# Patient Record
Sex: FEMALE | Race: WHITE | NOT HISPANIC OR LATINO | Employment: OTHER | URBAN - NONMETROPOLITAN AREA
[De-identification: names, ages, dates, MRNs, and addresses within clinical notes are randomized per-mention and may not be internally consistent; named-entity substitution may affect disease eponyms.]

---

## 2017-02-01 ENCOUNTER — COMMUNICATION - GICH (OUTPATIENT)
Dept: INTERNAL MEDICINE | Facility: OTHER | Age: 82
End: 2017-02-01

## 2017-02-01 DIAGNOSIS — G47.00 INSOMNIA: ICD-10-CM

## 2017-02-06 ENCOUNTER — COMMUNICATION - GICH (OUTPATIENT)
Dept: INTERNAL MEDICINE | Facility: OTHER | Age: 82
End: 2017-02-06

## 2017-02-06 DIAGNOSIS — K21.9 GASTRO-ESOPHAGEAL REFLUX DISEASE WITHOUT ESOPHAGITIS: ICD-10-CM

## 2017-02-20 ENCOUNTER — COMMUNICATION - GICH (OUTPATIENT)
Dept: INTERNAL MEDICINE | Facility: OTHER | Age: 82
End: 2017-02-20

## 2017-02-20 DIAGNOSIS — E03.9 HYPOTHYROIDISM: ICD-10-CM

## 2017-02-20 DIAGNOSIS — I10 ESSENTIAL (PRIMARY) HYPERTENSION: ICD-10-CM

## 2017-03-19 ENCOUNTER — HISTORY (OUTPATIENT)
Dept: EMERGENCY MEDICINE | Facility: OTHER | Age: 82
End: 2017-03-19

## 2017-03-22 ENCOUNTER — COMMUNICATION - GICH (OUTPATIENT)
Dept: INTERNAL MEDICINE | Facility: OTHER | Age: 82
End: 2017-03-22

## 2017-03-27 ENCOUNTER — COMMUNICATION - GICH (OUTPATIENT)
Dept: INTERNAL MEDICINE | Facility: OTHER | Age: 82
End: 2017-03-27

## 2017-03-27 DIAGNOSIS — I10 ESSENTIAL (PRIMARY) HYPERTENSION: ICD-10-CM

## 2017-03-31 ENCOUNTER — OFFICE VISIT - GICH (OUTPATIENT)
Dept: INTERNAL MEDICINE | Facility: OTHER | Age: 82
End: 2017-03-31

## 2017-03-31 ENCOUNTER — HISTORY (OUTPATIENT)
Dept: INTERNAL MEDICINE | Facility: OTHER | Age: 82
End: 2017-03-31

## 2017-03-31 DIAGNOSIS — E03.9 HYPOTHYROIDISM: ICD-10-CM

## 2017-03-31 DIAGNOSIS — N28.9 DISORDER OF KIDNEY AND URETER: ICD-10-CM

## 2017-03-31 DIAGNOSIS — E86.0 DEHYDRATION: ICD-10-CM

## 2017-03-31 DIAGNOSIS — D64.9 ANEMIA: ICD-10-CM

## 2017-03-31 DIAGNOSIS — J11.1 INFLUENZA DUE TO UNIDENTIFIED INFLUENZA VIRUS WITH OTHER RESPIRATORY MANIFESTATIONS: ICD-10-CM

## 2017-03-31 DIAGNOSIS — I10 ESSENTIAL (PRIMARY) HYPERTENSION: ICD-10-CM

## 2017-03-31 LAB
ANION GAP - HISTORICAL: 9 (ref 5–18)
BUN SERPL-MCNC: 23 MG/DL (ref 7–25)
BUN/CREAT RATIO - HISTORICAL: 16
CALCIUM SERPL-MCNC: 9 MG/DL (ref 8.6–10.3)
CHLORIDE SERPLBLD-SCNC: 107 MMOL/L (ref 98–107)
CO2 SERPL-SCNC: 24 MMOL/L (ref 21–31)
CREAT SERPL-MCNC: 1.43 MG/DL (ref 0.7–1.3)
ERYTHROCYTE [DISTWIDTH] IN BLOOD BY AUTOMATED COUNT: 11.8 % (ref 11.5–15.5)
FOLATE: 6.8 NG/ML
GFR IF NOT AFRICAN AMERICAN - HISTORICAL: 35 ML/MIN/1.73M2
GLUCOSE SERPL-MCNC: 89 MG/DL (ref 70–105)
HCT VFR BLD AUTO: 35.9 % (ref 33–51)
HEMOGLOBIN: 11.9 G/DL (ref 12–16)
MCH RBC QN AUTO: 33.6 PG (ref 26–34)
MCHC RBC AUTO-ENTMCNC: 33.1 G/DL (ref 32–36)
MCV RBC AUTO: 101 FL (ref 80–100)
PLATELET # BLD AUTO: 327 THOU/CU MM (ref 140–440)
PMV BLD: 6.1 FL (ref 6.5–11)
POTASSIUM SERPL-SCNC: 4.2 MMOL/L (ref 3.5–5.1)
RED BLOOD COUNT - HISTORICAL: 3.54 MIL/CU MM (ref 4–5.2)
SODIUM SERPL-SCNC: 140 MMOL/L (ref 133–143)
T4 FREE SERPL-MCNC: 1.51 NG/DL (ref 0.58–1.64)
TSH - HISTORICAL: 1.63 UIU/ML (ref 0.34–5.6)
VIT B12 SERPL-MCNC: 598 PG/ML (ref 180–914)
WHITE BLOOD COUNT - HISTORICAL: 8.5 THOU/CU MM (ref 4.5–11)

## 2017-04-18 ENCOUNTER — COMMUNICATION - GICH (OUTPATIENT)
Dept: INTERNAL MEDICINE | Facility: OTHER | Age: 82
End: 2017-04-18

## 2017-04-18 DIAGNOSIS — I10 ESSENTIAL (PRIMARY) HYPERTENSION: ICD-10-CM

## 2017-04-20 ENCOUNTER — AMBULATORY - GICH (OUTPATIENT)
Dept: LAB | Facility: OTHER | Age: 82
End: 2017-04-20

## 2017-04-20 DIAGNOSIS — I10 ESSENTIAL (PRIMARY) HYPERTENSION: ICD-10-CM

## 2017-04-20 LAB
ANION GAP - HISTORICAL: 11 (ref 5–18)
BUN SERPL-MCNC: 21 MG/DL (ref 7–25)
BUN/CREAT RATIO - HISTORICAL: 14
CALCIUM SERPL-MCNC: 9.2 MG/DL (ref 8.6–10.3)
CHLORIDE SERPLBLD-SCNC: 107 MMOL/L (ref 98–107)
CO2 SERPL-SCNC: 24 MMOL/L (ref 21–31)
CREAT SERPL-MCNC: 1.53 MG/DL (ref 0.7–1.3)
GFR IF NOT AFRICAN AMERICAN - HISTORICAL: 32 ML/MIN/1.73M2
GLUCOSE SERPL-MCNC: 87 MG/DL (ref 70–105)
POTASSIUM SERPL-SCNC: 4.6 MMOL/L (ref 3.5–5.1)
SODIUM SERPL-SCNC: 142 MMOL/L (ref 133–143)

## 2017-04-28 ENCOUNTER — OFFICE VISIT - GICH (OUTPATIENT)
Dept: INTERNAL MEDICINE | Facility: OTHER | Age: 82
End: 2017-04-28

## 2017-04-28 ENCOUNTER — HISTORY (OUTPATIENT)
Dept: INTERNAL MEDICINE | Facility: OTHER | Age: 82
End: 2017-04-28

## 2017-04-28 DIAGNOSIS — I10 ESSENTIAL (PRIMARY) HYPERTENSION: ICD-10-CM

## 2017-04-28 DIAGNOSIS — N18.30 CHRONIC KIDNEY DISEASE, STAGE III (MODERATE) (H): ICD-10-CM

## 2017-04-28 DIAGNOSIS — R60.9 EDEMA: ICD-10-CM

## 2017-04-28 DIAGNOSIS — K21.9 GASTRO-ESOPHAGEAL REFLUX DISEASE WITHOUT ESOPHAGITIS: ICD-10-CM

## 2017-05-01 ENCOUNTER — COMMUNICATION - GICH (OUTPATIENT)
Dept: INTERNAL MEDICINE | Facility: OTHER | Age: 82
End: 2017-05-01

## 2017-05-01 DIAGNOSIS — I10 ESSENTIAL (PRIMARY) HYPERTENSION: ICD-10-CM

## 2017-05-01 DIAGNOSIS — E03.9 HYPOTHYROIDISM: ICD-10-CM

## 2017-05-04 ENCOUNTER — OFFICE VISIT - GICH (OUTPATIENT)
Dept: FAMILY MEDICINE | Facility: OTHER | Age: 82
End: 2017-05-04

## 2017-05-04 ENCOUNTER — HISTORY (OUTPATIENT)
Dept: FAMILY MEDICINE | Facility: OTHER | Age: 82
End: 2017-05-04

## 2017-05-04 DIAGNOSIS — M70.62 TROCHANTERIC BURSITIS OF LEFT HIP: ICD-10-CM

## 2017-05-04 DIAGNOSIS — M70.61 TROCHANTERIC BURSITIS OF RIGHT HIP: ICD-10-CM

## 2017-05-26 ENCOUNTER — OFFICE VISIT - GICH (OUTPATIENT)
Dept: INTERNAL MEDICINE | Facility: OTHER | Age: 82
End: 2017-05-26

## 2017-05-26 ENCOUNTER — HISTORY (OUTPATIENT)
Dept: INTERNAL MEDICINE | Facility: OTHER | Age: 82
End: 2017-05-26

## 2017-05-26 ENCOUNTER — HOSPITAL ENCOUNTER (OUTPATIENT)
Dept: RADIOLOGY | Facility: OTHER | Age: 82
End: 2017-05-26
Attending: NURSE PRACTITIONER

## 2017-05-26 DIAGNOSIS — I10 ESSENTIAL (PRIMARY) HYPERTENSION: ICD-10-CM

## 2017-05-26 DIAGNOSIS — R93.89 ABNORMAL FINDINGS ON DIAGNOSTIC IMAGING OF OTHER SPECIFIED BODY STRUCTURES: ICD-10-CM

## 2017-05-26 DIAGNOSIS — F17.210 CIGARETTE NICOTINE DEPENDENCE, UNCOMPLICATED: ICD-10-CM

## 2017-05-26 DIAGNOSIS — E53.8 DEFICIENCY OF OTHER SPECIFIED B GROUP VITAMINS (CODE): ICD-10-CM

## 2017-05-26 DIAGNOSIS — K21.9 GASTRO-ESOPHAGEAL REFLUX DISEASE WITHOUT ESOPHAGITIS: ICD-10-CM

## 2017-05-26 DIAGNOSIS — I95.9 HYPOTENSION: ICD-10-CM

## 2017-05-26 DIAGNOSIS — J11.1 INFLUENZA DUE TO UNIDENTIFIED INFLUENZA VIRUS WITH OTHER RESPIRATORY MANIFESTATIONS: ICD-10-CM

## 2017-05-26 DIAGNOSIS — N18.30 CHRONIC KIDNEY DISEASE, STAGE III (MODERATE) (H): ICD-10-CM

## 2017-05-26 DIAGNOSIS — R60.9 EDEMA: ICD-10-CM

## 2017-05-26 LAB
ANION GAP - HISTORICAL: 8 (ref 5–18)
BILIRUB UR QL: NEGATIVE
BUN SERPL-MCNC: 27 MG/DL (ref 7–25)
BUN/CREAT RATIO - HISTORICAL: 18
CALCIUM SERPL-MCNC: 9.2 MG/DL (ref 8.6–10.3)
CHLORIDE SERPLBLD-SCNC: 108 MMOL/L (ref 98–107)
CLARITY, URINE: CLEAR CLARITY
CO2 SERPL-SCNC: 24 MMOL/L (ref 21–31)
COLOR UR: YELLOW COLOR
CREAT SERPL-MCNC: 1.51 MG/DL (ref 0.7–1.3)
GFR IF NOT AFRICAN AMERICAN - HISTORICAL: 33 ML/MIN/1.73M2
GLUCOSE SERPL-MCNC: 93 MG/DL (ref 70–105)
GLUCOSE URINE: NEGATIVE MG/DL
KETONES UR QL: NEGATIVE MG/DL
LEUKOCYTE ESTERASE URINE: NEGATIVE
NITRITE UR QL STRIP: NEGATIVE
OCCULT BLOOD,URINE - HISTORICAL: NEGATIVE
PH UR: 5.5 [PH]
POTASSIUM SERPL-SCNC: 4.6 MMOL/L (ref 3.5–5.1)
PROTEIN QUALITATIVE,URINE - HISTORICAL: NEGATIVE MG/DL
SODIUM SERPL-SCNC: 140 MMOL/L (ref 133–143)
SP GR UR STRIP: 1.02
UROBILINOGEN,QUALITATIVE - HISTORICAL: NORMAL EU/DL

## 2017-06-09 ENCOUNTER — OFFICE VISIT - GICH (OUTPATIENT)
Dept: FAMILY MEDICINE | Facility: OTHER | Age: 82
End: 2017-06-09

## 2017-06-09 ENCOUNTER — HOSPITAL ENCOUNTER (OUTPATIENT)
Dept: RADIOLOGY | Facility: OTHER | Age: 82
End: 2017-06-09
Attending: FAMILY MEDICINE

## 2017-06-09 ENCOUNTER — HISTORY (OUTPATIENT)
Dept: FAMILY MEDICINE | Facility: OTHER | Age: 82
End: 2017-06-09

## 2017-06-09 DIAGNOSIS — R50.9 FEVER: ICD-10-CM

## 2017-06-09 DIAGNOSIS — R82.71 BACTERIURIA: ICD-10-CM

## 2017-06-09 DIAGNOSIS — R53.1 WEAKNESS: ICD-10-CM

## 2017-06-09 DIAGNOSIS — I49.9 CARDIAC ARRHYTHMIA: ICD-10-CM

## 2017-06-09 LAB
A/G RATIO - HISTORICAL: 1.2 (ref 1–2)
ALBUMIN SERPL-MCNC: 3.7 G/DL (ref 3.5–5.7)
ALP SERPL-CCNC: 46 IU/L (ref 34–104)
ALT (SGPT) - HISTORICAL: 17 IU/L (ref 7–52)
ANION GAP - HISTORICAL: 13 (ref 5–18)
AST SERPL-CCNC: 20 IU/L (ref 13–39)
BACTERIA URINE: ABNORMAL BACTERIA/HPF
BILIRUB SERPL-MCNC: 0.5 MG/DL (ref 0.3–1)
BILIRUB UR QL: ABNORMAL
BUN SERPL-MCNC: 27 MG/DL (ref 7–25)
BUN/CREAT RATIO - HISTORICAL: 18
CALCIUM SERPL-MCNC: 9 MG/DL (ref 8.6–10.3)
CHLORIDE SERPLBLD-SCNC: 105 MMOL/L (ref 98–107)
CLARITY, URINE: CLEAR CLARITY
CO2 SERPL-SCNC: 22 MMOL/L (ref 21–31)
COLOR UR: YELLOW COLOR
CREAT SERPL-MCNC: 1.46 MG/DL (ref 0.7–1.3)
EPITHELIAL CELLS: ABNORMAL EPI/HPF
ERYTHROCYTE [DISTWIDTH] IN BLOOD BY AUTOMATED COUNT: 13.3 % (ref 11.5–15.5)
GFR IF NOT AFRICAN AMERICAN - HISTORICAL: 34 ML/MIN/1.73M2
GLOBULIN - HISTORICAL: 3.2 G/DL (ref 2–3.7)
GLUCOSE SERPL-MCNC: 107 MG/DL (ref 70–105)
GLUCOSE URINE: NEGATIVE MG/DL
HCT VFR BLD AUTO: 34.8 % (ref 33–51)
HEMOGLOBIN: 11.5 G/DL (ref 12–16)
KETONES UR QL: ABNORMAL MG/DL
LEUKOCYTE ESTERASE URINE: NEGATIVE
MCH RBC QN AUTO: 33.5 PG (ref 26–34)
MCHC RBC AUTO-ENTMCNC: 33 G/DL (ref 32–36)
MCV RBC AUTO: 102 FL (ref 80–100)
NITRITE UR QL STRIP: NEGATIVE
OCCULT BLOOD,URINE - HISTORICAL: NEGATIVE
PH UR: 5 [PH]
PLATELET # BLD AUTO: 213 THOU/CU MM (ref 140–440)
PMV BLD: 9 FL (ref 6.5–11)
POTASSIUM SERPL-SCNC: 4.3 MMOL/L (ref 3.5–5.1)
PROT SERPL-MCNC: 6.9 G/DL (ref 6.4–8.9)
PROTEIN QUALITATIVE,URINE - HISTORICAL: ABNORMAL MG/DL
RBC - HISTORICAL: ABNORMAL /HPF
RED BLOOD COUNT - HISTORICAL: 3.43 MIL/CU MM (ref 4–5.2)
SODIUM SERPL-SCNC: 140 MMOL/L (ref 133–143)
SP GR UR STRIP: >=1.03
UROBILINOGEN,QUALITATIVE - HISTORICAL: NORMAL EU/DL
WBC - HISTORICAL: ABNORMAL /HPF
WHITE BLOOD COUNT - HISTORICAL: 13.8 THOU/CU MM (ref 4.5–11)

## 2017-06-11 LAB — CULTURE - HISTORICAL: NORMAL

## 2017-06-12 ENCOUNTER — COMMUNICATION - GICH (OUTPATIENT)
Dept: FAMILY MEDICINE | Facility: OTHER | Age: 82
End: 2017-06-12

## 2017-06-14 ENCOUNTER — OFFICE VISIT - GICH (OUTPATIENT)
Dept: INTERNAL MEDICINE | Facility: OTHER | Age: 82
End: 2017-06-14

## 2017-06-14 ENCOUNTER — HISTORY (OUTPATIENT)
Dept: INTERNAL MEDICINE | Facility: OTHER | Age: 82
End: 2017-06-14
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
## 2017-06-14 DIAGNOSIS — F17.210 CIGARETTE NICOTINE DEPENDENCE, UNCOMPLICATED: ICD-10-CM

## 2017-06-14 DIAGNOSIS — Z87.01 HISTORY OF RECURRENT PNEUMONIA: ICD-10-CM

## 2017-06-14 DIAGNOSIS — D72.829 ELEVATED WHITE BLOOD CELL COUNT: ICD-10-CM

## 2017-06-14 DIAGNOSIS — M25.561 PAIN IN RIGHT KNEE: ICD-10-CM

## 2017-06-14 DIAGNOSIS — N18.30 CHRONIC KIDNEY DISEASE, STAGE III (MODERATE) (H): ICD-10-CM

## 2017-06-14 DIAGNOSIS — R50.9 FEVER: ICD-10-CM

## 2017-06-14 LAB
ABSOLUTE BASOPHILS - HISTORICAL: 0 THOU/CU MM
ABSOLUTE EOSINOPHILS - HISTORICAL: 0.1 THOU/CU MM
ABSOLUTE LYMPHOCYTES - HISTORICAL: 2.2 THOU/CU MM (ref 0.9–2.9)
ABSOLUTE MONOCYTES - HISTORICAL: 0.9 THOU/CU MM
ABSOLUTE NEUTROPHILS - HISTORICAL: 10.3 THOU/CU MM (ref 1.7–7)
BASOPHILS # BLD AUTO: 0.2 %
EOSINOPHIL NFR BLD AUTO: 0.5 %
ERYTHROCYTE [DISTWIDTH] IN BLOOD BY AUTOMATED COUNT: 12.9 % (ref 11.5–15.5)
ERYTHROCYTE [SEDIMENTATION RATE] IN BLOOD: 100 MM/HR
HCT VFR BLD AUTO: 30.4 % (ref 33–51)
HEMOGLOBIN: 10 G/DL (ref 12–16)
LYMPHOCYTES NFR BLD AUTO: 16.1 % (ref 20–44)
MCH RBC QN AUTO: 33.1 PG (ref 26–34)
MCHC RBC AUTO-ENTMCNC: 32.9 G/DL (ref 32–36)
MCV RBC AUTO: 101 FL (ref 80–100)
MONOCYTES NFR BLD AUTO: 6.9 %
NEUTROPHILS NFR BLD AUTO: 75.6 % (ref 42–72)
PLATELET # BLD AUTO: 248 THOU/CU MM (ref 140–440)
PMV BLD: 9.1 FL (ref 6.5–11)
RED BLOOD COUNT - HISTORICAL: 3.02 MIL/CU MM (ref 4–5.2)
WHITE BLOOD COUNT - HISTORICAL: 13.6 THOU/CU MM (ref 4.5–11)

## 2017-06-16 ENCOUNTER — COMMUNICATION - GICH (OUTPATIENT)
Dept: INTERNAL MEDICINE | Facility: OTHER | Age: 82
End: 2017-06-16

## 2017-06-16 LAB — LYME SCREEN W/REFLEX WEST BLOT - HISTORICAL: NEGATIVE

## 2017-06-17 LAB
ANAPLASMA PHAGOCYTOPHILUM - HISTORICAL: NEGATIVE
EHRLICHIA CHAFFEENSIS - HISTORICAL: NEGATIVE
EHRLICHIA EWINGII/CANIS - HISTORICAL: NEGATIVE
EHRLICHIA MURIS-LIKE - HISTORICAL: NEGATIVE

## 2017-06-19 ENCOUNTER — COMMUNICATION - GICH (OUTPATIENT)
Dept: INTERNAL MEDICINE | Facility: OTHER | Age: 82
End: 2017-06-19

## 2017-06-19 DIAGNOSIS — I10 ESSENTIAL (PRIMARY) HYPERTENSION: ICD-10-CM

## 2017-06-20 LAB
CULTURE - HISTORICAL: NORMAL
CULTURE - HISTORICAL: NORMAL

## 2017-06-21 ENCOUNTER — HISTORY (OUTPATIENT)
Dept: INTERNAL MEDICINE | Facility: OTHER | Age: 82
End: 2017-06-21

## 2017-06-21 ENCOUNTER — OFFICE VISIT - GICH (OUTPATIENT)
Dept: INTERNAL MEDICINE | Facility: OTHER | Age: 82
End: 2017-06-21

## 2017-06-21 DIAGNOSIS — F17.210 CIGARETTE NICOTINE DEPENDENCE, UNCOMPLICATED: ICD-10-CM

## 2017-06-21 DIAGNOSIS — R26.81 UNSTEADINESS ON FEET: ICD-10-CM

## 2017-06-21 DIAGNOSIS — Z12.11 ENCOUNTER FOR SCREENING FOR MALIGNANT NEOPLASM OF COLON: ICD-10-CM

## 2017-06-21 DIAGNOSIS — D50.9 IRON DEFICIENCY ANEMIA: ICD-10-CM

## 2017-06-21 DIAGNOSIS — R50.9 FEVER: ICD-10-CM

## 2017-06-21 DIAGNOSIS — D72.829 ELEVATED WHITE BLOOD CELL COUNT: ICD-10-CM

## 2017-06-21 LAB
ABSOLUTE BASOPHILS - HISTORICAL: 0 THOU/CU MM
ABSOLUTE EOSINOPHILS - HISTORICAL: 0 THOU/CU MM
ABSOLUTE IMMATURE GRANULOCYTES(METAS,MYELOS,PROS) - HISTORICAL: 0.3 THOU/CU MM
ABSOLUTE LYMPHOCYTES - HISTORICAL: 3 THOU/CU MM (ref 0.9–2.9)
ABSOLUTE MONOCYTES - HISTORICAL: 0.5 THOU/CU MM
ABSOLUTE NEUTROPHILS - HISTORICAL: 9.3 THOU/CU MM (ref 1.7–7)
BASOPHILS # BLD AUTO: 0.2 %
EOSINOPHIL NFR BLD AUTO: 0.3 %
ERYTHROCYTE [DISTWIDTH] IN BLOOD BY AUTOMATED COUNT: 13.1 % (ref 11.5–15.5)
ERYTHROCYTE [SEDIMENTATION RATE] IN BLOOD: 106 MM/HR
FERRITIN SERPL-MCNC: 145.7 NG/ML (ref 23.9–336.2)
FOLATE: >24.8 NG/ML
HCT VFR BLD AUTO: 31.5 % (ref 33–51)
HEMOGLOBIN: 10.2 G/DL (ref 12–16)
IMMATURE GRANULOCYTES(METAS,MYELOS,PROS) - HISTORICAL: 2.2 %
IRON BINDING CAP: 255.08 UG/DL (ref 245–400)
IRON SATURATION: 10 % (ref 20–55)
IRON: 26 UG/DL (ref 50–212)
LYMPHOCYTES NFR BLD AUTO: 22.8 % (ref 20–44)
MCH RBC QN AUTO: 32.4 PG (ref 26–34)
MCHC RBC AUTO-ENTMCNC: 32.4 G/DL (ref 32–36)
MCV RBC AUTO: 100 FL (ref 80–100)
MONOCYTES NFR BLD AUTO: 3.9 %
NEUTROPHILS NFR BLD AUTO: 70.6 % (ref 42–72)
PLATELET # BLD AUTO: 453 THOU/CU MM (ref 140–440)
PMV BLD: 8.5 FL (ref 6.5–11)
RED BLOOD COUNT - HISTORICAL: 3.15 MIL/CU MM (ref 4–5.2)
UIBC (UNSATURATED) - HISTORICAL: 229.08 MG/DL
VIT B12 SERPL-MCNC: 953 PG/ML (ref 180–914)
WHITE BLOOD COUNT - HISTORICAL: 13.1 THOU/CU MM (ref 4.5–11)

## 2017-06-23 ENCOUNTER — HOSPITAL ENCOUNTER (OUTPATIENT)
Dept: RADIOLOGY | Facility: OTHER | Age: 82
End: 2017-06-23
Attending: NURSE PRACTITIONER

## 2017-06-23 DIAGNOSIS — R50.9 FEVER: ICD-10-CM

## 2017-06-23 DIAGNOSIS — F17.210 CIGARETTE NICOTINE DEPENDENCE, UNCOMPLICATED: ICD-10-CM

## 2017-06-23 DIAGNOSIS — D50.9 IRON DEFICIENCY ANEMIA: ICD-10-CM

## 2017-06-23 DIAGNOSIS — D72.829 ELEVATED WHITE BLOOD CELL COUNT: ICD-10-CM

## 2017-06-26 ENCOUNTER — AMBULATORY - GICH (OUTPATIENT)
Dept: INTERNAL MEDICINE | Facility: OTHER | Age: 82
End: 2017-06-26

## 2017-06-26 ENCOUNTER — COMMUNICATION - GICH (OUTPATIENT)
Dept: INTERNAL MEDICINE | Facility: OTHER | Age: 82
End: 2017-06-26

## 2017-06-26 DIAGNOSIS — K76.89 OTHER SPECIFIED DISEASES OF LIVER (CODE): ICD-10-CM

## 2017-06-29 ENCOUNTER — AMBULATORY - GICH (OUTPATIENT)
Dept: LAB | Facility: OTHER | Age: 82
End: 2017-06-29

## 2017-06-29 ENCOUNTER — HOSPITAL ENCOUNTER (OUTPATIENT)
Dept: RADIOLOGY | Facility: OTHER | Age: 82
End: 2017-06-29
Attending: NURSE PRACTITIONER

## 2017-06-29 DIAGNOSIS — K76.89 OTHER SPECIFIED DISEASES OF LIVER (CODE): ICD-10-CM

## 2017-06-29 DIAGNOSIS — D50.9 IRON DEFICIENCY ANEMIA: ICD-10-CM

## 2017-06-29 DIAGNOSIS — Z12.11 ENCOUNTER FOR SCREENING FOR MALIGNANT NEOPLASM OF COLON: ICD-10-CM

## 2017-06-29 LAB
A/G RATIO - HISTORICAL: 0.9 (ref 1–2)
ALBUMIN SERPL-MCNC: 3.7 G/DL (ref 3.5–5.7)
ALP SERPL-CCNC: 55 IU/L (ref 34–104)
ALT (SGPT) - HISTORICAL: 9 IU/L (ref 7–52)
ANION GAP - HISTORICAL: 7 (ref 5–18)
AST SERPL-CCNC: 15 IU/L (ref 13–39)
BILIRUB SERPL-MCNC: 0.3 MG/DL (ref 0.3–1)
BUN SERPL-MCNC: 22 MG/DL (ref 7–25)
BUN/CREAT RATIO - HISTORICAL: 17
CALCIUM SERPL-MCNC: 9.4 MG/DL (ref 8.6–10.3)
CHLORIDE SERPLBLD-SCNC: 107 MMOL/L (ref 98–107)
CO2 SERPL-SCNC: 24 MMOL/L (ref 21–31)
CREAT SERPL-MCNC: 1.32 MG/DL (ref 0.7–1.3)
GFR IF NOT AFRICAN AMERICAN - HISTORICAL: 38 ML/MIN/1.73M2
GLOBULIN - HISTORICAL: 4.1 G/DL (ref 2–3.7)
GLUCOSE SERPL-MCNC: 102 MG/DL (ref 70–105)
HEMOCCULT SP1 STL QL: NEGATIVE
HEMOCCULT SP2 STL QL: NEGATIVE
HEMOCCULT SP3 STL QL: NEGATIVE
POTASSIUM SERPL-SCNC: 4.3 MMOL/L (ref 3.5–5.1)
PROT SERPL-MCNC: 7.8 G/DL (ref 6.4–8.9)
SODIUM SERPL-SCNC: 138 MMOL/L (ref 133–143)
SPECIMEN DATE DAY 1 - HISTORICAL: NORMAL
SPECIMEN DATE DAY 2 - HISTORICAL: NORMAL
SPECIMEN DATE DAY 3 - HISTORICAL: NORMAL

## 2017-06-30 ENCOUNTER — AMBULATORY - GICH (OUTPATIENT)
Dept: INTERNAL MEDICINE | Facility: OTHER | Age: 82
End: 2017-06-30

## 2017-06-30 ENCOUNTER — COMMUNICATION - GICH (OUTPATIENT)
Dept: INTERNAL MEDICINE | Facility: OTHER | Age: 82
End: 2017-06-30

## 2017-06-30 ENCOUNTER — AMBULATORY - GICH (OUTPATIENT)
Dept: SCHEDULING | Facility: OTHER | Age: 82
End: 2017-06-30

## 2017-06-30 DIAGNOSIS — D50.9 IRON DEFICIENCY ANEMIA: ICD-10-CM

## 2017-06-30 DIAGNOSIS — K75.0 ABSCESS OF LIVER: ICD-10-CM

## 2017-07-02 ENCOUNTER — AMBULATORY - GICH (OUTPATIENT)
Dept: SCHEDULING | Facility: OTHER | Age: 82
End: 2017-07-02

## 2017-07-03 ENCOUNTER — COMMUNICATION - GICH (OUTPATIENT)
Dept: INTERNAL MEDICINE | Facility: OTHER | Age: 82
End: 2017-07-03

## 2017-07-03 DIAGNOSIS — G47.00 INSOMNIA: ICD-10-CM

## 2017-07-12 ENCOUNTER — HISTORY (OUTPATIENT)
Dept: INTERNAL MEDICINE | Facility: OTHER | Age: 82
End: 2017-07-12

## 2017-07-12 ENCOUNTER — OFFICE VISIT - GICH (OUTPATIENT)
Dept: INTERNAL MEDICINE | Facility: OTHER | Age: 82
End: 2017-07-12

## 2017-07-12 DIAGNOSIS — D50.8 OTHER IRON DEFICIENCY ANEMIAS (CODE): ICD-10-CM

## 2017-07-12 DIAGNOSIS — K75.0 ABSCESS OF LIVER: ICD-10-CM

## 2017-07-12 LAB
ABSOLUTE BASOPHILS - HISTORICAL: 0.1 THOU/CU MM
ABSOLUTE EOSINOPHILS - HISTORICAL: 0.6 THOU/CU MM
ABSOLUTE IMMATURE GRANULOCYTES(METAS,MYELOS,PROS) - HISTORICAL: 0.1 THOU/CU MM
ABSOLUTE LYMPHOCYTES - HISTORICAL: 3.7 THOU/CU MM (ref 0.9–2.9)
ABSOLUTE MONOCYTES - HISTORICAL: 0.6 THOU/CU MM
ABSOLUTE NEUTROPHILS - HISTORICAL: 5.9 THOU/CU MM (ref 1.7–7)
BASOPHILS # BLD AUTO: 0.8 %
EOSINOPHIL NFR BLD AUTO: 5.3 %
ERYTHROCYTE [DISTWIDTH] IN BLOOD BY AUTOMATED COUNT: 13.8 % (ref 11.5–15.5)
HCT VFR BLD AUTO: 34.7 % (ref 33–51)
HEMOGLOBIN: 11.7 G/DL (ref 12–16)
IMMATURE GRANULOCYTES(METAS,MYELOS,PROS) - HISTORICAL: 1.1 %
LYMPHOCYTES NFR BLD AUTO: 33.9 % (ref 20–44)
MCH RBC QN AUTO: 33.1 PG (ref 26–34)
MCHC RBC AUTO-ENTMCNC: 33.7 G/DL (ref 32–36)
MCV RBC AUTO: 98 FL (ref 80–100)
MONOCYTES NFR BLD AUTO: 5 %
NEUTROPHILS NFR BLD AUTO: 53.9 % (ref 42–72)
PLATELET # BLD AUTO: 256 THOU/CU MM (ref 140–440)
PMV BLD: 9.3 FL (ref 6.5–11)
RED BLOOD COUNT - HISTORICAL: 3.54 MIL/CU MM (ref 4–5.2)
WHITE BLOOD COUNT - HISTORICAL: 11 THOU/CU MM (ref 4.5–11)

## 2017-07-17 ENCOUNTER — AMBULATORY - GICH (OUTPATIENT)
Dept: SCHEDULING | Facility: OTHER | Age: 82
End: 2017-07-17

## 2017-07-24 ENCOUNTER — COMMUNICATION - GICH (OUTPATIENT)
Dept: INTERNAL MEDICINE | Facility: OTHER | Age: 82
End: 2017-07-24

## 2017-07-24 DIAGNOSIS — G60.9 HEREDITARY AND IDIOPATHIC NEUROPATHY: ICD-10-CM

## 2017-07-24 DIAGNOSIS — I10 ESSENTIAL (PRIMARY) HYPERTENSION: ICD-10-CM

## 2017-07-24 DIAGNOSIS — E03.9 HYPOTHYROIDISM: ICD-10-CM

## 2017-08-04 ENCOUNTER — HISTORY (OUTPATIENT)
Dept: INTERNAL MEDICINE | Facility: OTHER | Age: 82
End: 2017-08-04

## 2017-08-04 ENCOUNTER — OFFICE VISIT - GICH (OUTPATIENT)
Dept: INTERNAL MEDICINE | Facility: OTHER | Age: 82
End: 2017-08-04

## 2017-08-04 DIAGNOSIS — D50.9 IRON DEFICIENCY ANEMIA: ICD-10-CM

## 2017-08-04 DIAGNOSIS — G60.9 HEREDITARY AND IDIOPATHIC NEUROPATHY: ICD-10-CM

## 2017-08-04 DIAGNOSIS — K75.0 ABSCESS OF LIVER: ICD-10-CM

## 2017-08-04 DIAGNOSIS — K58.0 IRRITABLE BOWEL SYNDROME WITH DIARRHEA: ICD-10-CM

## 2017-08-09 ENCOUNTER — HOSPITAL ENCOUNTER (OUTPATIENT)
Dept: PHYSICAL THERAPY | Facility: OTHER | Age: 82
Setting detail: THERAPIES SERIES
End: 2017-08-09
Attending: NURSE PRACTITIONER

## 2017-08-09 DIAGNOSIS — R26.81 UNSTEADINESS ON FEET: ICD-10-CM

## 2017-08-25 ENCOUNTER — HOSPITAL ENCOUNTER (OUTPATIENT)
Dept: PHYSICAL THERAPY | Facility: OTHER | Age: 82
Setting detail: THERAPIES SERIES
End: 2017-08-25
Attending: NURSE PRACTITIONER

## 2017-08-26 ENCOUNTER — COMMUNICATION - GICH (OUTPATIENT)
Dept: INTERNAL MEDICINE | Facility: OTHER | Age: 82
End: 2017-08-26

## 2017-08-26 DIAGNOSIS — E03.9 HYPOTHYROIDISM: ICD-10-CM

## 2017-09-01 ENCOUNTER — AMBULATORY - GICH (OUTPATIENT)
Dept: CHIROPRACTIC MEDICINE | Facility: OTHER | Age: 82
End: 2017-09-01

## 2017-09-01 ENCOUNTER — OFFICE VISIT - GICH (OUTPATIENT)
Dept: CHIROPRACTIC MEDICINE | Facility: OTHER | Age: 82
End: 2017-09-01

## 2017-09-01 DIAGNOSIS — M70.61 TROCHANTERIC BURSITIS OF RIGHT HIP: ICD-10-CM

## 2017-09-01 DIAGNOSIS — M99.04 SEGMENTAL AND SOMATIC DYSFUNCTION OF SACRAL REGION: ICD-10-CM

## 2017-09-01 DIAGNOSIS — M99.05 SEGMENTAL AND SOMATIC DYSFUNCTION OF PELVIC REGION: ICD-10-CM

## 2017-09-05 ENCOUNTER — COMMUNICATION - GICH (OUTPATIENT)
Dept: INTERNAL MEDICINE | Facility: OTHER | Age: 82
End: 2017-09-05

## 2017-09-05 DIAGNOSIS — Z78.0 ASYMPTOMATIC MENOPAUSAL STATE: ICD-10-CM

## 2017-09-07 ENCOUNTER — OFFICE VISIT - GICH (OUTPATIENT)
Dept: CHIROPRACTIC MEDICINE | Facility: OTHER | Age: 82
End: 2017-09-07

## 2017-09-07 ENCOUNTER — HOSPITAL ENCOUNTER (OUTPATIENT)
Dept: PHYSICAL THERAPY | Facility: OTHER | Age: 82
Setting detail: THERAPIES SERIES
End: 2017-09-07
Attending: NURSE PRACTITIONER

## 2017-09-07 DIAGNOSIS — M70.61 TROCHANTERIC BURSITIS OF RIGHT HIP: ICD-10-CM

## 2017-09-07 DIAGNOSIS — M99.05 SEGMENTAL AND SOMATIC DYSFUNCTION OF PELVIC REGION: ICD-10-CM

## 2017-09-07 DIAGNOSIS — M99.04 SEGMENTAL AND SOMATIC DYSFUNCTION OF SACRAL REGION: ICD-10-CM

## 2017-09-11 ENCOUNTER — OFFICE VISIT - GICH (OUTPATIENT)
Dept: CHIROPRACTIC MEDICINE | Facility: OTHER | Age: 82
End: 2017-09-11

## 2017-09-11 DIAGNOSIS — M99.04 SEGMENTAL AND SOMATIC DYSFUNCTION OF SACRAL REGION: ICD-10-CM

## 2017-09-11 DIAGNOSIS — M70.61 TROCHANTERIC BURSITIS OF RIGHT HIP: ICD-10-CM

## 2017-09-11 DIAGNOSIS — M54.50 LOW BACK PAIN: ICD-10-CM

## 2017-09-11 DIAGNOSIS — M99.05 SEGMENTAL AND SOMATIC DYSFUNCTION OF PELVIC REGION: ICD-10-CM

## 2017-09-15 ENCOUNTER — HISTORY (OUTPATIENT)
Dept: RADIOLOGY | Facility: OTHER | Age: 82
End: 2017-09-15

## 2017-09-15 ENCOUNTER — OFFICE VISIT - GICH (OUTPATIENT)
Dept: CHIROPRACTIC MEDICINE | Facility: OTHER | Age: 82
End: 2017-09-15

## 2017-09-15 ENCOUNTER — HOSPITAL ENCOUNTER (OUTPATIENT)
Dept: RADIOLOGY | Facility: OTHER | Age: 82
End: 2017-09-15
Attending: NURSE PRACTITIONER

## 2017-09-15 DIAGNOSIS — Z78.0 ASYMPTOMATIC MENOPAUSAL STATE: ICD-10-CM

## 2017-09-15 DIAGNOSIS — Z12.31 ENCOUNTER FOR SCREENING MAMMOGRAM FOR MALIGNANT NEOPLASM OF BREAST: ICD-10-CM

## 2017-09-15 DIAGNOSIS — M99.02 SEGMENTAL AND SOMATIC DYSFUNCTION OF THORACIC REGION: ICD-10-CM

## 2017-09-15 DIAGNOSIS — M70.61 TROCHANTERIC BURSITIS OF RIGHT HIP: ICD-10-CM

## 2017-09-15 DIAGNOSIS — M54.6 PAIN IN THORACIC SPINE: ICD-10-CM

## 2017-09-15 DIAGNOSIS — M99.04 SEGMENTAL AND SOMATIC DYSFUNCTION OF SACRAL REGION: ICD-10-CM

## 2017-09-18 ENCOUNTER — OFFICE VISIT - GICH (OUTPATIENT)
Dept: CHIROPRACTIC MEDICINE | Facility: OTHER | Age: 82
End: 2017-09-18

## 2017-09-18 DIAGNOSIS — M99.05 SEGMENTAL AND SOMATIC DYSFUNCTION OF PELVIC REGION: ICD-10-CM

## 2017-09-18 DIAGNOSIS — M70.61 TROCHANTERIC BURSITIS OF RIGHT HIP: ICD-10-CM

## 2017-09-20 ENCOUNTER — AMBULATORY - GICH (OUTPATIENT)
Dept: INTERNAL MEDICINE | Facility: OTHER | Age: 82
End: 2017-09-20

## 2017-09-20 ENCOUNTER — COMMUNICATION - GICH (OUTPATIENT)
Dept: INTERNAL MEDICINE | Facility: OTHER | Age: 82
End: 2017-09-20

## 2017-09-20 DIAGNOSIS — G47.00 INSOMNIA: ICD-10-CM

## 2017-09-21 ENCOUNTER — HOSPITAL ENCOUNTER (OUTPATIENT)
Dept: PHYSICAL THERAPY | Facility: OTHER | Age: 82
Setting detail: THERAPIES SERIES
End: 2017-09-21
Attending: NURSE PRACTITIONER

## 2017-09-21 ENCOUNTER — OFFICE VISIT - GICH (OUTPATIENT)
Dept: CHIROPRACTIC MEDICINE | Facility: OTHER | Age: 82
End: 2017-09-21

## 2017-09-21 DIAGNOSIS — M54.6 PAIN IN THORACIC SPINE: ICD-10-CM

## 2017-09-21 DIAGNOSIS — M54.50 LOW BACK PAIN: ICD-10-CM

## 2017-09-21 DIAGNOSIS — M99.02 SEGMENTAL AND SOMATIC DYSFUNCTION OF THORACIC REGION: ICD-10-CM

## 2017-09-21 DIAGNOSIS — M99.04 SEGMENTAL AND SOMATIC DYSFUNCTION OF SACRAL REGION: ICD-10-CM

## 2017-09-22 ENCOUNTER — COMMUNICATION - GICH (OUTPATIENT)
Dept: INTERNAL MEDICINE | Facility: OTHER | Age: 82
End: 2017-09-22

## 2017-09-22 DIAGNOSIS — N95.2 POSTMENOPAUSAL ATROPHIC VAGINITIS: ICD-10-CM

## 2017-09-26 ENCOUNTER — COMMUNICATION - GICH (OUTPATIENT)
Dept: INTERNAL MEDICINE | Facility: OTHER | Age: 82
End: 2017-09-26

## 2017-09-26 DIAGNOSIS — E03.9 HYPOTHYROIDISM: ICD-10-CM

## 2017-10-06 ENCOUNTER — OFFICE VISIT - GICH (OUTPATIENT)
Dept: INTERNAL MEDICINE | Facility: OTHER | Age: 82
End: 2017-10-06

## 2017-10-06 ENCOUNTER — HISTORY (OUTPATIENT)
Dept: INTERNAL MEDICINE | Facility: OTHER | Age: 82
End: 2017-10-06

## 2017-10-06 DIAGNOSIS — M85.80 OTHER SPECIFIED DISORDERS OF BONE DENSITY AND STRUCTURE, UNSPECIFIED SITE (CODE): ICD-10-CM

## 2017-10-06 DIAGNOSIS — K58.0 IRRITABLE BOWEL SYNDROME WITH DIARRHEA: ICD-10-CM

## 2017-10-06 DIAGNOSIS — Z23 ENCOUNTER FOR IMMUNIZATION: ICD-10-CM

## 2017-10-06 DIAGNOSIS — E03.9 HYPOTHYROIDISM: ICD-10-CM

## 2017-10-06 LAB — VITAMIN D TOTAL - HISTORICAL: 42.7 NG/ML

## 2017-10-10 ENCOUNTER — HOSPITAL ENCOUNTER (OUTPATIENT)
Dept: PHYSICAL THERAPY | Facility: OTHER | Age: 82
Setting detail: THERAPIES SERIES
End: 2017-10-10
Attending: NURSE PRACTITIONER

## 2017-10-23 ENCOUNTER — COMMUNICATION - GICH (OUTPATIENT)
Dept: INTERNAL MEDICINE | Facility: OTHER | Age: 82
End: 2017-10-23

## 2017-10-23 DIAGNOSIS — E03.9 HYPOTHYROIDISM: ICD-10-CM

## 2017-10-23 DIAGNOSIS — G60.9 HEREDITARY AND IDIOPATHIC NEUROPATHY: ICD-10-CM

## 2017-10-23 DIAGNOSIS — I10 ESSENTIAL (PRIMARY) HYPERTENSION: ICD-10-CM

## 2017-10-30 ENCOUNTER — COMMUNICATION - GICH (OUTPATIENT)
Dept: INTERNAL MEDICINE | Facility: OTHER | Age: 82
End: 2017-10-30

## 2017-10-30 DIAGNOSIS — G47.00 INSOMNIA: ICD-10-CM

## 2017-11-02 ENCOUNTER — COMMUNICATION - GICH (OUTPATIENT)
Dept: INTERNAL MEDICINE | Facility: OTHER | Age: 82
End: 2017-11-02

## 2017-11-07 ENCOUNTER — COMMUNICATION - GICH (OUTPATIENT)
Dept: INTERNAL MEDICINE | Facility: OTHER | Age: 82
End: 2017-11-07

## 2017-12-04 ENCOUNTER — AMBULATORY - GICH (OUTPATIENT)
Dept: FAMILY MEDICINE | Facility: OTHER | Age: 82
End: 2017-12-04

## 2017-12-11 ENCOUNTER — COMMUNICATION - GICH (OUTPATIENT)
Dept: INTERNAL MEDICINE | Facility: OTHER | Age: 82
End: 2017-12-11

## 2017-12-11 DIAGNOSIS — G47.00 INSOMNIA: ICD-10-CM

## 2017-12-20 ENCOUNTER — COMMUNICATION - GICH (OUTPATIENT)
Dept: INTERNAL MEDICINE | Facility: OTHER | Age: 82
End: 2017-12-20

## 2017-12-20 DIAGNOSIS — E03.9 HYPOTHYROIDISM: ICD-10-CM

## 2017-12-27 NOTE — PROGRESS NOTES
Patient Information     Patient Name MRN Sex Sylvie Raza [id number] Female 1934      Progress Notes by Mirella Aly NP at 2017 10:20 AM     Author:  Mirella Aly NP Service:  (none) Author Type:  PHYS- Nurse Practitioner     Filed:  2017 10:48 AM Encounter Date:  2017 Status:  Signed     :  Mirella Aly NP (PHYS- Nurse Practitioner)            SUBJECTIVE:    Sylvie Bautista is a 83 y.o. female who presents for follow-up    HPI  last month pacer was hospitalized for liver abscess. She had incision and drainage. She was placed on Augmentin. She followed up with GI a couple weeks ago and all was well. She has no abdominal pain. Denies jaundice and yellow stools. Feeling well. Appetite is good. She also had anemia which improved after liver abscess was drained. She was found to have some mild iron deficiency anemia. This was felt to be diet related as she has a diet poor and iron. She has made some changes to her diet to improve that. She is not on iron pills. Her last hemoglobin was 11.7 g/dL. She also has history of irritable bowel syndrome which fluctuate between diarrhea and constipation. She is wondering about Vibrezi. She had started Metamucil) before the liver abscess and discontinue that after a couple of days but is planning to restart it once again to see if that improves her symptoms. She has been taking Pepto-Bismol occasionally with diarrhea stools because the Imodium causes too much constipation.  She was referred to physical therapy prior to liver abscess secondary to gait instability. She states her balance is off. If she has to walk any long distances she will use a cane. She never went to physical therapy because then she was in the hospital but she is planning to start it this week.  No Known Allergies,   Current Outpatient Prescriptions on File Prior to Visit       Medication  Sig Dispense Refill     acetaminophen SR (TYLENOL  ARTHRITIS) 650 mg Extended-Release tablet Take 1,300 mg by mouth every 24 hours. Max acetaminophen dose: 4000mg in 24 hrs.   Indications: PAIN       aspirin 81 mg tablet Take 81 mg by mouth once daily with a meal.       Blood Pressure Test Kit-Large kit As directed. OMRON 1 Each 0     CALCIUM CARBONATE/VITAMIN D3 (CALCIUM 600 + D,3, ORAL) Take  by mouth 2 times daily.       carboxymethylcellulose 0.5% (REFRESH TEARS) 0.5 % drop ophthalmic drops Place 1 Drop into both eyes once daily.  0     chlorthalidone (HYGROTON) 25 mg tablet TAKE ONE TABLET three days per week 45 tablet 0     docusate (DULCOLAX STOOL SOFTENER, DSS,) 100 mg capsule Take 1 capsule by mouth for constipation. Can take 2 in 24 hours.  0     estradiol (ESTRACE) 0.1 mg/g vaginal cream Insert 2 g into the vagina every Monday and Friday. 42.5 g 6     folic acid 1 mg tablet Take 1 tablet by mouth once daily.  0     gabapentin (NEURONTIN) 300 mg capsule TAKE 1 CAPSULE BY MOUTH THREE TIMES DAILY 270 capsule 0     levothyroxine (SYNTHROID) 112 mcg tablet TAKE 1 TABLET BY MOUTH ONCE DAILY 90 tablet 2     loratadine (CLARITIN) 10 mg tablet Take 1 tablet by mouth once daily.  0     losartan (COZAAR) 25 mg tablet TAKE 1 TABLET BY MOUTH EVERY DAY 90 tablet 3     pantoprazole (PROTONIX) 20 mg tablet Take 1 tablet by mouth once daily. 90 tablet 3     SF 5000 PLUS 1.1 % crea   98     Vit C-Vit E-Lutein-Min-OM-3 (OCUVITE) 287-11-9-150 mg-unit-mg-mg cap 1 tablet by mouth once daily.  0     VITAMIN C 1,000 MG TAB 1 tablet oral daily       zolpidem (AMBIEN) 5 mg tablet TAKE ONE TABLET BY MOUTH NIGHTLY AT BEDTIME AS NEEDED FOR SLEEP 40 tablet 2     No current facility-administered medications on file prior to visit.     and   Past Medical History:     Diagnosis  Date     CHRONIC KIDNEY DISEASE STAGE III (MODERATE) 7/27/2011     Colon polyp 2006    sessile adenoma       Depression with anxiety     1972 with divorce - Imipramine;  Sertraline 5/08      Diarrhea 11/6/2014  "    Dysphagia 2007    EGD 3/14/07 nl; sx from goiter.      Grave's disease 2004    on Tapazole      HTN (hypertension)      Hx of pregnancy     vaginal deliveries       Kidney stones 1990    s/p lithotripsy      Menopause     DXA 10/27/06 normal      Raynaud phenomenon 6/5/2015     Tobacco use disorder        REVIEW OF SYSTEMS:  ROS  see history of present illness  OBJECTIVE:  /64  Temp 96.1  F (35.6  C) (Tympanic)  Ht 1.632 m (5' 4.25\")  Wt 68.9 kg (152 lb)  Breastfeeding? No  BMI 25.89 kg/m2    EXAM:   Physical Exam  pleasant female without acute distress. Affect normal. Alert and oriented ×4. No ataxia. Skin color pink. Sclera nonicteric. Lung fields clear to auscultation throughout. Cardiovascular regular rate and rhythm. Abdomen soft without masses, tenderness and organomegaly.  Most recent CBC reviewed and discussed with patient. She has had Hemoccult-negative stools.  ASSESSMENT/PLAN:    ICD-10-CM    1. Liver abscess K75.0    2. Iron deficiency anemia, unspecified iron deficiency anemia type D50.9    3. Irritable bowel syndrome with diarrhea K58.0 Psyllium Seed-Sucrose (METAMUCIL, SUGAR,) powder   4. NEUROPATHY-PERIPHERAL G60.9         Plan:  1. Liver abscess has resolved. If for some reason she develops fever, lethargy, abdominal pain, jaundice she needs to be seen. She does not have any follow-up appointments with GI. 2. Iron deficiency anemia secondary to poor iron diet. She has incorporated iron rich foods and to her diet, does not want any iron supplements. 3. Chronic irritable bowel syndrome with alternating constipation and diarrhea. Will restart the Metamucil 1 teaspoon daily in 8 ounces of water. May need to back off on the Pepto-Bismol if she develops constipation. Discussed with patient that the Vibrezi is for patients with irritable bowel syndrome with predominant diarrhea. Also discussed with patient potential complications of this medication to include bowel obstruction and patient " declines. She states her symptoms are not bad enough to really do much more about it. 4. She will restart physical therapy. Gait instability secondary to peripheral neuropathy. She's not having any falls with the exception of when she was ill with a liver abscess.

## 2017-12-27 NOTE — PROGRESS NOTES
Patient Information     Patient Name MRN Sex Sylvie Raza [id number] Female 1934      Progress Notes by Nita Polanco at 2017 10:19 AM     Author:  Nita Polanco Service:  (none) Author Type:  Other Clinical Staff     Filed:  2017 10:19 AM Date of Service:  2017 10:19 AM Status:  Signed     :  Nita Polanco (Other Clinical Staff)            Falls Risk Criteria:    Age 65 and older or under age 4        Sensory deficits    Poor vision    Use of ambulatory aides    Impaired judgment    Unable to walk independently    Meets High Risk criteria for falls: yes             1.  Do you have dizziness or vertigo?    no                    2.  Do you need help standing or walking?   no                 3.  Have you fallen within the last 6 months?    no           4.  Has the patient been fasting?      no       If any risks are marked Yes, the following interventions are utilized:    Do not leave patient unattended     Assist patient in the dressing room and bathroom    Have ambulatory aides available throughout procedure    Involve patient s family if available            2

## 2017-12-27 NOTE — PROGRESS NOTES
Patient Information     Patient Name MRN Sex Sylvie Raza [id number] Female 1934      Progress Notes by Mirella Aly NP at 2017  2:40 PM     Author:  Mirella Aly NP Service:  (none) Author Type:  PHYS- Nurse Practitioner     Filed:  2017  4:21 PM Encounter Date:  2017 Status:  Signed     :  Mirella Aly NP (PHYS- Nurse Practitioner)            SUBJECTIVE:    Sylvie Bautista is a 83 y.o. female who presents for febrile illness    Fever    This is a new problem. The current episode started in the past 7 days. The problem has been unchanged. The maximum temperature noted was 102 to 102.9 F. The temperature was taken using an oral thermometer. Pertinent negatives include no abdominal pain, chest pain, congestion, coughing, diarrhea, ear pain, headaches, muscle aches, nausea, rash, sleepiness, sore throat, urinary pain, vomiting or wheezing. She has tried NSAIDs for the symptoms. The treatment provided moderate relief.   Risk factors: recent sickness    Risk factors: no contaminated food, no contaminated water, no hx of cancer and no immunosuppression    above answers were by patient on her my chart.    She was seen in clinic last Friday by Dr. Casas. Had chest x-ray, EKG and lab work completed. Urinalysis slightly abnormal. She was started on amoxicillin. She took this until Monday and was called and told that she did not need to continue the antibiotic because urine culture was normal. Her white count was slightly elevated last week at 13.8. The fever continues to be off and on and was 102  last night. She did have a bite on her left wrist last week. She lives in an apartment complex and mostly has hardscape surfaces outdoors. Does not recall being bit by any ticks or mosquitoes recently. Occasionally will work with potted plants. She was hospitalized one year ago with pneumonia. She was very weak at that time. Pneumonia resolved. She does feel  that her weakness is getting better. She does report that this morning she had acute sharp right knee pain. She took one ibuprofen around noon and the pain in her knee has moderately improved. The highest her temperature is been today is 100.2. She was sweaty one night but otherwise no night sweats. Appetite improving. Weight stable.  Has not started any new medications.    No Known Allergies,   Family History       Problem   Relation Age of Onset     Other  Mother       87yo, emphysema, dementia       Heart Disease  Father      MI in 50s,  62yo       Diabetes  Father      Alcohol/Drug  Father      Etoh       Diabetes  Daughter      Psychiatric illness  Daughter      depr/anxiety       Thyroid Disease  Sister      Cancer-breast  No Family History    ,   Current Outpatient Prescriptions on File Prior to Visit       Medication  Sig Dispense Refill     acetaminophen SR (TYLENOL ARTHRITIS) 650 mg Extended-Release tablet Take 1,300 mg by mouth every 24 hours. Max acetaminophen dose: 4000mg in 24 hrs.   Indications: PAIN       aspirin 81 mg tablet Take 81 mg by mouth once daily with a meal.       Blood Pressure Test Kit-Large kit As directed. OMRON 1 Each 0     CALCIUM CARBONATE/VITAMIN D3 (CALCIUM 600 + D,3, ORAL) Take  by mouth 2 times daily.       carboxymethylcellulose 0.5% (REFRESH TEARS) 0.5 % drop ophthalmic drops Place 1 Drop into both eyes once daily.  0     chlorthalidone (HYGROTON) 25 mg tablet TAKE ONE TABLET three days per week 45 tablet 0     docusate (DULCOLAX STOOL SOFTENER, DSS,) 100 mg capsule Take 1 capsule by mouth for constipation. Can take 2 in 24 hours.  0     estradiol (ESTRACE) 0.1 mg/g vaginal cream Insert 2 g into the vagina every Monday and Friday. 42.5 g 6     folic acid 1 mg tablet Take 1 tablet by mouth once daily.  0     gabapentin (NEURONTIN) 300 mg capsule Take 1 capsule by mouth 3 times daily. 270 capsule 3     levothyroxine (SYNTHROID) 112 mcg tablet TAKE 1 TABLET BY MOUTH ONCE  DAILY 90 tablet 2     loratadine (CLARITIN) 10 mg tablet Take 1 tablet by mouth once daily.  0     losartan (COZAAR) 25 mg tablet TAKE ONE TABLET BY MOUTH EVERY DAY 90 tablet 0     pantoprazole (PROTONIX) 20 mg tablet Take 1 tablet by mouth once daily. 90 tablet 3     SF 5000 PLUS 1.1 % crea   98     Vit C-Vit E-Lutein-Min-OM-3 (OCUVITE) [id number] mg-unit-mg-mg cap 1 tablet by mouth once daily.  0     VITAMIN C 1,000 MG TAB 1 tablet oral daily       zolpidem (AMBIEN) 5 mg tablet TAKE ONE TABLET BY MOUTH NIGHTLY AT BEDTIME AS NEEDED FOR SLEEP 40 tablet 3     No current facility-administered medications on file prior to visit.    ,   Past Medical History:     Diagnosis  Date     CHRONIC KIDNEY DISEASE STAGE III (MODERATE) 7/27/2011     Colon polyp 2006    sessile adenoma       Depression with anxiety     1972 with divorce - Imipramine;  Sertraline 5/08      Diarrhea 11/6/2014     Dysphagia 2007    EGD 3/14/07 nl; sx from goiter.      Grave's disease 2004    on Tapazole      HTN (hypertension)      Hx of pregnancy     vaginal deliveries       Kidney stones 1990    s/p lithotripsy      Menopause     DXA 10/27/06 normal      Raynaud phenomenon 6/5/2015     Tobacco use disorder    ,   Past Surgical History:      Procedure  Laterality Date     APPENDECTOMY  1979,     & Meckel's diverticulum removed       COLONOSCOPY DIAGNOSTIC  11/17/06    polyps; repeat in 5 YEARS       COLONOSCOPY DIAGNOSTIC  11/8/11     ESOPHAGOGASTRODUODENOSCOPY  3/14/07    done for dysphagia; normal       FOOT SURGERY  10/26/2015    [facility] with Dr. Grimaldo       IR BILIARY STRICTURE DILATATION WO STENT      x3- per h/p /Choledochojejunostomy with Vane-En-Y due to transection of common bile duct       LAP CHOLECYSTECTOMY  1991    common bile duct transected       OR COLONOSCOPY REMOVE PAMELA POLYP LESN HOT BPSY FORCEP  11/6/2014            PREMALIG/BENIGN SKIN LESION EXCISION  4/05    epidermal inclusion cyst        "ARMEN AND BSO  1979     THYROIDECTOMY  3/26/08    Total thyroidectomy; multinodular goiter      and   Social History       Substance Use Topics         Smoking status:   Current Every Day Smoker     Packs/day:  1.00     Years:  30.00     Types:  Cigarettes     Smokeless tobacco:   Never Used      Comment: 1/2 - 3/4 ppd; not interested in quitting at this time.        Alcohol use   No       REVIEW OF SYSTEMS:  Review of Systems   Constitutional: Positive for fever.   HENT: Negative for congestion, ear pain and sore throat.    Respiratory: Negative for cough and wheezing.    Cardiovascular: Negative for chest pain.   Gastrointestinal: Negative for abdominal pain, diarrhea, nausea and vomiting.   Genitourinary: Negative for dysuria.   Musculoskeletal: Positive for joint pain. Negative for neck pain.   Skin: Negative for rash.   Neurological: Negative for headaches.       OBJECTIVE:  /60  Temp 98.8  F (37.1  C) (Tympanic)  Ht 1.632 m (5' 4.25\")  Wt 72.1 kg (159 lb)  Breastfeeding? No  BMI 27.08 kg/m2  weight down 3 pounds since April 2017  EXAM:   Physical Exam   Constitutional: She is oriented to person, place, and time and well-developed, well-nourished, and in no distress. No distress.   HENT:   Mouth/Throat: Oropharynx is clear and moist. No oropharyngeal exudate.   TMs clear bilaterally. Throat without erythema and exudate.   Eyes: Conjunctivae are normal. No scleral icterus.   Neck: Neck supple. No thyromegaly present.   Cardiovascular: Normal rate, regular rhythm and normal heart sounds.  Exam reveals no gallop and no friction rub.    No murmur heard.  Pulmonary/Chest: Effort normal and breath sounds normal. No respiratory distress. She has no wheezes. She has no rales.   Abdominal: Soft. Bowel sounds are normal. She exhibits no distension. There is no tenderness.   No axillary or inguinal adenopathy. No suprapubic tenderness. No CVA tenderness.   Musculoskeletal: She exhibits no edema. "   Lymphadenopathy:     She has no cervical adenopathy.   Neurological: She is alert and oriented to person, place, and time.   Using a cane for ambulation. Slight left. Right knee with mild swelling when compared to left. No erythema or warmth. Normal range of motion. No pain with palpation or with range of motion.   Skin: Skin is warm. No rash noted. She is not diaphoretic. No pallor.   Left wrist is without a bite javier   Psychiatric: Mood, memory, affect and judgment normal.   Nursing note and vitals reviewed.  Visit note, laboratory and diagnostic studies from appointment last week all reviewed and discussed with patient.    ASSESSMENT/PLAN:    ICD-10-CM    1. Febrile illness R50.9 CBC WITH DIFFERENTIAL      BLOOD CULTURE      ANAPLASMA      LYME SCREEN W/REFLEX      BLOOD CULTURE      CBC WITH DIFFERENTIAL      BLOOD CULTURE      ANAPLASMA      LYME SCREEN W/REFLEX      BLOOD CULTURE      CBC WITH AUTO DIFFERENTIAL      levoFLOXacin (LEVAQUIN) 250 mg tablet      SEDIMENTATION RATE   2. Fever of unknown origin R50.9 levoFLOXacin (LEVAQUIN) 250 mg tablet      SEDIMENTATION RATE   3. Leukocytosis, unspecified type D72.829 SEDIMENTATION RATE   4. Insect bite, initial encounter W57.XXXA    5. Acute pain of right knee M25.561    6. Cigarette smoker F17.210    7. History of pneumonia Z87.01         Plan:  Patient is a tobacco user and has history of pneumonia 1 year which clinically began similar to this illness. She is not having any cough or shortness of breath. Will empirically treat with Levaquin 250 mg 2 pills today than one pill daily for the next 6 days. Dose adjusted for renal disease. If she finds his symptoms are not improving or she develops worsening she needs to be seen otherwise will see her back in 1 week. If continues to be mildly elevated at 13.6. Blood cultures ×2 are pending. Also obtained tick titers to include Anaplasma and Lymes however diagnosis unlikely in this patient but she did have a bite on  her wrist and right knee pain that was new and acute and also has been feverish. If symptoms do not resolve with antibiotic treatment may need to proceed with further laboratory diagnostic studies to workup for fever of unknown origin.

## 2017-12-27 NOTE — PROGRESS NOTES
Patient Information     Patient Name MRN Sex Sylvie Raza [id number] Female 1934      Progress Notes by Mirella Aly NP at 10/6/2017 10:20 AM     Author:  Mirella Aly NP Service:  (none) Author Type:  PHYS- Nurse Practitioner     Filed:  10/6/2017 11:09 AM Encounter Date:  10/6/2017 Status:  Signed     :  Mirella Aly NP (PHYS- Nurse Practitioner)            SUBJECTIVE:    Sylvie Bautista is a 83 y.o. female who presents for follow-up on osteopenia    HPI  patient had bone density scan recently. This did show osteopenia. Her fracture score was increased at the femur. She has never been on medication to treat osteoporosis. She does take calcium 600 mg once or twice daily. She is taking additional vitamin D but she is not sure the strength. There is also Vitamin D in her calcium supplement. She has never had compression fracture in the past. She is quite vigorous and active. She continues to smoke tobacco and is not interested in tobacco cessation. She has never had vitamin D level completed.  Hypothyroidism: Patient had normal TSH in March of this year. She was sick with a liver abscess in  and had a poor appetite and weight loss. She lost approximately 10 pounds. Her weight has stabilized but she has not started to gain weight and yet with the exception of 1 pound. Her appetite is good and she is not losing weight any longer. She denies other symptoms of thyroid disease.  Irritable bowel syndrome with diarrhea: Patient has not had diarrhea for several weeks and then yesterday had a large amount of diarrhea. She states she has not been taking the Metamucil faithfully. She is maybe only doing this every 3 days. She is finding that most days she is more constipated than having diarrhea. She does not have any abdominal pain. No melena or rectal bleeding. Last colonoscopy . This is a typical pattern for her irritable bowel.  She is also in need of  influenza vaccine.  No Known Allergies,   Current Outpatient Prescriptions on File Prior to Visit       Medication  Sig Dispense Refill     acetaminophen SR (TYLENOL ARTHRITIS) 650 mg Extended-Release tablet Take 1,300 mg by mouth every 24 hours. Max acetaminophen dose: 4000mg in 24 hrs.   Indications: PAIN       aspirin 81 mg tablet Take 81 mg by mouth once daily with a meal.       Blood Pressure Test Kit-Large kit As directed. OMRON 1 Each 0     CALCIUM CARBONATE/VITAMIN D3 (CALCIUM 600 + D,3, ORAL) Take  by mouth 2 times daily.       carboxymethylcellulose 0.5% (REFRESH TEARS) 0.5 % drop ophthalmic drops Place 1 Drop into both eyes once daily.  0     chlorthalidone (HYGROTON) 25 mg tablet TAKE ONE TABLET three days per week 45 tablet 0     docusate (DULCOLAX STOOL SOFTENER, DSS,) 100 mg capsule Take 1 capsule by mouth for constipation. Can take 2 in 24 hours.  0     ESTRACE 0.01 % (0.1 mg/gram) vaginal cream INSERT 2GM INTO THE VAGINA EVERY MONDAY AND FRIDAY 42.5 g 2     folic acid 1 mg tablet Take 1 tablet by mouth once daily.  0     gabapentin (NEURONTIN) 300 mg capsule TAKE 1 CAPSULE BY MOUTH THREE TIMES DAILY 270 capsule 0     levothyroxine (SYNTHROID) 112 mcg tablet TAKE 1 TABLET BY MOUTH ONCE DAILY 90 tablet 2     loratadine (CLARITIN) 10 mg tablet Take 1 tablet by mouth once daily.  0     losartan (COZAAR) 25 mg tablet TAKE 1 TABLET BY MOUTH EVERY DAY 90 tablet 3     pantoprazole (PROTONIX) 20 mg tablet Take 1 tablet by mouth once daily. 90 tablet 3     Psyllium Seed-Sucrose (METAMUCIL, SUGAR,) powder Take 1 tsp by mouth at bedtime.  0     SF 5000 PLUS 1.1 % crea   98     Vit C-Vit E-Lutein-Min-OM-3 (OCUVITE) 088-02-3-150 mg-unit-mg-mg cap 1 tablet by mouth once daily.  0     VITAMIN C 1,000 MG TAB 1 tablet oral daily       zolpidem (AMBIEN) 5 mg tablet TAKE ONE TABLET BY MOUTH NIGHTLY AT BEDTIME AS NEEDED FOR SLEEP 40 tablet 2     No current facility-administered medications on file prior to visit.    ,  "  Past Medical History:     Diagnosis  Date     CHRONIC KIDNEY DISEASE STAGE III (MODERATE) 7/27/2011     Colon polyp 2006    sessile adenoma       Depression with anxiety     1972 with divorce - Imipramine;  Sertraline 5/08      Diarrhea 11/6/2014     Dysphagia 2007    EGD 3/14/07 nl; sx from goiter.      Grave's disease 2004    on Tapazole      HTN (hypertension)      Hx of pregnancy     vaginal deliveries       Kidney stones 1990    s/p lithotripsy      Menopause     DXA 10/27/06 normal      Raynaud phenomenon 6/5/2015     Tobacco use disorder     and   Past Surgical History:      Procedure  Laterality Date     APPENDECTOMY  1979,     & Meckel's diverticulum removed       COLONOSCOPY DIAGNOSTIC  11/17/06    polyps; repeat in 5 YEARS       COLONOSCOPY DIAGNOSTIC  11/8/11     ESOPHAGOGASTRODUODENOSCOPY  3/14/07    done for dysphagia; normal       FOOT SURGERY  10/26/2015    [facility] with Dr. Grimaldo       IR BILIARY STRICTURE DILATATION WO STENT      x3- per h/p /Choledochojejunostomy with Vane-En-Y due to transection of common bile duct       LAP CHOLECYSTECTOMY  1991    common bile duct transected       percutaneous drainage liver abscess      6/30/17 [facility]       VA COLONOSCOPY REMOVE PAMELA POLYP LESN HOT BPSY FORCEP  11/6/2014            PREMALIG/BENIGN SKIN LESION EXCISION  4/05    epidermal inclusion cyst       ARMEN AND BSO  1979     THYROIDECTOMY  3/26/08    Total thyroidectomy; multinodular goiter         REVIEW OF SYSTEMS:  ROS  see history of present illness  OBJECTIVE:  /60  Temp 96  F (35.6  C) (Tympanic)  Ht 1.651 m (5' 5\")  Wt 69.4 kg (153 lb)  Breastfeeding? No  BMI 25.46 kg/m2    EXAM:   Physical Exam  pleasant female without acute distress. Affect normal. Alert and oriented ×4.  Bone density scan results reviewed and discussed with patient.  ASSESSMENT/PLAN:    ICD-10-CM    1. Osteopenia, unspecified location M85.80 VITAMIN D 25 (DEFICIENCY)      VITAMIN D 25 " (DEFICIENCY)   2. Irritable bowel syndrome with diarrhea K58.0    3. Need for influenza vaccination Z23 FLU VACCINE => 65 YRS HIGH DOSE TRIVALENT IIV3 IM      KS ADMIN FLU VACC SEASONAL (MEDICARE)   4. Hypothyroidism, unspecified type E03.9         Plan:  1. Vitamin D hydroxy level will be drawn today. Discussed use of Fosamax. At this point patient really is not interested and she is fearful of the potential side effects. Once her vitamin D level returns she may reconsider. Discussed bone builders and other strength building activities. Patient not interested. She may begin doing yoga as there is a  who will come to the home. Recommend repeat bone density scan in 3 years. She will continue with calcium 600 mg twice daily. For maintenance therapy she needs vitamin D 2000 international units daily. If vitamin D level low then we will prescribe high-dose vitamin D short-term. 2. Encouraged patient to do the Metamucil 1 teaspoon daily as this will help to keep the stool soft and hopefully will prevent both constipation and diarrhea. If she is not finding improvement after doing this for 2 weeks then recommend increasing to 1 tablespoon daily. 3. Patient had normal TSH in March. Her weight loss has stabilized. She did lose weight after her liver abscess. Recommend that if she continues to lose weight or develops other symptoms of thyroid disease that repeat TSH is obtained. She will follow-up as needed. 4. Influenza vaccine is provided today.

## 2017-12-27 NOTE — PROGRESS NOTES
Patient Information     Patient Name MRN Sex Sylvie Raza [id number] Female 1934      Progress Notes by Johanna Kate at 2017 10:54 AM     Author:  Johanna Kate  Service:  (none) Author Type:  Other Clinical Staff     Filed:  2017 10:54 AM  Date of Service:  2017 10:54 AM Status:  Addendum     :  Johanna Kate (Other Clinical Staff)        Related Notes: Original Note by Johanna Kate (Other Clinical Staff) filed at 2017 10:54 AM            Falls Risk Criteria:    Age 65 and older or under age 4        Sensory deficits    Poor vision    Use of ambulatory aides    Impaired judgment    Unable to walk independently    Meets High Risk criteria for falls:  Yes             1.  Do you have dizziness or vertigo?    no                    2.  Do you need help standing or walking?   no                 3.  Have you fallen within the last 6 months?    no           4.  Has the patient been fasting?      yes       If any risks are marked Yes, the following interventions are utilized:    Do not leave patient unattended     Assist patient in the dressing room and bathroom    Have ambulatory aides available throughout procedure    Involve patient s family if available

## 2017-12-28 NOTE — TELEPHONE ENCOUNTER
Patient Information     Patient Name MRN Sylvie Jacobson [id number] Female 1934      Telephone Encounter by Vivi Onofre RN at 2017  9:18 AM     Author:  Vivi Onofre RN Service:  (none) Author Type:  NURS- Registered Nurse     Filed:  2017  9:24 AM Encounter Date:  2017 Status:  Signed     :  Vivi Onofre RN (NURS- Registered Nurse)            Request for refill of Levothyroxine received from [employer]. Per medication list was refilled on 17 for #90 X 2 refills - a nine month supply. Refused, too early to refill.   Unable to complete prescription refill per RN Medication Refill Policy.................... Vivi Onofre RN ....................  2017   9:23 AM

## 2017-12-28 NOTE — TELEPHONE ENCOUNTER
Patient Information     Patient Name MRN Sex Sylvie Raza [id number] Female 1934      Telephone Encounter by Mirella Aly NP at 2017  8:57 AM     Author:  Mirella Aly NP Service:  (none) Author Type:  PHYS- Nurse Practitioner     Filed:  2017  8:57 AM Encounter Date:  2017 Status:  Signed     :  Mirella Aly NP (PHYS- Nurse Practitioner)            The sedimentation rate is elevated because of the infection. The preliminary blood culture results are negative but those are not yet fully completed. Lyme's titer is negative.

## 2017-12-28 NOTE — TELEPHONE ENCOUNTER
Patient Information     Patient Name MRN Sex Mayda Raza [id number] Female 1934      Telephone Encounter by Tereza Delvalle RN at 2017  8:06 AM     Author:  Tereza Delvalle RN Service:  (none) Author Type:  NURS- Registered Nurse     Filed:  2017  8:08 AM Encounter Date:  2017 Status:  Signed     :  Tereza Delvalle RN (NURS- Registered Nurse)            losartan (COZAAR) 25 mg tablet  TAKE 1 TABLET BY MOUTH EVERY DAY       Disp: 90 tablet Refills:     Class: eRx Start: 2017    For: Essential hypertension  Documented:4 years ago  Last refill:2017  To pharmacy: MAYDA IS A MED SYN PATIENT. MAY WE PLEASE HAVE #90? THANK YOU!  To be filled at: [facility] [place] [facility] [address]: [phone]    Last visit with MIRELLA DURANT was on: 2017 in [facility]  Next visit with MIRELLA DURANT is on: 2017 in [facility]  Next visit with Internal Medicine is on: 2017 in [facility]    Lab test requirements:  Creatinine and Potassium annually, if ordering lab, order BMP.  CREATININE (mg/dL)    Date Value   2017 1.46 (H)     POTASSIUM (mmol/L)    Date Value   2017 4.3     Will route to Mirella Durant NP for review and consideration of refill, patient has appointment tomorrow 17    Unable to complete prescription refill per RN Medication Refill Policy.................... TEREZA DELVALLE RN ....................  2017   8:07 AM

## 2017-12-28 NOTE — TELEPHONE ENCOUNTER
Patient Information     Patient Name MRN Sex Mayda Raza [id number] Female 1934      Telephone Encounter by Misty Quick RN at 2017  3:43 PM     Author:  Misty Quick RN Service:  (none) Author Type:  NURS- Registered Nurse     Filed:  2017  3:45 PM Encounter Date:  2017 Status:  Signed     :  Misty Quick RN (NURS- Registered Nurse)            Filled 17 #90 x 2. Due 2018. Pharmacy alerted. Unable to complete prescription refill per RN Medication Refill Policy.................... Misty Quick RN ....................  2017   3:44 PM    Prescribing Provider: Mirella Aly NP           Order Date: 2017  Ordered by: RANI BUSTILLOS  Medication:levothyroxine (SYNTHROID) 112 mcg tablet  Prescription #:[id number]    Qty:90 tablet   Ref:2  Start:2017    End:              Route:Oral                  DAMION:No   Class:eRx    Sig:TAKE 1 TABLET BY MOUTH ONCE DAILY    Note to Pharmacy:MAYDA IS NOW A MED [facility] PATIENT. MAY WE PLEASE HAVE 90                      DAYS FOR THIS RX? THANK YOU!    Pharmacy:[facility] - [place], [place] - [facility] [address]

## 2017-12-28 NOTE — TELEPHONE ENCOUNTER
Patient Information     Patient Name MRN Sylvie Jacobson [id number] Female 1934      Telephone Encounter by Terese Traylor LPN at 2017  8:28 AM     Author:  Terese Traylor LPN Service:  (none) Author Type:  NURS- Licensed Practical Nurse     Filed:  2017  8:28 AM Encounter Date:  7/3/2017 Status:  Signed     :  Terese Traylor LPN (NURS- Licensed Practical Nurse)            Rx faxed to pharmacy below.  Terese Traylor LPN.........2017   8:28 AM

## 2017-12-28 NOTE — TELEPHONE ENCOUNTER
Patient Information     Patient Name MRN Sylvie Jacobson [id number] Female 1934      Telephone Encounter by Misty Quick RN at 2017  8:08 AM     Author:  Misty Quick RN Service:  (none) Author Type:  NURS- Registered Nurse     Filed:  2017  8:13 AM Encounter Date:  2017 Status:  Signed     :  Misty Quick RN (NURS- Registered Nurse)            Hormones  Office visit in the past 12 months or per provider note.  Last visit with JOE DURANT was on: 2017 in [facility]  Next visit with JOE DURANT is on: No future appointment listed with this provider  Next visit with Internal Medicine is on: No future appointment listed in this department  Max refill for 12 months from last office visit or per provider note.    Prescription refilled per RN Medication Refill Policy.................... Misty Quick RN ....................  2017   8:12 AM

## 2017-12-28 NOTE — TELEPHONE ENCOUNTER
Patient Information     Patient Name MRN Sex Sylvie Raza [id number] Female 1934      Telephone Encounter by Terese Traylor LPN at 2017 11:56 AM     Author:  Terese Traylor LPN Service:  (none) Author Type:  NURS- Licensed Practical Nurse     Filed:  2017 11:57 AM Encounter Date:  2017 Status:  Signed     :  Terese Traylor LPN (NURS- Licensed Practical Nurse)            Wondering about CT scan results.  Terese Traylor LPN.........2017   11:56 AM

## 2017-12-28 NOTE — PATIENT INSTRUCTIONS
Patient Information     Patient Name MRN Sylvie Jacobson [id number] Female 1934      Patient Instructions by Ewa Koroma DC at 2017 11:40 AM     Author:  Ewa Koroma DC  Service:  (none) Author Type:  INT THER- Other provider     Filed:  2017 11:41 AM  Encounter Date:  2017 Status:  Addendum     :  Ewa Koroma DC (INT THER- Other provider)        Related Notes: Original Note by Ewa Koroma DC (INT THER- Other provider) filed at 2017 11:40 AM            Stretch side of your neck 3xdaily for 3 slow deep breaths as shown.  PT exercises.  Schedule 2xweek for 2 weeks.      Index Exercises   Hip Bursitis   ________________________________________________________________________  KEY POINTS    Bursitis is irritation and swelling a fluid-filled sac that acts as a cushion between bone and other tissues, such as skin, muscle, tendons, or ligaments. There are several bursae in your hip.    You need to change or stop doing the activities that cause pain until the bursa has healed. Treatment may include removing fluid from the swollen area with a needle and syringe, medicine, or surgery.    To help prevent bursitis, wear protective pads in sports that may cause hits or falls on your hip. If your legs are different lengths, ask your provider if you should get lifts for your shoes.  ________________________________________________________________________  What is hip bursitis?  Bursitis is irritation and swelling of a bursa. A bursa is a fluid-filled sac that acts as a cushion between bone and other tissues, such as skin, muscle, tendons, or ligaments. Tendons are strong bands of tissue that attach muscle to bone. Ligaments are strong bands of tissue that connect one bone to another to form the joints.  There are several bursae in your hip. A bursa that is only mildly irritated may improve within a few weeks with treatment. A bursa that is very swollen and  irritated, or has been painful for a long time, may take months to improve.  What is the cause?   Common causes of hip bursitis include:    Injury to your hip, for example, from a fall or being tackled    Overuse injuries of your hip during sports, work, or hobbies. For example, a strong band of tissue called the iliotibial band goes down the outside of your thigh and connects your hip to your knee. Tight muscles in your hips or outer thigh can tighten this band and cause it to rub against the bursa. This is more likely to happen if you run, walk, or ride a bicycle a lot, especially if you increase your mileage too fast.    Infection from a cut or a scrape on the skin over a bursa  Other possible causes or risk factors for developing hip bursitis include:    Differences in the length of your legs or frequently running on uneven surfaces    Arthritis, which is pain and swelling of a joint, such as the joints in your lower back    Gout or pseudogout, which is pain and swelling caused by a buildup of crystals in your joints and under your skin    An autoimmune disease, such as rheumatoid arthritis or lupus, which causes your body to mistakenly attack your own tissue  What are the symptoms?   Symptoms may include:    Pain on the upper outer area of your thigh or on the side of your hip    Pain that gets worse when you walk, bicycle, go up or down stairs, or put pressure on the side of your hip (this pain can make sleeping on your side difficult)    Stiffness or trouble moving your hip  How is it diagnosed?   Your healthcare provider will ask about your symptoms and medical history and examine you. Tests may include:    An ultrasound, which uses sound waves to show pictures of the joint    Joint aspiration, which uses a needle and syringe to remove a sample of fluid from the bursa. The fluid can be checked for infection and other causes of the bursitis. Removing some of the fluid can also help the pain.  You may have  tests or scans to check for other possible causes of your symptoms, such as blood tests, X-rays, or an MRI, which uses a strong magnetic field and radio waves to show detailed pictures of the joint  How is it treated?   To relieve symptoms of bursitis:    You may need to change or stop doing the activities that cause pain until the bursa has healed. Do not put any pressure on the sore area while it is swollen. For example, you may need to swim instead of running or bicycling. If you are a cyclist, you may need to lower your bicycle seat.    Put an ice pack, gel pack, or package of frozen vegetables wrapped in a cloth on the area every 3 to 4 hours for up to 20 minutes at a time to help relieve pain.    Take nonprescription pain medicine, such as acetaminophen, ibuprofen, or naproxen. Read the label and take as directed. Unless recommended by your healthcare provider, you should not take these medicines for more than 10 days.    Nonsteroidal anti-inflammatory medicines (NSAIDs), such as ibuprofen, naproxen, and aspirin, may cause stomach bleeding and other problems. These risks increase with age.    Acetaminophen may cause liver damage or other problems. Unless recommended by your provider, don't take more than 3000 milligrams (mg) in 24 hours. To make sure you don t take too much, check other medicines you take to see if they also contain acetaminophen. Ask your provider if you need to avoid drinking alcohol while taking this medicine.    Follow your provider s instructions for doing exercises to keep your range of motion, keep the joint from getting stiff, and help the joint get stronger.    Bursitis caused by an infection may be treated with antibiotics.  If you keep having symptoms:    Your healthcare provider may remove fluid from the swollen area with a needle and syringe.    Your provider may inject the inflamed area with a steroid medicine and a local anesthetic so you will have less swelling, redness, and  pain.    Your provider may recommend surgery to take out the bursa if the joint keeps bothering you even after treatment.  Follow the full course of treatment prescribed by your healthcare provider. Ask your provider:    How and when you will get your test results    How long it will take to recover    If there are activities you should avoid and when you can return to your normal activities    How to take care of yourself at home    What symptoms or problems you should watch for and what to do if you have them  Make sure you know when you should come back for a checkup. Keep all appointments for provider visits or tests.  How can I help prevent hip bursitis?   It may help prevent hip bursitis if you warm up properly and do stretching exercises before sports or other physical activities. You may need to cut back on or avoid doing whatever seems to have caused the bursitis. Wear protective pads in sports that may cause hits or falls on your hip. If your legs are different lengths, ask your provider if you should get lifts for your shoes.  Developed by [employer].  Adult Advisor 2016.3 published by [employer].  Last modified: 2016-05-17  Last reviewed: 2014-10-06  This content is reviewed periodically and is subject to change as new health information becomes available. The information is intended to inform and educate and is not a replacement for medical evaluation, advice, diagnosis or treatment by a healthcare professional.  References   Adult Advisor 2016.3 Index    Copyright   2016 [employer], a division of [employer] All rights reserved.

## 2017-12-28 NOTE — TELEPHONE ENCOUNTER
Patient Information     Patient Name MRN Sex Sylvie Raza [id number] Female 1934      Telephone Encounter by Mirella Aly NP at 2017  7:10 AM     Author:  Mirella Aly NP Service:  (none) Author Type:  PHYS- Nurse Practitioner     Filed:  2017  7:15 AM Encounter Date:  2017 Status:  Signed     :  Mirella Aly NP (PHYS- Nurse Practitioner)            ordered

## 2017-12-28 NOTE — TELEPHONE ENCOUNTER
Patient Information     Patient Name MRN Sex Sylvie Raza [id number] Female 1934      Telephone Encounter by Terese Traylor LPN at 7/3/2017  8:54 AM     Author:  Terese Traylor LPN Service:  (none) Author Type:  NURS- Licensed Practical Nurse     Filed:  7/3/2017  8:56 AM Encounter Date:  7/3/2017 Status:  Signed     :  Terese Traylor LPN (NURS- Licensed Practical Nurse)            Verified patient's last name and date of birth. Notified of the information below. Transferred to the appointment line to schedule.  Terese Traylor LPN.........7/3/2017   8:56 AM

## 2017-12-28 NOTE — TELEPHONE ENCOUNTER
Patient Information     Patient Name MRN Sex Sylvie Raza [id number] Female 1934      Telephone Encounter by Terese Traylor LPN at 2017  8:38 AM     Author:  Terese Traylor LPN Service:  (none) Author Type:  NURS- Licensed Practical Nurse     Filed:  2017  8:40 AM Encounter Date:  2017 Status:  Signed     :  Terese Traylor LPN (NURS- Licensed Practical Nurse)            Patient was able to see the sed rate and the CBC but not the rest of the labs, please advise.  Terese Traylor LPN.........2017   8:40 AM

## 2017-12-28 NOTE — PROGRESS NOTES
"Patient Information     Patient Name MRN Sex Sylvie Raza [id number] Female 1934      Progress Notes by Ewa Koroma DC at 2017 10:30 AM     Author:  Ewa Koroma DC Service:  (none) Author Type:  INT [facility]ER- Other provider     Filed:  2017 11:31 AM Encounter Date:  2017 Status:  Signed     :  Ewa Koroma DC (INT [facility]ER- Other provider)            PATIENT:  Sylvie Bautista is a 83 y.o. female    PROBLEM:   Date of Initial Visit for this Episode:  2017  Chief Complaint     Patient presents with       Chiropractic Care      f/u      2017 Visit #5    SUBJECTIVE:  L low back and R lateral hip pain rated 3/10 average, now occasional from frequent, a little better since care began.  Occasional tenderness left low back upon waking.  Sleeps on right with left leg bent over right.  Balance has improved.  Sore left midthoracic.    2017  Back index 22%  2017     Back index 28%    Functional limitations: improvement with no pain walking or standing (fatigue limits).     PT measured leg length difference at 2\" compared to 1 \".   Wearing lift in R shoe.  Progressing HEP with Physical Therapy.      OBJECTIVE:    DIAGNOSTICS:  No lumbopelvic or hip xrays views taken.  2017 Defer on improvement with trial of care.     2017   Musculoskeletal Exam:   Posture: Anterior head carriage, rounded shoulders.  Low R shoulder, Low R occiput- head leans to R.      Low L iliac crest and L PSIS, low R greater trochanter.    Cervical  ROM: No further evaluation.     Thoracic and Lumbar ROM:  Restricted.  -Kemps:   +Gillets:  NINO   - Seated knee extension: no radicular or focal R low back pain  - XIANG: no ipsilateral low back/sacroiliac jt pain, restricted ROM bilateral  +Prone Leg length inequality: -Derefield L; Restricted R heel to buttock -R hip does not lift off table with passive heel to buttock.    +Tenderness: L T4-5, left lumbar  +Muscle spasm:  MILD " lumbar paraspinals  +Joint asymmetry and restriction:  T5 L, NINO    ASSESSMENT: Improved functional pelvic alignment with improvement on structural measurement of leg length and hip flexor length, standing and walking improvement. Mild left thoracic dysfunction with postural weakness.  She has met care goals as can be expected and discharged to [facility], able to return if worsens.  Chronicity, peripheral neuropathy of feet, arthritis are complicating factors.     ICD-10-CM    1. Segmental and somatic dysfunction of sacral region M99.04 IN CHIRO SPINAL MANIP 1-2 [facility] ONLY   2. Acute left-sided low back pain without sciatica M54.5 IN CHIRO SPINAL MANIP 1-2 [facility] ONLY   3. Segmental and somatic dysfunction of thoracic region M99.02 IN CHIRO SPINAL MANIP 1-2 [facility] ONLY   4. Midline thoracic back pain, unspecified chronicity M54.6 IN CHIRO SPINAL MANIP 1-2 [facility] ONLY       PLAN    Care:    2 min. glute Myofascial release and stretching.  SMT using light Drop x3 sacrum and Activator instrument thoracic adjustments.       INSTRUCTIONS   She has met care goals and discharged to [facility], able to return if worsens.      Stretch side of your neck 3xdaily for 3 slow deep breaths as shown.    Stretch anterior hips  PT exercises.  Discharged after 5/8 visits of Chiropractic Care including Spinal Adjustments, brief soft tissue physiotherapy and active rehabilitation. Most non-spinal care deferred to Physical Therapy to meet care plan goals.

## 2017-12-28 NOTE — PROGRESS NOTES
Patient Information     Patient Name MRN Sex Sylvie Raza [id number] Female 1934      Progress Notes by Darling Akbar PT at 2017  1:16 PM     Author:  Darling Akbar PT Service:  (none) Author Type:  PT- Physical Therapist     Filed:  2017  3:05 PM Date of Service:  2017  1:16 PM Status:  Signed     :  Darling Akbar PT (PT- Physical Therapist)            [facility]  Outpatient PT - Daily note      Date of Service: 2017   Visit #: 2 (of 10 for PSFS)     Patient Name: Sylvie Bautista (Mireya)  YOB: 1934   Referring MD/Provider: Referring MD/Provider: Haydee Aly  Diagnosis: Medical and Treatment Diagnosis: Gait instability  Treatment Diagnosis: Gait instability   Insurance:  Medicare: G Codes/C Modifiers at Initial Assessment:     Start of Care Date: Start of Service: 17  Certification Dates: From: Start of Service: 17  Re-Cert Due: Medicare/MA Re-Cert Due: 10/04/17      Subjective      Current Status: She has done some of HEP but not every day.  Most difficult with eyes closed.  She hasn't gotten out for walks, encouraged her to set a time and she is thinking at 1:00 when she is at home to walk to park and back.  Issued heel lift for R leg length discrepancy and she is wondering about chiropractic care, she has had some L LBP off and on she had a couple falls over the winter, one time on L knee early in winter and once when trying to get up from chair slid to ground then when trying to get up fell and hit head.  She will check on insurance for chiropractic and keep up with HEP.  Plan to f/u in 2 weeks again to advance HEP.      Complaints/Reason for Referral: Sylvie Bautista  is a 83 y.o. female  who comes to physical therapy today with a chief complaint of unsteady gait.  She has noticed she walks favoring R foot and when she had pant shortened she was told one was shorter, she had R foot surgery for  hammer toe but not successful and sometimes painful but usually tight.  Uses cane when she walks outside but sick since from liver abscess that started June 11th, started with antibiotic for UTI, MRI found, [facility] in [place] on 6/30 for procedure, and it's been fine ever since.  She lost weight, about 6-7 pounds.  No energy.  Has had decreased balance before this, winter doesn't walk much and uses cart when shops.  Neuropathy in feet, elevates feet in afternoon and can notices it then, not notices when she is walking.  Gabapentin helps some, interested in trying different med to see if helps more.  Can tell when in shower she has to hold on because she feels unstable when she closes her eyes to rinse hair.  Doesn't feel safe with walking outside, walk with holding daughter's arm or cane.  No pain.    Falls: Pt reports no falls in past 6 months.  Unsteady but grabs onto something.     Patient Specific Functional and Pain Scales (PSFS): 8/9/2017 based on pt reports during subjective history    Clinician Instructions: Complete after the history and before the exam.    Initial Assessment: We want to know what 3 activities in your life you are unable to perform, or are having the most difficulty performing, as a result of your chief problem. Please list and score at least 3 activities that you are unable to perform, or having the most difficulty performing, because of your chief problem.   Patient Specific Activity Scoring Scheme (score one number for each activity):   Activity Score (0-10)  0= Unable to perform activity  10= Able to perform activity at same level as before injury or problem   1. Walking outside - needs cane 5/10   2. Steps - needs rail 5/10   3. Shopping -needs cart 7/10   Totals:  17/30 = 57% ability which relates to 43% impairment    Patient verbally states that they understand that the information they have provided above is current and complete to the best of their knowledge.     G codes and  Modifier taken from patient completing the PSFS:   Initial Primary G Code and Modifier:    Per the Patient's intake and/or assessment the Primary G Code is: Mobility .   The Patient's Impairment, Limitation or Restriction Modifier would be best described as: CK - 40% - 60% Impairment.   Goal Primary G Code and Modifier:    The Patient's G Code Goal would be: Mobility    The Patient's Impairment, Limitation or Restriction Modifier goal would be best described as: CJ - 20% - 40% Impairment.       Objective    Posture: with heel lift: improved, pelvic crest is level still shoulders shifted to R but less than without lift    Today's Intervention:    Standing March with Counter Support - 20 reps - 2x daily  Seated Hamstring Stretch - 2 reps - 30 second hold - 2x daily  Standing Romberg to 3/4 Tandem Stance - 30 second hold - 2x daily  Romberg Stance with Eyes Closed - 2 reps - 30 second hold - 2x daily  NEW: Sit to stand x8 in 30 sec -instructed to do 10 reps at home    SLS 4-5 sec max    Home Exercise Program:      Access Code: [email or web address] URL: [email or web address] Date: 08/09/2017 Prepared by: Darling Akbar   Exercises  Standing March with Counter Support - 20 reps - 2x daily  Seated Hamstring Stretch - 2 reps - 30 second hold - 2x daily  Standing Romberg to 3/4 Tandem Stance - 30 second hold - 2x daily  Romberg Stance with Eyes Closed - 2 reps - 30 second hold - 2x daily    Access Code: [email or web address] URL: [email or web address] Date: 08/25/2017 Prepared by: Darling Akbar   Exercises   Sit to Stand without Arm Support - 10 reps - 2x daily   Walking - 10-20 Minutes - 1x daily     Assessment      Clinical Impression:  Patient presents with signs and symptoms consistent with decreased balance with mild weakness and peripheral neuropathy.      Functional Limitations/Problems List:   1. Decreased safety walking outdoors on uneven surfaces  2. Decreased safety walking on stairs    3. Decreased safety walking long distances      Functional Impairment(s): Decreased Activity Tolerance:  walking, dynamic balance     Prior Function:   Limited progressing    Physical Impairment(s):       NEURO/SENSORY:  peripheral neuropathy    Goals: Following physical therapy intervention, the patient hopes to be able to: Feel more confident with walking outdoors.      Goals:   Short Term Goals: To be met in 4 weeks:   Following physical therapy intervention, the patient will be able to:   1. Patient following through with HEP.  2. Patient to show increasing balance with being able to preform SLS x10 sec.     Long Term Goals: To be met in 8 weeks:   Following physical therapy intervention, the patient will be able to:   1. Patient to feel more confident with ambulating outdoors to be able to walk to park at least 3 times per week.   2. Patient will be independent with HEP to continue to maintain ROM and strength achieved during physical therapy.      Rehab Potential: Patient Potential for Achieving Desired Outcome:  Fair due to question patient follow through    Barriers to Learning: There are barriers to learning present with this patient with wanting to do on her own and not sure how much she is willing to put into HEP.     Patient participated in goal selection and understand(s) the plan of care: Yes     Therapist Assessment of Today's Intervention:  She did well with exercises given and understood parts of balance to work on, may need encouragement to continue.     Response to Intervention: Felt comfortable with HEP.     Plan    Treatment Plan / Targeted Outcomes:     Frequency:   6 visits     Duration of Treatment: 8 weeks     Plan of Care Due Date: Medicare/MA Re-Cert Due: 10/04/17     Planned Interventions:  Possible physical therapy interventions include but are not limited to:   Home Exercise Program development  Therapeutic Exercise (ROM & Strengthening)  Manual Therapy  Neuromuscular  Re-education  Ultrasound  Electrical Stimulation  Phonophoresis with Ketoprofen  Iontophoresis with Dexamethasone  Therapeutic Activities  Gait Training    Patient will follow up with physician as needed during course of physical therapy intervention.  At time of discharge, patient will be given a home exercise program to continue to maintain strength and ROM achieved during therapy.      Plan for next visit:  Advance HEP and see if additional appointments needed for follow through.  Discussed some adjustment of R shoe to accommodate decreased leg length on R side.     Student or PTA has been instructed in and demonstrates skills necessary to carry out above stated treatment plan: Yes    Thank you for your referral to [facility].  Please call with any questions, concerns or comments.  [phone]

## 2017-12-28 NOTE — TELEPHONE ENCOUNTER
Patient Information     Patient Name MRN Sylvie Jacobson [id number] Female 1934      Telephone Encounter by Agnes Benjamin LPN at 2017 10:17 AM     Author:  Agnes Benjamin LPN Service:  (none) Author Type:  NURS- Licensed Practical Nurse     Filed:  2017 10:17 AM Encounter Date:  2017 Status:  Signed     :  Agnes Benjamin LPN (NURS- Licensed Practical Nurse)            The patient was calling looking for her refill on her zolpidem. I let the patient know we did receive it on 10-30 and it will be looked at today.  Agnes Benjamin LPN......2017  10:17 AM

## 2017-12-28 NOTE — PROGRESS NOTES
"Patient Information     Patient Name MRN Sex Sylvie Raza [id number] Female 1934      Progress Notes by Sujata Casas MD at 2017 10:00 AM     Author:  Sujata Casas MD Service:  (none) Author Type:  Physician     Filed:  2017  2:07 PM Encounter Date:  2017 Status:  Signed     :  Sujata Casas MD (Physician)            SUBJECTIVE:    Sylvie Bautista is a 83 y.o. female who presents for weakness.    HPI Comments: Mrs. Bautista presents today with her daughter for concerns of possible pneumonia. Last night she just didn't feel quite well, had chills. She woke up in the middle of the night feeling mildly short of breath, feels that her breathing was \"odd.\"  This morning she feels more weak than normal. She reports a temp of 102 F. She has taken Tylenol since then. Temp upon presentation is normal.  Patient's daughter is one hour away from leaving on a road trip to [facility]. They are trying to determine if they should make arrangements for other family members to be present to care for the patient.  Mrs. Bautista does not note any cold symptoms, no sore throat, no headache. Patient is not had any chest pain or otherwise noted shortness of breath or coughing. She denies any nausea vomiting diarrhea or constipation. No other GI symptoms. Typically does not have PND or orthopnea. No recent increase in lower extremity swelling. In the past she has had some issues with pneumonia, sometimes this presents with weakness.      Past Medical History:     Diagnosis  Date     CHRONIC KIDNEY DISEASE STAGE III (MODERATE) 2011     Colon polyp 2006    sessile adenoma       Depression with anxiety     1972 with divorce - Imipramine;  Sertraline       Diarrhea 2014     Dysphagia 2007    EGD 3/14/07 nl; sx from goiter.      Grave's disease     on Tapazole      HTN (hypertension)      Hx of pregnancy     vaginal deliveries       Kidney stones     s/p lithotripsy      Menopause     " DXA 10/27/06 normal      Raynaud phenomenon 6/5/2015     Tobacco use disorder    ,   Patient Active Problem List       Diagnosis  Date Noted     Low folic acid  05/26/2017     CAP (community acquired pneumonia)  06/15/2016     Raynaud phenomenon  06/05/2015     Cigarette smoker  06/05/2015     Age 16 to current; 1/2 to 1 ppd        Irritable bowel syndrome  11/19/2014     Diarrhea  11/06/2014     ACP (advance care planning)  12/05/2013     CHRONIC KIDNEY DISEASE STAGE III (MODERATE)  07/27/2011     NEUROPATHY-PERIPHERAL  03/09/2011     TINEA PEDIS       NEUROPATHY  02/28/2011     Essential hypertension  06/23/2010     HYPOTHYROIDISM  06/23/2010     ACID REFLUX DISEASE  06/23/2010     INSOMNIA  06/23/2010     NONTOXIC MULTINODULAR GOITER s/p total thyroidectomy  03/26/2008    and   Past Surgical History:      Procedure  Laterality Date     APPENDECTOMY  1979,     & Meckel's diverticulum removed       COLONOSCOPY DIAGNOSTIC  11/17/06    polyps; repeat in 5 YEARS       COLONOSCOPY DIAGNOSTIC  11/8/11     ESOPHAGOGASTRODUODENOSCOPY  3/14/07    done for dysphagia; normal       FOOT SURGERY  10/26/2015    [facility] with Dr. Grimaldo       IR BILIARY STRICTURE DILATATION WO STENT      x3- per h/p /Choledochojejunostomy with Vane-En-Y due to transection of common bile duct       LAP CHOLECYSTECTOMY  1991    common bile duct transected       CO COLONOSCOPY REMOVE PAMELA POLYP LESN HOT BPSY FORCEP  11/6/2014            PREMALIG/BENIGN SKIN LESION EXCISION  4/05    epidermal inclusion cyst       ARMEN AND BSO  1979     THYROIDECTOMY  3/26/08    Total thyroidectomy; multinodular goiter         REVIEW OF SYSTEMS:  ROS see HPI, ROS otherwise negative.     OBJECTIVE:  /62  Pulse 58  Temp 99  F (37.2  C) (Tympanic)  Resp 20  Breastfeeding? No    EXAM:   Physical Exam   Constitutional: She is well-developed, well-nourished, and in no distress.   HENT:   Mouth/Throat: Oropharynx is clear and  moist. No oropharyngeal exudate.   TMs normal   Eyes: Conjunctivae are normal. No scleral icterus.   Neck: Neck supple. No thyromegaly present.   Cardiovascular: Normal rate and regular rhythm.    Pulmonary/Chest: Effort normal and breath sounds normal.   Abdominal: Soft. She exhibits no distension. There is no tenderness. There is no rebound.   Musculoskeletal: She exhibits no edema.   Lymphadenopathy:     She has no cervical adenopathy.   Neurological: She is alert.   Skin: No rash noted.   Psychiatric: Mood and affect normal.     Results for orders placed or performed in visit on 06/09/17      COMPLETE METABOLIC PANEL      Result  Value Ref Range    SODIUM 140 133 - 143 mmol/L    POTASSIUM 4.3 3.5 - 5.1 mmol/L    CHLORIDE 105 98 - 107 mmol/L    CO2,TOTAL 22 21 - 31 mmol/L    ANION GAP 13 5 - 18                    GLUCOSE 107 (H) 70 - 105 mg/dL    CALCIUM 9.0 8.6 - 10.3 mg/dL    BUN 27 (H) 7 - 25 mg/dL    CREATININE 1.46 (H) 0.70 - 1.30 mg/dL    BUN/CREAT RATIO           18                    GFR if African American 41 (L) >60 ml/min/1.73m2    GFR if not  34 (L) >60 ml/min/1.73m2    ALBUMIN 3.7 3.5 - 5.7 g/dL    PROTEIN,TOTAL 6.9 6.4 - 8.9 g/dL    GLOBULIN                  3.2 2.0 - 3.7 g/dL    A/G RATIO 1.2 1.0 - 2.0                    BILIRUBIN,TOTAL 0.5 0.3 - 1.0 mg/dL    ALK PHOSPHATASE 46 34 - 104 IU/L    ALT (SGPT) 17 7 - 52 IU/L    AST (SGOT) 20 13 - 39 IU/L   CBC W PLT NO DIFF      Result  Value Ref Range    WHITE BLOOD COUNT         13.8 (H) 4.5 - 11.0 thou/cu mm    RED BLOOD COUNT           3.43 (L) 4.00 - 5.20 mil/cu mm    HEMOGLOBIN                11.5 (L) 12.0 - 16.0 g/dL    HEMATOCRIT                34.8 33.0 - 51.0 %    MCV                       102 (H) 80 - 100 fL    MCH                       33.5 26.0 - 34.0 pg    MCHC                      33.0 32.0 - 36.0 g/dL    RDW                       13.3 11.5 - 15.5 %    PLATELET COUNT            213 140 - 440 thou/cu mm    MPV                        9.0 6.5 - 11.0 fL   URINALYSIS W REFLEX MICROSCOPIC IF POSITIVE      Result  Value Ref Range    COLOR                     Yellow Yellow Color    CLARITY                   Clear Clear Clarity    SPECIFIC GRAVITY,URINE    >=1.030 (A) 1.010, 1.015, 1.020, 1.025                    PH,URINE                  5.0 (A) 6.0, 7.0, 8.0, 5.5, 6.5, 7.5, 8.5                    UROBILINOGEN,QUALITATIVE  Normal Normal EU/dl    PROTEIN, URINE Trace (A) Negative mg/dL    GLUCOSE, URINE Negative Negative mg/dL    KETONES,URINE             Trace (A) Negative mg/dL    BILIRUBIN,URINE           Abnormal (A) Negative                    OCCULT BLOOD,URINE        Negative Negative                    NITRITE                   Negative Negative                    LEUKOCYTE ESTERASE        Negative Negative                   URINALYSIS MICROSCOPIC      Result  Value Ref Range    RBC None Seen 0-2, None Seen /HPF    WBC 0-2 0-2, 3-5, None Seen /HPF    BACTERIA                  Moderate (A) None Seen, Rare, Occasional, Few Bacteria/HPF    EPITHELIAL CELLS          Few None Seen, Few Epi/HPF   URINE CULTURE      Result  Value Ref Range    CULTURE <10,000 CFU/mL multiple organisms      Chest XR: without acute findings.     ASSESSMENT/PLAN:    ICD-10-CM    1. Fever, unspecified fever cause R50.9 XR CHEST 2 VIEWS PA AND LATERAL      COMPLETE METABOLIC PANEL      CBC W PLT NO DIFF      URINALYSIS W REFLEX MICROSCOPIC IF POSITIVE      COMPLETE METABOLIC PANEL      CBC W PLT NO DIFF      URINALYSIS W REFLEX MICROSCOPIC IF POSITIVE      URINALYSIS MICROSCOPIC      URINALYSIS MICROSCOPIC   2. Weakness R53.1    3. Irregular heart beat I49.9 EKG 12 LEAD UNIT PERFORMED      MN ELECTROCARDIOGRAM TRACING   4. Bacteria in urine R82.71 amoxicillin (AMOXIL) 500 mg capsule      URINE CULTURE      URINE CULTURE        Plan:  Patient with report of fever and weakness. Exam unremarkable. Labs generally unremarkable with the exception of bacteriuria. Will  cover for possible bladder infection while awaiting urine culture. Due to patient's age and possible developing infection, would recommend that another family member be present to care for her should her condition change. Recommend presentation to the emergency department over the weekend if symptoms progress.      Sujata Casas MD

## 2017-12-28 NOTE — TELEPHONE ENCOUNTER
Patient Information     Patient Name MRN Sex Sylvie Raza [id number] Female 1934      Telephone Encounter by Vivi Flores RN at 10/26/2017 11:06 AM     Author:  Vivi Flores RN Service:  (none) Author Type:  NURS- Registered Nurse     Filed:  10/26/2017 11:12 AM Encounter Date:  10/23/2017 Status:  Signed     :  Vivi Flores RN (NURS- Registered Nurse)            Refill request for levothyroxine refused.  eRx on 17 for #90 and 2 refills to [facility].    Nsaids    Office visit in the past 12 months or per provider note.    Last visit with JOE DURANT was on: 10/06/2017 in [facility]  Next visit with JOE DURANT is on: No future appointment listed with this provider  Next visit with Internal Medicine is on: No future appointment listed in this department    Max refill for 12 months from last office visit or per provider note.    Diuretics (may be prescribed for edema)    Office visit in the past 12 months or per provider note.    Last visit with JOE DURANT was on: 10/06/2017 in [facility]  Next visit with JOE DURANT is on: No future appointment listed with this provider  Next visit with Internal Medicine is on: No future appointment listed in this department    Lab testing requirements:  Creatinine and Potassium annually, if ordering lab, order BMP.  CREATININE (mg/dL)    Date Value   2017 1.32 (H)     POTASSIUM (mmol/L)    Date Value   2017 4.3     BP Readings from Last 4 Encounters:    10/06/17 100/60   17 122/66   17 108/64   17 120/54         Review last provider visit note.  If BP reviewed and plan is noted, can refill.  Max refill for 12 months from last office visit or per provider note.    Prescription refilled per RN Medication Refill Policy.................... Vivi Flores RN ....................  10/26/2017   11:10 AM

## 2017-12-28 NOTE — TELEPHONE ENCOUNTER
Patient Information     Patient Name MRN Sylvie Jacobson [id number] Female 1934      Telephone Encounter by Terese Traylor LPN at 2017 11:04 AM     Author:  Terese Traylor LPN Service:  (none) Author Type:  NURS- Licensed Practical Nurse     Filed:  2017 11:05 AM Encounter Date:  2017 Status:  Signed     :  Terese Traylor LPN (NURS- Licensed Practical Nurse)            Patient notified of the information below. Stated that she is not sure if the blood cultures are all back or if they are still the same but she would like a [employer] message with interpretation of them. Please advise.  Terese Traylor LPN.........2017   11:05 AM

## 2017-12-28 NOTE — TELEPHONE ENCOUNTER
Patient Information     Patient Name MRN Sex Sylvie Raza [id number] Female 1934      Telephone Encounter by Terese Traylor LPN at 2017  8:29 AM     Author:  Terese Traylor LPN Service:  (none) Author Type:  NURS- Licensed Practical Nurse     Filed:  2017  8:30 AM Encounter Date:  2017 Status:  Signed     :  Terese Traylor LPN (NURS- Licensed Practical Nurse)            Patient is speaking with Mirella SIMPSON.  Terese Traylor LPN.........2017   8:30 AM

## 2017-12-28 NOTE — TELEPHONE ENCOUNTER
Patient Information     Patient Name MRN Sylvie Jacobson [id number] Female 1934      Telephone Encounter by Jeanette Mendez at 2017  9:42 AM     Author:  Jeanette Mendez Service:  (none) Author Type:  (none)     Filed:  2017  9:43 AM Encounter Date:  2017 Status:  Signed     :  Jeanette Mendez            Please call patient regarding RX refill. They state [facility] faxed request to [facility] on Monday.

## 2017-12-28 NOTE — PROGRESS NOTES
"Patient Information     Patient Name MRN Sex Sylvie Raza [id number] Female 1934      Progress Notes by Ewa Koroma DC at 2017  1:30 PM     Author:  Ewa Koroma DC Service:  (none) Author Type:  [facility]- Other provider     Filed:  2017  3:37 PM Encounter Date:  2017 Status:  Signed     :  Ewa Koroma DC ([facility]- Other provider)            PATIENT:  Sylvie Bautista is a 83 y.o. female    PROBLEM:   Date of Initial Visit for this Episode:  2017  Chief Complaint     Patient presents with       Back Pain      Both hips and leg length difference.      2017 Visit #1    SUBJECTIVE / HPI:   Description, Duration and Location of Primary Problem: R low back and hip aching pain x9 months.  Balance difficulty, lists to the right.  Seamstress noted and recent Physical Therapist measured 1.5 in R leg length discrepancy.    Working with Physical Therapy.  She had R foot surgery for hammer toe but not successful and sometimes painful but usually tight.   Standing with eyes closed feels unbalanced.   Same history from 17 PT note:  \"Neuropathy in feet, elevates feet in afternoon and can notices it then, not notices when she is walking.  Gabapentin helps some, interested in trying different med to see if helps more.  Can tell when in shower she has to hold on because she feels unstable when she closes her eyes to rinse hair.  Doesn't feel safe with walking outside, walk with holding daughter's arm or cane.\"    Mechanism of Injury: recurrent, no injury  Duration and Frequency of Pain: frequent  Radiation of pain: yes, R lateral thigh   Pain rated 4/10 at it's worst, 2/10 current, and 0/10 at it's best.  Pain course: unchanged  Worse with: walking, standing, sitting and lifting  Improved by: holding onto cart  Previous injury:  History of recurrent hip bursitis, has had several injections.    2017  Neck index n/a    Back index 28%    Functional limitations: " walking    Other Health Care Providers seen for this: PT Darling Akbar  Work Habits:   Exercise habits:  Sleeping habits:  Hobbies/Interests:  Past D.C. Care: past for low back         Patient Active Problem List     Diagnosis  Code     NONTOXIC MULTINODULAR GOITER s/p total thyroidectomy E04.2     NEUROPATHY-PERIPHERAL G60.9     TINEA PEDIS B35.3     Essential hypertension I10     HYPOTHYROIDISM E03.9     ACID REFLUX DISEASE K21.9     INSOMNIA G47.00     NEUROPATHY G58.9     CHRONIC KIDNEY DISEASE STAGE III (MODERATE) N18.3     ACP (advance care planning) Z71.89     Diarrhea R19.7     Irritable bowel syndrome K58.9     Raynaud phenomenon I73.00     Cigarette smoker F17.210     CAP (community acquired pneumonia) J18.9     Low folic acid E53.8     Liver abscess K75.0     Iron deficiency anemia D50.9       Past Medical History:     Diagnosis  Date     CHRONIC KIDNEY DISEASE STAGE III (MODERATE) 7/27/2011     Colon polyp 2006    sessile adenoma       Depression with anxiety     1972 with divorce - Imipramine;  Sertraline 5/08      Diarrhea 11/6/2014     Dysphagia 2007    EGD 3/14/07 nl; sx from goiter.      Grave's disease 2004    on Tapazole      HTN (hypertension)      Hx of pregnancy     vaginal deliveries       Kidney stones 1990    s/p lithotripsy      Menopause     DXA 10/27/06 normal      Raynaud phenomenon 6/5/2015     Tobacco use disorder        Past Surgical History:      Procedure  Laterality Date     APPENDECTOMY  1979,     & Meckel's diverticulum removed       COLONOSCOPY DIAGNOSTIC  11/17/06    polyps; repeat in 5 YEARS       COLONOSCOPY DIAGNOSTIC  11/8/11     ESOPHAGOGASTRODUODENOSCOPY  3/14/07    done for dysphagia; normal       FOOT SURGERY  10/26/2015    [facility] [place] with Dr. Grimaldo       IR BILIARY STRICTURE DILATATION WO STENT      x3- per h/p /Choledochojejunostomy with Vane-En-Y due to transection of common bile duct       LAP CHOLECYSTECTOMY  1991    common bile  duct transected       percutaneous drainage liver abscess      6/30/17 [facility]       WA COLONOSCOPY REMOVE PAMELA POLYP LESN HOT BPSY FORCEP  11/6/2014            PREMALIG/BENIGN SKIN LESION EXCISION  4/05    epidermal inclusion cyst       ARMEN AND BSO  1979     THYROIDECTOMY  3/26/08    Total thyroidectomy; multinodular goiter           Current Outpatient Prescriptions:      acetaminophen SR (TYLENOL ARTHRITIS) 650 mg Extended-Release tablet, Take 1,300 mg by mouth every 24 hours. Max acetaminophen dose: 4000mg in 24 hrs.   Indications: PAIN, Disp: , Rfl:      aspirin 81 mg tablet, Take 81 mg by mouth once daily with a meal., Disp: , Rfl:      Blood Pressure Test Kit-Large kit, As directed. OMRON, Disp: 1 Each, Rfl: 0     CALCIUM CARBONATE/VITAMIN D3 (CALCIUM 600 + D,3, ORAL), Take  by mouth 2 times daily., Disp: , Rfl:      carboxymethylcellulose 0.5% (REFRESH TEARS) 0.5 % drop ophthalmic drops, Place 1 Drop into both eyes once daily., Disp: , Rfl: 0     chlorthalidone (HYGROTON) 25 mg tablet, TAKE ONE TABLET three days per week, Disp: 45 tablet, Rfl: 0     docusate (DULCOLAX STOOL SOFTENER, DSS,) 100 mg capsule, Take 1 capsule by mouth for constipation. Can take 2 in 24 hours., Disp: , Rfl: 0     estradiol (ESTRACE) 0.1 mg/g vaginal cream, Insert 2 g into the vagina every Monday and Friday., Disp: 42.5 g, Rfl: 6     folic acid 1 mg tablet, Take 1 tablet by mouth once daily., Disp: , Rfl: 0     gabapentin (NEURONTIN) 300 mg capsule, TAKE 1 CAPSULE BY MOUTH THREE TIMES DAILY, Disp: 270 capsule, Rfl: 0     levothyroxine (SYNTHROID) 112 mcg tablet, TAKE 1 TABLET BY MOUTH ONCE DAILY, Disp: 90 tablet, Rfl: 2     loratadine (CLARITIN) 10 mg tablet, Take 1 tablet by mouth once daily., Disp: , Rfl: 0     losartan (COZAAR) 25 mg tablet, TAKE 1 TABLET BY MOUTH EVERY DAY, Disp: 90 tablet, Rfl: 3     pantoprazole (PROTONIX) 20 mg tablet, Take 1 tablet by mouth once daily., Disp: 90 tablet, Rfl: 3     Psyllium Seed-Sucrose  "(METAMUCIL, SUGAR,) powder, Take 1 tsp by mouth at bedtime., Disp: , Rfl: 0     SF 5000 PLUS 1.1 % crea, , Disp: , Rfl: 98     Vit C-Vit E-Lutein-Min-OM-3 (OCUVITE) 970-29-4-150 mg-unit-mg-mg cap, 1 tablet by mouth once daily., Disp: , Rfl: 0     VITAMIN C 1,000 MG TAB, 1 tablet oral daily, Disp: , Rfl:      zolpidem (AMBIEN) 5 mg tablet, TAKE ONE TABLET BY MOUTH NIGHTLY AT BEDTIME AS NEEDED FOR SLEEP, Disp: 40 tablet, Rfl: 2  Medications have been reviewed by me and are current to the best of my knowledge and ability.      Social History     Social History        Marital status:       Spouse name: N/A     Number of children:  N/A     Years of education:  N/A     Occupational History      Not on file.     Social History Main Topics         Smoking status:   Current Every Day Smoker     Packs/day:  0.50     Years:  30.00     Types:  Cigarettes     Smokeless tobacco:   Never Used      Comment: 1/2 - 3/4 ppd; not interested in quitting at this time.        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    ROS:  A comprehensive review of systems was negative except for items noted in HPI/Subjective.      OBJECTIVE:    DIAGNOSTICS:  No lumbopelvic or hip xrays views taken.  Order if not improving with trial of care.     PHYSICAL EXAM:   /66  Pulse 60  Resp 16  Ht 1.651 m (5' 5\")  Wt 69.3 kg (152 lb 12.8 oz)  BMI 25.43 kg/m2    Neurologic Exam: Nonfocal; symmetric DTRs, normal gross motor movement, tone, and coordination. No tremor.  Sensory: " decreased fine touch bilateral feet.    Musculoskeletal Exam:   Posture: Anterior head carriage, rounded shoulders.  Low R shoulder, Low R occiput- head leans to R.      Low L iliac crest and L PSIS, low R greater trochanter.  Gait: shortened    Cervical    ROM:   halting arc of motion, marked lateral bending restriction <15 degrees bilateral.   No further evaluation.     Thoracic and Lumbar ROM:   Restricted.  +Kemps: bilateral sacroiliac.   +Gillets: +FERNANDA, NINO   - Seated knee extension: no radicular pain, focal R low back pain  + XIANG: ipsilateral low back/sacroiliac jt pain, restricted ROM.   +Prone Leg length inequality: -[place]ield R; Restricted R heel to buttock +anterior thigh muscle pulling, R hip lifts off table with passive heel to buttock.    +Tenderness: lumbosacral and R inferior and lateral glute  +Muscle spasm:  lumbar paraspinals, glutes, R>L psoas.   +Joint asymmetry and restriction:  FERNANDA, NINO    ASSESSMENT: Sylvie Bautista is a 83 y.o. female with good to fair prognosis with age, chronicity, recurrence, adherence/motivation toward home exercise program as limiting factors to recovery.   No diagnosis found.    PLAN    Care:  Risks and benefits were explained and all questions answered. Exam only today.     INSTRUCTIONS   Gentle Range Of Motion stretching: anterior hips.  Return to start care in 2-7 days.    9/1/2017  Plan of Care:  Short term 5-8 visits Chiropractic Care including Spinal Adjustments.    Deferring physiotherapy and active rehabilitation to Physical Therapy to meet care plan goals.     Frequency: 2xweek for up to 2-4 weeks trial of care.  POC discussed and patient agreeable to plan of care.      9/1/2017 Goals:  To be met by Re-eval in 1 month approx 9/21-10/1/2017 .     Patient:     Patient will report improved pain.   Patient will report 25-50% improved walking.   Patient will demonstrate an improved ability to complete Activities of Daily Living as shown by a reported reduced  score on back index.   Objective:     Patient will demonstrate improved ROM and motion on palpation testing.

## 2017-12-28 NOTE — TELEPHONE ENCOUNTER
Patient Information     Patient Name MRN Sex Sylvie Raza [id number] Female 1934      Telephone Encounter by Terese Traylor LPN at 2017  8:06 AM     Author:  Terese Traylor LPN Service:  (none) Author Type:  NURS- Licensed Practical Nurse     Filed:  2017  8:07 AM Encounter Date:  2017 Status:  Signed     :  Terese Traylor LPN (NURS- Licensed Practical Nurse)            Verified patient's last name and date of birth. Notified of the order placed.  Terese Traylro LPN.........2017   8:07 AM

## 2017-12-28 NOTE — TELEPHONE ENCOUNTER
Patient Information     Patient Name MRN Sex Sylvie Raza [id number] Female 1934      Telephone Encounter by Terese Traylor LPN at 2017  9:12 AM     Author:  Terese Traylor LPN Service:  (none) Author Type:  NURS- Licensed Practical Nurse     Filed:  2017  9:15 AM Encounter Date:  2017 Status:  Signed     :  Terese Traylor LPN (NURS- Licensed Practical Nurse)            Verified patient's last name and date of birth. Notified of the results, she is concerned about her hemoglobin has dropped from 11.5 to 10 in five days. She is concerned about this and is wondering if this has anything to do with her not being able to drink a lot of water. Please advise.  Terese Traylor LPN.........2017   9:15 AM

## 2017-12-28 NOTE — TELEPHONE ENCOUNTER
Patient Information     Patient Name MRN Sex Sylvie Raza [id number] Female 1934      Telephone Encounter by Mirella Aly NP at 2017  7:20 AM     Author:  Mirella Aly NP Service:  (none) Author Type:  PHYS- Nurse Practitioner     Filed:  2017  7:22 AM Encounter Date:  2017 Status:  Signed     :  Mirella Aly NP (PHYS- Nurse Practitioner)            This drop could have occurred because of your persistent. This lab will be rechecked when you are seen in clinic this week and also additional studies will be obtained to rule out causes of anemia if it is still low.

## 2017-12-28 NOTE — PROGRESS NOTES
Patient Information     Patient Name MRN Sex Sylvie Raza [id number] Female 1934      Progress Notes by Mirella Aly NP at 2017 12:40 PM     Author:  Mirella Aly NP  Service:  (none) Author Type:  PHYS- Nurse Practitioner     Filed:  2017  3:39 PM  Encounter Date:  2017 Status:  Addendum     :  Mirella Aly NP (PHYS- Nurse Practitioner)        Related Notes: Original Note by Mirella Aly NP (PHYS- Nurse Practitioner) filed at 2017  2:58 PM            SUBJECTIVE:    Sylvie Bautista is a 83 y.o. female who presents for a follow-up on febrile illness and fatigue    HPI  she was seen in clinic one week ago. She had been seen by Dr. Casas the week prior for fever and weakness. She had questionable urinalysis and was started on amoxicillin. That antibiotic was stopped because urine culture was negative. She was seen back in follow-up by this provider last week. Patient continued to run a temperature and felt quite weak. She really had no other symptoms. Patient did have pneumonia about a year ago. She does use tobacco daily. She has been smoking since age of 17 between 1 and 2 packs of cigarettes per day most of her life but now is down to just 1 pack a day. She denies cough and shortness of breath. Denies weight loss and night sweats. No hemoptysis. No dysuria. No abdominal pain. She states she feels significantly better. Highest fever over the past 2 days is 99.3  Blood cultures and titers negative last week.  When she was seen in clinic last week her sedimentation rate was increased to 100 and white count was above 13,000. Her hemoglobin was also low at 10 g/dL which was a new finding. She is to do today for follow-up of labs. Patient had colonoscopy  with hyperplastic polyp and no further recommended screening per surgical note. Denies GI or  blood loss. She has been drinking plenty of water and appetite is good. She has  chronic intermittent episodes of diarrhea which has been related to irritable bowel syndrome with negative colonoscopy screening. The symptoms are unchanged.  She does have some chronic gait instability which is worsened since she has been ill. She is wondering if she can get set up with physical therapy. She currently is using a cane to assist with ambulation. No recent falls.  Other than the Levaquin, she has not been on any new medications that could be causing the fever  No Known Allergies,   Current Outpatient Prescriptions on File Prior to Visit       Medication  Sig Dispense Refill     acetaminophen SR (TYLENOL ARTHRITIS) 650 mg Extended-Release tablet Take 1,300 mg by mouth every 24 hours. Max acetaminophen dose: 4000mg in 24 hrs.   Indications: PAIN       aspirin 81 mg tablet Take 81 mg by mouth once daily with a meal.       Blood Pressure Test Kit-Large kit As directed. OMRON 1 Each 0     CALCIUM CARBONATE/VITAMIN D3 (CALCIUM 600 + D,3, ORAL) Take  by mouth 2 times daily.       carboxymethylcellulose 0.5% (REFRESH TEARS) 0.5 % drop ophthalmic drops Place 1 Drop into both eyes once daily.  0     chlorthalidone (HYGROTON) 25 mg tablet TAKE ONE TABLET three days per week 45 tablet 0     docusate (DULCOLAX STOOL SOFTENER, DSS,) 100 mg capsule Take 1 capsule by mouth for constipation. Can take 2 in 24 hours.  0     estradiol (ESTRACE) 0.1 mg/g vaginal cream Insert 2 g into the vagina every Monday and Friday. 42.5 g 6     folic acid 1 mg tablet Take 1 tablet by mouth once daily.  0     gabapentin (NEURONTIN) 300 mg capsule Take 1 capsule by mouth 3 times daily. 270 capsule 3     levothyroxine (SYNTHROID) 112 mcg tablet TAKE 1 TABLET BY MOUTH ONCE DAILY 90 tablet 2     loratadine (CLARITIN) 10 mg tablet Take 1 tablet by mouth once daily.  0     losartan (COZAAR) 25 mg tablet TAKE 1 TABLET BY MOUTH EVERY DAY 90 tablet 3     pantoprazole (PROTONIX) 20 mg tablet Take 1 tablet by mouth once daily. 90 tablet 3     SF  "5000 PLUS 1.1 % crea   98     Vit C-Vit E-Lutein-Min-OM-3 (OCUVITE) 091-30-6-150 mg-unit-mg-mg cap 1 tablet by mouth once daily.  0     VITAMIN C 1,000 MG TAB 1 tablet oral daily       zolpidem (AMBIEN) 5 mg tablet TAKE ONE TABLET BY MOUTH NIGHTLY AT BEDTIME AS NEEDED FOR SLEEP 40 tablet 3     No current facility-administered medications on file prior to visit.     and   Past Medical History:     Diagnosis  Date     CHRONIC KIDNEY DISEASE STAGE III (MODERATE) 7/27/2011     Colon polyp 2006    sessile adenoma       Depression with anxiety     1972 with divorce - Imipramine;  Sertraline 5/08      Diarrhea 11/6/2014     Dysphagia 2007    EGD 3/14/07 nl; sx from goiter.      Grave's disease 2004    on Tapazole      HTN (hypertension)      Hx of pregnancy     vaginal deliveries       Kidney stones 1990    s/p lithotripsy      Menopause     DXA 10/27/06 normal      Raynaud phenomenon 6/5/2015     Tobacco use disorder        REVIEW OF SYSTEMS:  Review of Systems   Constitutional: Negative for chills, fever, malaise/fatigue and weight loss.   HENT: Negative for congestion, ear pain and sore throat.    Respiratory: Negative for cough, hemoptysis, sputum production, shortness of breath and wheezing.    Cardiovascular: Negative for chest pain.   Gastrointestinal: Negative for abdominal pain, constipation and diarrhea.   Genitourinary: Negative for dysuria.   Musculoskeletal: Positive for joint pain.        Hip pain secondary arthritis, improved with steroid injection last month   Neurological: Negative for dizziness, weakness and headaches.        Has chronic gait instability, using cane   Psychiatric/Behavioral: Positive for substance abuse.       OBJECTIVE:  /60  Temp 97.4  F (36.3  C) (Tympanic)  Ht 1.632 m (5' 4.25\")  Wt 72.1 kg (159 lb)  Breastfeeding? No  BMI 27.08 kg/m2    EXAM:   Physical Exam   Constitutional: She is oriented to person, place, and time and well-developed, well-nourished, and in no distress. " No distress.   HENT:   Right Ear: Tympanic membrane normal.   Left Ear: Tympanic membrane normal.   Mouth/Throat: Oropharynx is clear and moist. No oropharyngeal exudate.   No temporal artery tenderness, erythema, swelling of temporal arteries nor bounding pulsations   Eyes: Conjunctivae are normal. No scleral icterus.   Neck: Neck supple.   No supraclavicular adenopathy. No thyromegaly or thyroid tenderness   Cardiovascular: Normal rate, regular rhythm and normal heart sounds.    Pulmonary/Chest: Effort normal and breath sounds normal.   Faint mid inspiratory rales bilaterally at bases   Abdominal: Soft. She exhibits no distension. There is no tenderness.   Musculoskeletal: She exhibits no edema.   No synovitis   Lymphadenopathy:     She has no cervical adenopathy.   Neurological: She is alert and oriented to person, place, and time.   Slight limp, uses cane   Skin: Skin is warm. No rash noted. She is not diaphoretic. No pallor.   Psychiatric: Mood, memory, affect and judgment normal.     Results for orders placed or performed in visit on 06/21/17      SEDIMENTATION RATE      Result  Value Ref Range    SEDIMENTATION RATE        106 (H) <31 mm/hr   IRON PLUS IRON BINDING CAP      Result  Value Ref Range    IRON 26 (L) 50 - 212 ug/dL    UIBC (UNSATURATED)  229.08 mg/dL    IRON BINDING CAPACITY, CALCULATED  255.08 245.00 - 400.00 ug/dL    IRON, % SATURATION  10 (L) 20 - 55 %   FERRITIN      Result  Value Ref Range    FERRITIN 145.7 23.9 - 336.2 ng/mL   FOLIC ACID      Result  Value Ref Range    FOLIC ACID >24.8 >=5.2 ng/mL   VITAMIN B12      Result  Value Ref Range    VITAMIN B12 953 (H) 180 - 914 pg/mL   CBC WITH AUTO DIFFERENTIAL      Result  Value Ref Range    WHITE BLOOD COUNT         13.1 (H) 4.5 - 11.0 thou/cu mm    RED BLOOD COUNT           3.15 (L) 4.00 - 5.20 mil/cu mm    HEMOGLOBIN                10.2 (L) 12.0 - 16.0 g/dL    HEMATOCRIT                31.5 (L) 33.0 - 51.0 %    MCV                       100 80  - 100 fL    MCH                       32.4 26.0 - 34.0 pg    MCHC                      32.4 32.0 - 36.0 g/dL    RDW                       13.1 11.5 - 15.5 %    PLATELET COUNT            453 (H) 140 - 440 thou/cu mm    MPV                       8.5 6.5 - 11.0 fL    NEUTROPHILS               70.6 42.0 - 72.0 %    LYMPHOCYTES               22.8 20.0 - 44.0 %    MONOCYTES                 3.9 <12.0 %    EOSINOPHILS               0.3 <8.0 %    BASOPHILS                 0.2 <3.0 %    IMMATURE GRANULOCYTES(METAS,MYELOS,PROS) 2.2 %    ABSOLUTE NEUTROPHILS      9.3 (H) 1.7 - 7.0 thou/cu mm    ABSOLUTE LYMPHOCYTES      3.0 (H) 0.9 - 2.9 thou/cu mm    ABSOLUTE MONOCYTES        0.5 <0.9 thou/cu mm    ABSOLUTE EOSINOPHILS      0.0 <0.5 thou/cu mm    ABSOLUTE BASOPHILS        0.0 <0.3 thou/cu mm    ABSOLUTE IMMATURE GRANULOCYTES(METAS,MYELOS,PROS) 0.3 <=0.3 thou/cu mm     ASSESSMENT/PLAN:    ICD-10-CM    1. Fever of unknown origin R50.9 CT ABDOMEN PELVIS W      CT CHEST W   2. Febrile illness R50.9 SEDIMENTATION RATE      SEDIMENTATION RATE      CT ABDOMEN PELVIS W      CT CHEST W      CANCELED: XR CHEST 2 VIEWS PA AND LATERAL      CANCELED: CT CHEST WO   3. Leukocytosis, unspecified type D72.829 CT ABDOMEN PELVIS W      CT CHEST W      CANCELED: XR CHEST 2 VIEWS PA AND LATERAL   4. Iron deficiency anemia, unspecified iron deficiency anemia type D50.9 CBC WITH DIFFERENTIAL      IRON PLUS IRON BINDING CAP      FERRITIN      FOLIC ACID      VITAMIN B12      CBC WITH DIFFERENTIAL      IRON PLUS IRON BINDING CAP      FERRITIN      FOLIC ACID      VITAMIN B12      CBC WITH AUTO DIFFERENTIAL      CT ABDOMEN PELVIS W      CT CHEST W      OCCULT BLOOD STOOL 3 SPECIMENS GUAIAC   5. Cigarette smoker F17.210 CT CHEST W      CANCELED: CT CHEST WO   6. Gait instability R26.81 AMB CONSULT TO PHYSICAL THERAPY   7. Encounter for screening for malignant neoplasm of colon  Z12.11 OCCULT BLOOD STOOL 3 SPECIMENS GUAIAC        Plan:  Patient continues  to have elevated white count, significantly high sedimentation rate and evidence of iron deficiency anemia. Stools will be checked for occult bleeding with Hemoccult ×3. If positive then patient will need panendoscopy. No further antibiotic therapy at this point. She will be scheduled for CT of abdomen and pelvis and chest with contrast. She does have stage III chronic kidney disease, last evaluation 10 days ago. Patient will follow-up in clinic 2 days after test to discuss results. If she develops worsening of her symptoms to include high fever, weakness or any new concerning findings she needs to be evaluated. She will be evaluated in the emergency department if significantly ill. She also will be set up for physical therapy for strengthening. Patient is at high risk of malignancy with lab abnormality, fever of unknown origin. May also need to consider rheumatologic disorders.  Greater than 40 minutes of face-to-face time spent with patient and her daughter with greater than 50% in counseling and care coordination.

## 2017-12-28 NOTE — PROGRESS NOTES
"Patient Information     Patient Name MRN Sex Sylvie Raza [id number] Female 1934      Progress Notes by Ewa Koroma DC at 2017 10:30 AM     Author:  Ewa Koroma DC Service:  (none) Author Type:  [facility]- Other provider     Filed:  2017 11:29 AM Encounter Date:  2017 Status:  Signed     :  Ewa Koroma DC ([facility]- Other provider)            PATIENT:  Sylvie Bautista is a 83 y.o. female    PROBLEM:   Date of Initial Visit for this Episode:  2017  Chief Complaint     Patient presents with       Chiropractic Care      f/u      2017 Visit #5    SUBJECTIVE:  L low back and R lateral hip pain \"ok\".   No discomfort after adding thoracic adjusting with activator.    Wearing lift in R shoe today with athletic shoes and doing ok.  Doing HEP, but not able to do all at one time because tires or overwhelmed by number.    Not raising R knee as high marching because aggravating R trochanter bursitis.   Thinking about attending yoga and fitness classes held at [facility] after completes PT/chiropractic.    Wants to review anterior hip stretching.       HPI:   Description, Duration and Location of Primary Problem: R low back and hip aching pain x9 months.  Balance difficulty, lists to the right.  Seamstress noted and recent Physical Therapist measured 1.5 in R leg length discrepancy.    Working with Physical Therapy.  She had R foot surgery for hammer toe but not successful and sometimes painful but usually tight.   Standing with eyes closed feels unbalanced.   Same history from 17 PT note:  \"Neuropathy in feet, elevates feet in afternoon and can notices it then, not notices when she is walking.  Gabapentin helps some, interested in trying different med to see if helps more.  Can tell when in shower she has to hold on because she feels unstable when she closes her eyes to rinse hair.  Doesn't feel safe with walking outside, walk with holding " "daughter's arm or cane.\"    Mechanism of Injury: recurrent, no injury  Duration and Frequency of Pain: frequent  Radiation of pain: yes, R lateral thigh   Pain rated 4/10 at it's worst, 2/10 current, and 0/10 at it's best.  Pain course: unchanged  Worse with: walking, standing, sitting and lifting  Improved by: holding onto cart  Previous injury:  History of recurrent hip bursitis, has had several injections.    9/1/2017  Neck index n/a    Back index 28%    Functional limitations: walking    Other Health Care Providers seen for this: PT Darling Akabr  Work Habits:   Exercise habits:  Sleeping habits:  Hobbies/Interests:  Past D.C. Care: past for low back      OBJECTIVE:    DIAGNOSTICS:  No lumbopelvic or hip xrays views taken.  Order if not improving with trial of care.     9/7/17  PHYSICAL EXAM:   Neurologic Exam: Nonfocal; symmetric DTRs, normal gross motor movement, tone, and coordination. No tremor.  Sensory: decreased fine touch bilateral feet.    Musculoskeletal Exam:   Posture: Anterior head carriage, rounded shoulders.  Low R shoulder, Low R occiput- head leans to R.      Low L iliac crest and L PSIS, low R greater trochanter.  Gait: shortened    Cervical    ROM:   halting arc of motion, marked lateral bending restriction <15 degrees bilateral.   No further evaluation.     Thoracic and Lumbar ROM:   Restricted.  +Kemps: bilateral sacroiliac.   +Gillets: +FERNANDA, NINO   - Seated knee extension: no radicular pain, focal R low back pain  + XIANG: ipsilateral low back/sacroiliac jt pain, restricted ROM.   +Prone Leg length inequality: -Derefield R; Restricted R heel to buttock +anterior thigh muscle pulling, R hip lifts off table with passive heel to buttock.    +Tenderness: lumbosacral and R inferior and lateral glute  +Muscle spasm:  lumbar paraspinals, glutes, R>L psoas.   +Joint asymmetry and restriction:  FERNANDA, NINO    9/18/2017   Upright posture: Anterior head carriage, rounded shoulders.  Low R shoulder, Low R " occiput- head leans to R.   Mild Low L iliac crest and L PSIS, mildly low R greater trochanter.    +Prone Leg length inequality: MILD +Derefield L; MILD Restricted L heel to buttock   +Tenderness: mild lumbosacral   +Muscle spasm:  MILD lumbar paraspinals  +Joint asymmetry and restriction:  BERONICA    ASSESSMENT: Improved functional pelvic leg length and hip flexor length, resolved thoracic dysfunction.     No diagnosis found.    PLAN    Care:      3 min. Anterior hip stretching reviewed. Instructions given on AVS handout.  SMT using light Drop x3 pelvic adjustments.       INSTRUCTIONS   Stretch side of your neck 3xdaily for 3 slow deep breaths as shown.    Active neck rotation: Sit in a chair, keeping your neck, shoulders, and trunk straight. First, turn your head slowly to the right. Turn it gently until it starts hurting. Turn it back to the forward position. Relax. Then turn it to the left. Repeat in each direction for 3-5 slow deep breaths.       Active neck side bend: Sit in a chair, keeping your neck, shoulders, and trunk straight. Tilt your head so that your right ear moves toward your right shoulder. Keep tilting until it starts hurting. Then tilt your head in the other direction so your left ear moves toward your left shoulder. Make sure you do not rotate your head while tilting or raise your shoulder toward your head. Repeat this exercise for 3-5 slow deep breaths in each direction.    Stretch anterior hips      PT exercises.  Return 9/21 for progress evaluation, likely discharge with maintained improvements.          9/1/2017  Plan of Care:  Short term 5-8 visits Chiropractic Care including Spinal Adjustments.    Deferring physiotherapy and active rehabilitation to Physical Therapy to meet care plan goals.     Frequency: 2xweek for up to 2-4 weeks trial of care.       9/1/2017 Goals:  To be met by Re-eval in 1 month approx 9/21/2017 .     Patient:     Patient will report improved pain.   Patient will report  25-50% improved walking.   Patient will demonstrate an improved ability to complete Activities of Daily Living as shown by a reported reduced score on back index.   Objective:     Patient will demonstrate improved ROM and motion on palpation testing.

## 2017-12-28 NOTE — TELEPHONE ENCOUNTER
Patient Information     Patient Name MRN Sex Sylvie Raza [id number] Female 1934      Telephone Encounter by Lyndsey Deshpande at 2017 11:33 AM     Author:  Lyndsey Deshpande Service:  (none) Author Type:  (none)     Filed:  2017 11:37 AM Encounter Date:  2017 Status:  Signed     :  Lyndsey Deshpande            Patient notified of results. She states she is feeling better but she is still continuing to run a temperature of 100.4. She states this temperature is not normal for her.  Lyndsey Deshpande LPN.......................... 2017  11:36 AM

## 2017-12-28 NOTE — TELEPHONE ENCOUNTER
Patient Information     Patient Name MRN Sex Sylvie Raza [id number] Female 1934      Telephone Encounter by Terese Traylor LPN at 7/3/2017  8:14 AM     Author:  Terese Traylor LPN Service:  (none) Author Type:  NURS- Licensed Practical Nurse     Filed:  7/3/2017  8:20 AM Encounter Date:  7/3/2017 Status:  Signed     :  Terese Traylor LPN (NURS- Licensed Practical Nurse)            Verified patient's last name and date of birth. Stated that she is feeling much better. Stated that they pulled the drain out before she was discharged yesterday as they weren't getting any drainage out of it. Stated that she has a dressing over this area that was advised to leave on for 24 hours as long as it was dry. Stated that this has been dry. She is on Augmentin for 14 days and then is to follow up down in [place] after this. She is wondering if she needs to follow up with Mirella SIMPSON in the mean time. Stated that she has not had a fever at all. Please advise.  Terese Traylor LPN.........7/3/2017   8:20 AM

## 2017-12-28 NOTE — PATIENT INSTRUCTIONS
Patient Information     Patient Name MRN Sex Sylvie Raza [id number] Female 1934      Patient Instructions by Ewa Koroma DC at 2017 10:30 AM     Author:  Ewa Koroma DC Service:  (none) Author Type:  [facility]- Other provider     Filed:  2017 11:27 AM Encounter Date:  2017 Status:  Signed     :  Ewa Koroma DC (INT [facility]- Other provider)              INSTRUCTIONS   Stretch side of your neck 3xdaily for 3 slow deep breaths as shown.    Active neck rotation: Sit in a chair, keeping your neck, shoulders, and trunk straight. First, turn your head slowly to the right. Turn it gently until it starts hurting. Turn it back to the forward position. Relax. Then turn it to the left. Repeat in each direction for 3-5 slow deep breaths.       Active neck side bend: Sit in a chair, keeping your neck, shoulders, and trunk straight. Tilt your head so that your right ear moves toward your right shoulder. Keep tilting until it starts hurting. Then tilt your head in the other direction so your left ear moves toward your left shoulder. Make sure you do not rotate your head while tilting or raise your shoulder toward your head. Repeat this exercise for 3-5 slow deep breaths in each direction.    Stretch anterior hips      PT exercises.  Return  for progress evaluation, likely discharge with maintained improvements.          2017  Plan of Care:  Short term 5-8 visits Chiropractic Care including Spinal Adjustments.    Deferring physiotherapy and active rehabilitation to Physical Therapy to meet care plan goals.     Frequency: 2xweek for up to 2-4 weeks trial of care.       2017 Goals:  To be met by Re-eval in 1 month approx 2017 .     Patient:     Patient will report improved pain.   Patient will report 25-50% improved walking.   Patient will demonstrate an improved ability to complete Activities of Daily Living as shown by a reported reduced score on back index.    Objective:     Patient will demonstrate improved ROM and motion on palpation testing.

## 2017-12-28 NOTE — TELEPHONE ENCOUNTER
Patient Information     Patient Name MRN Sex Sylvie Raza [id number] Female 1934      Telephone Encounter by Susie Yu at 2017  1:50 PM     Author:  Susie Yu Service:  (none) Author Type:  (none)     Filed:  2017  1:51 PM Encounter Date:  2017 Status:  Signed     :  Susie Yu            Patient notified and appointment made.

## 2017-12-28 NOTE — PROGRESS NOTES
"Patient Information     Patient Name MRN Sex Sylvie Raza [id number] Female 1934      Progress Notes by Ewa Koroma DC at 2017 11:00 AM     Author:  Ewa Koroma DC Service:  (none) Author Type:  INT [facility]- Other provider     Filed:  2017  3:50 PM Encounter Date:  2017 Status:  Signed     :  Ewa Koroma DC (INT [facility]ER- Other provider)            PATIENT:  Sylvie Bautista is a 83 y.o. female    PROBLEM:   Date of Initial Visit for this Episode:  2017  Chief Complaint    Patient presents with      Chiropractic Care      2017 Visit #2    SUBJECTIVE:  Here to start treatment after initial evaluation.    Woke this morning with right bilateral hip pain consistent with trochanteric bursitis.  Saw physical therapy prior to this and given IT band stretching.  Discussed this with Darling Akbar, PT.       HPI:   Description, Duration and Location of Primary Problem: R low back and hip aching pain x9 months.  Balance difficulty, lists to the right.  Seamstress noted and recent Physical Therapist measured 1.5 in R leg length discrepancy.    Working with Physical Therapy.  She had R foot surgery for hammer toe but not successful and sometimes painful but usually tight.   Standing with eyes closed feels unbalanced.   Same history from 17 PT note:  \"Neuropathy in feet, elevates feet in afternoon and can notices it then, not notices when she is walking.  Gabapentin helps some, interested in trying different med to see if helps more.  Can tell when in shower she has to hold on because she feels unstable when she closes her eyes to rinse hair.  Doesn't feel safe with walking outside, walk with holding daughter's arm or cane.\"    Mechanism of Injury: recurrent, no injury  Duration and Frequency of Pain: frequent  Radiation of pain: yes, R lateral thigh   Pain rated 4/10 at it's worst, 2/10 current, and 0/10 at it's best.  Pain course: unchanged  Worse with: walking, " standing, sitting and lifting  Improved by: holding onto cart  Previous injury:  History of recurrent hip bursitis, has had several injections.    9/1/2017  Neck index n/a    Back index 28%    Functional limitations: walking    Other Health Care Providers seen for this: PT Darling Akbar  Work Habits:   Exercise habits:  Sleeping habits:  Hobbies/Interests:  Past D.C. Care: past for low back      OBJECTIVE:    DIAGNOSTICS:  No lumbopelvic or hip xrays views taken.  Order if not improving with trial of care.     9/7/17  PHYSICAL EXAM:   Neurologic Exam: Nonfocal; symmetric DTRs, normal gross motor movement, tone, and coordination. No tremor.  Sensory: decreased fine touch bilateral feet.    Musculoskeletal Exam:   Posture: Anterior head carriage, rounded shoulders.  Low R shoulder, Low R occiput- head leans to R.      Low L iliac crest and L PSIS, low R greater trochanter.  Gait: shortened    Cervical    ROM:   halting arc of motion, marked lateral bending restriction <15 degrees bilateral.   No further evaluation.     Thoracic and Lumbar ROM:   Restricted.  +Kemps: bilateral sacroiliac.   +Gillets: +FERNANDA, NINO   - Seated knee extension: no radicular pain, focal R low back pain  + XIANG: ipsilateral low back/sacroiliac jt pain, restricted ROM.   +Prone Leg length inequality: -Derefield R; Restricted R heel to buttock +anterior thigh muscle pulling, R hip lifts off table with passive heel to buttock.    +Tenderness: lumbosacral and R inferior and lateral glute  +Muscle spasm:  lumbar paraspinals, glutes, R>L psoas.   +Joint asymmetry and restriction:  FERNANDA, NINO    9/7/2017     +Prone Leg length inequality: -Derefield R; Restricted R heel to buttock +anterior thigh muscle pulling, R hip lifts off table with passive heel to buttock.  +Laguerres:  R restricted  +Tenderness: lumbosacral and R inferior and lateral glute  +Muscle spasm:  lumbar paraspinals, glutes, R>L psoas.   +Joint asymmetry and restriction:  FERNANDA,  NINO    ASSESSMENT: Sylvie Bautista is a 83 y.o. female with good to fair prognosis with age, chronicity, recurrence, adherence/motivation toward home exercise program as limiting factors to recovery.       ICD-10-CM    1. Segmental and somatic dysfunction of pelvic region M99.05 SD CHIRO SPINAL MANIP 1-2 [facility] ONLY   2. Greater trochanteric bursitis, right M70.61 SD CHIRO SPINAL MANIP 1-2 [facility] ONLY   3. Segmental and somatic dysfunction of sacral region M99.04 SD CHIRO SPINAL MANIP 1-2 [facility] ONLY       PLAN    Care:  Risks and benefits were explained and all questions answered.  5 min. Hip capsule elongation, glute Myofascial release and stretching.  Side neck stretching.  SMT using light Drop x3 and R LE LAD pelvic adjustments.       INSTRUCTIONS   Stretch side of your neck 3xdaily for 3 slow deep breaths as shown.  PT exercises.  Schedule 2xweek for 2 weeks.          9/1/2017  Plan of Care:  Short term 5-8 visits Chiropractic Care including Spinal Adjustments.    Deferring physiotherapy and active rehabilitation to Physical Therapy to meet care plan goals.     Frequency: 2xweek for up to 2-4 weeks trial of care.  POC discussed and patient agreeable to plan of care.      9/1/2017 Goals:  To be met by Re-eval in 1 month approx 9/21-10/1/2017 .     Patient:     Patient will report improved pain.   Patient will report 25-50% improved walking.   Patient will demonstrate an improved ability to complete Activities of Daily Living as shown by a reported reduced score on back index.   Objective:     Patient will demonstrate improved ROM and motion on palpation testing.

## 2017-12-28 NOTE — TELEPHONE ENCOUNTER
Patient Information     Patient Name MRN Sex Sylvie Raza [id number] Female 1934      Telephone Encounter by Terese Traylor LPN at 2017 10:35 AM     Author:  Terese Traylor LPN Service:  (none) Author Type:  NURS- Licensed Practical Nurse     Filed:  2017 10:36 AM Encounter Date:  2017 Status:  Signed     :  Terese Traylor LPN (NURS- Licensed Practical Nurse)            Left message for patient to return call.  Terese Traylor LPN.........2017   10:35 AM

## 2017-12-28 NOTE — PROGRESS NOTES
Patient Information     Patient Name MRN Sex Sylvie Raza [id number] Female 1934      Progress Notes by Darling Akbar PT at 2017  9:46 AM     Author:  Darling Akbar PT Service:  (none) Author Type:  PT- Physical Therapist     Filed:  2017 11:11 AM Date of Service:  2017  9:46 AM Status:  Signed     :  Darling Akbar PT (PT- Physical Therapist)            [facility]  Outpatient PT - Daily note      Date of Service: 2017   Visit #: 3 (of 10 for PSFS)     Patient Name: Sylvie Bautista (Mireya)  YOB: 1934   Referring MD/Provider: Referring MD/Provider: Haydee Aly  Diagnosis: Medical and Treatment Diagnosis: Gait instability  Treatment Diagnosis: Gait instability   Insurance:  Medicare: G Codes/C Modifiers at Initial Assessment:     Start of Care Date: Start of Service: 17  Certification Dates: From: Start of Service: 17  Re-Cert Due: Medicare/MA Re-Cert Due: 10/04/17      Subjective      Current Status: She has done HEP 1 time per day, hasn't walked every day, but couple trips to [facility] and couple outside.  Walk outside carried cane instead of needing it.  She does feel heel lift has helped.  She has had some soreness in R greater trochanter area this morning, thinks she slept wrong, felt quite a stretch with IT band and pirifomis stretch.  She has had eval by chiropractor and sees her for an adjustment after PT today.  Plan to f/u in 2 weeks again to advance HEP.      Complaints/Reason for Referral: Sylvie Bautista  is a 83 y.o. female  who comes to physical therapy today with a chief complaint of unsteady gait.  She has noticed she walks favoring R foot and when she had pant shortened she was told one was shorter, she had R foot surgery for hammer toe but not successful and sometimes painful but usually tight.  Uses cane when she walks outside but sick since from liver abscess that started ,  started with antibiotic for UTI, MRI found, [facility] in [place] on 6/30 for procedure, and it's been fine ever since.  She lost weight, about 6-7 pounds.  No energy.  Has had decreased balance before this, winter doesn't walk much and uses cart when shops.  Neuropathy in feet, elevates feet in afternoon and can notices it then, not notices when she is walking.  Gabapentin helps some, interested in trying different med to see if helps more.  Can tell when in shower she has to hold on because she feels unstable when she closes her eyes to rinse hair.  Doesn't feel safe with walking outside, walk with holding daughter's arm or cane.  No pain.    Falls: Pt reports no falls in past 6 months.  Unsteady but grabs onto something.     Patient Specific Functional and Pain Scales (PSFS): 8/9/2017 based on pt reports during subjective history    Clinician Instructions: Complete after the history and before the exam.    Initial Assessment: We want to know what 3 activities in your life you are unable to perform, or are having the most difficulty performing, as a result of your chief problem. Please list and score at least 3 activities that you are unable to perform, or having the most difficulty performing, because of your chief problem.   Patient Specific Activity Scoring Scheme (score one number for each activity):   Activity Score (0-10)  0= Unable to perform activity  10= Able to perform activity at same level as before injury or problem   1. Walking outside - needs cane 5/10   2. Steps - needs rail 5/10   3. Shopping -needs cart 7/10   Totals:  17/30 = 57% ability which relates to 43% impairment    Patient verbally states that they understand that the information they have provided above is current and complete to the best of their knowledge.     G codes and Modifier taken from patient completing the PSFS:   Initial Primary G Code and Modifier:    Per the Patient's intake and/or assessment the Primary G Code is: Mobility  .   The Patient's Impairment, Limitation or Restriction Modifier would be best described as: CK - 40% - 60% Impairment.   Goal Primary G Code and Modifier:    The Patient's G Code Goal would be: Mobility    The Patient's Impairment, Limitation or Restriction Modifier goal would be best described as: CJ - 20% - 40% Impairment.       Objective    Posture: with heel lift: improved, pelvic crest is level still shoulders shifted to R but less than without lift    Today's Intervention:    Standing March with Counter Support - 20 reps - 2x daily - advanced to 1 finger on each hand - pulls in R buttock/hip  Sitting piriformis stretch B 2x30 sec, sore on R, stretch B  Seated Hamstring Stretch - 2 reps - 30 second hold - 2x daily  Standing Romberg to 3/4 Tandem Stance - 30 second hold - 2x daily - minimal sway, added head turns   Romberg Stance with Eyes Closed - 2 reps - 30 second hold - 2x daily - minimal sway  Sit to stand x8 in 30 sec -instructed to do 10 reps    SLS 4-5 sec max    Home Exercise Program:      Access Code: [email or web address] URL: [email or web address]/ Date: 08/09/2017 Prepared by: Darling Akbar   Exercises  Standing March with Counter Support - 20 reps - 2x daily  Seated Hamstring Stretch - 2 reps - 30 second hold - 2x daily  Standing Romberg to 3/4 Tandem Stance - 30 second hold - 2x daily  Romberg Stance with Eyes Closed - 2 reps - 30 second hold - 2x daily    Access Code: [email or web address] URL: [email or web address]/ Date: 08/25/2017 Prepared by: Darling Akbar   Exercises   Sit to Stand without Arm Support - 10 reps - 2x daily   Walking - 10-20 Minutes - 1x daily     Assessment      Clinical Impression:  Patient presents with signs and symptoms consistent with decreased balance with mild weakness and peripheral neuropathy.      Functional Limitations/Problems List:   1. Decreased safety walking outdoors on uneven surfaces  2. Decreased safety walking on stairs   3. Decreased safety  walking long distance      Functional Impairment(s): Decreased Activity Tolerance:  walking, dynamic balance     Prior Function:   Limited progressing    Physical Impairment(s):       NEURO/SENSORY:  peripheral neuropathy    Goals: Following physical therapy intervention, the patient hopes to be able to: Feel more confident with walking outdoors.      Goals:   Short Term Goals: To be met in 4 weeks:   Following physical therapy intervention, the patient will be able to:   1. Patient following through with HEP.  2. Patient to show increasing balance with being able to preform SLS x10 sec.     Long Term Goals: To be met in 8 weeks:   Following physical therapy intervention, the patient will be able to:   1. Patient to feel more confident with ambulating outdoors to be able to walk to park at least 3 times per week.   2. Patient will be independent with HEP to continue to maintain ROM and strength achieved during physical therapy.      Rehab Potential: Patient Potential for Achieving Desired Outcome:  Fair due to question patient follow through    Barriers to Learning: There are barriers to learning present with this patient with wanting to do on her own and not sure how much she is willing to put into HEP.     Patient participated in goal selection and understand(s) the plan of care: Yes     Therapist Assessment of Today's Intervention:  She did well with exercises given and understood parts of balance to work on, may need encouragement to continue.     Response to Intervention: Felt comfortable with HEP.     Plan    Treatment Plan / Targeted Outcomes:     Frequency:   6 visits     Duration of Treatment: 8 weeks     Plan of Care Due Date: Medicare/MA Re-Cert Due: 10/04/17     Planned Interventions:  Possible physical therapy interventions include but are not limited to:   Home Exercise Program development  Therapeutic Exercise (ROM & Strengthening)  Manual Therapy  Neuromuscular Re-education  Ultrasound  Electrical  Stimulation  Phonophoresis with Ketoprofen  Iontophoresis with Dexamethasone  Therapeutic Activities  Gait Training    Patient will follow up with physician as needed during course of physical therapy intervention.  At time of discharge, patient will be given a home exercise program to continue to maintain strength and ROM achieved during therapy.      Plan for next visit:  Advance HEP and see if additional appointments needed for follow through.  Discussed some adjustment of R shoe to accommodate decreased leg length on R side.     Student or PTA has been instructed in and demonstrates skills necessary to carry out above stated treatment plan: Yes    Thank you for your referral to [facility].  Please call with any questions, concerns or comments.  [phone]

## 2017-12-28 NOTE — TELEPHONE ENCOUNTER
Patient Information     Patient Name MRN Sex Sylvie Raza [id number] Female 1934      Telephone Encounter by Rani Tripp RN at 2017 10:07 AM     Author:  Rani Tripp RN Service:  (none) Author Type:  NURS- Registered Nurse     Filed:  2017 10:09 AM Encounter Date:  7/3/2017 Status:  Signed     :  Rani Tripp RN (NURS- Registered Nurse)            Medication is not on refill protocol. Unable to complete prescription refill per RN Medication Refill Policy.................... RANI TRIPP RN ....................  2017   10:07 AM    Will send to [facility] as Mirella Durant NP is out.    This is a Refill request from: [facility]  Name of Medication: Ambien 5 mg  Quantity requested: 40  Last fill date: 17  Last visit with MIRELLA DURANT was on: 2017 in [facility]  PCP:  Mirella Durant NP  Controlled Substance Agreement:  na   Diagnosis r/t this medication request: insomnia

## 2017-12-28 NOTE — TELEPHONE ENCOUNTER
Patient Information     Patient Name MRN Sylvie Jacobson [id number] Female 1934      Telephone Encounter by Tereza Delvalle RN at 2017  9:00 AM     Author:  Tereza Delvalle RN Service:  (none) Author Type:  NURS- Registered Nurse     Filed:  2017  9:02 AM Encounter Date:  10/30/2017 Status:  Signed     :  Tereza Delvalle RN (NURS- Registered Nurse)            zolpidem (AMBIEN) 5 mg tablet  TAKE 1 TABLET BY MOUTH NIGHTLY AT BEDTIME AS NEEDED FOR SLEEP  Disp: 40 tablet Refills:     Class: eRx Start: 10/30/2017    For: Insomnia  Originally ordered: 3 years ago by Mirella Durant NP  Last refill:2017  To be filled at: [facility] - [place], [place] [facility] [address]: [phone]    Last visit with MIRELLA DURANT was on: 10/06/2017 in [facility]  PCP:  Mirella Durant NP  Controlled Substance Agreement:  None noted      Mirella Durant NP out of office the rest of week will route to covering team let for review and consideration of refill    Unable to complete prescription refill per RN Medication Refill Policy.................... TEREZA DELVALLE RN ....................  2017   9:00 AM

## 2017-12-28 NOTE — TELEPHONE ENCOUNTER
Patient Information     Patient Name MRN Sylvie Jacobson [id number] Female 1934      Telephone Encounter by Seth Dalton MD at 2017 12:02 PM     Author:  Seth Dalton MD Service:  (none) Author Type:  Physician     Filed:  2017 12:02 PM Encounter Date:  7/3/2017 Status:  Signed     :  Seth Dalton MD (Physician)            PCP is back tomorrow.   Seth Dalton MD

## 2017-12-28 NOTE — PROGRESS NOTES
Patient Information     Patient Name MRN Sex Sylvie Raza [id number] Female 1934      Progress Notes by Mirella Aly NP at 2017  1:20 PM     Author:  Mirella Aly NP Service:  (none) Author Type:  PHYS- Nurse Practitioner     Filed:  2017  2:00 PM Encounter Date:  2017 Status:  Signed     :  Mirella Aly NP (PHYS- Nurse Practitioner)            SUBJECTIVE:    Sylvie Bautista is a 83 y.o. female who presents for follow-up after liver abscess drainage    HPI  patient was having fever of unknown origin and fatigue. She was also found to have a mildly elevated white count and new anemia. Found to have iron deficiency anemia. Hemoccult negative stools. Her diet is poor and iron. She eats a lot of TV dinners. On 2017 she was sent to [facility] after MRI of liver results showed abscess. She did have this drained. She had a 3 day hospital stay that was uneventful. She was discharged on Augmentin twice daily for 14 days. Since hospital discharge she states that the fatigue has improved. She is no longer febrile. She's feeling very well. Her appetite has always been good. She denies abdominal pain and pain in the right upper quadrant. No nausea or vomiting.    No Known Allergies,   Current Outpatient Prescriptions on File Prior to Visit       Medication  Sig Dispense Refill     acetaminophen SR (TYLENOL ARTHRITIS) 650 mg Extended-Release tablet Take 1,300 mg by mouth every 24 hours. Max acetaminophen dose: 4000mg in 24 hrs.   Indications: PAIN       aspirin 81 mg tablet Take 81 mg by mouth once daily with a meal.       Blood Pressure Test Kit-Large kit As directed. OMRON 1 Each 0     CALCIUM CARBONATE/VITAMIN D3 (CALCIUM 600 + D,3, ORAL) Take  by mouth 2 times daily.       carboxymethylcellulose 0.5% (REFRESH TEARS) 0.5 % drop ophthalmic drops Place 1 Drop into both eyes once daily.  0     chlorthalidone (HYGROTON) 25 mg tablet TAKE  ONE TABLET three days per week 45 tablet 0     docusate (DULCOLAX STOOL SOFTENER, DSS,) 100 mg capsule Take 1 capsule by mouth for constipation. Can take 2 in 24 hours.  0     estradiol (ESTRACE) 0.1 mg/g vaginal cream Insert 2 g into the vagina every Monday and Friday. 42.5 g 6     folic acid 1 mg tablet Take 1 tablet by mouth once daily.  0     gabapentin (NEURONTIN) 300 mg capsule Take 1 capsule by mouth 3 times daily. 270 capsule 3     levothyroxine (SYNTHROID) 112 mcg tablet TAKE 1 TABLET BY MOUTH ONCE DAILY 90 tablet 2     loratadine (CLARITIN) 10 mg tablet Take 1 tablet by mouth once daily.  0     losartan (COZAAR) 25 mg tablet TAKE 1 TABLET BY MOUTH EVERY DAY 90 tablet 3     pantoprazole (PROTONIX) 20 mg tablet Take 1 tablet by mouth once daily. 90 tablet 3     SF 5000 PLUS 1.1 % crea   98     Vit C-Vit E-Lutein-Min-OM-3 (OCUVITE) 037-59-9-150 mg-unit-mg-mg cap 1 tablet by mouth once daily.  0     VITAMIN C 1,000 MG TAB 1 tablet oral daily       zolpidem (AMBIEN) 5 mg tablet TAKE ONE TABLET BY MOUTH NIGHTLY AT BEDTIME AS NEEDED FOR SLEEP 40 tablet 2     No current facility-administered medications on file prior to visit.    ,   Past Medical History:     Diagnosis  Date     CHRONIC KIDNEY DISEASE STAGE III (MODERATE) 7/27/2011     Colon polyp 2006    sessile adenoma       Depression with anxiety     1972 with divorce - Imipramine;  Sertraline 5/08      Diarrhea 11/6/2014     Dysphagia 2007    EGD 3/14/07 nl; sx from goiter.      Grave's disease 2004    on Tapazole      HTN (hypertension)      Hx of pregnancy     vaginal deliveries       Kidney stones 1990    s/p lithotripsy      Menopause     DXA 10/27/06 normal      Raynaud phenomenon 6/5/2015     Tobacco use disorder    ,   Past Surgical History:      Procedure  Laterality Date     APPENDECTOMY  1979,     & Meckel's diverticulum removed       COLONOSCOPY DIAGNOSTIC  11/17/06    polyps; repeat in 5 YEARS       COLONOSCOPY DIAGNOSTIC  11/8/11      "ESOPHAGOGASTRODUODENOSCOPY  3/14/07    done for dysphagia; normal       FOOT SURGERY  10/26/2015    [facility] with Dr. Dillan DECKER BILIARY STRICTURE DILATATION WO STENT      x3- per h/p /Choledochojejunostomy with Vane-En-Y due to transection of common bile duct       LAP CHOLECYSTECTOMY  1991    common bile duct transected       percutaneous drainage liver abscess      6/30/17 [facility]       FL COLONOSCOPY REMOVE PAMELA POLYP LESLAY HOT BPSY FORCEP  11/6/2014            PREMALIG/BENIGN SKIN LESION EXCISION  4/05    epidermal inclusion cyst       ARMEN AND BSO  1979     THYROIDECTOMY  3/26/08    Total thyroidectomy; multinodular goiter      and   Social History       Substance Use Topics         Smoking status:   Current Every Day Smoker     Packs/day:  0.50     Years:  30.00     Types:  Cigarettes     Smokeless tobacco:   Never Used      Comment: 1/2 - 3/4 ppd; not interested in quitting at this time.        Alcohol use   No       REVIEW OF SYSTEMS:  ROS  see history of present illness  OBJECTIVE:  /54  Pulse 60  Temp 96.2  F (35.7  C) (Tympanic)  Ht 1.632 m (5' 4.25\")  Wt 71.7 kg (158 lb)  Breastfeeding? No  BMI 26.91 kg/m2    EXAM:   Physical Exam   Constitutional: She is oriented to person, place, and time and well-developed, well-nourished, and in no distress. No distress.   HENT:   Mouth/Throat: Oropharynx is clear and moist. No oropharyngeal exudate.   Cardiovascular: Normal rate and regular rhythm.    Pulmonary/Chest: Effort normal and breath sounds normal.   Abdominal: Soft. She exhibits no distension. There is no tenderness. There is no guarding.   2 drainage sites at the epigastric region have healed.   Neurological: She is alert and oriented to person, place, and time.   Skin: Skin is warm. She is not diaphoretic. No pallor.   Psychiatric: Mood, memory, affect and judgment normal.   Nursing note and vitals reviewed.  [facility] discharge notes reviewed and " discussed with patient.    ASSESSMENT/PLAN:    ICD-10-CM    1. Liver abscess K75.0    2. Iron deficiency anemia secondary to inadequate dietary iron intake D50.8 CBC WITH DIFFERENTIAL      CBC WITH DIFFERENTIAL      CBC WITH AUTO DIFFERENTIAL        Plan:  1. She has follow-up on Monday with surgeon. If she develops fever, fatigue or any other new concerning symptoms that she will need to be evaluated. She will finish out Augmentin. 2. We'll repeat CBC to make sure her white count has returned back to normal and that the hemoglobin is improving. If hemoglobin is still low then will replace with iron. She has heme-negative stools. Nutritional intake poor as iron sources as admitted by patient. If she is started on iron supplements then she will need to have labs checked again in 6 weeks.

## 2017-12-28 NOTE — PROGRESS NOTES
"Patient Information     Patient Name MRN Sex Sylvie Raza [id number] Female 1934      Progress Notes by Ewa Koroma DC at 2017 10:45 AM     Author:  Ewa Koroma DC Service:  (none) Author Type:  INT [facility]ER- Other provider     Filed:  9/15/2017  8:08 AM Encounter Date:  2017 Status:  Signed     :  Ewa Koroma DC (INT THER- Other provider)            PATIENT:  Sylvie Bautista is a 83 y.o. female    PROBLEM:   Date of Initial Visit for this Episode:  2017  Chief Complaint     Patient presents with       Chiropractic Care      f/u      9/15/2017 Visit #3    SUBJECTIVE:  L low back pain current 0/10, 6-7/10 at worst.    Improved right bilateral hip pain and no left hip pain today.     Wants to review neck stretching to improve upright posture.  Marching exercise helpful to feel more balanced.      HPI:   Description, Duration and Location of Primary Problem: R low back and hip aching pain x9 months.  Balance difficulty, lists to the right.  Seamstress noted and recent Physical Therapist measured 1.5 in R leg length discrepancy.    Working with Physical Therapy.  She had R foot surgery for hammer toe but not successful and sometimes painful but usually tight.   Standing with eyes closed feels unbalanced.   Same history from 17 PT note:  \"Neuropathy in feet, elevates feet in afternoon and can notices it then, not notices when she is walking.  Gabapentin helps some, interested in trying different med to see if helps more.  Can tell when in shower she has to hold on because she feels unstable when she closes her eyes to rinse hair.  Doesn't feel safe with walking outside, walk with holding daughter's arm or cane.\"    Mechanism of Injury: recurrent, no injury  Duration and Frequency of Pain: frequent  Radiation of pain: yes, R lateral thigh   Pain rated 4/10 at it's worst, 2/10 current, and 0/10 at it's best.  Pain course: unchanged  Worse with: walking, standing, " sitting and lifting  Improved by: holding onto cart  Previous injury:  History of recurrent hip bursitis, has had several injections.    9/1/2017  Neck index n/a    Back index 28%    Functional limitations: walking    Other Health Care Providers seen for this: PT Darling Akbar  Work Habits:   Exercise habits:  Sleeping habits:  Hobbies/Interests:  Past D.C. Care: past for low back      OBJECTIVE:    DIAGNOSTICS:  No lumbopelvic or hip xrays views taken.  Order if not improving with trial of care.     9/7/17  PHYSICAL EXAM:   Neurologic Exam: Nonfocal; symmetric DTRs, normal gross motor movement, tone, and coordination. No tremor.  Sensory: decreased fine touch bilateral feet.    Musculoskeletal Exam:   Posture: Anterior head carriage, rounded shoulders.  Low R shoulder, Low R occiput- head leans to R.      Low L iliac crest and L PSIS, low R greater trochanter.  Gait: shortened    Cervical    ROM:   halting arc of motion, marked lateral bending restriction <15 degrees bilateral.   No further evaluation.     Thoracic and Lumbar ROM:   Restricted.  +Kemps: bilateral sacroiliac.   +Gillets: +FERNANDA, NINO   - Seated knee extension: no radicular pain, focal R low back pain  + XIANG: ipsilateral low back/sacroiliac jt pain, restricted ROM.   +Prone Leg length inequality: -Derefield R; Restricted R heel to buttock +anterior thigh muscle pulling, R hip lifts off table with passive heel to buttock.    +Tenderness: lumbosacral and R inferior and lateral glute  +Muscle spasm:  lumbar paraspinals, glutes, R>L psoas.   +Joint asymmetry and restriction:  FERNANDA, NINO    9/15/2017     +Prone Leg length inequality: -Derefield R; Restricted R heel to buttock +anterior thigh muscle pulling, R hip lifts off table with passive heel to buttock, improved  +Tenderness: lumbosacral and R inferior and lateral glute  +Muscle spasm:  lumbar paraspinals, glutes, R>L psoas.   +Joint asymmetry and restriction:  FERNANDA, NINO    ASSESSMENT: Sylvie GASTELUM  Lily is a 83 y.o. female with good to fair prognosis with age, chronicity, recurrence, adherence/motivation toward home exercise program as limiting factors to recovery.       ICD-10-CM    1. Segmental and somatic dysfunction of pelvic region M99.05 MS CHIRO SPINAL MANIP 1-2 REGIONS GICH ONLY   2. Greater trochanteric bursitis, right M70.61 MS CHIRO SPINAL MANIP 1-2 REGIONS GICH ONLY   3. Segmental and somatic dysfunction of sacral region M99.04 MS CHIRO SPINAL MANIP 1-2 REGIONS GICH ONLY   4. Acute left-sided low back pain without sciatica M54.5 MS CHIRO SPINAL MANIP 1-2 REGIONS GICH ONLY       PLAN    Care:    5 min. Hip capsule elongation, glute Myofascial release and stretching.  3 min. Side neck stretching reviewed. Instructions given on AVS handout.  SMT using light Drop x3 and bilateral LE LAD pelvic adjustments.       INSTRUCTIONS   Stretch side of your neck 3xdaily for 3 slow deep breaths as shown.    Active neck rotation: Sit in a chair, keeping your neck, shoulders, and trunk straight. First, turn your head slowly to the right. Turn it gently until it starts hurting. Turn it back to the forward position. Relax. Then turn it to the left. Repeat in each direction for 3-5 slow deep breaths.       Active neck side bend: Sit in a chair, keeping your neck, shoulders, and trunk straight. Tilt your head so that your right ear moves toward your right shoulder. Keep tilting until it starts hurting. Then tilt your head in the other direction so your left ear moves toward your left shoulder. Make sure you do not rotate your head while tilting or raise your shoulder toward your head. Repeat this exercise for 3-5 slow deep breaths in each direction.    PT exercises.  Schedule 2xweek for 2 weeks.          9/1/2017  Plan of Care:  Short term 5-8 visits Chiropractic Care including Spinal Adjustments.    Deferring physiotherapy and active rehabilitation to Physical Therapy to meet care plan goals.     Frequency: 2xweek  for up to 2-4 weeks trial of care.       9/1/2017 Goals:  To be met by Re-eval in 1 month approx 9/21-10/1/2017 .     Patient:     Patient will report improved pain.   Patient will report 25-50% improved walking.   Patient will demonstrate an improved ability to complete Activities of Daily Living as shown by a reported reduced score on back index.   Objective:     Patient will demonstrate improved ROM and motion on palpation testing.

## 2017-12-28 NOTE — TELEPHONE ENCOUNTER
Patient Information     Patient Name MRN Sylvie Jacobson [id number] Female 1934      Telephone Encounter by Tereza Latham RN at 2017 11:19 AM     Author:  Tereza Latham RN Service:  (none) Author Type:  NURS- Registered Nurse     Filed:  2017 11:31 AM Encounter Date:  2017 Status:  Signed     :  Tereza Latham RN (NURS- Registered Nurse)            Levothyroxine refilled on 17 #90 x 2 refills to [facility]  Chlorthalidone refilled on 17 #45  Nsaids    Office visit in the past 12 months or per provider note.    Last visit with JOE DURANT was on: 2017 in [facility]  Next visit with JOE DURANT is on: 2017 in [facility]  Next visit with Internal Medicine is on: 2017 in [facility]    Max refill for 12 months from last office visit or per provider note.  Prescription refilled per RN Medication Refill Policy.................... TEREZA LATHAM RN ....................  2017   11:30 AM

## 2017-12-28 NOTE — PROGRESS NOTES
Patient Information     Patient Name MRN Sex Sylvie Raza [id number] Female 1934      Progress Notes by Libertad Shaw at 2017 12:29 PM     Author:  Libertad Shaw Service:  (none) Author Type:  (none)     Filed:  2017 12:31 PM Encounter Date:  2017 Status:  Signed     :  Libertad Shaw            Fax from [facility] was sent to [facility]go[facility] for previous Dexa/Mammogram images. No radiology exams were done at our clinics.    Libertad Shaw ....................  2017   12:31 PM

## 2017-12-28 NOTE — PROGRESS NOTES
"Patient Information     Patient Name MRN Sex Sylvie Raza [id number] Female 1934      Progress Notes by Ewa Koroma DC at 9/15/2017 11:30 AM     Author:  Ewa Koroma DC Service:  (none) Author Type:  INT THER- Other provider     Filed:  9/15/2017  4:26 PM Encounter Date:  9/15/2017 Status:  Signed     :  Ewa Koroma DC (INT THER- Other provider)            PATIENT:  Sylvie Bautista is a 83 y.o. female    PROBLEM:   Date of Initial Visit for this Episode:  2017  Chief Complaint     Patient presents with       Chiropractic Care      f/u      9/15/2017 Visit #4    SUBJECTIVE:  L low back pain \"ok\".   Mid back pain sitting on computer too long.   Not wearing lift in R shoe today with athletic shoes and doing ok.  Doing HEP, but not able to do all at one time because tires.   Wants to review neck stretching to improve upright posture.      HPI:   Description, Duration and Location of Primary Problem: R low back and hip aching pain x9 months.  Balance difficulty, lists to the right.  Seamstress noted and recent Physical Therapist measured 1.5 in R leg length discrepancy.    Working with Physical Therapy.  She had R foot surgery for hammer toe but not successful and sometimes painful but usually tight.   Standing with eyes closed feels unbalanced.   Same history from 17 PT note:  \"Neuropathy in feet, elevates feet in afternoon and can notices it then, not notices when she is walking.  Gabapentin helps some, interested in trying different med to see if helps more.  Can tell when in shower she has to hold on because she feels unstable when she closes her eyes to rinse hair.  Doesn't feel safe with walking outside, walk with holding daughter's arm or cane.\"    Mechanism of Injury: recurrent, no injury  Duration and Frequency of Pain: frequent  Radiation of pain: yes, R lateral thigh   Pain rated 4/10 at it's worst, 2/10 current, and 0/10 at it's best.  Pain course: " unchanged  Worse with: walking, standing, sitting and lifting  Improved by: holding onto cart  Previous injury:  History of recurrent hip bursitis, has had several injections.    9/1/2017  Neck index n/a    Back index 28%    Functional limitations: walking    Other Health Care Providers seen for this: PT Darling Akbar  Work Habits:   Exercise habits:  Sleeping habits:  Hobbies/Interests:  Past D.C. Care: past for low back      OBJECTIVE:    DIAGNOSTICS:  No lumbopelvic or hip xrays views taken.  Order if not improving with trial of care.     9/7/17  PHYSICAL EXAM:   Neurologic Exam: Nonfocal; symmetric DTRs, normal gross motor movement, tone, and coordination. No tremor.  Sensory: decreased fine touch bilateral feet.    Musculoskeletal Exam:   Posture: Anterior head carriage, rounded shoulders.  Low R shoulder, Low R occiput- head leans to R.      Low L iliac crest and L PSIS, low R greater trochanter.  Gait: shortened    Cervical    ROM:   halting arc of motion, marked lateral bending restriction <15 degrees bilateral.   No further evaluation.     Thoracic and Lumbar ROM:   Restricted.  +Kemps: bilateral sacroiliac.   +Gillets: +FERNANDA, NINO   - Seated knee extension: no radicular pain, focal R low back pain  + XIANG: ipsilateral low back/sacroiliac jt pain, restricted ROM.   +Prone Leg length inequality: -Derefield R; Restricted R heel to buttock +anterior thigh muscle pulling, R hip lifts off table with passive heel to buttock.    +Tenderness: lumbosacral and R inferior and lateral glute  +Muscle spasm:  lumbar paraspinals, glutes, R>L psoas.   +Joint asymmetry and restriction:  FERNANDA, NINO    9/15/2017   Upright posture: Anterior head carriage, rounded shoulders.  Low R shoulder, Low R occiput- head leans to R.   Mild Low L iliac crest and L PSIS, mildly low R greater trochanter.  +Prone Leg length inequality: Mild -Derefield R; Marked Restricted R heel to buttock   +Tenderness: mild lumbosacral and R inferior and  lateral glute  +Muscle spasm:  lumbar paraspinals, glutes, R>L psoas.   +Joint asymmetry and restriction:  FERNANDA, NINO    ASSESSMENT: Improved hip flexor length       ICD-10-CM    1. Segmental and somatic dysfunction of sacral region M99.04 [place] CHIRO SPINAL MANIP 1-2 REG[facility]ICH ONLY   2. Greater trochanteric bursitis, right M70.61 [place] CHIRO SPINAL MANIP 1-2 REG[facility] ONLY   3. Segmental and somatic dysfunction of thoracic region M99.02 NJ CHIRO SPINAL MANIP 1-2 REGIONS [facility]ICH ONLY   4. Midline thoracic back pain, unspecified chronicity M54.6 NJ CHIRO SPINAL MANIP 1-2 REG[facility] GICH ONLY       PLAN    Care:    5 min. Hip capsule elongation, glute Myofascial release and stretching.  3 min. Side neck stretching reviewed. Instructions given on AVS handout.  SMT using light Drop x3 and bilateral LE LAD pelvic adjustments.       INSTRUCTIONS   Stretch side of your neck 3xdaily for 3 slow deep breaths as shown.    Active neck rotation: Sit in a chair, keeping your neck, shoulders, and trunk straight. First, turn your head slowly to the right. Turn it gently until it starts hurting. Turn it back to the forward position. Relax. Then turn it to the left. Repeat in each direction for 3-5 slow deep breaths.       Active neck side bend: Sit in a chair, keeping your neck, shoulders, and trunk straight. Tilt your head so that your right ear moves toward your right shoulder. Keep tilting until it starts hurting. Then tilt your head in the other direction so your left ear moves toward your left shoulder. Make sure you do not rotate your head while tilting or raise your shoulder toward your head. Repeat this exercise for 3-5 slow deep breaths in each direction.    PT exercises.  Schedule 2xweek for 2-3 weeks.          9/1/2017  Plan of Care:  Short term 5-8 visits Chiropractic Care including Spinal Adjustments.    Deferring physiotherapy and active rehabilitation to Physical Therapy to meet care plan goals.     Frequency: 2xweek for up  to 2-4 weeks trial of care.       9/1/2017 Goals:  To be met by Re-eval in 1 month approx 9/21/2017 .     Patient:     Patient will report improved pain.   Patient will report 25-50% improved walking.   Patient will demonstrate an improved ability to complete Activities of Daily Living as shown by a reported reduced score on back index.   Objective:     Patient will demonstrate improved ROM and motion on palpation testing.

## 2017-12-28 NOTE — PROGRESS NOTES
Patient Information     Patient Name MRN Sex Sylvie Raza [id number] Female 1934      Progress Notes by Darling Akbar PT at 2017  9:37 AM     Author:  Darling Akbar PT Service:  (none) Author Type:  PT- Physical Therapist     Filed:  2017 11:05 AM Date of Service:  2017  9:37 AM Status:  Signed     :  Darling Akbar PT (PT- Physical Therapist)            [facility]  Outpatient PT - Daily note      Date of Service: 2017   Visit #: 4 (of 10 for PSFS)     Patient Name: Sylvie Bautista (Mireya)  YOB: 1934   Referring MD/Provider: Referring MD/Provider: Haydee Aly  Diagnosis: Medical and Treatment Diagnosis: Gait instability  Treatment Diagnosis: Gait instability   Insurance:  Medicare: G Codes/C Modifiers at Initial Assessment:     Start of Care Date: Start of Service: 17  Certification Dates: From: Start of Service: 17  Re-Cert Due: Medicare/MA Re-Cert Due: 10/04/17      Subjective      Current Status: She has done HEP a little throughout the day, some walking, feels a little more confident with gait.  She still tends to lean to the right but doesn't feel like she's listing as much.      Complaints/Reason for Referral: Sylvie Bautista  is a 83 y.o. female  who comes to physical therapy today with a chief complaint of unsteady gait.  She has noticed she walks favoring R foot and when she had pant shortened she was told one was shorter, she had R foot surgery for hammer toe but not successful and sometimes painful but usually tight.  Uses cane when she walks outside but sick since from liver abscess that started , started with antibiotic for UTI, MRI found, [facility] in [place] on  for procedure, and it's been fine ever since.  She lost weight, about 6-7 pounds.  No energy.  Has had decreased balance before this, winter doesn't walk much and uses cart when shops.  Neuropathy in feet, elevates  feet in afternoon and can notices it then, not notices when she is walking.  Gabapentin helps some, interested in trying different med to see if helps more.  Can tell when in shower she has to hold on because she feels unstable when she closes her eyes to rinse hair.  Doesn't feel safe with walking outside, walk with holding daughter's arm or cane.  No pain.    Falls: Pt reports no falls in past 6 months.  Unsteady but grabs onto something.     Patient Specific Functional and Pain Scales (PSFS): 8/9/2017 based on pt reports during subjective history    Clinician Instructions: Complete after the history and before the exam.    Initial Assessment: We want to know what 3 activities in your life you are unable to perform, or are having the most difficulty performing, as a result of your chief problem. Please list and score at least 3 activities that you are unable to perform, or having the most difficulty performing, because of your chief problem.   Patient Specific Activity Scoring Scheme (score one number for each activity):   Activity Score (0-10)  0= Unable to perform activity  10= Able to perform activity at same level as before injury or problem   1. Walking outside - needs cane 5/10   2. Steps - needs rail 5/10   3. Shopping -needs cart 7/10   Totals:  17/30 = 57% ability which relates to 43% impairment    Patient verbally states that they understand that the information they have provided above is current and complete to the best of their knowledge.     G codes and Modifier taken from patient completing the PSFS:   Initial Primary G Code and Modifier:    Per the Patient's intake and/or assessment the Primary G Code is: Mobility .   The Patient's Impairment, Limitation or Restriction Modifier would be best described as: CK - 40% - 60% Impairment.   Goal Primary G Code and Modifier:    The Patient's G Code Goal would be: Mobility    The Patient's Impairment, Limitation or Restriction Modifier goal would  "be best described as: CJ - 20% - 40% Impairment.       Objective    Posture: with heel lift: improved, pelvic crest is level still shoulders shifted to R but less than without lift  Measured leg length in supine from greater trochanter to medial malleolus:   R 29 3/4\"  L 30 1/4      Today's Intervention:    Standing March with Counter Support - 20 reps - even with smaller marches feels sore in knees, willing to switch it to side steps  Sitting piriformis stretch B 2x30 sec, sore on R, stretch B  Seated Hamstring Stretch - 2 reps - 30 second hold - 2x daily  Standing Romberg to 3/4 Tandem Stance - 30 second hold - 2x daily - minimal sway, added head turns   Romberg Stance with Eyes Closed - 2 reps - 30 second hold - 2x daily - minimal sway  Sit to stand x8 in 30 sec x12   Side steps x20 feet B  IT band stretch 2x30 sec          Home Exercise Program:      Access Code: [email or web address] URL: [email or web address] Date: 08/09/2017 Prepared by: Darling Akbar   Exercises  Standing March with Counter Support - 20 reps - 2x daily  Seated Hamstring Stretch - 2 reps - 30 second hold - 2x daily  Standing Romberg to 3/4 Tandem Stance - 30 second hold - 2x daily  Romberg Stance with Eyes Closed - 2 reps - 30 second hold - 2x daily    Access Code: [email or web address] URL: [email or web address] Date: 08/25/2017 Prepared by: Darling Akbar   Exercises   Sit to Stand without Arm Support - 10 reps - 2x daily   Walking - 10-20 Minutes - 1x daily     Access Code: [email or web address] URL: [email or web address] Date: 09/21/2017 Prepared by: Darling Akbar     Exercises   Seated Hamstring Stretch - 2 reps - 30 second hold - 2x daily - hand on knee  Standing Romberg to 3/4 Tandem Stance - 30 second hold - 2x daily - add head turn  Romberg Stance with Eyes Closed - 2 reps - 30 second hold - 2x daily   Seated Figure 4 Piriformis Stretch - 2 reps - 30 hold - 2x daily   ITB Stretch at Wall - 2 reps - 30 second hold - 2x daily - " hand on hip  Sideways Walking - 20 reps - 2x daily (instead of marching)  Sit to Stand with Arms Crossed - 12-13 reps - 1x daily     Assessment      Clinical Impression:  Patient presents with signs and symptoms consistent with decreased balance with mild weakness and peripheral neuropathy.      Functional Limitations/Problems List:   1. Decreased safety walking outdoors on uneven surfaces  2. Decreased safety walking on stairs   3. Decreased safety walking long distance      Functional Impairment(s): Decreased Activity Tolerance:  walking, dynamic balance     Prior Function:   Limited progressing    Physical Impairment(s):       NEURO/SENSORY:  peripheral neuropathy    Goals: Following physical therapy intervention, the patient hopes to be able to: Feel more confident with walking outdoors.      Goals:   Short Term Goals: To be met in 4 weeks:   Following physical therapy intervention, the patient will be able to:   1. Patient following through with HEP.  2. Patient to show increasing balance with being able to preform SLS x10 sec.     Long Term Goals: To be met in 8 weeks:   Following physical therapy intervention, the patient will be able to:   1. Patient to feel more confident with ambulating outdoors to be able to walk to park at least 3 times per week.   2. Patient will be independent with HEP to continue to maintain ROM and strength achieved during physical therapy.      Rehab Potential: Patient Potential for Achieving Desired Outcome:  Fair due to question patient follow through    Barriers to Learning: There are barriers to learning present with this patient with wanting to do on her own and not sure how much she is willing to put into HEP.     Patient participated in goal selection and understand(s) the plan of care: Yes     Therapist Assessment of Today's Intervention:  She did well with exercises given and understood parts of balance to work on, may need encouragement to continue.     Response to  Intervention: Felt comfortable with HEP.     Plan    Treatment Plan / Targeted Outcomes:     Frequency:   6 visits     Duration of Treatment: 8 weeks     Plan of Care Due Date: Medicare/MA Re-Cert Due: 10/04/17     Planned Interventions:  Possible physical therapy interventions include but are not limited to:   Home Exercise Program development  Therapeutic Exercise (ROM & Strengthening)  Manual Therapy  Neuromuscular Re-education  Ultrasound  Electrical Stimulation  Phonophoresis with Ketoprofen  Iontophoresis with Dexamethasone  Therapeutic Activities  Gait Training    Patient will follow up with physician as needed during course of physical therapy intervention.  At time of discharge, patient will be given a home exercise program to continue to maintain strength and ROM achieved during therapy.      Plan for next visit:  Advance HEP and see if additional appointments needed for follow through.  Discussed some adjustment of R shoe to accommodate decreased leg length on R side.     Student or PTA has been instructed in and demonstrates skills necessary to carry out above stated treatment plan: Yes    Thank you for your referral to [facility].  Please call with any questions, concerns or comments.  [phone]

## 2017-12-28 NOTE — TELEPHONE ENCOUNTER
Patient Information     Patient Name MRN Sex Sylvie Raza [id number] Female 1934      Telephone Encounter by Terese Traylor LPN at 2017  8:24 AM     Author:  Terese Traylor LPN Service:  (none) Author Type:  NURS- Licensed Practical Nurse     Filed:  2017  8:25 AM Encounter Date:  2017 Status:  Signed     :  Terese Traylor LPN (NURS- Licensed Practical Nurse)            Left message for patient to return call.  Terese Traylor LPN.........2017   8:24 AM

## 2017-12-28 NOTE — TELEPHONE ENCOUNTER
Patient Information     Patient Name MRN Sylvie Jacobson [id number] Female 1934      Telephone Encounter by Terese Traylor LPN at 2017 11:34 AM     Author:  Terese Traylor LPN Service:  (none) Author Type:  NURS- Licensed Practical Nurse     Filed:  2017 11:38 AM Encounter Date:  2017 Status:  Signed     :  Terese Traylor LPN (NURS- Licensed Practical Nurse)            See result note from 9/15/17.  Terese Traylor LPN.........2017   11:38 AM

## 2017-12-28 NOTE — TELEPHONE ENCOUNTER
Patient Information     Patient Name MRN Sex Sylvie Raza [id number] Female 1934      Telephone Encounter by Terese Traylor LPN at 2017 11:09 AM     Author:  Terese Traylor LPN Service:  (none) Author Type:  NURS- Licensed Practical Nurse     Filed:  2017 11:10 AM Encounter Date:  2017 Status:  Signed     :  Terese Traylor LPN (NURS- Licensed Practical Nurse)            Notified of the information below.  Terese Traylor LPN.........2017   11:10 AM

## 2017-12-28 NOTE — TELEPHONE ENCOUNTER
Patient Information     Patient Name MRN Sylvie Jacobson [id number] Female 1934      Telephone Encounter by Terese Traylor LPN at 2017 12:57 PM     Author:  Terese Traylor LPN Service:  (none) Author Type:  NURS- Licensed Practical Nurse     Filed:  2017  1:06 PM Encounter Date:  2017 Status:  Signed     :  Terese Traylor LPN (NURS- Licensed Practical Nurse)            Patient verified last name and date of birth. Stated that last week she had called in a refill for Ambien on 10/30/17 and medication was not refilled until 2017 and she was not able to  from the pharmacy until the next day, 11/3/17. Stated that luckily she had enough medication to get through but was very unhappy about the time frame. Advised that refills take 48-72 hours to address. Patient will call in refill when she is about a week from needing a refill again next month to allow for time for refill process.  Terese Traylor LPN.........2017   1:06 PM

## 2017-12-28 NOTE — TELEPHONE ENCOUNTER
Patient Information     Patient Name MRN Sex Sylvie Raza [id number] Female 1934      Telephone Encounter by Sujata Casas MD at 2017  1:38 PM     Author:  Sujata Casas MD Service:  (none) Author Type:  Physician     Filed:  2017  1:40 PM Encounter Date:  2017 Status:  Signed     :  Sujata Casas MD (Physician)            I wonder if her thermometer is accurate? I would not expect her to feel okay if she continued to have a fever. May be worth getting an appointment scheduled with [facility] (she has openings on Wed), can always cancel if she feels all symptoms have resolved.

## 2017-12-28 NOTE — PROGRESS NOTES
Patient Information     Patient Name MRN Sex Sylvie Raza [id number] Female 1934      Progress Notes by Gisel Guzman at 9/15/2017  1:34 PM     Author:  Gisel Guzman Service:  (none) Author Type:  (none)     Filed:  9/15/2017  1:34 PM Date of Service:  9/15/2017  1:34 PM Status:  Signed     :  Gisel Guzman            Falls Risk Criteria:    Age 65 and older or under age 4        Sensory deficits    Poor vision    Use of ambulatory aides    Impaired judgment    Unable to walk independently    Meets High Risk criteria for falls:  Yes             1.  Do you have dizziness or vertigo?    no                    2.  Do you need help standing or walking?   no                 3.  Have you fallen within the last 6 months?    No           4.  Has the patient been fasting?      no       If any risks are marked Yes, the following interventions are utilized:    Do not leave patient unattended     Assist patient in the dressing room and bathroom    Have ambulatory aides available throughout procedure    Involve patient s family if available

## 2017-12-29 NOTE — PATIENT INSTRUCTIONS
Patient Information     Patient Name MRN Sex Sylvie Raza [id number] Female 1934      Patient Instructions by Ewa Koroma DC at 2017 10:45 AM     Author:  Ewa Koroma DC Service:  (none) Author Type:  INT THER- Other provider     Filed:  2017 11:30 AM Encounter Date:  2017 Status:  Signed     :  Ewa Koroma DC (INT THER- Other provider)              Active neck rotation: Sit in a chair, keeping your neck, shoulders, and trunk straight. First, turn your head slowly to the right. Turn it gently until it starts hurting. Turn it back to the forward position. Relax. Then turn it to the left. Repeat in each direction for 3-5 slow deep breaths.       Active neck side bend: Sit in a chair, keeping your neck, shoulders, and trunk straight. Tilt your head so that your right ear moves toward your right shoulder. Keep tilting until it starts hurting. Then tilt your head in the other direction so your left ear moves toward your left shoulder. Make sure you do not rotate your head while tilting or raise your shoulder toward your head. Repeat this exercise for 3-5 slow deep breaths in each direction.

## 2017-12-29 NOTE — PATIENT INSTRUCTIONS
Patient Information     Patient Name MRN Sex Sylvie Raza [id number] Female 1934      Patient Instructions by Ewa Koroma DC at 2017  1:30 PM     Author:  Ewa Koroma DC Service:  (none) Author Type:  INT THER- Other provider     Filed:  2017  1:08 PM Encounter Date:  2017 Status:  Signed     :  Ewa Koroma DC (INT THER- Other provider)              INSTRUCTIONS   Gentle Range Of Motion stretching: anterior hips.  Return to start care in 2-7 days.    2017  Plan of Care:  Short term 5-8 visits Chiropractic Care including Spinal Adjustments.    Deferring physiotherapy and active rehabilitation to Physical Therapy to meet care plan goals.     Frequency: 2xweek for up to 2-4 weeks trial of care.  POC discussed and patient agreeable to plan of care.      2017 Goals:  To be met by Re-eval in 1 month approx -10/1/2017 .     Patient:     Patient will report improved pain.   Patient will report 25-50% improved walking.   Patient will demonstrate an improved ability to complete Activities of Daily Living as shown by a reported reduced score on back index.   Objective:     Patient will demonstrate improved ROM and motion on palpation testing.

## 2017-12-29 NOTE — DISCHARGE SUMMARY
Patient Information     Patient Name MRN Sex Sylvie Raza [id number] Female 1934      Discharge Summaries by Darling Akbar PT at 10/10/2017 12:31 PM     Author:  Darling Akbar PT Service:  (none) Author Type:  PT- Physical Therapist     Filed:  10/10/2017  3:32 PM Date of Service:  10/10/2017 12:31 PM Status:  Signed     :  Darling Akbar PT (PT- Physical Therapist)            [facility]  Outpatient PT -   Discharge/ Daily note      Date of Service: 10/10/2017   Visit #: [id number]     Patient Name: Sylvie Bautista (Mireya)  YOB: 1934   Referring MD/Provider: Referring MD/Provider: Haydee Aly  Diagnosis: Medical and Treatment Diagnosis: Gait instability  Treatment Diagnosis: Gait instability   Insurance:  Medicare: G Codes/C Modifiers at Initial Assessment:     Start of Care Date: Start of Service: 17  Certification Dates: From: Start of Service: 17  Re-Cert Due: Medicare/MA Re-Cert Due: 10/04/17  Re-cert for last visit on 10/10/2017       Subjective      Current Status: She does feel that she has continued to gain confidence with her walking.  She has walked to and from the park a block away from her house a couple times a week and carries her cane most of the walk, using it minimally.  She also shops holding the cart a couple times per week, so in combination getting in about 4 walks per week.  She is thinking she will join a [employer] exercise class twice per week now that she is going to be done with PT.  She did get some new shoes and they fit well. She has been doing HEP most days, leaves the paper with HEP on her table so she remembers to do them.  Bone density did show decreased density in hip, started on vitamin D and encouraged to have increased calcium intake.  Overall good gains with gait, balance and posture and plan to d/c from PT today.     Complaints/Reason for Referral: Sylvie Bautista  is a 83 y.o. female  who  comes to physical therapy today with a chief complaint of unsteady gait.  She has noticed she walks favoring R foot and when she had pant shortened she was told one was shorter, she had R foot surgery for hammer toe but not successful and sometimes painful but usually tight.  Uses cane when she walks outside but sick since from liver abscess that started June 11th, started with antibiotic for UTI, MRI found, [facility] in [place] on 6/30 for procedure, and it's been fine ever since.  She lost weight, about 6-7 pounds.  No energy.  Has had decreased balance before this, winter doesn't walk much and uses cart when shops.  Neuropathy in feet, elevates feet in afternoon and can notices it then, not notices when she is walking.  Gabapentin helps some, interested in trying different med to see if helps more.  Can tell when in shower she has to hold on because she feels unstable when she closes her eyes to rinse hair.  Doesn't feel safe with walking outside, walk with holding daughter's arm or cane.  No pain.    Falls: Pt reports no falls in past 6 months.  Unsteady but grabs onto something.     Patient Specific Functional and Pain Scales (PSFS): based on pt reports during subjective history    Clinician Instructions: Complete after the history and before the exam.    Initial Assessment: We want to know what 3 activities in your life you are unable to perform, or are having the most difficulty performing, as a result of your chief problem. Please list and score at least 3 activities that you are unable to perform, or having the most difficulty performing, because of your chief problem.   Patient Specific Activity Scoring Scheme (score one number for each activity):   Activity Score (0-10) 8/9/2017   0= Unable to perform activity  10= Able to perform activity at same level as before injury or problem Score (0-10) 10/10/2017   0= Unable to perform activity  10= Able to perform activity at same level as before injury or  problem   1. Walking outside - needs cane 5/10 9/10   2. Steps - needs rail 5/10 8/10   3. Shopping -needs cart 7/10 8/10   Totals:  17/30 = 57% ability which relates to 43% impairment 25/30 = 83% ability which relates to 17% impairment    Patient verbally states that they understand that the information they have provided above is current and complete to the best of their knowledge.     G codes and Modifier taken from patient completing the PSFS and clinical judgement:    Goal Primary G Code and Modifier:    The Patient's G Code Goal would be: Mobility    The Patient's Impairment, Limitation or Restriction Modifier goal would be best described as: CJ - 20% - 40% Impairment.     Discharge Primary G Code and Modifier:      The Patient's status upon Discharge is Mobility    The Patient's Impairment, Limitation or Restriction Modifier would be best described as CI - 1% - 20% Impairment.       Objective    Posture: with heel lift: pelvic crest is level, shoulders minimal shift to R with gait, not in standing, significantly improved       Today's Intervention:    Side steps B x40 ft  Sitting piriformis stretch B 2x30 sec  Seated Hamstring Stretch - 2 reps - 30 second   Standing Romberg to 3/4 Tandem Stance - 2x 30 second hold - with head turns   Romberg Stance with Eyes Closed - 2 reps - 30 second hold - 2x daily - minimal sway  Sit to stand x10 then started to get sore in knees   IT band stretch 2x30 sec        Home Exercise Program:      Access Code: [email or web address] URL: [email or web address] Date: 08/09/2017 Prepared by: Darling Akbar   Exercises  Standing March with Counter Support - 20 reps - 2x daily  Seated Hamstring Stretch - 2 reps - 30 second hold - 2x daily  Standing Romberg to 3/4 Tandem Stance - 30 second hold - 2x daily  Romberg Stance with Eyes Closed - 2 reps - 30 second hold - 2x daily    Access Code: [email or web address] URL: [email or web address]/ Date: 08/25/2017 Prepared by:  Darling Akbar   Exercises   Sit to Stand without Arm Support - 10 reps - 2x daily   Walking - 10-20 Minutes - 1x daily     Access Code: [email or web address] URL: [email or web address] Date: 09/21/2017 Prepared by: Darling Akbar     Exercises   Seated Hamstring Stretch - 2 reps - 30 second hold - 2x daily - hand on knee  Standing Romberg to 3/4 Tandem Stance - 30 second hold - 2x daily - add head turn  Romberg Stance with Eyes Closed - 2 reps - 30 second hold - 2x daily   Seated Figure 4 Piriformis Stretch - 2 reps - 30 hold - 2x daily   ITB Stretch at Wall - 2 reps - 30 second hold - 2x daily - hand on hip  Sideways Walking - 20 reps - 2x daily (instead of marching)  Sit to Stand with Arms Crossed - 12-13 reps - 1x daily     Assessment      Clinical Impression:  Patient presents with signs and symptoms consistent with decreased balance with mild weakness and peripheral neuropathy.      Functional Limitations/Problems List:   1. Decreased safety walking outdoors on uneven surfaces  2. Decreased safety walking on stairs   3. Decreased safety walking long distance      Functional Impairment(s): Decreased Activity Tolerance:  walking, dynamic balance     Prior Function:   Limited progressing    Physical Impairment(s):       NEURO/SENSORY:  peripheral neuropathy    Goals: Following physical therapy intervention, the patient hopes to be able to: Feel more confident with walking outdoors.   Goal met 10/10/2017     Goals:   Short Term Goals: To be met in 4 weeks:   Following physical therapy intervention, the patient will be able to:   1. Patient following through with HEP. Goal met 10/10/2017   2. Patient to show increasing balance with being able to preform SLS x10 sec.  Goal partially met 10/10/2017 (can do Romberg eyes closed)    Long Term Goals: To be met in 8 weeks:   Following physical therapy intervention, the patient will be able to:   1. Patient to feel more confident with ambulating outdoors to be able to  walk to park at least 3 times per week.  Goal met 10/10/2017   2. Patient will be independent with HEP to continue to maintain ROM and strength achieved during physical therapy.   Goal met 10/10/2017     Rehab Potential: Patient Potential for Achieving Desired Outcome:  Fair due to question patient follow through    Barriers to Learning: There are barriers to learning present with this patient with wanting to do on her own and not sure how much she is willing to put into HEP.     Patient participated in goal selection and understand(s) the plan of care: Yes     Therapist Assessment of Today's Intervention:  She did well with exercises given and understood parts of balance to work on, may need encouragement to continue.     Response to Intervention: Felt comfortable with HEP.     Plan    Treatment Plan / Targeted Outcomes:     Frequency:   6 visits     Duration of Treatment: 8 weeks     Plan of Care Due Date: Medicare/MA Re-Cert Due: 10/04/17     Planned Interventions:  Possible physical therapy interventions include but are not limited to:   Home Exercise Program development  Therapeutic Exercise (ROM & Strengthening)  Manual Therapy  Neuromuscular Re-education  Ultrasound  Electrical Stimulation  Phonophoresis with Ketoprofen  Iontophoresis with Dexamethasone  Therapeutic Activities  Gait Training    Patient will follow up with physician as needed during course of physical therapy intervention.  At time of discharge, patient will be given a home exercise program to continue to maintain strength and ROM achieved during therapy.      Plan for next visit:  D/C.    Student or PTA has been instructed in and demonstrates skills necessary to carry out above stated treatment plan: Yes    Thank you for your referral to [facility].  Please call with any questions, concerns or comments.  [phone]

## 2017-12-29 NOTE — PATIENT INSTRUCTIONS
Patient Information     Patient Name MRN Sex Sylvie Raza [id number] Female 1934      Patient Instructions by Ewa Koroma DC at 2017 10:30 AM     Author:  Ewa Koroma DC Service:  (none) Author Type:  INT THER- Other provider     Filed:  2017 11:28 AM Encounter Date:  2017 Status:  Signed     :  Ewa Koroma DC (INT THER- Other provider)              She has met care goals and discharged to [facility], able to return if worsens.      Stretch side of your neck 3xdaily for 3 slow deep breaths as shown.    Stretch anterior hips  PT exercises.  Discharged after 5/8 visits of Chiropractic Care including Spinal Adjustments, brief soft tissue physiotherapy and active rehabilitation. Most non-spinal care deferred to Physical Therapy to meet care plan goals.

## 2017-12-29 NOTE — PATIENT INSTRUCTIONS
Patient Information     Patient Name MRN Sex Sylvie Raza [id number] Female 1934      Patient Instructions by Ewa Koroma DC at 9/15/2017 11:30 AM     Author:  Ewa Koroma DC Service:  (none) Author Type:  INT THER- Other provider     Filed:  9/15/2017  4:26 PM Encounter Date:  9/15/2017 Status:  Signed     :  Ewa Koroma DC (INT THER- Other provider)            ONS   Stretch side of your neck 3xdaily for 3 slow deep breaths as shown.    Active neck rotation: Sit in a chair, keeping your neck, shoulders, and trunk straight. First, turn your head slowly to the right. Turn it gently until it starts hurting. Turn it back to the forward position. Relax. Then turn it to the left. Repeat in each direction for 3-5 slow deep breaths.       Active neck side bend: Sit in a chair, keeping your neck, shoulders, and trunk straight. Tilt your head so that your right ear moves toward your right shoulder. Keep tilting until it starts hurting. Then tilt your head in the other direction so your left ear moves toward your left shoulder. Make sure you do not rotate your head while tilting or raise your shoulder toward your head. Repeat this exercise for 3-5 slow deep breaths in each direction.    PT exercises.  Schedule 2xweek for 2-3 weeks.          2017  Plan of Care:  Short term 5-8 visits Chiropractic Care including Spinal Adjustments.    Deferring physiotherapy and active rehabilitation to Physical Therapy to meet care plan goals.     Frequency: 2xweek for up to 2-4 weeks trial of care.       2017 Goals:  To be met by Re-eval in 1 month approx 2017 .     Patient:     Patient will report improved pain.   Patient will report 25-50% improved walking.   Patient will demonstrate an improved ability to complete Activities of Daily Living as shown by a reported reduced score on back index.   Objective:     Patient will demonstrate improved ROM and motion on palpation testing.

## 2017-12-30 ENCOUNTER — HISTORY (OUTPATIENT)
Dept: EMERGENCY MEDICINE | Facility: OTHER | Age: 82
End: 2017-12-30

## 2017-12-30 NOTE — NURSING NOTE
Patient Information     Patient Name MRN Sex Sylvie Raza [id number] Female 1934      Nursing Note by Terese Traylor LPN at 10/6/2017 10:20 AM     Author:  Terese Traylor LPN Service:  (none) Author Type:  NURS- Licensed Practical Nurse     Filed:  10/6/2017 10:28 AM Encounter Date:  10/6/2017 Status:  Signed     :  Terese Traylor LPN (NURS- Licensed Practical Nurse)            Sylvie Bautista is a 83 y.o. female here today today for follow up bone density.  Declines KIANA and PHQ.  Terese LPN.........10/6/2017   10:15 am

## 2017-12-30 NOTE — NURSING NOTE
Patient Information     Patient Name MRN Sex Sylvie Raza [id number] Female 1934      Nursing Note by Terese Traylor LPN at 2017 12:40 PM     Author:  Terese Traylor LPN Service:  (none) Author Type:  NURS- Licensed Practical Nurse     Filed:  2017 12:48 PM Encounter Date:  2017 Status:  Signed     :  Terese Traylor LPN (NURS- Licensed Practical Nurse)            Sylvie Bautista is a 83 y.o. female here today for follow up weakness and fever. Reports that the weakness is better but worse then her normal.   Terese Traylor LPN.........2017   12:43 PM

## 2017-12-30 NOTE — NURSING NOTE
Patient Information     Patient Name MRN Sylvie Jacobson [id number] Female 1934      Nursing Note by Julisa Garcia at 2017 10:00 AM     Author:  Julisa aGrcia Service:  (none) Author Type:  NURS- Student Practical Nurse     Filed:  2017 10:20 AM Encounter Date:  2017 Status:  Signed     :  Julisa Garcia (NURS- Student Practical Nurse)            Patient states she felt chilled when she went to bed last night. Woke up with SOB and wheezing. Woke up and was real weak and couldn't even get up to use the bathroom. Temp this morning was 101.4 and took two tylenol. States she had pneumonia last year at this time.   Julisa Garcia, KARLENE............................ 2017 10:01 AM

## 2017-12-30 NOTE — NURSING NOTE
Patient Information     Patient Name MRN Sex Sylvie Raza [id number] Female 1934      Nursing Note by Terese Traylor LPN at 2017 10:20 AM     Author:  Terese Traylor LPN Service:  (none) Author Type:  NURS- Licensed Practical Nurse     Filed:  2017 10:26 AM Encounter Date:  2017 Status:  Signed     :  Terese Traylor LPN (NURS- Licensed Practical Nurse)            Sylvie Bautista is a 83 y.o. female here today for follow up liver abscess.   Declines PHQ-9 and KIANA.  Terese Traylor LPN.........2017   10:19 AM

## 2017-12-30 NOTE — NURSING NOTE
Patient Information     Patient Name MRN Sex Sylvie Raza [id number] Female 1934      Nursing Note by Terese Traylor LPN at 2017  2:40 PM     Author:  Terese Traylor LPN Service:  (none) Author Type:  NURS- Licensed Practical Nurse     Filed:  2017  2:52 PM Encounter Date:  2017 Status:  Signed     :  Terese Traylor LPN (NURS- Licensed Practical Nurse)            Sylvie Bautista is a 83 y.o. female here today for follow up weakness, fatigue and a fever from 17. Stated that the weakness is still there but better and she had a fever again yesterday afternoon. She also had a sharp pain in her right knee this afternoon when she got up that is new that lasted about an hour.  Terese Traylor LPN.........2017   2:39 PM

## 2017-12-30 NOTE — NURSING NOTE
Patient Information     Patient Name MRN Sylvie Jacobson [id number] Female 1934      Nursing Note by Terese Traylor LPN at 2017  1:20 PM     Author:  Terese Traylor LPN Service:  (none) Author Type:  NURS- Licensed Practical Nurse     Filed:  2017  1:37 PM Encounter Date:  2017 Status:  Signed     :  Terese Traylor LPN (NURS- Licensed Practical Nurse)            Sylvie Bautista is a 83 y.o. female here today for hospital follow up from [place] for a liver abscess that was drained. She reports that her tempature has been normal and is feeling better.  Terese Traylor LPN.........2017   1:13 PM

## 2018-01-03 NOTE — TELEPHONE ENCOUNTER
Patient Information     Patient Name MRN Sylvie Jacobson [id number] Female 1934      Telephone Encounter by Tereza Delvalle RN at 2017  2:52 PM     Author:  Tereza Delvalle RN Service:  (none) Author Type:  NURS- Registered Nurse     Filed:  2017  2:53 PM Encounter Date:  2017 Status:  Signed     :  Tereza Delvalle RN (NURS- Registered Nurse)            zolpidem (AMBIEN) 5 mg tablet  TAKE ONE TABLET BY MOUTH NIGHTLY AT BEDTIME AS NEEDED FOR SLEEP  Disp: 40 tablet Refills:     Class: eRx Start: 2017    For: Insomnia, unspecified  Originally ordered: 2 years ago by Mirella Durant NP  Last refill: 2016  To be filled at: [facility] [place] [facility] [address]: [phone]  Last visit with MIRELLA DURANT was on: 2016 in [facility]  PCP:  Mirella Durant NP       Unable to complete prescription refill per RN Medication Refill Policy.................... TEREZA DELVALLE RN ....................  2017   2:52 PM

## 2018-01-03 NOTE — TELEPHONE ENCOUNTER
Patient Information     Patient Name MRN Sylvie Jacobson [id number] Female 1934      Telephone Encounter by Aarti Alicea at 3/22/2017  9:24 AM     Author:  Aarti Alicea Service:  (none) Author Type:  (none)     Filed:  3/22/2017  9:25 AM Encounter Date:  3/22/2017 Status:  Signed     :  Aarti Alicea            RKV- Patient wanting injection for hip pain, would like to talk to the nurse about her options. Thanks    Aarti Alicea ....................  3/22/2017   9:25 AM

## 2018-01-03 NOTE — TELEPHONE ENCOUNTER
Patient Information     Patient Name MRN Sex Sylvie Raza [id number] Female 1934      Telephone Encounter by Rani Bustillos RN at 2017 10:50 AM     Author:  Rani Bustillos RN Service:  (none) Author Type:  NURS- Registered Nurse     Filed:  2017 10:55 AM Encounter Date:  2017 Status:  Signed     :  Rani Bustillos RN (NURS- Registered Nurse)            Hypothyroidism    Office visit in the past 12 months or per provider note.    Last visit with MIRELLA DURANT was on: 2016 in [facility]  Next visit with MIRELLA DURANT is on: No future appointment listed with this provider  Next visit with Internal Medicine is on: No future appointment listed in this department    Lab testing requirements:  TSH annually  TSH (uIU/mL)    Date Value   10/14/2015 0.91   Patient is due for labs.  This is a Refill request from: [facility]  Name of Medication: Synthroid 112mcg  Quantity requested: 90  Last fill date:   Last visit with MIRELLA DURANT was on: 2016 in [facility]  PCP:  Mirella Durant NP  Controlled Substance Agreement:  na   Diagnosis r/t this medication request: hypothyroidism     Unable to complete prescription refill per RN Medication Refill Policy.................... RANI BUSTILLOS RN ....................  2017   10:52 AM          Losartan was refilled 16, 90 x 1.  Unable to complete prescription refill per RN Medication Refill Policy.................... RANI BUSTILLOS RN ....................  2017   10:51 AM

## 2018-01-03 NOTE — TELEPHONE ENCOUNTER
Patient Information     Patient Name MRN Sylvie Jacobson [id number] Female 1934      Telephone Encounter by Rani Bustillos RN at 2017 11:05 AM     Author:  Rani Bustillos RN Service:  (none) Author Type:  NURS- Registered Nurse     Filed:  2017 11:06 AM Encounter Date:  2017 Status:  Signed     :  aRni Bustillos RN (NURS- Registered Nurse)            Refilled 16, 90 x 2.  Too early.  Unable to complete prescription refill per RN Medication Refill Policy.................... RANI BUSTILLOS RN ....................  2017   11:05 AM

## 2018-01-03 NOTE — TELEPHONE ENCOUNTER
Patient Information     Patient Name MRN Sylvie Jacobson [id number] Female 1934      Telephone Encounter by Terese Traylor LPN at 2017  4:06 PM     Author:  Terese Traylor LPN Service:  (none) Author Type:  NURS- Licensed Practical Nurse     Filed:  2017  4:06 PM Encounter Date:  2017 Status:  Signed     :  Terese Traylor LPN (NURS- Licensed Practical Nurse)            Rx faxed to pharmacy.  Terese Traylor LPN.........2017   4:06 PM

## 2018-01-04 NOTE — TELEPHONE ENCOUNTER
Patient Information     Patient Name MRN Sex Sylvie Raza [id number] Female 1934      Telephone Encounter by Melanie Barone at 2017 11:10 AM     Author:  Melanie Barone Service:  (none) Author Type:  (none)     Filed:  2017 11:16 AM Encounter Date:  2017 Status:  Signed     :  Melanie Barone            Patient states seen CALVIN Vasques end of march, and had labs. Patient states was recommended to have repeat labs in a few weeks.  mychart message was to stop chlorthalidone, increase fluids and repeat labs.   Patient states blood pressure has been higher since stopping.   Patient wondering what labs are needed, and what to do about blood pressure.   Notified may need appointment, or have new Medication. Please advise, and place lab orders.  Melanie Barone LPN .............2017  11:16 AM

## 2018-01-04 NOTE — NURSING NOTE
Patient Information     Patient Name MRN Sex Sylvie Raza [id number] Female 1934      Nursing Note by Terese Traylor LPN at 3/31/2017  1:50 PM     Author:  Terese Traylor LPN Service:  (none) Author Type:  NURS- Licensed Practical Nurse     Filed:  3/31/2017  2:12 PM Encounter Date:  3/31/2017 Status:  Signed     :  Terese Traylor LPN (NURS- Licensed Practical Nurse)            Sylvie Bautista is a 83 y.o. female here today for follow up ED for a contusion of her scalp. She had become weak at home and was not able to get out of her chair, crawled across the floor to pull up to stand and fell backwards hitting her head. She feels better today.  Terese Traylor LPN.........3/31/2017   1:54 PM

## 2018-01-04 NOTE — TELEPHONE ENCOUNTER
Patient Information     Patient Name MRN Sylvie Jacobson [id number] Female 1934      Telephone Encounter by Melanie Barone at 2017  2:20 PM     Author:  Melanie Barone Service:  (none) Author Type:  (none)     Filed:  2017  2:20 PM Encounter Date:  2017 Status:  Signed     :  Melanie Barone            Patient notified.   Melanie Barone LPN........2017  2:20 PM

## 2018-01-04 NOTE — TELEPHONE ENCOUNTER
Patient Information     Patient Name MRN Sex Sylvie Raza [id number] Female 1934      Telephone Encounter by Mara Tripp RN at 2017  3:47 PM     Author:  Mara Tripp RN Service:  (none) Author Type:  NURS- Registered Nurse     Filed:  2017  3:49 PM Encounter Date:  2017 Status:  Signed     :  Mara Tripp RN (NURS- Registered Nurse)            Diuretics (may be prescribed for edema)    Office visit in the past 12 months or per provider note.    Last visit with JOE DURANT was on: 2017 in [facility]  Next visit with JOE DURANT is on: 2017 in [facility]  Next visit with Internal Medicine is on: 2017 in [facility]    Lab testing requirements:  Creatinine and Potassium annually, if ordering lab, order BMP.  CREATININE (mg/dL)    Date Value   2017 1.53 (H)     POTASSIUM (mmol/L)    Date Value   2017 4.6     BP Readings from Last 4 Encounters:    17 122/78   17 102/54   17 118/55   16 116/60         Review last provider visit note.  If BP reviewed and plan is noted, can refill.  Max refill for 12 months from last office visit or per provider note.  Angiotensin Receptor Blockers (ARB)    Office visit in the past 12 months or per provider note.    Last visit with JOE DURANT was on: 2017 in [facility]  Next visit with JOE DURANT is on: 2017 in [facility]  Next visit with Internal Medicine is on: 2017 in [facility]    Lab test requirements:  Creatinine and Potassium annually, if ordering lab, order BMP.  CREATININE (mg/dL)    Date Value   2017 1.53 (H)     POTASSIUM (mmol/L)    Date Value   2017 4.6       Max refill for 12 months from last office visit or per provider note  Hypothyroidism    Office visit in the past 12 months or per provider note.    Lab testing requirements:  TSH  annually  TSH (uIU/mL)    Date Value   03/31/2017 1.63       Max refill for 12 months from last office visit or per provider note.  Prescription refilled per RN Medication Refill Policy.................... RANI BUSTILLOS RN ....................  5/1/2017   3:48 PM

## 2018-01-04 NOTE — PROGRESS NOTES
Patient Information     Patient Name MRN Sex Sylvie Raza [id number] Female 1934      Progress Notes by Shahid Koroma MD at 2017 11:00 AM     Author:  Shahid Koroma MD Service:  (none) Author Type:  Physician     Filed:  2017 11:15 AM Encounter Date:  2017 Status:  Signed     :  Shahid Koroma MD (Physician)            SUBJECTIVE:  Sylvie Bautista is a 83 y.o. female here for bilateral hip pain. Patient has a history of trochanteric bursitis bilaterally. She has had a history of right trochanteric injections, last one was 2015. She states that over the last month her right side has flared up significantly in her left side has also flared up but not as severe. She does not recall any particular activity or injury that brought this on. She is here today requesting injections.    ROS:    As above otherwise ROS is unremarkable.    OBJECTIVE:  /56  Pulse 68  Wt 73 kg (161 lb)  BMI 27.42 kg/m2    EXAM:  General Appearance: Pleasant, alert, appropriate appearance for age. No acute distress  Musculoskeletal: Right hip shows flexion of 120 , internal rotation 15  and external rotation of 45 . She has no pain with log rolling. She has tenderness to palpation over her greater trochanter.    Left hip shows flexion to 120 , internal rotation 15  and rotation 45 . She also has no pain with log rolling on her left side. She again has tenderness over her greater trochanter on the left side.  Skin: No bruising is noted.    ASSESSEMENT AND PLAN:    Sylvie was seen today for musculoskeletal problem.    Diagnoses and all orders for this visit:    Trochanteric bursitis of both hips  -     AR DRAIN/INJ MAJOR JOINT/BURSA HIP KNEE WO US GUIDE  -     triamcinolone acetonide 40 mg injection (KENALOG); Inject 1 mL intra-articular one time.  -     AR DRAIN/INJ MAJOR JOINT/BURSA HIP KNEE WO US GUIDE  -     triamcinolone acetonide 40 mg injection (KENALOG); Inject 1 mL  intra-articular one time.    We discussed her various options and she once again wanted to try steroid injections she wanted to try both sides today. Informed consent was obtained. Her right greater trochanter was cleansed normal fashion. A 5 mL mixture of 40 mg of Kenalog and lidocaine were used to infiltrate the point of maximal tenderness in a fanlike fashion. She tolerated this well, no immediate complications.    The procedure was then repeated on her left side exactly as above. She again tolerated this well, no immediate complications. She will ice both areas to prevent postinjection flare and follow-up as needed.      Adam Koroma MD    This document was prepared using voice generated softwear.  While every attempt was made for accuracy, grammatical errors may exist.

## 2018-01-04 NOTE — NURSING NOTE
Patient Information     Patient Name MRN Sex Sylvie Raza [id number] Female 1934      Nursing Note by Berenice Hong at 2017 11:00 AM     Author:  Berenice Hong Service:  (none) Author Type:  (none)     Filed:  2017 11:26 AM Encounter Date:  2017 Status:  Signed     :  Berenice Hong            [facility] Protocol    A. Pre-procedure verification complete yes  1-relevant information / documentation available, reviewed and properly matched to the patient; 2-consent accurate and complete, 3-equipment and supplies available    B. Site marking complete N/A  Site marked if not in continuous attendance with patient    C. TIME OUT completed yes  Time Out was conducted just prior to starting procedure to verify the eight required elements: 1-patient identity, 2-consent accurate and complete, 3-position, 4-correct side/site marked (if applicable), 5-procedure, 6-relevant images / results properly labeled and displayed (if applicable), 7-antibiotics / irrigation fluids (if applicable), 8-safety precautions.

## 2018-01-04 NOTE — TELEPHONE ENCOUNTER
Patient Information     Patient Name MRN Sylvie Jacobson [id number] Female 1934      Telephone Encounter by Tereza Latham RN at 3/28/2017  1:43 PM     Author:  Tereza Latham RN Service:  (none) Author Type:  NURS- Registered Nurse     Filed:  3/28/2017  1:45 PM Encounter Date:  3/27/2017 Status:  Signed     :  Tereza Latham RN (NURS- Registered Nurse)            Refilled on 16 #90 x 2 refills and noted as refilled by [facility] 3/27  Unable to complete prescription refill per RN Medication Refill Policy.................... TEREZA LATHAM RN ....................  3/28/2017   1:44 PM

## 2018-01-04 NOTE — NURSING NOTE
Patient Information     Patient Name MRN Sylvie Jacobson [id number] Female 1934      Nursing Note by Berenice Hong at 2017 11:00 AM     Author:  Berenice Hong Service:  (none) Author Type:  (none)     Filed:  2017 11:08 AM Encounter Date:  2017 Status:  Signed     :  Berenice Hong            Patient presents for bilateral hip pain. Requesting injection. States right hip worse than left.  Berenice Hong LPN   2017  11:02 AM

## 2018-01-04 NOTE — TELEPHONE ENCOUNTER
Patient Information     Patient Name MRN Sex Sylvie Raza [id number] Female 1934      Telephone Encounter by Mirella Aly NP at 2017 12:45 PM     Author:  Mirella Aly NP Service:  (none) Author Type:  PHYS- Nurse Practitioner     Filed:  2017 12:46 PM Encounter Date:  2017 Status:  Signed     :  Mirella Aly NP (PHYS- Nurse Practitioner)            Repeat labs and then can decide what to do about BP.  Lab order placed.

## 2018-01-04 NOTE — TELEPHONE ENCOUNTER
Patient Information     Patient Name MRN Sex Sylvie Raza [id number] Female 1934      Telephone Encounter by Terese Traylor LPN at 3/22/2017  9:35 AM     Author:  Terese Traylor LPN Service:  (none) Author Type:  NURS- Licensed Practical Nurse     Filed:  3/22/2017  9:36 AM Encounter Date:  3/22/2017 Status:  Signed     :  Terese Traylor LPN (NURS- Licensed Practical Nurse)            Patient verified last name and date of birth. Stated that she fell last  and had to be brought into the ED. Stated that since then she has had some hip pain and was wondering about getting another injection in her hip through Dr. Koroma. Offered patient to schedule an appointment at the next available, 3/30/17. Transferred to unit 4 appointment line to schedule.  Terese Traylor LPN.........3/22/2017   9:36 AM

## 2018-01-04 NOTE — PROGRESS NOTES
Patient Information     Patient Name MRN Sex Sylvie Raza [id number] Female 1934      Progress Notes by Mirella Aly NP at 3/31/2017  1:50 PM     Author:  Mirella Aly NP Service:  (none) Author Type:  PHYS- Nurse Practitioner     Filed:  3/31/2017  2:36 PM Encounter Date:  3/31/2017 Status:  Signed     :  Mirella Aly NP (PHYS- Nurse Practitioner)            SUBJECTIVE:    Sylvie Bautista is a 83 y.o. female who presents for follow-up after emergency department visit    HPI  she was evaluated in the emergency department about 3 weeks ago. She was weak at home and could not get up after she had fallen. She had been around her daughter who had influenza A. Patient was evaluated in the emergency department and treated for influenza. Her labs showed a mild macrocytic anemia and mild renal insufficiency. She states she usually doesn't do well drinking fluids. She is on chlorthalidone for hypertension. She has not had symptoms of orthostasis. She states that now over the past week she is back to her usual state of health. Overall feeling well. She has known hypothyroidism and has not had thyroid labs since 2015. She also reports that she has gassiness and bloating and she is concerned that is from gabapentin. Her son-in-law was on this medication for shingles and had that same problem. She does not want to stop the gabapentin however she is wondering if there is anything she could try to help with the gassiness. She also has history of irritable bowel syndrome with flares occasionally that is accompanied by diarrhea. No constipation issues. She has chronic idiopathic peripheral neuropathy. She continues to use tobacco.  No Known Allergies,   Current Outpatient Prescriptions on File Prior to Visit       Medication  Sig Dispense Refill     acetaminophen SR (TYLENOL ARTHRITIS) 650 mg Extended-Release tablet Take 1,300 mg by mouth every 24 hours. Max acetaminophen  dose: 4000mg in 24 hrs.   Indications: PAIN       aspirin 81 mg tablet Take 81 mg by mouth once daily with a meal.       calcium carbonate CHEWABLE (TUMS E-X) 750 mg chewable tablet Take 1-2 tabs every 2 hours as needed for stomach discomfort.  0     CALCIUM CARBONATE/VITAMIN D3 (CALCIUM 600 + D,3, ORAL) Take  by mouth 2 times daily.       carboxymethylcellulose 0.5% (REFRESH TEARS) 0.5 % drop ophthalmic drops Place 1 Drop into both eyes once daily.  0     chlorthalidone (HYGROTON) 25 mg tablet Take 1 tablet by mouth once daily. 90 tablet 2     diphenhydrAMINE-acetaminophen  mg (TYLENOL PM EXTRA STRENGTH)  mg tablet Take 1-2 tablets by mouth at bedtime if needed.  0     docusate (DULCOLAX STOOL SOFTENER, DSS,) 100 mg capsule Take 1 capsule by mouth for constipation. Can take 2 in 24 hours.  0     estradiol (ESTRACE) 0.1 mg/g vaginal cream Insert 2 g into the vagina every Monday and Friday. 42.5 g 6     gabapentin (NEURONTIN) 300 mg capsule Take 1 capsule by mouth 3 times daily. 270 capsule 3     glycerin, adult, suppository Insert 1 Suppository rectally once daily if needed for Constipation.  0     ibuprofen (ADVIL; MOTRIN) 200 mg tablet Take 1-2 tab every 4-6 hours as needed for pain.  0     levothyroxine (SYNTHROID) 112 mcg tablet TAKE 1 TABLET BY MOUTH ONCE DAILY 90 tablet 0     loratadine (CLARITIN) 10 mg tablet Take 1 tablet by mouth once daily.  0     losartan (COZAAR) 25 mg tablet TAKE ONE TABLET BY MOUTH EVERY DAY 90 tablet 1     magnesium 250 mg tab Take 1 tablet by mouth once daily.  0     miconazole nitrate (MICONAZOLE 7) 2 % vaginal cream Insert 1 Applicatorful into the vagina at bedtime.  0     naproxen (ALEVE) 220 mg tablet Take 1-2 tablets by mouth every 12 hours for pain.  0     pantoprazole (PROTONIX) 40 mg delayed-release tablet Take 1 tablet by mouth once daily. 90 tablet 2     phenazopyridine (AZO STANDARD) 97.5 mg tab tablet Take 1-2 tablets up to 3 times daily for bladder pain.  0  "    SF 5000 PLUS 1.1 % crea   98     Vit C-Vit E-Lutein-Min-OM-3 (OCUVITE) 629-16-6-150 mg-unit-mg-mg cap 1 tablet by mouth once daily.  0     VITAMIN C 1,000 MG TAB 1 tablet oral daily       zolpidem (AMBIEN) 5 mg tablet TAKE ONE TABLET BY MOUTH NIGHTLY AT BEDTIME AS NEEDED FOR SLEEP 40 tablet 3     No current facility-administered medications on file prior to visit.     and   Past Medical History      Diagnosis   Date     CHRONIC KIDNEY DISEASE STAGE III (MODERATE)  7/27/2011     Colon polyp  2006     sessile adenoma       Depression with anxiety       1972 with divorce - Imipramine;  Sertraline 5/08      Diarrhea  11/6/2014     Dysphagia  2007     EGD 3/14/07 nl; sx from goiter.      Grave's disease  2004     on Tapazole      HTN (hypertension)       Hx of pregnancy       vaginal deliveries       Kidney stones  1990     s/p lithotripsy      Menopause       DXA 10/27/06 normal      Raynaud phenomenon  6/5/2015     Tobacco use disorder         REVIEW OF SYSTEMS:  Review of Systems   Constitutional: Negative.    HENT: Negative.    Respiratory: Negative.    Cardiovascular: Negative.    Gastrointestinal:        See history of present illness   Genitourinary: Negative.    Musculoskeletal: Positive for falls.   Skin: Negative for rash.   Neurological: Positive for sensory change. Negative for dizziness.   Endo/Heme/Allergies: Negative for polydipsia.   Psychiatric/Behavioral: Negative.        OBJECTIVE:  /54  Temp 96.4  F (35.8  C) (Tympanic)  Ht 1.632 m (5' 4.25\")  Wt 73.9 kg (163 lb)  Breastfeeding? No  BMI 27.76 kg/m2  blood pressure rechecked by this provider and is 112/60. Checks with patient's cuff and 120/60. Nursing had checked blood pressure and then again with patient's blood pressure cuff and was still about 10 systolic points higher with patient's own blood pressure cuff.  EXAM:   Physical Exam   Constitutional: She is oriented to person, place, and time and well-developed, well-nourished, and in no " distress. No distress.   HENT:   Mouth/Throat: Oropharynx is clear and moist. No oropharyngeal exudate.   Eyes: Conjunctivae are normal. No scleral icterus.   Neck: Neck supple. No thyromegaly present.   Cardiovascular: Normal rate, regular rhythm and normal heart sounds.    Pulmonary/Chest: Effort normal and breath sounds normal.   Musculoskeletal: She exhibits no edema.   Lymphadenopathy:     She has no cervical adenopathy.   Neurological: She is alert and oriented to person, place, and time. Gait normal.   Skin: Skin is warm. She is not diaphoretic. No pallor.   Psychiatric: Mood, memory, affect and judgment normal.   Nursing note and vitals reviewed.  Emergency department visit, lab and diagnostic studies reviewed and discussed with patient    ASSESSMENT/PLAN:    ICD-10-CM    1. Influenza J11.1    2. Essential hypertension I10 BASIC METABOLIC PANEL      Blood Pressure Test Kit-Large kit   3. Hypothyroidism, unspecified type E03.9 TSH      T4,FREE   4. Anemia, unspecified type D64.9 CBC W PLT NO DIFF      VITAMIN B12      FOLIC ACID   5. Renal insufficiency N28.9 BASIC METABOLIC PANEL   6. Dehydration E86.0 BASIC METABOLIC PANEL        Plan:  Labs today will include CBC, B12, folic acid, TSH, free T4 and basic metabolic panel. She will continue with same medication and treatment plan.  Pressure cuff as ordered as hers is not reading accurately. Recommend that she try to drink fluids throughout the day to help prevent dehydration. She may need chlorthalidone decreased if renal insufficiency still present. May need additional anemia workup depending upon current results. Patient wants to stay on gabapentin but she will try Metamucil 1 teaspoon increasing to 1 tablespoon in 8 ounces of water daily to see if that improves.

## 2018-01-04 NOTE — PROGRESS NOTES
Patient Information     Patient Name MRN Sex Sylvie Raza [id number] Female 1934      Progress Notes by Mirella Aly NP at 2017  1:10 PM     Author:  Mirella Aly NP Service:  (none) Author Type:  PHYS- Nurse Practitioner     Filed:  2017  1:40 PM Encounter Date:  2017 Status:  Signed     :  Mirella Aly NP (PHYS- Nurse Practitioner)            SUBJECTIVE:    Sylvie Bautista is a 83 y.o. female who presents for follow-up    HPI  patient has hypertension and has been checking her blood pressure twice daily. Her systolic pressure averages from 120-148. Most of the time her systolic pressure is less than 140. Approximately a month ago she had labs completed which showed a slight elevation in her creatinine. She stopped chlorthalidone and repeated labs. The creatinine actually had increased from 1.43 up to 1.53. She's had a normal BUN. She has stage III chronic kidney disease. Over the past week she restarted chlorthalidone, had labs completed again. She wants to stay on chlorthalidone as this does help with the edema in her ankles. She is not taking ibuprofen nor naproxen. She takes acetaminophen for pain. She is on Protonix 40 mg daily and is taking this for many years. She had dysphagia many years ago and had an EGD. No abnormality was found that she was placed on the medication and her symptoms resolved. She was told she likely had acid reflux. She would consider reducing dose of the protonix.  No Known Allergies,   Current Outpatient Prescriptions on File Prior to Visit       Medication  Sig Dispense Refill     acetaminophen SR (TYLENOL ARTHRITIS) 650 mg Extended-Release tablet Take 1,300 mg by mouth every 24 hours. Max acetaminophen dose: 4000mg in 24 hrs.   Indications: PAIN       aspirin 81 mg tablet Take 81 mg by mouth once daily with a meal.       Blood Pressure Test Kit-Large kit As directed. OMRON 1 Each 0     calcium carbonate  CHEWABLE (TUMS E-X) 750 mg chewable tablet Take 1-2 tabs every 2 hours as needed for stomach discomfort.  0     CALCIUM CARBONATE/VITAMIN D3 (CALCIUM 600 + D,3, ORAL) Take  by mouth 2 times daily.       carboxymethylcellulose 0.5% (REFRESH TEARS) 0.5 % drop ophthalmic drops Place 1 Drop into both eyes once daily.  0     chlorthalidone (HYGROTON) 25 mg tablet Take 1 tablet by mouth once daily. 90 tablet 2     diphenhydrAMINE-acetaminophen  mg (TYLENOL PM EXTRA STRENGTH)  mg tablet Take 1-2 tablets by mouth at bedtime if needed.  0     docusate (DULCOLAX STOOL SOFTENER, DSS,) 100 mg capsule Take 1 capsule by mouth for constipation. Can take 2 in 24 hours.  0     estradiol (ESTRACE) 0.1 mg/g vaginal cream Insert 2 g into the vagina every Monday and Friday. 42.5 g 6     gabapentin (NEURONTIN) 300 mg capsule Take 1 capsule by mouth 3 times daily. 270 capsule 3     glycerin, adult, suppository Insert 1 Suppository rectally once daily if needed for Constipation.  0     levothyroxine (SYNTHROID) 112 mcg tablet TAKE 1 TABLET BY MOUTH ONCE DAILY 90 tablet 0     loratadine (CLARITIN) 10 mg tablet Take 1 tablet by mouth once daily.  0     losartan (COZAAR) 25 mg tablet TAKE ONE TABLET BY MOUTH EVERY DAY 90 tablet 1     magnesium 250 mg tab Take 1 tablet by mouth once daily.  0     miconazole nitrate (MICONAZOLE 7) 2 % vaginal cream Insert 1 Applicatorful into the vagina at bedtime.  0     phenazopyridine (AZO STANDARD) 97.5 mg tab tablet Take 1-2 tablets up to 3 times daily for bladder pain.  0     SF 5000 PLUS 1.1 % crea   98     Vit C-Vit E-Lutein-Min-OM-3 (OCUVITE) 604-78-2-150 mg-unit-mg-mg cap 1 tablet by mouth once daily.  0     VITAMIN C 1,000 MG TAB 1 tablet oral daily       zolpidem (AMBIEN) 5 mg tablet TAKE ONE TABLET BY MOUTH NIGHTLY AT BEDTIME AS NEEDED FOR SLEEP 40 tablet 3     No current facility-administered medications on file prior to visit.     and   Past Medical History:     Diagnosis  Date      CHRONIC KIDNEY DISEASE STAGE III (MODERATE) 7/27/2011     Colon polyp 2006    sessile adenoma       Depression with anxiety     1972 with divorce - Imipramine;  Sertraline 5/08      Diarrhea 11/6/2014     Dysphagia 2007    EGD 3/14/07 nl; sx from goiter.      Grave's disease 2004    on Tapazole      HTN (hypertension)      Hx of pregnancy     vaginal deliveries       Kidney stones 1990    s/p lithotripsy      Menopause     DXA 10/27/06 normal      Raynaud phenomenon 6/5/2015     Tobacco use disorder        REVIEW OF SYSTEMS:  Review of Systems   Respiratory: Positive for cough. Negative for shortness of breath.         Smoker   Cardiovascular: Positive for leg swelling. Negative for chest pain and palpitations.   Genitourinary: Negative for flank pain and hematuria.   Neurological: Negative for weakness.       OBJECTIVE:  /78  Pulse 76  Wt 73.5 kg (162 lb)  BMI 27.59 kg/m2    EXAM:   Physical Exam   Constitutional: She is oriented to person, place, and time and well-developed, well-nourished, and in no distress. No distress.   HENT:   Mouth/Throat: Oropharynx is clear and moist. No oropharyngeal exudate.   Cardiovascular: Normal rate, regular rhythm and normal heart sounds.    Pulmonary/Chest: Effort normal and breath sounds normal.   Musculoskeletal: She exhibits no edema.   Neurological: She is alert and oriented to person, place, and time.   Skin: Skin is warm. She is not diaphoretic. No pallor.   Psychiatric: Mood, memory, affect and judgment normal.   Nursing note and vitals reviewed.  Creatinine since 2014 has fluctuated between 1.2 7 and 1.53, normal BUN. Stage III chronic kidney disease    ASSESSMENT/PLAN:    ICD-10-CM    1. Gastroesophageal reflux disease, esophagitis presence not specified K21.9 pantoprazole (PROTONIX) 20 mg tablet   2. Essential hypertension I10    3. CHRONIC KIDNEY DISEASE STAGE III (MODERATE) N18.3    4. Edema, unspecified type R60.9         Plan:  Discussed this with patient.  She would like to stay on Cozaar and chlorthalidone as currently prescribed. She is going to reduce the Protonix down to 20 mg daily. She will plan to follow up in 1 month and have creatinine checked. She has stage III chronic kidney disease which over the past 3 years has fluctuated but has been stable.

## 2018-01-04 NOTE — TELEPHONE ENCOUNTER
Patient Information     Patient Name MRN Sex Sylvie Raza [id number] Female 1934      Telephone Encounter by Terese Traylor LPN at 3/22/2017  1:25 PM     Author:  Terese Traylor LPN Service:  (none) Author Type:  NURS- Licensed Practical Nurse     Filed:  3/22/2017  1:34 PM Encounter Date:  3/22/2017 Status:  Signed     :  Terese Traylor LPN (NURS- Licensed Practical Nurse)            Verified patient's last name and date of birth. Stated that she was looking for the results of her blood cultures, advised after speaking with lab staff that this takes 5-7 days to be sent out and will not be back until the weekend and that she can call on Monday to see about the results.  Terese Traylor LPN.........3/22/2017   1:33 PM

## 2018-01-05 NOTE — PATIENT INSTRUCTIONS
Patient Information     Patient Name MRN Sex Sylvie Raza [id number] Female 1934      Patient Instructions by Mirella Aly NP at 2017 10:40 AM     Author:  Mirella Aly NP Service:  (none) Author Type:  PHYS- Nurse Practitioner     Filed:  2017 11:18 AM Encounter Date:  2017 Status:  Signed     :  Mirella Aly NP (PHYS- Nurse Practitioner)            Decrease chlorthalidone to 3 times weekly.  Start Folic Acid 1 mg daily or a MVI with Folic Acid daily.  You will have labs to recheck your kidney function and also a chest xray today.  I will let you know the results and if any follow up is needed.

## 2018-01-05 NOTE — PROGRESS NOTES
Patient Information     Patient Name MRN Sex Sylvie Raza [id number] Female 1934      Progress Notes by Mirella Aly NP at 2017 10:40 AM     Author:  Mirella Aly NP Service:  (none) Author Type:  PHYS- Nurse Practitioner     Filed:  2017 11:30 AM Encounter Date:  2017 Status:  Signed     :  Mirella Aly NP (PHYS- Nurse Practitioner)            SUBJECTIVE:    Sylvie Bautista is a 83 y.o. female who presents for follow-up on chronic kidney disease, edema and other issues    HPI  chronic kidney disease: Patient was seen in clinic one month ago. Creatinine was slightly elevated. Protonix was decreased down from 40 mg to a dose of 20 mg daily. She denies dysphagia and any reflux symptoms. She stopped taking her blood pressure. Normally she takes her blood pressure on the left arm. Today in clinic it is taken on the right arm. She denies symptoms of orthostasis. She currently is taking losartan and chlorthalidone for hypertension. She had stopped chlorthalidone at one point in time but restarted because it helped with the edema in her legs. She was to stay on this medication if at all possible.  Influenza: Patient had a fall with head injury in March. She was diagnosed and treated for influenza. She had a chest x-ray which showed atelectasis or right basilar infiltrate. She was treated with Tamiflu. She occasionally has some wheezing but denies shortness of breath. No hemoptysis. She does use tobacco daily. She has not had a follow-up chest CT.  Anemia: Patient had a slightly low hemoglobin around 11.7 g/dL. MCV was slightly elevated. TSH and B12 levels were normal. Folate acid level was normal but on the low side of normal.  No Known Allergies,   Current Outpatient Prescriptions on File Prior to Visit       Medication  Sig Dispense Refill     acetaminophen SR (TYLENOL ARTHRITIS) 650 mg Extended-Release tablet Take 1,300 mg by mouth every 24 hours.  Max acetaminophen dose: 4000mg in 24 hrs.   Indications: PAIN       aspirin 81 mg tablet Take 81 mg by mouth once daily with a meal.       Blood Pressure Test Kit-Large kit As directed. OMRON 1 Each 0     CALCIUM CARBONATE/VITAMIN D3 (CALCIUM 600 + D,3, ORAL) Take  by mouth 2 times daily.       carboxymethylcellulose 0.5% (REFRESH TEARS) 0.5 % drop ophthalmic drops Place 1 Drop into both eyes once daily.  0     docusate (DULCOLAX STOOL SOFTENER, DSS,) 100 mg capsule Take 1 capsule by mouth for constipation. Can take 2 in 24 hours.  0     estradiol (ESTRACE) 0.1 mg/g vaginal cream Insert 2 g into the vagina every Monday and Friday. 42.5 g 6     gabapentin (NEURONTIN) 300 mg capsule Take 1 capsule by mouth 3 times daily. 270 capsule 3     levothyroxine (SYNTHROID) 112 mcg tablet TAKE 1 TABLET BY MOUTH ONCE DAILY 90 tablet 2     loratadine (CLARITIN) 10 mg tablet Take 1 tablet by mouth once daily.  0     losartan (COZAAR) 25 mg tablet TAKE ONE TABLET BY MOUTH EVERY DAY 90 tablet 0     pantoprazole (PROTONIX) 20 mg tablet Take 1 tablet by mouth once daily. 90 tablet 3     SF 5000 PLUS 1.1 % crea   98     Vit C-Vit E-Lutein-Min-OM-3 (OCUVITE) 137-27-1-150 mg-unit-mg-mg cap 1 tablet by mouth once daily.  0     VITAMIN C 1,000 MG TAB 1 tablet oral daily       zolpidem (AMBIEN) 5 mg tablet TAKE ONE TABLET BY MOUTH NIGHTLY AT BEDTIME AS NEEDED FOR SLEEP 40 tablet 3     No current facility-administered medications on file prior to visit.     and   Past Medical History:     Diagnosis  Date     CHRONIC KIDNEY DISEASE STAGE III (MODERATE) 7/27/2011     Colon polyp 2006    sessile adenoma       Depression with anxiety     1972 with divorce - Imipramine;  Sertraline 5/08      Diarrhea 11/6/2014     Dysphagia 2007    EGD 3/14/07 nl; sx from goiter.      Grave's disease 2004    on Tapazole      HTN (hypertension)      Hx of pregnancy     vaginal deliveries       Kidney stones 1990    s/p lithotripsy      Menopause     DXA 10/27/06  "normal      Raynaud phenomenon 6/5/2015     Tobacco use disorder        REVIEW OF SYSTEMS:  Review of Systems   Constitutional: Negative for chills, diaphoresis, fever, malaise/fatigue and weight loss.   HENT: Negative for congestion.    Respiratory: Positive for wheezing. Negative for cough, hemoptysis, sputum production and shortness of breath.    Cardiovascular: Negative.    Genitourinary: Negative for dysuria, flank pain, frequency and hematuria.   Musculoskeletal: Negative for falls.   Neurological: Negative for dizziness and weakness.   Endo/Heme/Allergies: Negative for polydipsia.   Psychiatric/Behavioral: Negative.        OBJECTIVE:  BP 92/50  Temp 96.6  F (35.9  C) (Tympanic)  Ht 1.632 m (5' 4.25\")  Wt 72.6 kg (160 lb)  Breastfeeding? No  BMI 27.25 kg/m2    EXAM:   Physical Exam   Constitutional: She is oriented to person, place, and time and well-developed, well-nourished, and in no distress. No distress.   HENT:   Mouth/Throat: Oropharynx is clear and moist. No oropharyngeal exudate.   Cardiovascular: Normal rate, regular rhythm and normal heart sounds.    Pulmonary/Chest: Effort normal. She has wheezes.   Faint wheeze right mid to lower lung cleared with deep breathing.   Musculoskeletal: She exhibits no edema.   Neurological: She is alert and oriented to person, place, and time.   Skin: Skin is warm. She is not diaphoretic. No pallor.   Psychiatric: Mood, memory, affect and judgment normal.   Most recent labs and emergency  CT and chest x-ray results reviewed and discussed with patient.    ASSESSMENT/PLAN:    ICD-10-CM    1. Essential hypertension I10 chlorthalidone (HYGROTON) 25 mg tablet      BASIC METABOLIC PANEL      BASIC METABOLIC PANEL   2. Hypotension, unspecified hypotension type I95.9    3. CHRONIC KIDNEY DISEASE STAGE III (MODERATE) N18.3 BASIC METABOLIC PANEL      BASIC METABOLIC PANEL      URINALYSIS W REFLEX MICROSCOPIC IF POSITIVE   4. Gastroesophageal reflux disease, " esophagitis presence not specified K21.9    5. Edema, unspecified type R60.9    6. Cigarette smoker F17.210    7. Influenza J11.1    8. Abnormal CXR- f/u possible right basilar infilatrate R93.8 XR CHEST 2 VIEWS PA AND LATERAL   9. Low folic acid E53.8 folic acid 1 mg tablet        Plan:  1. Reduce chlorthalidone to a dose of 3 times weekly. Continue with the losartan. This should help with the lower blood pressure and hopefully chronic kidney disease. Urinalysis and basic metabolic panel will be drawn at this visit. 2. Continue with Protonix 20 mg daily. 3. May stay on the chlorthalidone to help with the edema but have reduced dose. 4. Tobacco cessation encouraged. Patient did have a slightly abnormal chest x-ray recently and emergency department. Chest x-ray will be repeated. If abnormal will decide further treatment plan as chest CT may be needed. She was hospitalized last year for pneumonia. Denies other symptoms of pneumonia. Not interested in tobacco cessation. 5. Mild macrocytic anemia. Low folic acid level but still in normal range. We'll add folic acid 1 mg daily otherwise a multivitamin with folic acid.  Further follow-up will be determined depending upon results from studies today.  Patient Instructions   Decrease chlorthalidone to 3 times weekly.  Start Folic Acid 1 mg daily or a MVI with Folic Acid daily.  You will have labs to recheck your kidney function and also a chest xray today.  I will let you know the results and if any follow up is needed.

## 2018-01-05 NOTE — NURSING NOTE
Patient Information     Patient Name MRN Sex Sylvie Raza [id number] Female 1934      Nursing Note by Terese Traylor LPN at 2017 10:40 AM     Author:  Terese Traylor LPN Service:  (none) Author Type:  NURS- Licensed Practical Nurse     Filed:  2017 10:57 AM Encounter Date:  2017 Status:  Signed     :  Terese Traylor LPN (NURS- Licensed Practical Nurse)            Sylvie Bautista is a 83 y.o. female here today for follow up kidney function.  Terese Traylor LPN.........2017   10:45 AM

## 2018-01-15 ENCOUNTER — COMMUNICATION - GICH (OUTPATIENT)
Dept: INTERNAL MEDICINE | Facility: OTHER | Age: 83
End: 2018-01-15

## 2018-01-16 PROBLEM — E53.8 LOW FOLIC ACID: Status: ACTIVE | Noted: 2017-05-26

## 2018-01-16 PROBLEM — M85.80 OSTEOPENIA: Status: ACTIVE | Noted: 2017-10-06

## 2018-01-16 PROBLEM — K75.0 LIVER ABSCESS: Status: ACTIVE | Noted: 2017-06-30

## 2018-01-16 PROBLEM — D50.9 IRON DEFICIENCY ANEMIA: Status: ACTIVE | Noted: 2017-06-30

## 2018-01-16 PROBLEM — B35.3 TINEA PEDIS: Status: ACTIVE | Noted: 2018-01-16

## 2018-01-16 RX ORDER — ZOLPIDEM TARTRATE 5 MG/1
5 TABLET ORAL
COMMUNITY
Start: 2017-12-12 | End: 2018-05-22

## 2018-01-16 RX ORDER — LOSARTAN POTASSIUM 25 MG/1
25 TABLET ORAL
COMMUNITY
Start: 2017-06-20 | End: 2018-05-22

## 2018-01-16 RX ORDER — DOCUSATE SODIUM 100 MG/1
CAPSULE, LIQUID FILLED ORAL
COMMUNITY
Start: 2016-06-21 | End: 2019-03-27

## 2018-01-16 RX ORDER — PANTOPRAZOLE SODIUM 20 MG/1
20 TABLET, DELAYED RELEASE ORAL
COMMUNITY
Start: 2017-04-28 | End: 2018-04-20

## 2018-01-16 RX ORDER — CARBOXYMETHYLCELLULOSE SODIUM 5 MG/ML
1 SOLUTION/ DROPS OPHTHALMIC
COMMUNITY
Start: 2016-06-21 | End: 2018-05-22

## 2018-01-16 RX ORDER — ESTRADIOL 0.1 MG/G
CREAM VAGINAL
COMMUNITY
Start: 2017-09-26 | End: 2018-05-22

## 2018-01-16 RX ORDER — CHLORTHALIDONE 25 MG/1
TABLET ORAL
COMMUNITY
Start: 2017-10-26 | End: 2018-05-22

## 2018-01-16 RX ORDER — MV-MN/OM3/DHA/EPA/FISH/LUT/ZEA 250-5-1 MG
CAPSULE ORAL
Status: ON HOLD | COMMUNITY
Start: 2016-06-21 | End: 2022-01-01

## 2018-01-16 RX ORDER — SENNOSIDES 8.6 MG
1300 CAPSULE ORAL
Status: ON HOLD | COMMUNITY
End: 2022-01-01

## 2018-01-16 RX ORDER — LORATADINE 10 MG/1
10 TABLET ORAL
COMMUNITY
Start: 2016-06-21 | End: 2018-05-22

## 2018-01-16 RX ORDER — FOLIC ACID 1 MG/1
1 TABLET ORAL
COMMUNITY
Start: 2017-05-26 | End: 2018-05-22

## 2018-01-16 RX ORDER — MULTIVIT WITH MINERALS/LUTEIN
TABLET ORAL
Status: ON HOLD | COMMUNITY
End: 2022-01-01

## 2018-01-16 RX ORDER — GABAPENTIN 300 MG/1
300 CAPSULE ORAL
COMMUNITY
Start: 2017-10-26 | End: 2018-05-22

## 2018-01-16 RX ORDER — LEVOTHYROXINE SODIUM 112 UG/1
112 TABLET ORAL
COMMUNITY
Start: 2017-12-20 | End: 2018-03-22

## 2018-01-16 RX ORDER — SODIUM FLUORIDE 5 MG/ML
PASTE, DENTIFRICE DENTAL DAILY
Status: ON HOLD | COMMUNITY
Start: 2016-04-18 | End: 2022-01-01

## 2018-01-16 RX ORDER — BENZOCAINE/MENTHOL 6 MG-10 MG
LOZENGE MUCOUS MEMBRANE
COMMUNITY
Start: 2017-03-31

## 2018-01-26 VITALS
SYSTOLIC BLOOD PRESSURE: 122 MMHG | DIASTOLIC BLOOD PRESSURE: 64 MMHG | WEIGHT: 162 LBS | BODY MASS INDEX: 25.95 KG/M2 | HEIGHT: 64 IN | DIASTOLIC BLOOD PRESSURE: 78 MMHG | SYSTOLIC BLOOD PRESSURE: 110 MMHG | RESPIRATION RATE: 20 BRPM | HEART RATE: 76 BPM | TEMPERATURE: 99 F | WEIGHT: 152 LBS | SYSTOLIC BLOOD PRESSURE: 108 MMHG | HEART RATE: 58 BPM | BODY MASS INDEX: 26.55 KG/M2 | DIASTOLIC BLOOD PRESSURE: 62 MMHG | TEMPERATURE: 96.1 F

## 2018-01-26 VITALS
WEIGHT: 159 LBS | SYSTOLIC BLOOD PRESSURE: 102 MMHG | HEIGHT: 64 IN | TEMPERATURE: 98.8 F | BODY MASS INDEX: 27.14 KG/M2 | DIASTOLIC BLOOD PRESSURE: 60 MMHG | WEIGHT: 159 LBS | SYSTOLIC BLOOD PRESSURE: 120 MMHG | DIASTOLIC BLOOD PRESSURE: 60 MMHG | BODY MASS INDEX: 27.14 KG/M2 | HEIGHT: 64 IN | TEMPERATURE: 97.4 F

## 2018-01-26 VITALS
BODY MASS INDEX: 25.46 KG/M2 | HEART RATE: 60 BPM | DIASTOLIC BLOOD PRESSURE: 66 MMHG | RESPIRATION RATE: 16 BRPM | HEIGHT: 65 IN | HEIGHT: 64 IN | WEIGHT: 160 LBS | TEMPERATURE: 96.6 F | DIASTOLIC BLOOD PRESSURE: 50 MMHG | SYSTOLIC BLOOD PRESSURE: 92 MMHG | WEIGHT: 152.8 LBS | BODY MASS INDEX: 27.31 KG/M2 | SYSTOLIC BLOOD PRESSURE: 122 MMHG

## 2018-01-26 VITALS
TEMPERATURE: 96.2 F | DIASTOLIC BLOOD PRESSURE: 54 MMHG | HEIGHT: 64 IN | HEART RATE: 60 BPM | BODY MASS INDEX: 26.98 KG/M2 | SYSTOLIC BLOOD PRESSURE: 120 MMHG | WEIGHT: 158 LBS

## 2018-01-26 VITALS
SYSTOLIC BLOOD PRESSURE: 100 MMHG | TEMPERATURE: 96 F | WEIGHT: 153 LBS | HEIGHT: 65 IN | BODY MASS INDEX: 25.49 KG/M2 | DIASTOLIC BLOOD PRESSURE: 60 MMHG

## 2018-01-26 VITALS
HEART RATE: 68 BPM | DIASTOLIC BLOOD PRESSURE: 56 MMHG | SYSTOLIC BLOOD PRESSURE: 110 MMHG | BODY MASS INDEX: 27.83 KG/M2 | SYSTOLIC BLOOD PRESSURE: 102 MMHG | DIASTOLIC BLOOD PRESSURE: 54 MMHG | BODY MASS INDEX: 27.42 KG/M2 | TEMPERATURE: 96.4 F | HEIGHT: 64 IN | WEIGHT: 163 LBS | WEIGHT: 161 LBS

## 2018-01-29 ENCOUNTER — COMMUNICATION - GICH (OUTPATIENT)
Dept: INTERNAL MEDICINE | Facility: OTHER | Age: 83
End: 2018-01-29

## 2018-01-29 DIAGNOSIS — K21.9 GASTRO-ESOPHAGEAL REFLUX DISEASE WITHOUT ESOPHAGITIS: ICD-10-CM

## 2018-02-02 ENCOUNTER — OFFICE VISIT - GICH (OUTPATIENT)
Dept: INTERNAL MEDICINE | Facility: OTHER | Age: 83
End: 2018-02-02

## 2018-02-02 ENCOUNTER — HISTORY (OUTPATIENT)
Dept: INTERNAL MEDICINE | Facility: OTHER | Age: 83
End: 2018-02-02

## 2018-02-02 DIAGNOSIS — E03.9 HYPOTHYROIDISM: ICD-10-CM

## 2018-02-02 DIAGNOSIS — I10 ESSENTIAL (PRIMARY) HYPERTENSION: ICD-10-CM

## 2018-02-02 DIAGNOSIS — R10.31 RIGHT LOWER QUADRANT PAIN: ICD-10-CM

## 2018-02-02 LAB
ANION GAP - HISTORICAL: 7 (ref 5–18)
BUN SERPL-MCNC: 26 MG/DL (ref 7–25)
BUN/CREAT RATIO - HISTORICAL: 19
CALCIUM SERPL-MCNC: 9.6 MG/DL (ref 8.6–10.3)
CHLORIDE SERPLBLD-SCNC: 107 MMOL/L (ref 98–107)
CO2 SERPL-SCNC: 26 MMOL/L (ref 21–31)
CREAT SERPL-MCNC: 1.37 MG/DL (ref 0.7–1.3)
GFR IF NOT AFRICAN AMERICAN - HISTORICAL: 37 ML/MIN/1.73M2
GLUCOSE SERPL-MCNC: 96 MG/DL (ref 70–105)
POTASSIUM SERPL-SCNC: 4.4 MMOL/L (ref 3.5–5.1)
SODIUM SERPL-SCNC: 140 MMOL/L (ref 133–143)
T4 FREE SERPL-MCNC: 1.47 NG/DL (ref 0.58–1.64)
TSH - HISTORICAL: 0.97 UIU/ML (ref 0.34–5.6)

## 2018-02-02 ASSESSMENT — PATIENT HEALTH QUESTIONNAIRE - PHQ9: SUM OF ALL RESPONSES TO PHQ QUESTIONS 1-9: 0

## 2018-02-09 VITALS
DIASTOLIC BLOOD PRESSURE: 70 MMHG | HEIGHT: 65 IN | SYSTOLIC BLOOD PRESSURE: 122 MMHG | BODY MASS INDEX: 25.49 KG/M2 | WEIGHT: 153 LBS | TEMPERATURE: 96.7 F

## 2018-02-11 ASSESSMENT — PATIENT HEALTH QUESTIONNAIRE - PHQ9: SUM OF ALL RESPONSES TO PHQ QUESTIONS 1-9: 0

## 2018-02-12 NOTE — TELEPHONE ENCOUNTER
Patient Information     Patient Name MRN Sex Sylvie Raza [id number] Female 1934      Telephone Encounter by Vivi Onofre RN at 2017  3:04 PM     Author:  Vivi Onofre RN Service:  (none) Author Type:  NURS- Registered Nurse     Filed:  2017  3:09 PM Encounter Date:  2017 Status:  Signed     :  Vivi Onofre RN (NURS- Registered Nurse)            This is a Refill request from: [facility]  Medication: Zolpidem (AMBIEN) 5 mg tablet  Prescription #:[id number]    Qty:40 tablet   Ref:0  Start:2017   End:              Route:Oral                  DAMION:No   Class:Print    Sig:TAKE 1 TABLET BY MOUTH NIGHTLY AT BEDTIME AS NEEDED FOR SLEEP    Quantity requested: 40  Last fill date: 17 for #40  Last visit with MIRELLA ALY was on: 10/06/2017 in [facility]  PCP:  Mirella Aly NP  Controlled Substance Agreement:  None  Diagnosis r/t this medication request: Insomnia   Unable to complete prescription refill per RN Medication Refill Policy.................... Vivi Onofre RN ....................  2017   3:04 PM

## 2018-02-12 NOTE — TELEPHONE ENCOUNTER
Patient Information     Patient Name MRN Sex Sylvie Raza [id number] Female 1934      Telephone Encounter by Tereza Latham RN at 2017  4:12 PM     Author:  Tereza Latham RN Service:  (none) Author Type:  NURS- Registered Nurse     Filed:  2017  4:15 PM Encounter Date:  2017 Status:  Signed     :  Tereza Latham RN (NURS- Registered Nurse)            Hypothyroidism    Office visit in the past 12 months or per provider note.    Last visit with JOE DURANT was on: 10/06/2017 in [place] [facility]  Next visit with JOE DURANT is on: No future appointment listed with this provider  Next visit with Internal Medicine is on: No future appointment listed in this department    Lab testing requirements:  TSH annually  TSH (uIU/mL)    Date Value   2017 1.63       Max refill for 12 months from last office visit or per provider note.  Prescription refilled per RN Medication Refill Policy.................... TEREZA LATHAM RN ....................  2017   4:13 PM

## 2018-02-12 NOTE — TELEPHONE ENCOUNTER
Patient Information     Patient Name MRN Sylvie Jacobson [id number] Female 1934      Telephone Encounter by Terese Traylor LPN at 2017 10:36 AM     Author:  Terese Traylor LPN Service:  (none) Author Type:  NURS- Licensed Practical Nurse     Filed:  2017 10:36 AM Encounter Date:  2017 Status:  Signed     :  Terese Traylor LPN (NURS- Licensed Practical Nurse)            Rx faxed to pharmacy.  Terese Traylor LPN.........2017   10:36 AM

## 2018-02-13 NOTE — TELEPHONE ENCOUNTER
Patient Information     Patient Name MRN Sylvie Jacobson [id number] Female 1934      Telephone Encounter by Tereza Latham RN at 2018  2:57 PM     Author:  Tereza Latham RN Service:  (none) Author Type:  NURS- Registered Nurse     Filed:  2018  2:58 PM Encounter Date:  2018 Status:  Signed     :  Tereza Latham RN (NURS- Registered Nurse)            protonix refilled on 17 #90 x 3 refills to [facility]  TEREZA LATHAM RN ....................  2018   2:57 PM

## 2018-02-13 NOTE — TELEPHONE ENCOUNTER
"Patient Information     Patient Name MRN Sylvie Jacobson [id number] Female 1934      Telephone Encounter by Terese Traylor LPN at 1/15/2018  9:26 AM     Author:  Terese Tralyor LPN Service:  (none) Author Type:  NURS- Licensed Practical Nurse     Filed:  1/15/2018  9:31 AM Encounter Date:  1/15/2018 Status:  Signed     :  Terese Traylor LPN (NURS- Licensed Practical Nurse)            Patient stated that for the last week now she has noticed that her feet are swelling at night. Stated that when she gets up in the morning they are not swollen but at night they are \"pretty tight.\" stated that she is currently taking her Chlorthalidone , , and  due to her kidney function. Stated that her blood pressures have been running normal: 116/70 and 113/64. She is wondering if she needs to increase her chlorthalidone. Please advise.  Terese Traylor LPN.........1/15/2018   9:30 AM          "

## 2018-02-13 NOTE — PROGRESS NOTES
Patient Information     Patient Name MRN Sex Sylvie Raza [id number] Female 1934      Progress Notes by Mirella Aly NP at 2018 10:20 AM     Author:  Mirella Aly NP Service:  (none) Author Type:  PHYS- Nurse Practitioner     Filed:  2018 11:01 AM Encounter Date:  2018 Status:  Signed     :  Mirella Aly NP (PHYS- Nurse Practitioner)            SUBJECTIVE:    Sylvie Bautista is a 84 y.o. female who presents for follow-up    HPI  patient has hypertension and history of fluid retention in her hands and feet. She takes chlorthalidone 25 mg daily. She had been only taking this 3 days weekly but because of increased fluid retention she increased it to daily. Her blood pressures been well controlled. She denies dizziness or other side effects. Also has known hypothyroidism. She feels euthyroid. She is due for labs to be checked. She has been taking Synthroid daily.  She also reports that she has intermittent tenderness in the right lower quadrant of the abdomen. This is not they're always. She can only feel the tenderness if she palpates over the area. She is not sure if it is related to bowel movements. She has had complete hysterectomy. She did have evidence of a liver abscess last year. At that time a CT scan of abdomen was obtained. This showed some mild right hydronephrosis but no abnormal bowel loops. An MRI of the abdomen was done in follow-up to evaluate the liver abscess further in there was no mention of hydronephrosis but several small cyst in the kidneys. She is emptying her bladder without difficulty. Denies colicky type pain. No hematuria or burning with urination. She is having normal bowel movements but does not have a bowel movement every day. There is been no change in her bowel movements. Her last colonoscopy was  with benign polyps found in recommendations for no further colonoscopies.  No Known Allergies,   Current Outpatient  Prescriptions on File Prior to Visit       Medication  Sig Dispense Refill     acetaminophen SR (TYLENOL ARTHRITIS) 650 mg Extended-Release tablet Take 1,300 mg by mouth every 24 hours. Max acetaminophen dose: 4000mg in 24 hrs.   Indications: PAIN       aspirin 81 mg tablet Take 81 mg by mouth once daily with a meal.       Blood Pressure Test Kit-Large kit As directed. OMRON 1 Each 0     CALCIUM CARBONATE/VITAMIN D3 (CALCIUM 600 + D,3, ORAL) Take  by mouth 2 times daily.       carboxymethylcellulose 0.5% (REFRESH TEARS) 0.5 % drop ophthalmic drops Place 1 Drop into both eyes once daily.  0     docusate (DULCOLAX STOOL SOFTENER, DSS,) 100 mg capsule Take 1 capsule by mouth for constipation. Can take 2 in 24 hours.  0     ESTRACE 0.01 % (0.1 mg/gram) vaginal cream INSERT 2GM INTO THE VAGINA EVERY MONDAY AND FRIDAY 42.5 g 2     folic acid 1 mg tablet Take 1 tablet by mouth once daily.  0     gabapentin (NEURONTIN) 300 mg capsule TAKE 1 CAPSULE BY MOUTH THREE TIMES DAILY 270 capsule 3     levothyroxine (SYNTHROID) 112 mcg tablet TAKE 1 TABLET BY MOUTH ONCE DAILY 90 tablet 0     loratadine (CLARITIN) 10 mg tablet Take 1 tablet by mouth once daily.  0     losartan (COZAAR) 25 mg tablet TAKE 1 TABLET BY MOUTH EVERY DAY 90 tablet 3     pantoprazole (PROTONIX) 20 mg tablet Take 1 tablet by mouth once daily. 90 tablet 3     Psyllium Seed-Sucrose (METAMUCIL, SUGAR,) powder Take 1 tsp by mouth at bedtime.  0     SF 5000 PLUS 1.1 % crea   98     Vit C-Vit E-Lutein-Min-OM-3 (OCUVITE) 701-45-7-150 mg-unit-mg-mg cap 1 tablet by mouth once daily.  0     VITAMIN C 1,000 MG TAB 1 tablet oral daily       zolpidem (AMBIEN) 5 mg tablet Take 1 tablet by mouth at bedtime if needed for Sleep. 40 tablet 5     No current facility-administered medications on file prior to visit.    ,   Past Medical History:     Diagnosis  Date     CHRONIC KIDNEY DISEASE STAGE III (MODERATE) 7/27/2011     Colon polyp 2006    sessile adenoma       Depression  "with anxiety     1972 with divorce - Imipramine;  Sertraline 5/08      Diarrhea 11/6/2014     Dysphagia 2007    EGD 3/14/07 nl; sx from goiter.      Grave's disease 2004    on Tapazole      HTN (hypertension)      Hx of pregnancy     vaginal deliveries       Kidney stones 1990    s/p lithotripsy      Menopause     DXA 10/27/06 normal      Raynaud phenomenon 6/5/2015     Tobacco use disorder    ,   Past Surgical History:      Procedure  Laterality Date     APPENDECTOMY  1979,     & Meckel's diverticulum removed       COLONOSCOPY DIAGNOSTIC  11/17/06    polyps; repeat in 5 YEARS       COLONOSCOPY DIAGNOSTIC  11/8/11     ESOPHAGOGASTRODUODENOSCOPY  3/14/07    done for dysphagia; normal       FOOT SURGERY  10/26/2015    [facility] with Dr. Dillan DECKER BILIARY STRICTURE DILATATION WO STENT      x3- per h/p /Choledochojejunostomy with Vane-En-Y due to transection of common bile duct       LAP CHOLECYSTECTOMY  1991    common bile duct transected       percutaneous drainage liver abscess      6/30/17 [facility]       CT COLONOSCOPY REMOVE PAMELA POLYP LESN HOT BPSY FORCEP  11/6/2014            PREMALIG/BENIGN SKIN LESION EXCISION  4/05    epidermal inclusion cyst       ARMEN AND BSO  1979     THYROIDECTOMY  3/26/08    Total thyroidectomy; multinodular goiter      and   Social History       Substance Use Topics         Smoking status:   Current Every Day Smoker     Packs/day:  0.50     Years:  30.00     Types:  Cigarettes     Smokeless tobacco:   Never Used      Comment: 1/2 - 3/4 ppd; not interested in quitting at this time.        Alcohol use   No       REVIEW OF SYSTEMS:  ROS  see history of present illness  OBJECTIVE:  /70 (Cuff Site: Right Arm, Position: Sitting, Cuff Size: Adult Regular)  Temp 96.7  F (35.9  C) (Tympanic)  Ht 1.651 m (5' 5\")  Wt 69.4 kg (153 lb)  Breastfeeding? No  BMI 25.46 kg/m2    EXAM:   Physical Exam  pleasant female, no acute distress. Affect normal. Alert and " oriented ×4. Sclera nonicteric. Conjunctiva noninflamed. Mucous membranes moist. Neck supple and without adenopathy. Lung fields clear to auscultation throughout. Cardiovascular regular rate and rhythm and without S3. Abdomen soft and without masses, tenderness and organomegaly. No hepatosplenomegaly. No abdominal bruits or pulsatile masses. No inguinal adenopathy.    ASSESSMENT/PLAN:    ICD-10-CM    1. Essential hypertension I10 chlorthalidone (HYGROTON) 25 mg tablet      BASIC METABOLIC PANEL      BASIC METABOLIC PANEL   2. Hypothyroidism, unspecified type E03.9 TSH      T4,FREE      multivitamin (MVI) tablet      TSH      T4,FREE   3. RLQ abdominal pain R10.31         Plan:  1. Blood pressure well controlled. Labs today will include basic metabolic panel. Continue with chlorthalidone daily. 2. Check TSH and free T4. Continue with Synthroid, changes to dose will be made depending upon lab results from today. 3. Complains of intermittent right lower quadrant tenderness with palpation of the area. Patient did not want any additional workup at this time to include labs or CT scan and she refuses another colonoscopy. The tenderness is only there intermittently and is only there if she pushes over the area. This may be related to stool in the colon. She would like to monitor her symptoms longer to see if there is a correlation. She does take Metamucil to help with her bowel movements and recently switched over to the tablet and finds this is easier to take on a daily basis.

## 2018-02-13 NOTE — NURSING NOTE
Patient Information     Patient Name MRN Sex Sylvie Raza [id number] Female 1934      Nursing Note by Terese Traylor LPN at 2018 10:20 AM     Author:  Terese Traylor LPN Service:  (none) Author Type:  NURS- Licensed Practical Nurse     Filed:  2018 10:36 AM Encounter Date:  2018 Status:  Signed     :  Terese Traylor LPN (NURS- Licensed Practical Nurse)            Sylvie Bautista is a 84 y.o. female here today for follow up potassium. Has concerns about some occasional lower right sided abdominal pain that comes and goes.  Terese Traylor LPN.........2018   10:11 AM

## 2018-02-13 NOTE — TELEPHONE ENCOUNTER
Patient Information     Patient Name MRN Sex Sylvie Raza [id number] Female 1934      Telephone Encounter by Terese Traylor LPN at 2018 10:48 AM     Author:  Terese Traylor LPN Service:  (none) Author Type:  NURS- Licensed Practical Nurse     Filed:  2018 10:49 AM Encounter Date:  1/15/2018 Status:  Signed     :  Terese Traylor LPN (NURS- Licensed Practical Nurse)            Patient notified of the information below. Transferred to the appointment line to schedule.  Terese Traylor LPN.........2018   10:48 AM

## 2018-02-13 NOTE — TELEPHONE ENCOUNTER
Patient Information     Patient Name MRN Sylvie Jacobson [id number] Female 1934      Telephone Encounter by Mirella Aly NP at 2018 10:43 AM     Author:  Mirella Aly NP Service:  (none) Author Type:  PHYS- Nurse Practitioner     Filed:  2018 10:43 AM Encounter Date:  1/15/2018 Status:  Signed     :  Mirella Aly NP (PHYS- Nurse Practitioner)            I do not want her to take over-the-counter potassium supplements right now. This will be checked with her lab work but if she is able to add potatoes, bananas and melon to her diet that would help. If not we will just check her potassium level at follow-up

## 2018-02-13 NOTE — TELEPHONE ENCOUNTER
Patient Information     Patient Name MRN Sylvie Jacobson [id number] Female 1934      Telephone Encounter by Terese Traylor LPN at 2018  8:19 AM     Author:  Terese Traylor LPN Service:  (none) Author Type:  NURS- Licensed Practical Nurse     Filed:  2018  8:20 AM Encounter Date:  1/15/2018 Status:  Signed     :  Terese Traylor LPN (NURS- Licensed Practical Nurse)            Patient notified of the information below. She is wondering how much potassium supplement she should take as she does not have very good eating habits right now. Please advise.  Terese Traylor LPN.........2018   8:20 AM

## 2018-02-13 NOTE — TELEPHONE ENCOUNTER
Patient Information     Patient Name MRN Sex Sylvie Raza [id number] Female 1934      Telephone Encounter by Mirella Aly NP at 2018  7:05 AM     Author:  Mirella Aly NP Service:  (none) Author Type:  PHYS- Nurse Practitioner     Filed:  2018  7:07 AM Encounter Date:  1/15/2018 Status:  Signed     :  Mirella Aly NP (PHYS- Nurse Practitioner)            She can try to increase it to daily but should also make sure that every day she is eating potassium rich foods. I also recommend that she be seen in the clinic in 2- 3 weeks to have lab work obtained to evaluate kidney function and potassium level

## 2018-03-02 ENCOUNTER — DOCUMENTATION ONLY (OUTPATIENT)
Dept: OTHER | Facility: CLINIC | Age: 83
End: 2018-03-02

## 2018-03-09 ENCOUNTER — THERAPY VISIT (OUTPATIENT)
Dept: CHIROPRACTIC MEDICINE | Facility: OTHER | Age: 83
End: 2018-03-09
Attending: CHIROPRACTOR
Payer: MEDICARE

## 2018-03-09 DIAGNOSIS — M99.05 SEGMENTAL AND SOMATIC DYSFUNCTION OF PELVIC REGION: ICD-10-CM

## 2018-03-09 DIAGNOSIS — M54.50 ACUTE LEFT-SIDED LOW BACK PAIN WITHOUT SCIATICA: Primary | ICD-10-CM

## 2018-03-09 DIAGNOSIS — M99.04 SEGMENTAL AND SOMATIC DYSFUNCTION OF SACRAL REGION: ICD-10-CM

## 2018-03-09 PROCEDURE — G0463 HOSPITAL OUTPT CLINIC VISIT: HCPCS

## 2018-03-09 PROCEDURE — 98940 CHIROPRACT MANJ 1-2 REGIONS: CPT | Performed by: CHIROPRACTOR

## 2018-03-09 PROCEDURE — 98940 CHIROPRACT MANJ 1-2 REGIONS: CPT | Mod: AT | Performed by: CHIROPRACTOR

## 2018-03-09 PROCEDURE — 99212 OFFICE O/P EST SF 10 MIN: CPT | Mod: 25 | Performed by: CHIROPRACTOR

## 2018-03-09 PROCEDURE — G0463 HOSPITAL OUTPT CLINIC VISIT: HCPCS | Mod: 25

## 2018-03-09 NOTE — PROGRESS NOTES
"    PATIENT:  Sylvie Bautista is a 84 y.o. female    PROBLEM:   Date of Initial Visit for this Episode:  3/9/18   Visit #1    SUBJECTIVE / HPI: Sharp left low back pain only with sitting up and getting out of bed x3 weeks.  Lasts a few minutes.  Better after walking.    R lateral hip pain, comes and goes, considering seeing MD for repeating hip injection.  Wears R heel lift in her slippers for 1.5 in R leg length discrepancy.    Hasn't done any treatment.  Not exercising.  Tried 6 weeks Gentle Exercise with Christina at [facility] and couldn't keep up with pace of class.  Calls herself \"Exercise Aversive\".  Looking forward to walking outside once weather improves.    Taking extra vit D to help with bone density.  Scan shows osteopenia.    Worked with Physical Therapy and chiropractic fall 2017 on balance, R hip and low back pain.       Mechanism of Injury:  Duration and Frequency of Pain: intermittent  Radiation of pain: no  Pain rated 7/10 at it's worst, 2/10 current  Pain course: unchanged  Worse with: getting out of bed  Improved by: walking  Previous injury:  History of recurrent hip bursitis, has had several injections.    3/9/18  Neck index 16%    Back index 29%    Functional limitations: getting out of bed         Patient Active Problem List   Diagnosis     Acid reflux disease     ACP (advance care planning)     CAP (community acquired pneumonia)     Chronic kidney disease, stage III (moderate)     Cigarette smoker     Diarrhea     Essential hypertension     Hypothyroidism     Insomnia     Iron deficiency anemia     Irritable bowel syndrome     Liver abscess     Low folic acid     Mononeuritis     Hereditary and idiopathic peripheral neuropathy     Nontoxic multinodular goiter     Osteopenia     Raynaud phenomenon     Tinea pedis          Past Medical History:   Diagnosis Date     CHRONIC KIDNEY DISEASE STAGE III (MODERATE) 7/27/2011     Colon polyp 2006    sessile adenoma      Depression with " anxiety     1972 with divorce - Imipramine;  Sertraline 5/08     Diarrhea 11/6/2014     Dysphagia 2007    EGD 3/14/07 nl; sx from goiter.     Grave's disease 2004    on Tapazole     HTN (hypertension)      Hx of pregnancy     vaginal deliveries      Kidney stones 1990    s/p lithotripsy     Menopause     DXA 10/27/06 normal     Raynaud phenomenon 6/5/2015     Tobacco use disorder        Past Surgical History:   Procedure Laterality Date     APPENDECTOMY  1979,     & Meckel's diverticulum removed     COLONOSCOPY DIAGNOSTIC  11/17/06    polyps; repeat in 5 YEARS     COLONOSCOPY DIAGNOSTIC  11/8/11     ESOPHAGOGASTRODUODENOSCOPY  3/14/07    done for dysphagia; normal     FOOT SURGERY  10/26/2015    [facility] with Dr. Dillan DECKER BILIARY STRICTURE DILATATION WO STENT      x3- per h/p /Choledochojejunostomy with Vane-En-Y due to transection of common bile duct     LAP CHOLECYSTECTOMY  1991    common bile duct transected     percutaneous drainage liver abscess      6/30/17 [facility]     ND COLONOSCOPY REMOVE PAMELA POLYP LESN HOT BPSY FORCEP  11/6/2014          PREMALIG/BENIGN SKIN LESION EXCISION  4/05    epidermal inclusion cyst     ARMEN AND BSO  1979     THYROIDECTOMY  3/26/08    Total thyroidectomy; multinodular goiter       Current Outpatient Prescriptions:      acetaminophen (TYLENOL) 650 MG CR tablet, Take 1,300 mg by mouth, Disp: , Rfl:      Blood Pressure Monitor MISC, As directed. OMRON, Disp: , Rfl:      carboxymethylcellulose (CARBOXYMETHYLCELLULOSE SODIUM) 0.5 % SOLN ophthalmic solution, 1 drop, Disp: , Rfl:      chlorthalidone (HYGROTON) 25 MG tablet, , Disp: , Rfl:      docusate sodium (COLACE) 100 MG capsule, Take 1 capsule by mouth for constipation. Can take 2 in 24 hours., Disp: , Rfl:      estradiol (ESTRACE VAGINAL) 0.1 MG/GM cream, , Disp: , Rfl:      folic acid (FOLVITE) 1 MG tablet, Take 1 mg by mouth, Disp: , Rfl:      gabapentin (NEURONTIN) 300 MG capsule, Take 300 mg by  mouth, Disp: , Rfl:      levothyroxine (SYNTHROID/LEVOTHROID) 112 MCG tablet, Take 112 mcg by mouth, Disp: , Rfl:      loratadine (CLARITIN) 10 MG tablet, Take 10 mg by mouth, Disp: , Rfl:      losartan (COZAAR) 25 MG tablet, Take 25 mg by mouth, Disp: , Rfl:      pantoprazole (PROTONIX) 20 MG EC tablet, Take 20 mg by mouth, Disp: , Rfl:      PSYLLIUM PO, , Disp: , Rfl:      Sodium Fluoride (SF 5000 PLUS) 1.1 % CREA, , Disp: , Rfl:      Multiple Vitamins-Minerals (OCUVITE ADULT 50+) CAPS, 1 tablet by mouth once daily., Disp: , Rfl:      ascorbic acid (VITAMIN C) 1000 MG TABS, 1 tablet oral daily, Disp: , Rfl:      zolpidem (AMBIEN) 5 MG tablet, Take 5 mg by mouth, Disp: , Rfl:       Allergies:  Review of patient's allergies indicates no known allergies.    ROS:  A comprehensive review of systems was negative except for items noted in HPI/Subjective.      OBJECTIVE:    DIAGNOSTICS:  No lumbopelvic or hip xrays views taken.  Order if not improving with trial of care.     PHYSICAL EXAM:   Neurologic Exam: Nonfocal; symmetric DTRs, normal gross motor movement, tone, and coordination. No tremor.  Sensory: decreased fine touch bilateral feet.    Musculoskeletal Exam:   Posture: Anterior head carriage, rounded shoulders.  Low R shoulder, Low R occiput- head leans to R.      Low L iliac crest and L PSIS, low R greater trochanter.  Gait: shortened    Thoracic and Lumbar ROM:   Restricted.  +Kemps: bilateral sacroiliac.   +Gillets: +FERNANDA, RLI NINO   - Seated knee extension: no radicular pain, focal R low back pain  + XIANG: ipsilateral low back/sacroiliac jt pain, restricted ROM.   +Prone Leg length inequality: -[place]ield R; Restricted R heel to buttock +anterior thigh muscle pulling bilateral  +Tenderness: lumbosacral and R inferior and lateral glute  +Muscle spasm:  lumbar paraspinals, glutes, R>L psoas.   +Joint asymmetry and restriction: FERNANDA, RLI NINO     Functional assessment of Getting in/out of bed:  Does full sit  up      ASSESSMENT: Sylvie Bautista is a 84y.o. female with left low back pain  With good to fair prognosis with age, chronicity, recurrence, adherence/motivation toward home exercise program, and R hip degneration.   1. Acute left-sided low back pain without sciatica    2. Segmental and somatic dysfunction of sacral region    3. Segmental and somatic dysfunction of pelvic region        PLAN    Care:  Risks and benefits were explained and all questions answered.   SMT using gentle Drop sacral and pelvic adjustments.      INSTRUCTIONS   Instructed in getting in/out of bed and handout given  Gentle Range Of Motion stretching: anterior hips, floor angels  Return  in 2 days.     Plan of Care:  Short term 3-5 visits Chiropractic Care including Spinal Adjustments to meet care plan goals.     Frequency: 1xweek for up to 4 weeks trial of care.  POC discussed and patient agreeable to plan of care.      Goals:  To be met by Re-eval in 1 month     Patient:     Patient will report improved pain.   Patient will report ability to get out of bed with minimal pain limitation   Patient will demonstrate an improved ability to complete Activities of Daily Living as shown by a reported reduced score on back index.    Patient will demonstrate improved ROM.

## 2018-03-09 NOTE — PATIENT INSTRUCTIONS
Practice home exercise program given prior from PT.   Floor Linda Parra.   Practice safely get in/out of bed.  See below.    Return 1xweek 3-5 weeks.  (vacation March 16-27)        When You Have Low Back Pain  Caring for Your Back  You are not alone.  Low back pain is very common. Nearly half of all adults have low back pain in any given year.  The good news is that back pain is rarely a danger to your health. Most people can manage their back pain on their own and about half of them start feeling better within 2 weeks. In 9 out of 10 cases, low back pain goes away or no longer limits daily activity within 6 weeks.  Your outlook is good!  Your symptoms tell us that your low back pain is most likely not a danger to you. Most of the time we do not know the exact cause of low back pain, even if you see a doctor or have an MRI. However, treatment can still work without knowing the cause of the pain. Less than 1 in 100 people need surgery for their back pain.  What can I do about my low back pain?  There are three things you can do to ease low back pain and help it go away.    Use heat or cold packs.    Take medicine as directed.    Use positions, movements and exercises.  Using heat or cold packs  Try cold packs or gentle heat to ease your pain. Use whichever gives you the most relief. Apply the cold pack or heat for 15 minutes at a time, as often as needed.  Taking medicine    If your doctor has prescribed medicine, be sure to follow the directions.    If you take over-the-counter medicine, read and follow the directions.    Talk to your doctor if you have any questions.  Using positions, movements and exercises  Research tells us that moving your joints and muscles can help you recover from back pain. Such activity should be simple and gentle.  Use the positions in the photos as well as walking to help relieve your pain. Try taking a short walk every 3 to 4 hours during the day. Walk for a few minutes inside your home  or take longer walks outside, on a treadmill or at a mall. Slowly increase the amount of time you walk.  Expect discomfort when you begin, but it should lessen as your back starts to heal. When your back feels better, walk daily to keep your back and body healthy.  Finding a comfortable position  When your back pain is new, certain positions will ease your pain. Gently try each of the positions below until you find one that is helpful. Once you find a position of comfort, use it as often as you like when you are resting. You will recover faster if you combine rest with activity.         When should I call my doctor?  Your back pain should improve over the first couple of weeks. As it improves, you should be able to return to your normal activities. But call your doctor if:    You have a sudden change in your ability to control?your bladder or bowels.    You feel tingling in your groin or legs.    The pain spreads down your leg and into your foot.    Your toes, feet or leg muscles feel weak.    You feel generally unwell or sick.    Your pain does not get better or gets worse.  For informational purposes only. Not to replace the advice of your health care provider.  Copyright   2013 [employer]. All rights reserved. [employer] 778906 - REV 03/16.    Back Safety: Getting Into and Out of Bed  Good posture protects your back when you sit, stand, and walk. It is also important while getting into and out of bed. Follow the steps below to get out of bed. Reverse them to get into bed.  Safety tip: Sit at the side of the bed for a few seconds before standing up. Then, after you stand up, wait a moment before walking to be sure you re not dizzy.      Roll onto your side.   1. Roll onto your side    Keep your knees together.    Flatten your stomach muscles to keep your back from arching.    Put your hands on the bed in front of you.     Raise your body.   2. Raise your body    Push your upper body off the bed as  you swing your legs to the floor.    Keeping your back straight, move your whole body as one unit. Don t bend or twist at the waist.    Let the weight of your legs help you move.     Stand up.   3. Stand up    Lean forward from your hip and roll onto the balls of your feet.    Flatten your stomach muscles to keep your back from arching.    Using your arm and leg muscles, push yourself to a standing position.  Date Last Reviewed: 8/31/2015 2000-2017 [employer]. [address] [place] [address]. All rights reserved. This information is not intended as a substitute for professional medical care. Always follow your healthcare professional's instructions.

## 2018-03-09 NOTE — MR AVS SNAPSHOT
After Visit Summary   3/9/2018    Sylvie Bautista    MRN: [id number]           Patient Information     Date Of Birth          1/7/1934        Visit Information        Provider Department      3/9/2018 10:00 AM Ewa Koroma DC [facility]        Today's Diagnoses     Acute left-sided low back pain without sciatica    -  1    Segmental and somatic dysfunction of sacral region        Segmental and somatic dysfunction of pelvic region          Care Instructions    Practice home exercise program given prior from PT.   Floor Linda Parra.   Practice safely get in/out of bed.  See below.    Return 1xweek 3-5 weeks.  (vacation March 16-27)        When You Have Low Back Pain  Caring for Your Back  You are not alone.  Low back pain is very common. Nearly half of all adults have low back pain in any given year.  The good news is that back pain is rarely a danger to your health. Most people can manage their back pain on their own and about half of them start feeling better within 2 weeks. In 9 out of 10 cases, low back pain goes away or no longer limits daily activity within 6 weeks.  Your outlook is good!  Your symptoms tell us that your low back pain is most likely not a danger to you. Most of the time we do not know the exact cause of low back pain, even if you see a doctor or have an MRI. However, treatment can still work without knowing the cause of the pain. Less than 1 in 100 people need surgery for their back pain.  What can I do about my low back pain?  There are three things you can do to ease low back pain and help it go away.    Use heat or cold packs.    Take medicine as directed.    Use positions, movements and exercises.  Using heat or cold packs  Try cold packs or gentle heat to ease your pain. Use whichever gives you the most relief. Apply the cold pack or heat for 15 minutes at a time, as often as needed.  Taking medicine    If your doctor has prescribed medicine, be  sure to follow the directions.    If you take over-the-counter medicine, read and follow the directions.    Talk to your doctor if you have any questions.  Using positions, movements and exercises  Research tells us that moving your joints and muscles can help you recover from back pain. Such activity should be simple and gentle.  Use the positions in the photos as well as walking to help relieve your pain. Try taking a short walk every 3 to 4 hours during the day. Walk for a few minutes inside your home or take longer walks outside, on a treadmill or at a mall. Slowly increase the amount of time you walk.  Expect discomfort when you begin, but it should lessen as your back starts to heal. When your back feels better, walk daily to keep your back and body healthy.  Finding a comfortable position  When your back pain is new, certain positions will ease your pain. Gently try each of the positions below until you find one that is helpful. Once you find a position of comfort, use it as often as you like when you are resting. You will recover faster if you combine rest with activity.         When should I call my doctor?  Your back pain should improve over the first couple of weeks. As it improves, you should be able to return to your normal activities. But call your doctor if:    You have a sudden change in your ability to control?your bladder or bowels.    You feel tingling in your groin or legs.    The pain spreads down your leg and into your foot.    Your toes, feet or leg muscles feel weak.    You feel generally unwell or sick.    Your pain does not get better or gets worse.  For informational purposes only. Not to replace the advice of your health care provider.  Copyright   2013 [employer]. All rights reserved. [employer] 237977 - REV 03/16.    Back Safety: Getting Into and Out of Bed  Good posture protects your back when you sit, stand, and walk. It is also important while getting into and out of  bed. Follow the steps below to get out of bed. Reverse them to get into bed.  Safety tip: Sit at the side of the bed for a few seconds before standing up. Then, after you stand up, wait a moment before walking to be sure you re not dizzy.      Roll onto your side.   1. Roll onto your side    Keep your knees together.    Flatten your stomach muscles to keep your back from arching.    Put your hands on the bed in front of you.     Raise your body.   2. Raise your body    Push your upper body off the bed as you swing your legs to the floor.    Keeping your back straight, move your whole body as one unit. Don t bend or twist at the waist.    Let the weight of your legs help you move.     Stand up.   3. Stand up    Lean forward from your hip and roll onto the balls of your feet.    Flatten your stomach muscles to keep your back from arching.    Using your arm and leg muscles, push yourself to a standing position.  Date Last Reviewed: 8/31/2015 2000-2017 The [employer]. [address] [place] [address]. All rights reserved. This information is not intended as a substitute for professional medical care. Always follow your healthcare professional's instructions.                Follow-ups after your visit        Follow-up notes from your care team     Return in about 1 week (around 3/16/2018).      Who to contact     If you have questions or need follow up information about today's clinic visit or your schedule please contact [facility] directly at [phone].  Normal or non-critical lab and imaging results will be communicated to you by Cmedhart, letter or phone within 4 business days after the clinic has received the results. If you do not hear from us within 7 days, please contact the clinic through StemPatht or phone. If you have a critical or abnormal lab result, we will notify you by phone as soon as possible.  Submit refill requests through [employer] De[employer] or call your pharmacy and  "they will forward the refill request to us. Please allow 3 business days for your refill to be completed.          Additional Information About Your Visit        Intellinotehart Information     Blue Badge Style lets you send messages to your doctor, view your test results, renew your prescriptions, schedule appointments and more. To sign up, go to [email or web address] . Click on \"Log in\" on the left side of the screen, which will take you to the Welcome page. Then click on \"Sign up Now\" on the right side of the page.     You will be asked to enter the access code listed below, as well as some personal information. Please follow the directions to create your username and password.     Your access code is: [email or web address]  Expires: 2018 10:00 AM     Your access code will  in 90 days. If you need help or a new code, please call your [place] clinic or [phone].        Care EveryWhere ID     This is your Care EveryWhere ID. This could be used by other organizations to access your [place] medical records  [id number]         Blood Pressure from Last 3 Encounters:   18 122/70   10/06/17 100/60   17 122/66    Weight from Last 3 Encounters:   18 69.4 kg (153 lb)   10/06/17 69.4 kg (153 lb)   17 69.3 kg (152 lb 12.8 oz)              We Performed the Following     CHIROPRAC MANIP,SPINAL,1-2 REGIONS        Primary Care Provider Office Phone # Fax #    Mirella Aly [phone] [address] [facility] [address]        Equal Access to Services     LILIA ELIZONDO : Hadii eric Lees, reza paz, qaybflora musa. So [facility] [phone].    ATENCIÓN: Si habla español, tiene a ceballos disposición servicios gratuitos de asistencia lingüística. Tam al [phone].    We comply with applicable federal civil rights laws and [place] laws. We do not discriminate on the basis of race, color, national origin, " age, disability, sex, sexual orientation, or gender identity.            Thank you!     Thank you for choosing [facility] AND [facility]  for your care. Our goal is always to provide you with excellent care. Hearing back from our patients is one way we can continue to improve our services. Please take a few minutes to complete the written survey that you may receive in the mail after your visit with us. Thank you!             Your Updated Medication List - Protect others around you: Learn how to safely use, store and throw away your medicines at www.disposemymeds.org.          This list is accurate as of 3/9/18 11:04 AM.  Always use your most recent med list.                   Brand Name Dispense Instructions for use Diagnosis    acetaminophen 650 MG CR tablet    TYLENOL     Take 1,300 mg by mouth        ascorbic acid 1000 MG Tabs    vitamin C     1 tablet oral daily        Blood Pressure Monitor Misc      As directed. OMRON        carboxymethylcellulose sodium 0.5 % Soln ophthalmic solution   Generic drug:  carboxymethylcellulose      1 drop        chlorthalidone 25 MG tablet    HYGROTON          docusate sodium 100 MG capsule    COLACE     Take 1 capsule by mouth for constipation. Can take 2 in 24 hours.        ESTRACE VAGINAL 0.1 MG/GM cream   Generic drug:  estradiol           folic acid 1 MG tablet    FOLVITE     Take 1 mg by mouth        gabapentin 300 MG capsule    NEURONTIN     Take 300 mg by mouth        levothyroxine 112 MCG tablet    SYNTHROID/LEVOTHROID     Take 112 mcg by mouth        loratadine 10 MG tablet    CLARITIN     Take 10 mg by mouth        losartan 25 MG tablet    COZAAR     Take 25 mg by mouth        OCUVITE ADULT 50+ Caps      1 tablet by mouth once daily.        pantoprazole 20 MG EC tablet    PROTONIX     Take 20 mg by mouth        PSYLLIUM PO           SF 5000 PLUS 1.1 % Crea   Generic drug:  Sodium Fluoride           zolpidem 5 MG tablet    AMBIEN     Take 5 mg by mouth

## 2018-03-15 ENCOUNTER — THERAPY VISIT (OUTPATIENT)
Dept: CHIROPRACTIC MEDICINE | Facility: OTHER | Age: 83
End: 2018-03-15
Attending: NURSE PRACTITIONER
Payer: COMMERCIAL

## 2018-03-15 DIAGNOSIS — M54.50 ACUTE LEFT-SIDED LOW BACK PAIN WITHOUT SCIATICA: Primary | ICD-10-CM

## 2018-03-15 DIAGNOSIS — M99.04 SEGMENTAL AND SOMATIC DYSFUNCTION OF SACRAL REGION: ICD-10-CM

## 2018-03-15 DIAGNOSIS — M99.05 SEGMENTAL AND SOMATIC DYSFUNCTION OF PELVIC REGION: ICD-10-CM

## 2018-03-15 PROCEDURE — 98940 CHIROPRACT MANJ 1-2 REGIONS: CPT | Mod: AT | Performed by: CHIROPRACTOR

## 2018-03-15 PROCEDURE — 98940 CHIROPRACT MANJ 1-2 REGIONS: CPT | Performed by: CHIROPRACTOR

## 2018-03-15 PROCEDURE — G0463 HOSPITAL OUTPT CLINIC VISIT: HCPCS

## 2018-03-15 NOTE — MR AVS SNAPSHOT
"              After Visit Summary   3/15/2018    Sylvie Bautista    MRN: [id number]           Patient Information     Date Of Birth          1/7/1934        Visit Information        Provider Department      3/15/2018 4:00 PM Ewa Koroma DC [facility]        Today's Diagnoses     Acute left-sided low back pain without sciatica    -  1    Segmental and somatic dysfunction of sacral region        Segmental and somatic dysfunction of pelvic region           Follow-ups after your visit        Your next 10 appointments already scheduled     Mar 30, 2018 10:30 AM CDT   ALLIE Chiropractor with Ewa Koroma DC   [facility] ([facility])    [address] [place] [id number] [phone]              Who to contact     If you have questions or need follow up information about today's clinic visit or your schedule please contact [facility] directly at [phone].  Normal or non-critical lab and imaging results will be communicated to you by MessageBunkerhart, letter or phone within 4 business days after the clinic has received the results. If you do not hear from us within 7 days, please contact the clinic through Nuggetat or phone. If you have a critical or abnormal lab result, we will notify you by phone as soon as possible.  Submit refill requests through Remote or call your pharmacy and they will forward the refill request to us. Please allow 3 business days for your refill to be completed.          Additional Information About Your Visit        MessageBunkerharKylin Network Information     Remote lets you send messages to your doctor, view your test results, renew your prescriptions, schedule appointments and more. To sign up, go to [email or web address] . Click on \"Log in\" on the left side of the screen, which will take you to the Welcome page. Then click on \"Sign up Now\" on the right side of the page.     You will be asked to enter the " access code listed below, as well as some personal information. Please follow the directions to create your username and password.     Your access code is: [email or web address]  Expires: 2018 11:00 AM     Your access code will  in 90 days. If you need help or a new code, please call your [facility] or [phone].        Care EveryWhere ID     This is your Care EveryWhere ID. This could be used by other organizations to access your [place] medical records  [id number]         Blood Pressure from Last 3 Encounters:   18 122/70   10/06/17 100/60   17 122/66    Weight from Last 3 Encounters:   18 69.4 kg (153 lb)   10/06/17 69.4 kg (153 lb)   17 69.3 kg (152 lb 12.8 oz)              We Performed the Following     CHIROPRAC MANIP,SPINAL,1-2 REGIONS        Primary Care Provider Office Phone # Fax #    Mirella Aly [phone]       [address] [facility] [address]        Equal Access to Services     [facility]: Hadii aad ku hadasho Soomaali, waaxda luqadaha, qaybta kaalmada adeegyada, flora almonte . So [facility] [phone].    ATENCIÓN: Si habla español, tiene a ceballos disposición servicios gratuitos de asistencia lingüística. Llame al [phone].    We comply with applicable federal civil rights laws and [place] laws. We do not discriminate on the basis of race, color, national origin, age, disability, sex, sexual orientation, or gender identity.            Thank you!     Thank you for choosing [facility] AND [facility]  for your care. Our goal is always to provide you with excellent care. Hearing back from our patients is one way we can continue to improve our services. Please take a few minutes to complete the written survey that you may receive in the mail after your visit with us. Thank you!             Your Updated Medication List - Protect others around you: Learn how to safely use, store and throw away your  medicines at www.disposemymeds.org.          This list is accurate as of 3/15/18 11:59 PM.  Always use your most recent med list.                   Brand Name Dispense Instructions for use Diagnosis    acetaminophen 650 MG CR tablet    TYLENOL     Take 1,300 mg by mouth        ascorbic acid 1000 MG Tabs    vitamin C     1 tablet oral daily        Blood Pressure Monitor Misc      As directed. OMRON        carboxymethylcellulose sodium 0.5 % Soln ophthalmic solution   Generic drug:  carboxymethylcellulose      1 drop        chlorthalidone 25 MG tablet    HYGROTON          docusate sodium 100 MG capsule    COLACE     Take 1 capsule by mouth for constipation. Can take 2 in 24 hours.        ESTRACE VAGINAL 0.1 MG/GM cream   Generic drug:  estradiol           folic acid 1 MG tablet    FOLVITE     Take 1 mg by mouth        gabapentin 300 MG capsule    NEURONTIN     Take 300 mg by mouth        levothyroxine 112 MCG tablet    SYNTHROID/LEVOTHROID     Take 112 mcg by mouth        loratadine 10 MG tablet    CLARITIN     Take 10 mg by mouth        losartan 25 MG tablet    COZAAR     Take 25 mg by mouth        OCUVITE ADULT 50+ Caps      1 tablet by mouth once daily.        pantoprazole 20 MG EC tablet    PROTONIX     Take 20 mg by mouth        PSYLLIUM PO           SF 5000 PLUS 1.1 % Crea   Generic drug:  Sodium Fluoride           zolpidem 5 MG tablet    AMBIEN     Take 5 mg by mouth

## 2018-03-15 NOTE — PROGRESS NOTES
"        PATIENT:  Sylvie Bautista is a 84 y.o. female    PROBLEM:   Date of Initial Visit for this Episode:  3/9/18  3/15/18 Visit #2/5    SUBJECTIVE :L low back severe pretty briefly in the morning when first getting up from bed. Has to use the restroom immediately so not able to stretch.        Initial HPI Sharp left low back pain only with sitting up and getting out of bed x3 weeks.  Lasts a few minutes.  Better after walking.    R lateral hip pain, comes and goes, considering seeing MD for repeating hip injection.  Wears R heel lift in her slippers for 1.5 in R leg length discrepancy.    Hasn't done any treatment.  Not exercising.  Tried 6 weeks Gentle Exercise with Christina at [facility] and couldn't keep up with pace of class.  Calls herself \"Exercise Aversive\".  Looking forward to walking outside once weather improves.    Taking extra vit D to help with bone density.  Scan shows osteopenia.    Worked with Physical Therapy and chiropractic fall 2017 on balance, R hip and low back pain.       Mechanism of Injury:  Duration and Frequency of Pain: intermittent  Radiation of pain: no  Pain rated 7/10 at it's worst, 2/10 current  Pain course: unchanged  Worse with: getting out of bed  Improved by: walking  Previous injury:  History of recurrent hip bursitis, has had several injections.    3/9/18  Neck index 16%    Back index 29%    Functional limitations: getting out of bed       PRECAUTIONS: Osteopenia, Raynauds      OBJECTIVE:    DIAGNOSTICS:  No lumbopelvic or hip xrays views taken.  Order if not improving with trial of care.     PHYSICAL EXAM:     +Prone Leg length inequality: -Derefield R; Restricted R heel to buttock +anterior thigh muscle pulling bilateral  +Tenderness: lumbosacral and R inferior and lateral glute  +Muscle spasm:  lumbar paraspinals, glutes, R>L psoas.   +Joint asymmetry and restriction: FERNANDA, RLI NINO     Functional assessment of Getting in/out of bed:  Turns to side and keeps " back straight      ASSESSMENT:   1. Acute left-sided low back pain without sciatica    2. Segmental and somatic dysfunction of sacral region    3. Segmental and somatic dysfunction of pelvic region        PLAN    Care:    SMT using gentle Drop sacral and pelvic adjustments.    Response: improved speed of getting up    INSTRUCTIONS   Continue  Gentle Range Of Motion stretching: anterior hips, floor angels  Return  in 2 weeks.     Plan of Care:  Short term 3-5 visits Chiropractic Care including Spinal Adjustments to meet care plan goals.     Frequency: 1xweek for up to 4 weeks trial of care.  POC discussed and patient agreeable to plan of care.      Goals:  To be met by Re-eval in 1 month     Patient:     Patient will report improved pain.   Patient will report ability to get out of bed with minimal pain limitation   Patient will demonstrate an improved ability to complete Activities of Daily Living as shown by a reported reduced score on back index.    Patient will demonstrate improved ROM.

## 2018-03-16 PROBLEM — M99.05 SEGMENTAL AND SOMATIC DYSFUNCTION OF PELVIC REGION: Status: ACTIVE | Noted: 2018-03-16

## 2018-03-16 PROBLEM — M54.50 ACUTE LEFT-SIDED LOW BACK PAIN WITHOUT SCIATICA: Status: ACTIVE | Noted: 2018-03-16

## 2018-03-16 PROBLEM — M99.04 SEGMENTAL AND SOMATIC DYSFUNCTION OF SACRAL REGION: Status: ACTIVE | Noted: 2018-03-16

## 2018-03-22 DIAGNOSIS — E03.9 HYPOTHYROIDISM, UNSPECIFIED TYPE: Primary | ICD-10-CM

## 2018-03-26 RX ORDER — LEVOTHYROXINE SODIUM 112 UG/1
TABLET ORAL
Qty: 90 TABLET | Refills: 3 | Status: SHIPPED | OUTPATIENT
Start: 2018-03-26 | End: 2019-03-02

## 2018-03-30 ENCOUNTER — THERAPY VISIT (OUTPATIENT)
Dept: CHIROPRACTIC MEDICINE | Facility: OTHER | Age: 83
End: 2018-03-30
Attending: CHIROPRACTOR
Payer: MEDICARE

## 2018-03-30 DIAGNOSIS — M54.50 ACUTE LEFT-SIDED LOW BACK PAIN WITHOUT SCIATICA: Primary | ICD-10-CM

## 2018-03-30 DIAGNOSIS — M99.04 SEGMENTAL AND SOMATIC DYSFUNCTION OF SACRAL REGION: ICD-10-CM

## 2018-03-30 DIAGNOSIS — M99.02 SEGMENTAL AND SOMATIC DYSFUNCTION OF THORACIC REGION: ICD-10-CM

## 2018-03-30 DIAGNOSIS — M62.838 MUSCLE SPASM: ICD-10-CM

## 2018-03-30 DIAGNOSIS — M99.05 SEGMENTAL AND SOMATIC DYSFUNCTION OF PELVIC REGION: ICD-10-CM

## 2018-03-30 PROCEDURE — 98940 CHIROPRACT MANJ 1-2 REGIONS: CPT | Performed by: CHIROPRACTOR

## 2018-03-30 PROCEDURE — G0463 HOSPITAL OUTPT CLINIC VISIT: HCPCS

## 2018-03-30 PROCEDURE — G0463 HOSPITAL OUTPT CLINIC VISIT: HCPCS | Mod: 25

## 2018-03-30 NOTE — PROGRESS NOTES
PATIENT:  Sylvie Bautista is a 84 y.o. female    PROBLEM:   Date of Initial Visit for this Episode:  3/9/18  3/30/2018 Visit #3/5    SUBJECTIVE : Since last visit on 3/15/18 has been doing better with no L low pain pain getting up from bed.  Awareness of how she gets out of bed has made a difference.  Also sleeping on spare bed memory foam mattress helpful.  R hip pain has not been bothering her, so she's holding off on an injection.  Rates pain 0/10.      3/9/18  Neck index 16%    Back index 29%    Functional limitations: getting out of bed 3/30/2018 Goal met.       PRECAUTIONS: Osteopenia, Raynauds      OBJECTIVE:    DIAGNOSTICS:  No lumbopelvic or hip xrays views taken.  Order if not improving with trial of care. 3/30/2018 defer    PHYSICAL EXAM:     +Prone Leg length inequality: -Derefield R; Restricted L heel to buttock   +Tenderness: none  +Muscle spasm:  Mild R thoracic paraspinals, glutes, R>L psoas.   +Joint asymmetry and restriction: T6 R, NINO     Functional assessment of Getting in/out of bed:  Turns to side and keeps back straight      ASSESSMENT:   1. Acute left-sided low back pain without sciatica    2. Segmental and somatic dysfunction of sacral region    3. Segmental and somatic dysfunction of pelvic region        PLAN    Care:    SMT using gentle prone thoracic, Drop sacral and pelvic adjustments.    Response: improved mobility, no pain getting up      INSTRUCTIONS   Practice home exercise program given prior from PT.   Floor [place].   Practice safely get in/out of bed.      Discharged from care.       Goals:  To be met by Re-eval in 1 month 3/30/2018 Goals met.     Patient will report improved pain.   Patient will report ability to get out of bed with minimal pain limitation   Patient will demonstrate an improved ability to complete Activities of Daily Living as shown by a reported reduced score on back index. 3/30/2018  Not assessed today.    Patient will demonstrate improved  ROM.

## 2018-03-30 NOTE — PATIENT INSTRUCTIONS
Practice home exercise program given prior from PT.   Floor [place].   Practice safely get in/out of bed.      Discharged from care.

## 2018-03-30 NOTE — MR AVS SNAPSHOT
"              After Visit Summary   3/30/2018    Sylvie Bautista    MRN: [id number]           Patient Information     Date Of Birth          1/7/1934        Visit Information        Provider Department      3/30/2018 10:30 AM Ewa Koroma DC [facility]        Today's Diagnoses     Acute left-sided low back pain without sciatica    -  1    Segmental and somatic dysfunction of sacral region        Segmental and somatic dysfunction of pelvic region          Care Instructions    Practice home exercise program given prior from PT.   Floor Linda Parra.   Practice safely get in/out of bed.      Discharged from care.                Follow-ups after your visit        Follow-up notes from your care team     Return if symptoms worsen or fail to improve.      Who to contact     If you have questions or need follow up information about today's clinic visit or your schedule please contact [facility] directly at [phone].  Normal or non-critical lab and imaging results will be communicated to you by [employer], letter or phone within 4 business days after the clinic has received the results. If you do not hear from us within 7 days, please contact the clinic through [employer] or phone. If you have a critical or abnormal lab result, we will notify you by phone as soon as possible.  Submit refill requests through [employer]) or call your pharmacy and they will forward the refill request to us. Please allow 3 business days for your refill to be completed.          Additional Information About Your Visit        [employer] Information     [employer]) lets you send messages to your doctor, view your test results, renew your prescriptions, schedule appointments and more. To sign up, go to [email or web address]) . Click on \"Log in\" on the left side of the screen, which will take you to the Welcome page. Then click on \"Sign up Now\" on the right side of the page.     You will be asked to enter the " access code listed below, as well as some personal information. Please follow the directions to create your username and password.     Your access code is: [email or web address]  Expires: 2018 11:00 AM     Your access code will  in 90 days. If you need help or a new code, please call your [place] clinic or [phone].        Care EveryWhere ID     This is your Care EveryWhere ID. This could be used by other organizations to access your [place] medical records  [id number]         Blood Pressure from Last 3 Encounters:   18 122/70   10/06/17 100/60   17 122/66    Weight from Last 3 Encounters:   18 69.4 kg (153 lb)   10/06/17 69.4 kg (153 lb)   17 69.3 kg (152 lb 12.8 oz)              We Performed the Following     C CHIROPRAC MANIP,SPINAL,1-2 REGIONS - GI ONLY        Primary Care Provider Office Phone # Fax #    Mirella Aly [phone]       [address] [facility] [address]        Equal Access to Services     [facility]: Hadii aad ku hadasho Soomaali, waaxda luqadaha, qaybta kaalmada adeegyada, flora izaguirre haytoddn macy almonte . So [facility] [phone].    ATENCIÓN: Si habla español, tiene a ceballos disposición servicios gratuitos de asistencia lingüística. Llame al [phone].    We comply with applicable federal civil rights laws and [place] laws. We do not discriminate on the basis of race, color, national origin, age, disability, sex, sexual orientation, or gender identity.            Thank you!     Thank you for choosing [facility] AND [facility]  for your care. Our goal is always to provide you with excellent care. Hearing back from our patients is one way we can continue to improve our services. Please take a few minutes to complete the written survey that you may receive in the mail after your visit with us. Thank you!             Your Updated Medication List - Protect others around you: Learn how to safely use, store and  throw away your medicines at www.disposemymeds.org.          This list is accurate as of 3/30/18 10:53 AM.  Always use your most recent med list.                   Brand Name Dispense Instructions for use Diagnosis    acetaminophen 650 MG CR tablet    TYLENOL     Take 1,300 mg by mouth        ascorbic acid 1000 MG Tabs    vitamin C     1 tablet oral daily        Blood Pressure Monitor Misc      As directed. OMRON        carboxymethylcellulose sodium 0.5 % Soln ophthalmic solution   Generic drug:  carboxymethylcellulose      1 drop        chlorthalidone 25 MG tablet    HYGROTON          docusate sodium 100 MG capsule    COLACE     Take 1 capsule by mouth for constipation. Can take 2 in 24 hours.        ESTRACE VAGINAL 0.1 MG/GM cream   Generic drug:  estradiol           folic acid 1 MG tablet    FOLVITE     Take 1 mg by mouth        gabapentin 300 MG capsule    NEURONTIN     Take 300 mg by mouth        levothyroxine 112 MCG tablet    SYNTHROID/LEVOTHROID    90 tablet    TAKE 1 TABLET BY MOUTH ONCE DAILY    Hypothyroidism, unspecified type       loratadine 10 MG tablet    CLARITIN     Take 10 mg by mouth        losartan 25 MG tablet    COZAAR     Take 25 mg by mouth        OCUVITE ADULT 50+ Caps      1 tablet by mouth once daily.        pantoprazole 20 MG EC tablet    PROTONIX     Take 20 mg by mouth        PSYLLIUM PO           SF 5000 PLUS 1.1 % Crea   Generic drug:  Sodium Fluoride           zolpidem 5 MG tablet    AMBIEN     Take 5 mg by mouth

## 2018-04-20 DIAGNOSIS — K21.9 GASTROESOPHAGEAL REFLUX DISEASE, ESOPHAGITIS PRESENCE NOT SPECIFIED: Primary | ICD-10-CM

## 2018-04-20 RX ORDER — PANTOPRAZOLE SODIUM 20 MG/1
TABLET, DELAYED RELEASE ORAL
Qty: 90 TABLET | Refills: 3 | Status: SHIPPED | OUTPATIENT
Start: 2018-04-20 | End: 2019-03-09

## 2018-04-24 ENCOUNTER — OFFICE VISIT (OUTPATIENT)
Dept: INTERNAL MEDICINE | Facility: OTHER | Age: 83
End: 2018-04-24
Attending: NURSE PRACTITIONER
Payer: COMMERCIAL

## 2018-04-24 VITALS
SYSTOLIC BLOOD PRESSURE: 130 MMHG | HEIGHT: 65 IN | TEMPERATURE: 96.3 F | WEIGHT: 155 LBS | DIASTOLIC BLOOD PRESSURE: 62 MMHG | BODY MASS INDEX: 25.83 KG/M2

## 2018-04-24 DIAGNOSIS — M70.62 TROCHANTERIC BURSITIS OF BOTH HIPS: ICD-10-CM

## 2018-04-24 DIAGNOSIS — M70.61 TROCHANTERIC BURSITIS OF BOTH HIPS: ICD-10-CM

## 2018-04-24 DIAGNOSIS — K58.2 IRRITABLE BOWEL SYNDROME WITH BOTH CONSTIPATION AND DIARRHEA: Primary | ICD-10-CM

## 2018-04-24 PROCEDURE — 99214 OFFICE O/P EST MOD 30 MIN: CPT | Performed by: NURSE PRACTITIONER

## 2018-04-24 PROCEDURE — G0463 HOSPITAL OUTPT CLINIC VISIT: HCPCS

## 2018-04-24 ASSESSMENT — PAIN SCALES - GENERAL: PAINLEVEL: NO PAIN (0)

## 2018-04-24 NOTE — NURSING NOTE
Sylvie Bautista is a 84 year old female who presents today for possible IBS flare. Stated that her diarrhea has become more frequent between flares. Stated that normally it was every 6-7 weeks now is every 2-3 weeks.   Terese Traylor LPN.......4/24/2018.......11:04 AM

## 2018-04-24 NOTE — PROGRESS NOTES
Subjective:  She is here today for follow-up irritable bowel syndrome.  She states she takes 2 Citrucel daily and this usually works quite well.  She primarily has constipation with occasional diarrhea.  She states over the past 2-3 weeks she has been having more diarrhea.  She will have a several days of feeling more constipated with smaller stools and then will have a day of 3 large diarrhea stools.  She then will take 1 teaspoon of Pepto-Bismol and that helps.  She also reports that she has found that she has some right lower quadrant abdominal discomfort on the days that she is constipated but once she has a good bowel movement the pain resolves.  Her last colonoscopy was 2014.  It was recommended that she not have follow-up due to her advanced age.  She has not had black stools or rectal bleeding no nausea or vomiting or weight changes.  She is also felt a catch in the left hip and buttock occasionally with standing over the last couple of weeks.  She has history of bilateral trochanter bursitis.  She saw a chiropractor not long ago.  She would like to have this checked.  No recent hip x-rays.  No falls or injuries.      Patient Active Problem List   Diagnosis     Acid reflux disease     ACP (advance care planning)     CAP (community acquired pneumonia)     Chronic kidney disease, stage III (moderate)     Cigarette smoker     Diarrhea     Essential hypertension     Hypothyroidism     Insomnia     Iron deficiency anemia     Irritable bowel syndrome     Liver abscess     Low folic acid     Mononeuritis     Hereditary and idiopathic peripheral neuropathy     Nontoxic multinodular goiter     Osteopenia     Raynaud phenomenon     Tinea pedis     Acute left-sided low back pain without sciatica     Segmental and somatic dysfunction of sacral region     Segmental and somatic dysfunction of pelvic region     Past Medical History:   Diagnosis Date     Asymptomatic menopausal state     DXA 10/27/06 normal     Calculus of  kidney     1990,s/p lithotripsy     Chronic kidney disease, stage III (moderate)     7/27/2011     Diarrhea     11/6/2014     Dysphagia     2007,EGD 3/14/07 nl; sx from goiter.     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     2004,on Tapazole     Past Surgical History:   Procedure Laterality Date     APPENDECTOMY OPEN      1979,,& Meckel's diverticulum removed     COLONOSCOPY      11/17/06,polyps; repeat in 5 YEARS     COLONOSCOPY      11/8/11     COLONOSCOPY      11/6/2014     ESOPHAGOSCOPY, GASTROSCOPY, DUODENOSCOPY (EGD), COMBINED      3/14/07,done for dysphagia; normal     HYSTERECTOMY TOTAL ABDOMINAL, BILATERAL SALPINGO-OOPHORECTOMY, COMBINED      1979     LAPAROSCOPIC CHOLECYSTECTOMY      1991,common bile duct transected     OTHER SURGICAL HISTORY      558595,IR BILIARY STRICTURE DILATATION WO STENT,x3- per h/p /Choledochojejunostomy with Vane-En-Y due to transection of common bile duct     OTHER SURGICAL HISTORY      4/05,AJG976,PREMALIG/BENIGN SKIN LESION EXCISION,epidermal inclusion cyst     OTHER SURGICAL HISTORY      10/26/2015,[id number],FOOT SURGERY,hammertoe repair, [facility] with Dr. Grimaldo     OTHER SURGICAL HISTORY      366811,OTHER,6/30/17 [facility]     THYROIDECTOMY      3/26/08,Total thyroidectomy; multinodular goiter     Social History     Social History     Marital status:      Spouse name: N/A     Number of children: N/A     Years of education: N/A     Occupational History     Not on file.     Social History Main Topics     Smoking status: Current Every Day Smoker      Packs/day: 0.50     Years: 30.00     Types: Cigarettes     Smokeless tobacco: Never Used      Comment: Quit smokin/2 - 3/4 ppd; not interested in quitting at this time.     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; 4 children; Retired RN     Family History   Problem Relation Age of Onset     Other - See Comments Mother       85yo, emphysema, dementia     HEART DISEASE Father      Heart Disease,MI in 50s,  62yo     DIABETES Father      Diabetes     Substance Abuse Father      Alcohol/Drug,Etoh     DIABETES Daughter      Diabetes     Other - See Comments Daughter      Psychiatric illness,depr/anxiety     Thyroid Disease Sister      Thyroid Disease     Breast Cancer No family hx of      Cancer-breast     Current Outpatient Prescriptions   Medication Sig Dispense Refill     methylcellulose (CITRUCEL) 500 MG TABS tablet Take 2 tablets (1,000 mg) by mouth daily 14 each 0     acetaminophen (TYLENOL) 650 MG CR tablet Take 1,300 mg by mouth       ascorbic acid (VITAMIN C) 1000 MG TABS 1 tablet oral daily       Blood Pressure Monitor MISC As directed. OMRON       carboxymethylcellulose (CARBOXYMETHYLCELLULOSE SODIUM) 0.5 % SOLN ophthalmic solution 1 drop       chlorthalidone (HYGROTON) 25 MG tablet        docusate sodium (COLACE) 100 MG capsule Take 1 capsule by mouth for constipation. Can take 2 in 24 hours.       estradiol (ESTRACE VAGINAL) 0.1 MG/GM cream        folic acid (FOLVITE) 1 MG tablet Take 1 mg by mouth       gabapentin (NEURONTIN) 300 MG capsule Take 300 mg by mouth       levothyroxine (SYNTHROID/LEVOTHROID) 112 MCG tablet TAKE 1 TABLET BY MOUTH ONCE DAILY 90 tablet 3     loratadine (CLARITIN) 10 MG tablet Take 10 mg by mouth       losartan (COZAAR) 25 MG tablet Take 25 mg by mouth       Multiple Vitamins-Minerals (OCUVITE ADULT 50+) CAPS 1 tablet by mouth  "once daily.       pantoprazole (PROTONIX) 20 MG EC tablet TAKE 1 TABLET BY MOUTH ONCE DAILY 90 tablet 3     Sodium Fluoride (SF 5000 PLUS) 1.1 % CREA        zolpidem (AMBIEN) 5 MG tablet Take 5 mg by mouth       Review of patient's allergies indicates not on file.      Review of Systems:  Review of Systems  See HPI    Objective:   /62 (BP Location: Right arm, Patient Position: Sitting, Cuff Size: Adult Large)  Temp 96.3  F (35.7  C) (Tympanic)  Ht 5' 5\" (1.651 m)  Wt 155 lb (70.3 kg)  Breastfeeding? No  BMI 25.79 kg/m2  Physical Exam  Pleasant female, no acute distress.  Affect normal.  Alert and oriented ×4.  Abdomen is soft without masses, tenderness and organomegaly.  There is exquisite tenderness to trochanter bursa bilaterally.  Good range of motion of both hips.  No pain with palpation to lumbar sacral spine or sacroiliac joints.  Gait is stable.    Assessment:    ICD-10-CM    1. Irritable bowel syndrome with both constipation and diarrhea K58.2 methylcellulose (CITRUCEL) 500 MG TABS tablet   2. Trochanteric bursitis of both hips M70.61     M70.62        Plan:   1.  Discussed further evaluation of the colon.  She is not interested in colonoscopy as she is quite sure this is her usual IBS.  She would like to try increasing the Citrucel to 3 pills daily.  She will make sure that she drinks enough water with this.  She did have CT of abdomen and pelvis in June 2017 which showed normal bowel.  If she does not find improvement with increasing the Citrucel she can always decrease it to make sure it is not causing any additional constipation.  2.  Could consider follow-up with Dr. Koroma for steroid injections once again.  Right now she feels she is doing okay and will postpone that.  Could consider hip x-rays as well.    MARA Mullins   4/24/2018  11:43 AM  "

## 2018-04-24 NOTE — MR AVS SNAPSHOT
"              After Visit Summary   2018    Sylvie Bautista    MRN: [id number]           Patient Information     Date Of Birth          1934        Visit Information        Provider Department      2018 11:00 AM Mirella Aly NP [facility]        Today's Diagnoses     Irritable bowel syndrome with both constipation and diarrhea    -  1    Trochanteric bursitis of both hips           Follow-ups after your visit        Who to contact     If you have questions or need follow up information about today's clinic visit or your schedule please contact [facility] AND [facility] directly at [phone].  Normal or non-critical lab and imaging results will be communicated to you by [employer], letter or phone within 4 business days after the clinic has received the results. If you do not hear from us within 7 days, please contact the clinic through [employer] or phone. If you have a critical or abnormal lab result, we will notify you by phone as soon as possible.  Submit refill requests through [employer] or call your pharmacy and they will forward the refill request to us. Please allow 3 business days for your refill to be completed.          Additional Information About Your Visit        MyChart Information     [employer] lets you send messages to your doctor, view your test results, renew your prescriptions, schedule appointments and more. To sign up, go to [email or web address] . Click on \"Log in\" on the left side of the screen, which will take you to the Welcome page. Then click on \"Sign up Now\" on the right side of the page.     You will be asked to enter the access code listed below, as well as some personal information. Please follow the directions to create your username and password.     Your access code is: [email or web address]  Expires: 2018 11:00 AM     Your access code will  in 90 days. If you need help or a new code, please call your [place] clinic or " "[phone].        Care EveryWhere ID     This is your Care EveryWhere ID. This could be used by other organizations to access your [place] medical records  [id number]        Your Vitals Were     Temperature Height Breastfeeding? BMI (Body Mass Index)          96.3  F (35.7  C) (Tympanic) 5' 5\" (1.651 m) No 25.79 kg/m2         Blood Pressure from Last 3 Encounters:   04/24/18 130/62   02/02/18 122/70   10/06/17 100/60    Weight from Last 3 Encounters:   04/24/18 155 lb (70.3 kg)   02/02/18 153 lb (69.4 kg)   10/06/17 153 lb (69.4 kg)              Today, you had the following     No orders found for display         Today's Medication Changes          These changes are accurate as of 4/24/18 11:46 AM.  If you have any questions, ask your nurse or doctor.               Start taking these medicines.        Dose/Directions    methylcellulose 500 MG Tabs tablet   Commonly known as:  CITRUCEL   Used for:  Irritable bowel syndrome with both constipation and diarrhea   Started by:  Mirella Aly NP        Dose:  1000 mg   Take 2 tablets (1,000 mg) by mouth daily   Quantity:  14 each   Refills:  0         Stop taking these medicines if you haven't already. Please contact your care team if you have questions.     PSYLLIUM PO   Stopped by:  Mirella Aly NP                Where to get your medicines      Some of these will need a paper prescription and others can be bought over the counter.  Ask your nurse if you have questions.     You don't need a prescription for these medications     methylcellulose 500 MG Tabs tablet                Primary Care Provider Office Phone # Fax #    Mirella Ayl [phone] [address]  [facility] [address]        Equal Access to Services     LILIA ELIZONDO AH: Sheng Lees, waaxda luqadaha, qaybta kaalmada luis, flora teran. So [facility] [phone].    ATENCIÓN: Si fady ruggiero " a ceballos disposición servicios gratuitos de asistencia lingüística. [place] [phone].    We comply with applicable federal civil rights laws and [place] laws. We do not discriminate on the basis of race, color, national origin, age, disability, sex, sexual orientation, or gender identity.            Thank you!     Thank you for choosing [facility] AND [facility]  for your care. Our goal is always to provide you with excellent care. Hearing back from our patients is one way we can continue to improve our services. Please take a few minutes to complete the written survey that you may receive in the mail after your visit with us. Thank you!             Your Updated Medication List - Protect others around you: Learn how to safely use, store and throw away your medicines at www.disposemymeds.org.          This list is accurate as of 4/24/18 11:46 AM.  Always use your most recent med list.                   Brand Name Dispense Instructions for use Diagnosis    acetaminophen 650 MG CR tablet    TYLENOL     Take 1,300 mg by mouth        ascorbic acid 1000 MG Tabs    vitamin C     1 tablet oral daily        Blood Pressure Monitor Misc      As directed. OMRON        carboxymethylcellulose sodium 0.5 % Soln ophthalmic solution   Generic drug:  carboxymethylcellulose      1 drop        chlorthalidone 25 MG tablet    HYGROTON          docusate sodium 100 MG capsule    COLACE     Take 1 capsule by mouth for constipation. Can take 2 in 24 hours.        ESTRACE VAGINAL 0.1 MG/GM cream   Generic drug:  estradiol           folic acid 1 MG tablet    FOLVITE     Take 1 mg by mouth        gabapentin 300 MG capsule    NEURONTIN     Take 300 mg by mouth        levothyroxine 112 MCG tablet    SYNTHROID/LEVOTHROID    90 tablet    TAKE 1 TABLET BY MOUTH ONCE DAILY    Hypothyroidism, unspecified type       loratadine 10 MG tablet    CLARITIN     Take 10 mg by mouth        losartan 25 MG tablet    COZAAR     Take 25 mg by mouth         methylcellulose 500 MG Tabs tablet    CITRUCEL    14 each    Take 2 tablets (1,000 mg) by mouth daily    Irritable 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        pantoprazole 20 MG EC tablet    PROTONIX    90 tablet    TAKE 1 TABLET BY MOUTH ONCE DAILY    Gastroesophageal reflux disease, esophagitis presence not specified       SF 5000 PLUS 1.1 % Crea   Generic drug:  Sodium Fluoride           zolpidem 5 MG tablet    AMBIEN     Take 5 mg by mouth

## 2018-04-25 ASSESSMENT — PATIENT HEALTH QUESTIONNAIRE - PHQ9: SUM OF ALL RESPONSES TO PHQ QUESTIONS 1-9: 0

## 2018-04-29 ENCOUNTER — MYC MEDICAL ADVICE (OUTPATIENT)
Dept: INTERNAL MEDICINE | Facility: OTHER | Age: 83
End: 2018-04-29

## 2018-04-30 NOTE — TELEPHONE ENCOUNTER
I think it is fine to try.  Any of the OTC probiotics are fine.  She should take according to  directions

## 2018-05-03 ENCOUNTER — OFFICE VISIT (OUTPATIENT)
Dept: FAMILY MEDICINE | Facility: OTHER | Age: 83
End: 2018-05-03
Attending: FAMILY MEDICINE
Payer: MEDICARE

## 2018-05-03 VITALS
SYSTOLIC BLOOD PRESSURE: 130 MMHG | DIASTOLIC BLOOD PRESSURE: 78 MMHG | BODY MASS INDEX: 25.96 KG/M2 | HEART RATE: 64 BPM | WEIGHT: 156 LBS | TEMPERATURE: 98.9 F

## 2018-05-03 DIAGNOSIS — M70.61 TROCHANTERIC BURSITIS OF BOTH HIPS: Primary | ICD-10-CM

## 2018-05-03 DIAGNOSIS — M70.62 TROCHANTERIC BURSITIS OF BOTH HIPS: Primary | ICD-10-CM

## 2018-05-03 PROCEDURE — G0463 HOSPITAL OUTPT CLINIC VISIT: HCPCS

## 2018-05-03 PROCEDURE — 20610 DRAIN/INJ JOINT/BURSA W/O US: CPT | Performed by: FAMILY MEDICINE

## 2018-05-03 PROCEDURE — 25000128 H RX IP 250 OP 636: Performed by: FAMILY MEDICINE

## 2018-05-03 RX ORDER — TRIAMCINOLONE ACETONIDE 40 MG/ML
40 INJECTION, SUSPENSION INTRA-ARTICULAR; INTRAMUSCULAR ONCE
Status: COMPLETED | OUTPATIENT
Start: 2018-05-03 | End: 2018-05-03

## 2018-05-03 RX ADMIN — TRIAMCINOLONE ACETONIDE 40 MG: 40 INJECTION, SUSPENSION INTRA-ARTICULAR; INTRAMUSCULAR at 13:27

## 2018-05-03 RX ADMIN — TRIAMCINOLONE ACETONIDE 40 MG: 40 INJECTION, SUSPENSION INTRA-ARTICULAR; INTRAMUSCULAR at 13:26

## 2018-05-03 ASSESSMENT — PAIN SCALES - GENERAL: PAINLEVEL: SEVERE PAIN (6)

## 2018-05-03 NOTE — NURSING NOTE
Patient is here today with c/o left hip pain. Nicki Reyna LPN......................5/3/2018 12:51 PM

## 2018-05-03 NOTE — PROGRESS NOTES
SUBJECTIVE:  Sylvie Bautista is a 84 year old female here for follow-up.  She has a history of bilateral hip pain.  She was seen approximately 1 year ago where she had trochanteric bursal injections.  She states that these helped quite well.  Over the last 3 weeks she has developed bilateral hip pain once again.  She does not recall any particular injury or trauma that brought this pain on.  She reports pain has been in her lateral and posterior hips.  It is present with walking and laying on her sides.  She has not tried any for this at home.  She is here today requesting repeat injections as her initial injections were helpful.    ROS:    As above otherwise ROS is unremarkable.    OBJECTIVE:  /78 (BP Location: Right arm, Patient Position: Sitting, Cuff Size: Adult Regular)  Pulse 64  Temp 98.9  F (37.2  C) (Tympanic)  Wt 156 lb (70.8 kg)  Breastfeeding? No  BMI 25.96 kg/m2    EXAM:  General Appearance: Pleasant, alert, appropriate appearance for age. No acute distress  Musculoskeletal: Left hip shows flexion 90 , internal rotation 10  and external rotation 45 .  She is tenderness over greater trochanter.  No pain with logrolling.  No SI joint tenderness.    Right hip shows flexion of 90 , internal rotation 10  and external rotation 45 .  She has tenderness over her greater trochanter.  No pain with logrolling.  No SI joint tenderness.    ASSESSEMENT AND PLAN:    1. Trochanteric bursitis of both hips      Her examining symptoms are consistent once again with trochanteric bursitis bilaterally.  At her request we will proceed with injections.  Informed consent was obtained.  Her right lateral hip was cleansed in normal fashion.  A 5 mL mixture of 40 mg of Kenalog and lidocaine were used to infiltrate the point of maximal tenderness in a fanlike fashion.  She tolerated this well, no immediate complications.    The procedure was then repeated on her left side as above.  She once again tolerated this well  without any immediate complications.  She will ice both areas to prevent postinjection flare and follow-up as needed.    Adam Koroma MD    This document was prepared using voice generated software.  While every attempt was made for accuracy, grammatical errors may exist.

## 2018-05-03 NOTE — MR AVS SNAPSHOT
After Visit Summary   5/3/2018    Sylvie Bautista    MRN: [id number]           Patient Information     Date Of Birth          1/7/1934        Visit Information        Provider Department      5/3/2018 12:45 PM Shahid Koroma MD [facility]        Today's Diagnoses     Trochanteric bursitis of both hips    -  1       Follow-ups after your visit        Who to contact     If you have questions or need follow up information about today's clinic visit or your schedule please contact [facility] [place] [facility] directly at [phone].  Normal or non-critical lab and imaging results will be communicated to you by Edgeiohart, letter or phone within 4 business days after the clinic has received the results. If you do not hear from us within 7 days, please contact the clinic through SpePharmt or phone. If you have a critical or abnormal lab result, we will notify you by phone as soon as possible.  Submit refill requests through [employer] or call your pharmacy and they will forward the refill request to us. Please allow 3 business days for your refill to be completed.          Additional Information About Your Visit        MyChart Information     [employer] gives you secure access to your electronic health record. If you see a primary care provider, you can also send messages to your care team and make appointments. If you have questions, please call your primary care clinic.  If you do not have a primary care provider, please call [phone] and they will assist you.        Care EveryWhere ID     This is your Care EveryWhere ID. This could be used by other organizations to access your [place] medical records  [id number]        Your Vitals Were     Pulse Temperature Breastfeeding? BMI (Body Mass Index)          64 98.9  F (37.2  C) (Tympanic) No 25.96 kg/m2         Blood Pressure from Last 3 Encounters:   05/03/18 130/78   04/24/18 130/62   02/02/18 122/70    Weight from Last 3  Encounters:   05/03/18 156 lb (70.8 kg)   04/24/18 155 lb (70.3 kg)   02/02/18 153 lb (69.4 kg)              We Performed the Following     DRAIN/INJECT LARGE JOINT/BURSA        Primary Care Provider Office Phone # Fax Antwan Aly [phone]       [address] [facility] [address]        Equal Access to Services     ANDERSON ELIZONDO : Hadii aad ku hadasho Soomaali, waaxda luqadaha, qaybta kaalmada adeegyada, waxay idiin hayaan adeeg kharash laarlene . So [facility] [phone].    ATENCIÓN: Si serina judd, tiene a ceballos disposición servicios gratuitos de asistencia lingüística. Llame al [phone].    We comply with applicable federal civil rights laws and [place] laws. We do not discriminate on the basis of race, color, national origin, age, disability, sex, sexual orientation, or gender identity.            Thank you!     Thank you for choosing [facility] AND hospitals  for your care. Our goal is always to provide you with excellent care. Hearing back from our patients is one way we can continue to improve our services. Please take a few minutes to complete the written survey that you may receive in the mail after your visit with us. Thank you!             Your Updated Medication List - Protect others around you: Learn how to safely use, store and throw away your medicines at www.disposemymeds.org.          This list is accurate as of 5/3/18  3:29 PM.  Always use your most recent med list.                   Brand Name Dispense Instructions for use Diagnosis    acetaminophen 650 MG CR tablet    TYLENOL     Take 1,300 mg by mouth        ascorbic acid 1000 MG Tabs    vitamin C     1 tablet oral daily        Blood Pressure Monitor Misc      As directed. OMRON        carboxymethylcellulose sodium 0.5 % Soln ophthalmic solution   Generic drug:  carboxymethylcellulose      1 drop        chlorthalidone 25 MG tablet    HYGROTON          docusate sodium 100 MG capsule    COLACE      Take 1 capsule by mouth for constipation. Can take 2 in 24 hours.        ESTRACE VAGINAL 0.1 MG/GM cream   Generic drug:  estradiol           folic acid 1 MG tablet    FOLVITE     Take 1 mg by mouth        gabapentin 300 MG capsule    NEURONTIN     Take 300 mg by mouth        levothyroxine 112 MCG tablet    SYNTHROID/LEVOTHROID    90 tablet    TAKE 1 TABLET BY MOUTH ONCE DAILY    Hypothyroidism, unspecified type       loratadine 10 MG tablet    CLARITIN     Take 10 mg by mouth        losartan 25 MG tablet    COZAAR     Take 25 mg by mouth        methylcellulose 500 MG Tabs tablet    CITRUCEL    14 each    Take 2 tablets (1,000 mg) by mouth daily    Irritable 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        pantoprazole 20 MG EC tablet    PROTONIX    90 tablet    TAKE 1 TABLET BY MOUTH ONCE DAILY    Gastroesophageal reflux disease, esophagitis presence not specified       SF 5000 PLUS 1.1 % Crea   Generic drug:  Sodium Fluoride           zolpidem 5 MG tablet    AMBIEN     Take 5 mg by mouth

## 2018-05-04 ASSESSMENT — PATIENT HEALTH QUESTIONNAIRE - PHQ9: SUM OF ALL RESPONSES TO PHQ QUESTIONS 1-9: 0

## 2018-05-22 ENCOUNTER — OFFICE VISIT (OUTPATIENT)
Dept: INTERNAL MEDICINE | Facility: OTHER | Age: 83
End: 2018-05-22
Attending: NURSE PRACTITIONER
Payer: COMMERCIAL

## 2018-05-22 VITALS
SYSTOLIC BLOOD PRESSURE: 114 MMHG | HEART RATE: 60 BPM | TEMPERATURE: 96.5 F | WEIGHT: 150 LBS | DIASTOLIC BLOOD PRESSURE: 72 MMHG | BODY MASS INDEX: 24.96 KG/M2

## 2018-05-22 DIAGNOSIS — G89.29 CHRONIC PAIN OF LEFT KNEE: ICD-10-CM

## 2018-05-22 DIAGNOSIS — M25.562 CHRONIC PAIN OF LEFT KNEE: ICD-10-CM

## 2018-05-22 DIAGNOSIS — R13.10 DYSPHAGIA, UNSPECIFIED TYPE: Primary | ICD-10-CM

## 2018-05-22 DIAGNOSIS — F17.210 CIGARETTE SMOKER: ICD-10-CM

## 2018-05-22 PROCEDURE — G0463 HOSPITAL OUTPT CLINIC VISIT: HCPCS

## 2018-05-22 PROCEDURE — 99214 OFFICE O/P EST MOD 30 MIN: CPT | Performed by: NURSE PRACTITIONER

## 2018-05-22 RX ORDER — LORATADINE 10 MG/1
10 TABLET ORAL DAILY
Qty: 30 TABLET | COMMUNITY
Start: 2018-05-22 | End: 2022-01-01

## 2018-05-22 RX ORDER — FOLIC ACID 1 MG/1
1 TABLET ORAL DAILY
Qty: 30 TABLET | Status: ON HOLD | COMMUNITY
Start: 2018-05-22 | End: 2022-01-01

## 2018-05-22 RX ORDER — ESTRADIOL 0.1 MG/G
CREAM VAGINAL
Qty: 42.5 G | COMMUNITY
Start: 2018-05-22 | End: 2018-11-05

## 2018-05-22 RX ORDER — CHLORTHALIDONE 25 MG/1
25 TABLET ORAL DAILY
Qty: 30 TABLET | COMMUNITY
Start: 2018-05-22 | End: 2018-07-25

## 2018-05-22 RX ORDER — ZOLPIDEM TARTRATE 5 MG/1
5 TABLET ORAL
Qty: 30 TABLET | COMMUNITY
Start: 2018-05-22 | End: 2018-06-23

## 2018-05-22 RX ORDER — LOSARTAN POTASSIUM 25 MG/1
25 TABLET ORAL DAILY
Qty: 30 TABLET | COMMUNITY
Start: 2018-05-22 | End: 2018-06-25

## 2018-05-22 RX ORDER — CARBOXYMETHYLCELLULOSE SODIUM 5 MG/ML
SOLUTION/ DROPS OPHTHALMIC
Qty: 1 BOTTLE | Status: ON HOLD | COMMUNITY
Start: 2018-05-22 | End: 2022-01-01

## 2018-05-22 RX ORDER — GABAPENTIN 300 MG/1
300 CAPSULE ORAL 3 TIMES DAILY
Qty: 90 CAPSULE | COMMUNITY
Start: 2018-05-22 | End: 2018-10-20

## 2018-05-22 ASSESSMENT — PAIN SCALES - GENERAL: PAINLEVEL: NO PAIN (0)

## 2018-05-22 NOTE — MR AVS SNAPSHOT
After Visit Summary   5/22/2018    Sylvie Bautista    MRN: [id number]           Patient Information     Date Of Birth          1/7/1934        Visit Information        Provider Department      5/22/2018 12:40 PM Mirella Aly NP [facility]        Today's Diagnoses     Dysphagia, unspecified type    -  1    Chronic pain of left knee        Cigarette smoker           Follow-ups after your visit        Additional Services     GASTROENTEROLOGY ADULT REF PROCEDURE ONLY Other (gich)                 Who to contact     If you have questions or need follow up information about today's clinic visit or your schedule please contact [facility] directly at [phone].  Normal or non-critical lab and imaging results will be communicated to you by Avanzithart, letter or phone within 4 business days after the clinic has received the results. If you do not hear from us within 7 days, please contact the clinic through Avanzithart or phone. If you have a critical or abnormal lab result, we will notify you by phone as soon as possible.  Submit refill requests through [employer] or call your pharmacy and they will forward the refill request to us. Please allow 3 business days for your refill to be completed.          Additional Information About Your Visit        MyChart Information     [employer] gives you secure access to your electronic health record. If you see a primary care provider, you can also send messages to your care team and make appointments. If you have questions, please call your primary care clinic.  If you do not have a primary care provider, please call [phone] and they will assist you.        Care EveryWhere ID     This is your Care EveryWhere ID. This could be used by other organizations to access your [place] medical records  [id number]        Your Vitals Were     Pulse Temperature Breastfeeding? BMI (Body Mass Index)          60 96.5  F (35.8  C)  (Tympanic) No 24.96 kg/m2         Blood Pressure from Last 3 Encounters:   05/22/18 114/72   05/03/18 130/78   04/24/18 130/62    Weight from Last 3 Encounters:   05/22/18 150 lb (68 kg)   05/03/18 156 lb (70.8 kg)   04/24/18 155 lb (70.3 kg)              We Performed the Following     GASTROENTEROLOGY ADULT REF PROCEDURE ONLY Other (gich)          Today's Medication Changes          These changes are accurate as of 5/22/18  1:12 PM.  If you have any questions, ask your nurse or doctor.               These medicines have changed or have updated prescriptions.        Dose/Directions    carboxymethylcellulose sodium 0.5 % Soln ophthalmic solution   This may have changed:    - how much to take  - additional instructions   Generic drug:  carboxymethylcellulose   Changed by:  Mirella Aly NP        1 drop both eyes once daily   Quantity:  1 Bottle   Refills:  0       chlorthalidone 25 MG tablet   Commonly known as:  HYGROTON   This may have changed:    - how much to take  - when to take this   Changed by:  Mirella Aly NP        Dose:  25 mg   Take 1 tablet (25 mg) by mouth daily   Quantity:  30 tablet   Refills:  0       ESTRACE VAGINAL 0.1 MG/GM cream   This may have changed:  when to take this   Generic drug:  estradiol   Changed by:  Mirella Aly NP        Place vaginally twice a week   Quantity:  42.5 g   Refills:  0       folic acid 1 MG tablet   Commonly known as:  FOLVITE   This may have changed:  when to take this   Changed by:  Mirella Aly NP        Dose:  1 mg   Take 1 tablet (1 mg) by mouth daily   Quantity:  30 tablet   Refills:  0       gabapentin 300 MG capsule   Commonly known as:  NEURONTIN   This may have changed:  when to take this   Changed by:  Mirella Aly NP        Dose:  300 mg   Take 1 capsule (300 mg) by mouth 3 times daily   Quantity:  90 capsule   Refills:  0       loratadine 10 MG tablet   Commonly known as:  CLARITIN   This may have changed:   when to take this   Changed by:  Mirella Aly NP        Dose:  10 mg   Take 1 tablet (10 mg) by mouth daily   Quantity:  30 tablet   Refills:  0       losartan 25 MG tablet   Commonly known as:  COZAAR   This may have changed:  when to take this   Changed by:  Mirella Aly NP        Dose:  25 mg   Take 1 tablet (25 mg) by mouth daily   Quantity:  30 tablet   Refills:  0       zolpidem 5 MG tablet   Commonly known as:  AMBIEN   This may have changed:    - when to take this  - reasons to take this   Changed by:  Mirella Aly NP        Dose:  5 mg   Take 1 tablet (5 mg) by mouth nightly as needed for sleep   Quantity:  30 tablet   Refills:  0                Primary Care Provider Office Phone # Fax #    Mirella Aly [phone]       [address] [facility] [address]        Equal Access to Services     [facility] : Hadii eric casas hadasho Sojeffery, waaxda luqadaha, qaybta kaalmada adepiedadyada, flora almonte . So [facility] [phone].    ATENCIÓN: Si habla esplauro, tiene a ceballos disposición servicios gratuitos de asistencia lingüística. Tam al [phone].    We comply with applicable federal civil rights laws and [place] laws. We do not discriminate on the basis of race, color, national origin, age, disability, sex, sexual orientation, or gender identity.            Thank you!     Thank you for choosing [facility]  for your care. Our goal is always to provide you with excellent care. Hearing back from our patients is one way we can continue to improve our services. Please take a few minutes to complete the written survey that you may receive in the mail after your visit with us. Thank you!             Your Updated Medication List - Protect others around you: Learn how to safely use, store and throw away your medicines at www.disposemymeds.org.          This list is accurate as of 5/22/18  1:12 PM.  Always use  your most recent med list.                   Brand Name Dispense Instructions for use Diagnosis    acetaminophen 650 MG CR tablet    TYLENOL     Take 1,300 mg by mouth        ascorbic acid 1000 MG Tabs    vitamin C     1 tablet oral daily        Blood Pressure Monitor Misc      As directed. OMRON        carboxymethylcellulose sodium 0.5 % Soln ophthalmic solution   Generic drug:  carboxymethylcellulose     1 Bottle    1 drop both eyes once daily        chlorthalidone 25 MG tablet    HYGROTON    30 tablet    Take 1 tablet (25 mg) by mouth daily        docusate sodium 100 MG capsule    COLACE     Take 1 capsule by mouth for constipation. Can take 2 in 24 hours.        ESTRACE VAGINAL 0.1 MG/GM cream   Generic drug:  estradiol     42.5 g    Place vaginally twice a week        folic acid 1 MG tablet    FOLVITE    30 tablet    Take 1 tablet (1 mg) by mouth daily        gabapentin 300 MG capsule    NEURONTIN    90 capsule    Take 1 capsule (300 mg) by mouth 3 times daily        levothyroxine 112 MCG tablet    SYNTHROID/LEVOTHROID    90 tablet    TAKE 1 TABLET BY MOUTH ONCE DAILY    Hypothyroidism, unspecified type       loratadine 10 MG tablet    CLARITIN    30 tablet    Take 1 tablet (10 mg) by mouth daily        losartan 25 MG tablet    COZAAR    30 tablet    Take 1 tablet (25 mg) by mouth daily        methylcellulose 500 MG Tabs tablet    CITRUCEL    14 each    Take 2 tablets (1,000 mg) by mouth daily    Irritable 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        pantoprazole 20 MG EC tablet    PROTONIX    90 tablet    TAKE 1 TABLET BY MOUTH ONCE DAILY    Gastroesophageal reflux disease, esophagitis presence not specified       SF 5000 PLUS 1.1 % Crea   Generic drug:  Sodium Fluoride           zolpidem 5 MG tablet    AMBIEN    30 tablet    Take 1 tablet (5 mg) by mouth nightly as needed for sleep

## 2018-05-22 NOTE — NURSING NOTE
Patient states has always had issues with swallowing for years, worse for the last few months. Does not have pain when she has difficulty swallowing. She says it does not matter what she eats.   Jelena Beckett LPN...................5/22/2018   12:38 PM

## 2018-05-22 NOTE — PROGRESS NOTES
Subjective:  She is here today for follow-up on dysphagia.  She states she has had chronic dysphagia since 2007 however over the past 6 months she has noticed this has worsened.  She is a smoker.  She states that in 2007 she had EGD and was seen by gastroenterology.  They thought she had pressed by esophagus.  The also started on pantoprazole which she continues to take daily.  She has no acid reflux symptoms.  She states over the weekend she was eating coconut shrimp at a restaurant and it became lodged in her throat and she had difficulty passing this.  She did not vomit.  She does have difficulty with drinking water from a glass.  She is able to drink this out of a straw.  She usually avoids meat such as steak and pork.  She eats bread in small bites.  No black stools.  No abdominal discomfort.  She feels it gets caught in the upper esophagus.  She does not have chest pain or chest pressure.  In 2008 she had thyroidectomy as it was no improvement in symptoms post thyroidectomy.  Thought that goiter may have been contributing to difficulty with swallowing.  She would like to get set up for another EGD.  She also reports that she has chronic left knee pain.  This occurs with  Kneeling on the left knee.  It began after she had a fall onto the knee last year.  She is wondering she has any bone fragments.  It does not lock or give out nor does it bother her with ambulation.    Patient Active Problem List   Diagnosis     Acid reflux disease     ACP (advance care planning)     CAP (community acquired pneumonia)     Chronic kidney disease, stage III (moderate)     Cigarette smoker     Diarrhea     Essential hypertension     Hypothyroidism     Insomnia     Iron deficiency anemia     Irritable bowel syndrome     Liver abscess     Low folic acid     Mononeuritis     Hereditary and idiopathic peripheral neuropathy     Nontoxic multinodular goiter     Osteopenia     Raynaud phenomenon     Tinea pedis     Acute left-sided low  back pain without sciatica     Segmental and somatic dysfunction of sacral region     Segmental and somatic dysfunction of pelvic region     Past Medical History:   Diagnosis Date     Asymptomatic menopausal state     DXA 10/27/06 normal     Calculus of kidney     1990,s/p lithotripsy     Chronic kidney disease, stage III (moderate)     7/27/2011     Diarrhea     11/6/2014     Dysphagia     2007,EGD 3/14/07 nl; sx from goiter.     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     2004,on Tapazole     Past Surgical History:   Procedure Laterality Date     APPENDECTOMY OPEN      1979,,& Meckel's diverticulum removed     COLONOSCOPY      11/17/06,polyps; repeat in 5 YEARS     COLONOSCOPY      11/8/11     COLONOSCOPY      11/6/2014     ESOPHAGOSCOPY, GASTROSCOPY, DUODENOSCOPY (EGD), COMBINED      3/14/07,done for dysphagia; normal     HYSTERECTOMY TOTAL ABDOMINAL, BILATERAL SALPINGO-OOPHORECTOMY, COMBINED      1979     LAPAROSCOPIC CHOLECYSTECTOMY      1991,common bile duct transected     OTHER SURGICAL HISTORY      [id number],IR BILIARY STRICTURE DILATATION WO STENT,x3- per h/p /Choledochojejunostomy with Vane-En-Y due to transection of common bile duct     OTHER SURGICAL HISTORY      4/05,[id number],PREMALIG/BENIGN SKIN LESION EXCISION,epidermal inclusion cyst     OTHER SURGICAL HISTORY      10/26/2015,[id number],FOOT SURGERY,hammertoe repair, [facility] with Dr. Grimaldo     OTHER SURGICAL HISTORY      [id number],OTHER,6/30/17 [facility]     THYROIDECTOMY      3/26/08,Total thyroidectomy; multinodular goiter     Social History      Social History     Marital status:      Spouse name: N/A     Number of children: N/A     Years of education: N/A     Occupational History     Not on file.     Social History Main Topics     Smoking status: Current Every Day Smoker     Packs/day: 0.50     Years: 30.00     Types: Cigarettes     Smokeless tobacco: Never Used      Comment: Quit smokin/2 - 3/4 ppd; not interested in quitting at this time.     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; 4 children; Retired RN     Family History   Problem Relation Age of Onset     Other - See Comments Mother       85yo, emphysema, dementia     HEART DISEASE Father      Heart Disease,MI in 50s,  62yo     DIABETES Father      Diabetes     Substance Abuse Father      Alcohol/Drug,Etoh     DIABETES Daughter      Diabetes     Other - See Comments Daughter      Psychiatric illness,depr/anxiety     Thyroid Disease Sister      Thyroid Disease     Breast Cancer No family hx of      Cancer-breast     Current Outpatient Prescriptions   Medication Sig Dispense Refill     acetaminophen (TYLENOL) 650 MG CR tablet Take 1,300 mg by mouth       ascorbic acid (VITAMIN C) 1000 MG TABS 1 tablet oral daily       Blood Pressure Monitor MISC As directed. OMRON       carboxymethylcellulose (CARBOXYMETHYLCELLULOSE SODIUM) 0.5 % SOLN ophthalmic solution 1 drop both eyes once daily 1 Bottle      chlorthalidone (HYGROTON) 25 MG tablet Take 1 tablet (25 mg) by mouth daily 30 tablet      docusate sodium (COLACE) 100 MG capsule Take 1 capsule by mouth for constipation. Can take 2 in 24 hours.       estradiol (ESTRACE VAGINAL) 0.1 MG/GM cream Place vaginally twice a week 42.5 g      folic acid (FOLVITE) 1 MG tablet Take 1 tablet (1 mg) by mouth daily 30 tablet      gabapentin (NEURONTIN) 300 MG capsule Take 1 capsule (300 mg) by mouth 3 times daily  90 capsule      levothyroxine (SYNTHROID/LEVOTHROID) 112 MCG tablet TAKE 1 TABLET BY MOUTH ONCE DAILY 90 tablet 3     loratadine (CLARITIN) 10 MG tablet Take 1 tablet (10 mg) by mouth daily 30 tablet      losartan (COZAAR) 25 MG tablet Take 1 tablet (25 mg) by mouth daily 30 tablet      methylcellulose (CITRUCEL) 500 MG TABS tablet Take 2 tablets (1,000 mg) by mouth daily 14 each 0     Multiple Vitamins-Minerals (OCUVITE ADULT 50+) CAPS 1 tablet by mouth once daily.       pantoprazole (PROTONIX) 20 MG EC tablet TAKE 1 TABLET BY MOUTH ONCE DAILY 90 tablet 3     Sodium Fluoride (SF 5000 PLUS) 1.1 % CREA        zolpidem (AMBIEN) 5 MG tablet Take 1 tablet (5 mg) by mouth nightly as needed for sleep 30 tablet      [DISCONTINUED] chlorthalidone (HYGROTON) 25 MG tablet        [DISCONTINUED] gabapentin (NEURONTIN) 300 MG capsule Take 300 mg by mouth       [DISCONTINUED] losartan (COZAAR) 25 MG tablet Take 25 mg by mouth       Review of patient's allergies indicates no known allergies.      Review of Systems:  Review of Systems  See HPI    Objective:   /72 (BP Location: Right arm, Patient Position: Chair, Cuff Size: Adult Regular)  Pulse 60  Temp 96.5  F (35.8  C) (Tympanic)  Wt 150 lb (68 kg)  Breastfeeding? No  BMI 24.96 kg/m2  Physical Exam  Pleasant female, no acute distress.  Affect normal.  Alert and pleasant.  Sclera nonicteric.  Mucous membranes moist.  No epigastric tenderness.  Right knee with full range of motion.  There is tenderness with palpation along the lateral joint line.  No swelling, erythema or warmth.    Assessment:    ICD-10-CM    1. Dysphagia, unspecified type R13.10 GASTROENTEROLOGY ADULT REF PROCEDURE ONLY Other (gich)   2. Chronic pain of left knee M25.562     G89.29    3. Cigarette smoker F17.210        Plan:   1.  Patient has chronic dysphagia that has worsened over the past 6 months.  She does use tobacco.  She is referred for EGD.  But occasionally will use ibuprofen she does not use  aspirin maybe 1 tablet every several weeks.  Has no symptoms of acid reflux.  If negative studies may need evaluation by gastroenterology.  2.  Offered to complete x-ray of left knee to look for bone fragments.  She would not want any type of surgery.  Patient opted to not have x-ray.  Recommend that if she kneels on the knee that she uses some type of padding.  If symptoms worsen then should reconsider getting an x-ray.    MARA Mullins   5/22/2018  1:06 PM

## 2018-05-23 ENCOUNTER — TELEPHONE (OUTPATIENT)
Dept: SURGERY | Facility: OTHER | Age: 83
End: 2018-05-23

## 2018-05-23 ASSESSMENT — PATIENT HEALTH QUESTIONNAIRE - PHQ9: SUM OF ALL RESPONSES TO PHQ QUESTIONS 1-9: 0

## 2018-05-23 NOTE — TELEPHONE ENCOUNTER
Patient referred by Haydee Aly for a EGD ,  Diagnosis is dysphagia.  Please advise.  Thank you. Sheela Bowles on 5/23/2018 at 8:51 AM

## 2018-05-24 ENCOUNTER — TELEPHONE (OUTPATIENT)
Dept: SURGERY | Facility: OTHER | Age: 83
End: 2018-05-24

## 2018-05-24 NOTE — TELEPHONE ENCOUNTER
Screening Questions for the Scheduling of Screening Colonoscopies   (If Colonoscopy is diagnostic, Provider should review the chart before scheduling.)  Are you younger than 50 or older than 80?  YES   Do you take aspirin or fish oil?  ASPIRIN  (if yes, tell patient to stop 1 week prior to Colonoscopy)  Do you take warfarin (Coumadin), clopidogrel (Plavix), apixaban (Eliquis), dabigatram (Pradaxa), rivaroxaban (Xarelto) or any blood thinner?  NO   Do you use oxygen at home?    NO   Do you have kidney disease?   NO   Are you on dialysis?   NO   Have you had a stroke or heart attack in the last year?   NO   Have you had a stent in your heart or any blood vessel in the last year?   NO   Have you had a transplant of any organ?   NO  Have you had a colonoscopy or upper endoscopy (EGD) before?   YES           When?     [address] [place] [address]   Date of scheduled EGD  -  06/07/2018  Provider   [facility]   Pharmacy

## 2018-06-07 ENCOUNTER — ANESTHESIA (OUTPATIENT)
Dept: SURGERY | Facility: OTHER | Age: 83
End: 2018-06-07
Payer: MEDICARE

## 2018-06-07 ENCOUNTER — SURGERY (OUTPATIENT)
Age: 83
End: 2018-06-07

## 2018-06-07 ENCOUNTER — ANESTHESIA EVENT (OUTPATIENT)
Dept: SURGERY | Facility: OTHER | Age: 83
End: 2018-06-07
Payer: MEDICARE

## 2018-06-07 ENCOUNTER — HOSPITAL ENCOUNTER (OUTPATIENT)
Facility: OTHER | Age: 83
Discharge: HOME | End: 2018-06-07
Attending: SURGERY | Admitting: SURGERY
Payer: MEDICARE

## 2018-06-07 VITALS
HEART RATE: 58 BPM | RESPIRATION RATE: 16 BRPM | OXYGEN SATURATION: 97 % | TEMPERATURE: 96.8 F | BODY MASS INDEX: 24.99 KG/M2 | SYSTOLIC BLOOD PRESSURE: 114 MMHG | DIASTOLIC BLOOD PRESSURE: 61 MMHG | HEIGHT: 65 IN | WEIGHT: 150 LBS

## 2018-06-07 DIAGNOSIS — R13.19 ESOPHAGEAL DYSPHAGIA: Primary | ICD-10-CM

## 2018-06-07 DIAGNOSIS — K22.89 PRESBYESOPHAGUS: ICD-10-CM

## 2018-06-07 PROCEDURE — 40000010 ZZH STATISTIC ANES STAT CODE-CRNA PER MINUTE: Performed by: SURGERY

## 2018-06-07 PROCEDURE — 88305 TISSUE EXAM BY PATHOLOGIST: CPT

## 2018-06-07 PROCEDURE — 99100 ANES PT EXTEME AGE<1 YR&>70: CPT | Performed by: NURSE ANESTHETIST, CERTIFIED REGISTERED

## 2018-06-07 PROCEDURE — 43239 EGD BIOPSY SINGLE/MULTIPLE: CPT | Performed by: SURGERY

## 2018-06-07 PROCEDURE — 25000128 H RX IP 250 OP 636: Performed by: NURSE ANESTHETIST, CERTIFIED REGISTERED

## 2018-06-07 PROCEDURE — 43239 EGD BIOPSY SINGLE/MULTIPLE: CPT | Performed by: NURSE ANESTHETIST, CERTIFIED REGISTERED

## 2018-06-07 PROCEDURE — 25000125 ZZHC RX 250: Performed by: NURSE ANESTHETIST, CERTIFIED REGISTERED

## 2018-06-07 PROCEDURE — 25000128 H RX IP 250 OP 636: Performed by: SURGERY

## 2018-06-07 RX ORDER — LIDOCAINE HYDROCHLORIDE 20 MG/ML
INJECTION, SOLUTION INFILTRATION; PERINEURAL PRN
Status: DISCONTINUED | OUTPATIENT
Start: 2018-06-07 | End: 2018-06-07

## 2018-06-07 RX ORDER — LIDOCAINE 40 MG/G
CREAM TOPICAL
Status: DISCONTINUED | OUTPATIENT
Start: 2018-06-07 | End: 2018-06-07 | Stop reason: HOSPADM

## 2018-06-07 RX ORDER — PROPOFOL 10 MG/ML
INJECTION, EMULSION INTRAVENOUS CONTINUOUS PRN
Status: DISCONTINUED | OUTPATIENT
Start: 2018-06-07 | End: 2018-06-07

## 2018-06-07 RX ORDER — ONDANSETRON 2 MG/ML
4 INJECTION INTRAMUSCULAR; INTRAVENOUS EVERY 6 HOURS PRN
Status: DISCONTINUED | OUTPATIENT
Start: 2018-06-07 | End: 2018-06-07 | Stop reason: HOSPADM

## 2018-06-07 RX ORDER — SODIUM CHLORIDE, SODIUM LACTATE, POTASSIUM CHLORIDE, CALCIUM CHLORIDE 600; 310; 30; 20 MG/100ML; MG/100ML; MG/100ML; MG/100ML
INJECTION, SOLUTION INTRAVENOUS CONTINUOUS
Status: DISCONTINUED | OUTPATIENT
Start: 2018-06-07 | End: 2018-06-07 | Stop reason: HOSPADM

## 2018-06-07 RX ORDER — ONDANSETRON 4 MG/1
4 TABLET, ORALLY DISINTEGRATING ORAL EVERY 6 HOURS PRN
Status: DISCONTINUED | OUTPATIENT
Start: 2018-06-07 | End: 2018-06-07 | Stop reason: HOSPADM

## 2018-06-07 RX ORDER — NALOXONE HYDROCHLORIDE 0.4 MG/ML
.1-.4 INJECTION, SOLUTION INTRAMUSCULAR; INTRAVENOUS; SUBCUTANEOUS
Status: DISCONTINUED | OUTPATIENT
Start: 2018-06-07 | End: 2018-06-07 | Stop reason: HOSPADM

## 2018-06-07 RX ORDER — PROPOFOL 10 MG/ML
INJECTION, EMULSION INTRAVENOUS PRN
Status: DISCONTINUED | OUTPATIENT
Start: 2018-06-07 | End: 2018-06-07

## 2018-06-07 RX ORDER — ONDANSETRON 2 MG/ML
4 INJECTION INTRAMUSCULAR; INTRAVENOUS
Status: DISCONTINUED | OUTPATIENT
Start: 2018-06-07 | End: 2018-06-07 | Stop reason: HOSPADM

## 2018-06-07 RX ORDER — FLUMAZENIL 0.1 MG/ML
0.2 INJECTION, SOLUTION INTRAVENOUS
Status: DISCONTINUED | OUTPATIENT
Start: 2018-06-07 | End: 2018-06-07 | Stop reason: HOSPADM

## 2018-06-07 RX ADMIN — SODIUM CHLORIDE, SODIUM LACTATE, POTASSIUM CHLORIDE, AND CALCIUM CHLORIDE: 600; 310; 30; 20 INJECTION, SOLUTION INTRAVENOUS at 10:50

## 2018-06-07 RX ADMIN — PROPOFOL 120 MCG/KG/MIN: 10 INJECTION, EMULSION INTRAVENOUS at 11:19

## 2018-06-07 RX ADMIN — PROPOFOL 120 MG: 10 INJECTION, EMULSION INTRAVENOUS at 11:19

## 2018-06-07 RX ADMIN — LIDOCAINE HYDROCHLORIDE 80 MG: 20 INJECTION, SOLUTION INFILTRATION; PERINEURAL at 11:19

## 2018-06-07 ASSESSMENT — LIFESTYLE VARIABLES: TOBACCO_USE: 1

## 2018-06-07 NOTE — ANESTHESIA CARE TRANSFER NOTE
Patient: Sylvie Bautista    Procedure(s):  Gastroscopy - Wound Class: II-Clean Contaminated    Diagnosis: dysphagia  Diagnosis Additional Information: No value filed.    Anesthesia Type:   MAC     Note:  Airway :Nasal Cannula  Patient transferred to:Phase II  Handoff Report: Identifed the Patient, Identified the Reponsible Provider, Reviewed the pertinent medical history, Discussed the surgical course, Reviewed Intra-OP anesthesia mangement and issues during anesthesia, Set expectations for post-procedure period and Allowed opportunity for questions and acknowledgement of understanding      Vitals: (Last set prior to Anesthesia Care Transfer)    CRNA VITALS  6/7/2018 1100 - 6/7/2018 1158      6/7/2018             Resp Rate (set): 10                Electronically Signed By: LYNN Moran CRNA  June 7, 2018  11:58 AM

## 2018-06-07 NOTE — ANESTHESIA POSTPROCEDURE EVALUATION
Patient: Sylvie Bautista    Procedure(s):  Gastroscopy - Wound Class: II-Clean Contaminated    Diagnosis:dysphagia  Diagnosis Additional Information: No value filed.    Anesthesia Type:  MAC    Note:  Anesthesia Post Evaluation    Patient location during evaluation: Phase 2  Patient participation: Able to fully participate in evaluation  Level of consciousness: awake and alert  Pain management: adequate  Airway patency: patent  Cardiovascular status: acceptable  Respiratory status: acceptable  Hydration status: acceptable  PONV: none             Last vitals:  Vitals:    06/07/18 1025 06/07/18 1134   BP: 117/57    Pulse: 58    Resp: 18 16   Temp: 97.3  F (36.3  C) 96.8  F (36  C)   SpO2: 94% 97%         Electronically Signed By: LYNN SEPULVEDA CRNA  June 7, 2018  12:01 PM

## 2018-06-07 NOTE — OR NURSING
Patient has been discharged to home at 1225 via ambulatory accompanied by daughter    Written discharge instructions were provided to patient.  Prescriptions were N/A.      Patient and adult caring for them verbalize understanding of discharge instructions including no driving until tomorrow and no longer taking narcotic pain medications - no operating mechanical equipment and no making any important decisions.They understand reason for discharge, and necessary follow-up appointments.      Yi Brown RN

## 2018-06-07 NOTE — OP NOTE
PROCEDURE NOTE    SURGEON: Bisi Rivera MD.    PRE-OP DIAGNOSIS:   dysphagia      POST-OP DIAGNOSIS: presbyesophagus    PROCEDURE PLANNED:   Diagnostic EGD, flexible        PROCEDURE PERFORMED:  EGD with biopsy, flexible    SPECIMEN:  Antrum, gej biopsies    ANESTHESIA: See anesthesia record, Coverage requested due to: age more than 70    ESTIMATED BLOOD LOSS: None    COMPLICATIONS:  None    INDICATION FORTHE PROCEDURE: The patient is a 84 year oldfemale. The patient presents with dysphagia. I explained to the patient the risks, benefits and alternatives to diagnostic EGD for evaluating her complaint. We specifically discussed the risks of bleeding, infection, perforation and the risks of sedation. The patient's questions were answered and the patient wished to proceed. Informed consent paperwork was completed.    PROCEDURE: The patient was taken to the endoscopy suite. Appropriate monitors were attached. The patient was placed in the left lateral decubitus position. Bite block was positioned.Timeout was performed confirming the patient's identity and procedure to be performed. After appropriate sedation was confirmed, the flexible endoscope was advanced into the oropharynx. The posterior oropharynx appeared grossly normal. The scope was advanced into the proximal esophagus. The esophagus was insufflated with air. The scope was advanced under direct visualization. The esophagus appeared dilated. The scope was advanced into the stomach. No ulceration was noted. The scope was advanced through the pylorus into the duodenal bulb. The bulb and distal duodenum appeared grossly normal. The scope was withdrawn back into the stomach. Antral biopsy was obtained and sent to pathology. The scope was retroflexed and the GE junction inspected. No abnormalities were noted. The scope was returned to a neutral position and the stomach was decompressed. The scope was withdrawn to the GE junction and biopsy obtained. The mucosa of  the esophagus was inspected while withdrawing the scope. No mucosal abnormalities were noted. The scope was withdrawn from the patient. The bite block was removed. The patient tolerated the procedure with no immediately apparent complication. The patient was taken to recovery in stable condition.    FOLLOW UP:  RECOMMENDATIONS Will call with pathology results. Discussed speech pathology consult. Will discuss with Haydee.

## 2018-06-07 NOTE — ANESTHESIA CARE TRANSFER NOTE
Patient: Sylvie Bautista    Procedure(s):  Gastroscopy - Wound Class: II-Clean Contaminated    Diagnosis: dysphagia  Diagnosis Additional Information: No value filed.    Anesthesia Type:   MAC     Note:  Airway :Nasal Cannula  Patient transferred to:Phase II  Handoff Report: Identifed the Patient, Identified the Reponsible Provider, Reviewed the pertinent medical history, Discussed the surgical course, Reviewed Intra-OP anesthesia mangement and issues during anesthesia, Set expectations for post-procedure period and Allowed opportunity for questions and acknowledgement of understanding      Vitals: (Last set prior to Anesthesia Care Transfer)    CRNA VITALS  6/7/2018 1103 - 6/7/2018 1133      6/7/2018             Resp Rate (set): 10                Electronically Signed By: LYNN Moran CRNA  June 7, 2018  11:33 AM

## 2018-06-07 NOTE — DISCHARGE INSTRUCTIONS
Procedure you had done: EGD with biopsies  Your health care provider is:  Mirella Aly  Your surgeon is Dr. Bisi Rivera.   Please call your health care provider or surgeon at [phone] if:    - you feel you are getting worse or having an increase in problems    - fever greater than 101 degrees  - increasing shortness of breath or chest pain  - any signs of infection (increasing redness, swelling, tenderness, warmth, change in appearance, or  increased drainage)  - blood in your urine or stool  - coughing or vomiting blood  - nausea (upset stomach) and vomiting and/or diarrhea that will not stop  - severe pain that is not relieved by medicine, rest or ice  You have had medications for sedation. Please be aware that this can cause drowsiness and impaired judgment for up to 24 hours after your procedure. Do not drive, operate power tools or drink alcohol for 24 hours.  If samples were taken-you will get a phone call and a letter with your results in the next 7-10 days. If you don't get results, please call and let us know!

## 2018-06-07 NOTE — INTERVAL H&P NOTE
The history and physical has been reviewed and the patient has been examined.  There are no changes to the patient's history or physical condition.  I discussed diagnostic EGD with possible dilation with the patient. Anesthesia coverage requested due to age more then 70.

## 2018-06-07 NOTE — H&P (VIEW-ONLY)
Subjective:  She is here today for follow-up on dysphagia.  She states she has had chronic dysphagia since 2007 however over the past 6 months she has noticed this has worsened.  She is a smoker.  She states that in 2007 she had EGD and was seen by gastroenterology.  They thought she had pressed by esophagus.  The also started on pantoprazole which she continues to take daily.  She has no acid reflux symptoms.  She states over the weekend she was eating coconut shrimp at a restaurant and it became lodged in her throat and she had difficulty passing this.  She did not vomit.  She does have difficulty with drinking water from a glass.  She is able to drink this out of a straw.  She usually avoids meat such as steak and pork.  She eats bread in small bites.  No black stools.  No abdominal discomfort.  She feels it gets caught in the upper esophagus.  She does not have chest pain or chest pressure.  In 2008 she had thyroidectomy as it was no improvement in symptoms post thyroidectomy.  Thought that goiter may have been contributing to difficulty with swallowing.  She would like to get set up for another EGD.  She also reports that she has chronic left knee pain.  This occurs with  Kneeling on the left knee.  It began after she had a fall onto the knee last year.  She is wondering she has any bone fragments.  It does not lock or give out nor does it bother her with ambulation.    Patient Active Problem List   Diagnosis     Acid reflux disease     ACP (advance care planning)     CAP (community acquired pneumonia)     Chronic kidney disease, stage III (moderate)     Cigarette smoker     Diarrhea     Essential hypertension     Hypothyroidism     Insomnia     Iron deficiency anemia     Irritable bowel syndrome     Liver abscess     Low folic acid     Mononeuritis     Hereditary and idiopathic peripheral neuropathy     Nontoxic multinodular goiter     Osteopenia     Raynaud phenomenon     Tinea pedis     Acute left-sided low  back pain without sciatica     Segmental and somatic dysfunction of sacral region     Segmental and somatic dysfunction of pelvic region     Past Medical History:   Diagnosis Date     Asymptomatic menopausal state     DXA 10/27/06 normal     Calculus of kidney     1990,s/p lithotripsy     Chronic kidney disease, stage III (moderate)     7/27/2011     Diarrhea     11/6/2014     Dysphagia     2007,EGD 3/14/07 nl; sx from goiter.     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     2004,on Tapazole     Past Surgical History:   Procedure Laterality Date     APPENDECTOMY OPEN      1979,,& Meckel's diverticulum removed     COLONOSCOPY      11/17/06,polyps; repeat in 5 YEARS     COLONOSCOPY      11/8/11     COLONOSCOPY      11/6/2014     ESOPHAGOSCOPY, GASTROSCOPY, DUODENOSCOPY (EGD), COMBINED      3/14/07,done for dysphagia; normal     HYSTERECTOMY TOTAL ABDOMINAL, BILATERAL SALPINGO-OOPHORECTOMY, COMBINED      1979     LAPAROSCOPIC CHOLECYSTECTOMY      1991,common bile duct transected     OTHER SURGICAL HISTORY      536130,IR BILIARY STRICTURE DILATATION WO STENT,x3- per h/p /Choledochojejunostomy with Vane-En-Y due to transection of common bile duct     OTHER SURGICAL HISTORY      4/05,CBV914,PREMALIG/BENIGN SKIN LESION EXCISION,epidermal inclusion cyst     OTHER SURGICAL HISTORY      10/26/2015,EUQ536,FOOT SURGERY,hammertoe repair, [facility] with Dr. Grimaldo     OTHER SURGICAL HISTORY      036591,OTHER,6/30/17 [facility]     THYROIDECTOMY      3/26/08,Total thyroidectomy; multinodular goiter     Social History      Social History     Marital status:      Spouse name: N/A     Number of children: N/A     Years of education: N/A     Occupational History     Not on file.     Social History Main Topics     Smoking status: Current Every Day Smoker     Packs/day: 0.50     Years: 30.00     Types: Cigarettes     Smokeless tobacco: Never Used      Comment: Quit smokin/2 - 3/4 ppd; not interested in quitting at this time.     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; 4 children; Retired RN     Family History   Problem Relation Age of Onset     Other - See Comments Mother       85yo, emphysema, dementia     HEART DISEASE Father      Heart Disease,MI in 50s,  62yo     DIABETES Father      Diabetes     Substance Abuse Father      Alcohol/Drug,Etoh     DIABETES Daughter      Diabetes     Other - See Comments Daughter      Psychiatric illness,depr/anxiety     Thyroid Disease Sister      Thyroid Disease     Breast Cancer No family hx of      Cancer-breast     Current Outpatient Prescriptions   Medication Sig Dispense Refill     acetaminophen (TYLENOL) 650 MG CR tablet Take 1,300 mg by mouth       ascorbic acid (VITAMIN C) 1000 MG TABS 1 tablet oral daily       Blood Pressure Monitor MISC As directed. OMRON       carboxymethylcellulose (CARBOXYMETHYLCELLULOSE SODIUM) 0.5 % SOLN ophthalmic solution 1 drop both eyes once daily 1 Bottle      chlorthalidone (HYGROTON) 25 MG tablet Take 1 tablet (25 mg) by mouth daily 30 tablet      docusate sodium (COLACE) 100 MG capsule Take 1 capsule by mouth for constipation. Can take 2 in 24 hours.       estradiol (ESTRACE VAGINAL) 0.1 MG/GM cream Place vaginally twice a week 42.5 g      folic acid (FOLVITE) 1 MG tablet Take 1 tablet (1 mg) by mouth daily 30 tablet      gabapentin (NEURONTIN) 300 MG capsule Take 1 capsule (300 mg) by mouth 3 times daily  90 capsule      levothyroxine (SYNTHROID/LEVOTHROID) 112 MCG tablet TAKE 1 TABLET BY MOUTH ONCE DAILY 90 tablet 3     loratadine (CLARITIN) 10 MG tablet Take 1 tablet (10 mg) by mouth daily 30 tablet      losartan (COZAAR) 25 MG tablet Take 1 tablet (25 mg) by mouth daily 30 tablet      methylcellulose (CITRUCEL) 500 MG TABS tablet Take 2 tablets (1,000 mg) by mouth daily 14 each 0     Multiple Vitamins-Minerals (OCUVITE ADULT 50+) CAPS 1 tablet by mouth once daily.       pantoprazole (PROTONIX) 20 MG EC tablet TAKE 1 TABLET BY MOUTH ONCE DAILY 90 tablet 3     Sodium Fluoride (SF 5000 PLUS) 1.1 % CREA        zolpidem (AMBIEN) 5 MG tablet Take 1 tablet (5 mg) by mouth nightly as needed for sleep 30 tablet      [DISCONTINUED] chlorthalidone (HYGROTON) 25 MG tablet        [DISCONTINUED] gabapentin (NEURONTIN) 300 MG capsule Take 300 mg by mouth       [DISCONTINUED] losartan (COZAAR) 25 MG tablet Take 25 mg by mouth       Review of patient's allergies indicates no known allergies.      Review of Systems:  Review of Systems  See HPI    Objective:   /72 (BP Location: Right arm, Patient Position: Chair, Cuff Size: Adult Regular)  Pulse 60  Temp 96.5  F (35.8  C) (Tympanic)  Wt 150 lb (68 kg)  Breastfeeding? No  BMI 24.96 kg/m2  Physical Exam  Pleasant female, no acute distress.  Affect normal.  Alert and pleasant.  Sclera nonicteric.  Mucous membranes moist.  No epigastric tenderness.  Right knee with full range of motion.  There is tenderness with palpation along the lateral joint line.  No swelling, erythema or warmth.    Assessment:    ICD-10-CM    1. Dysphagia, unspecified type R13.10 GASTROENTEROLOGY ADULT REF PROCEDURE ONLY Other (gich)   2. Chronic pain of left knee M25.562     G89.29    3. Cigarette smoker F17.210        Plan:   1.  Patient has chronic dysphagia that has worsened over the past 6 months.  She does use tobacco.  She is referred for EGD.  But occasionally will use ibuprofen she does not use  aspirin maybe 1 tablet every several weeks.  Has no symptoms of acid reflux.  If negative studies may need evaluation by gastroenterology.  2.  Offered to complete x-ray of left knee to look for bone fragments.  She would not want any type of surgery.  Patient opted to not have x-ray.  Recommend that if she kneels on the knee that she uses some type of padding.  If symptoms worsen then should reconsider getting an x-ray.    MARA Mullins   5/22/2018  1:06 PM

## 2018-06-07 NOTE — ANESTHESIA PREPROCEDURE EVALUATION
Anesthesia Evaluation     .             ROS/MED HX    ENT/Pulmonary:     (+)tobacco use, Current use 1/2 packs/day  , . .    Neurologic:     (+)neuropathy - Peripheral- idiopathic,     Cardiovascular:     (+) hypertension----. : . . . :. .       METS/Exercise Tolerance:     Hematologic:     (+) Anemia, -      Musculoskeletal:   (+) arthritis, , , -       GI/Hepatic: Comment: Dysphagia    (+) GERD Asymptomatic on medication,       Renal/Genitourinary:     (+) chronic renal disease, type: CRI, Nephrolithiasis , Pt does not require dialysis, Pt has no history of transplant,       Endo:     (+) thyroid problem hypothyroidism, .      Psychiatric:     (+) psychiatric history anxiety      Infectious Disease:         Malignancy:         Other:                     Physical Exam  Normal systems: cardiovascular and pulmonary    Airway   Mallampati: II  TM distance: >3 FB  Neck ROM: full  Comment: Small Oral Opening    Dental   (+) upper dentures  Comment: Upper partial    Cardiovascular   Rhythm and rate: regular and normal      Pulmonary    breath sounds clear to auscultation                    Anesthesia Plan      History & Physical Review      ASA Status:  3 .    NPO Status:  > 8 hours    Plan for MAC Maintenance will be TIVA.           Postoperative Care      Consents  Anesthetic plan, risks, benefits and alternatives discussed with:  Patient..                          .

## 2018-06-07 NOTE — IP AVS SNAPSHOT
MRN:[id number]                      After Visit Summary   6/7/2018    Sylvie Bautista    MRN: [id number]           Thank you!     Thank you for choosing [facility] for your care. Our goal is always to provide you with excellent care. Hearing back from our patients is one way we can continue to improve our services. Please take a few minutes to complete the written survey that you may receive in the mail after you visit with us. Thank you!        Patient Information     Date Of Birth          1/7/1934        About your hospital stay     You were admitted on:  June 7, 2018 You last received care in the:  [facility]    You were discharged on:  June 7, 2018       Who to Call     For medical emergencies, please call 911.  For non-urgent questions about your medical care, please call your primary care provider or clinic, [phone]  For questions related to your surgery, please call your surgery clinic        Attending Provider     Provider Bisi Cain MD Surgery       Primary Care Provider Office Phone # Fax #    Mirella Aly [phone]      Further instructions from your care team       Procedure you had done: EGD with biopsies  Your health care provider is:  Mirella Aly  Your surgeon is Dr. Bisi Rivera.   Please call your health care provider or surgeon at [phone] if:    - you feel you are getting worse or having an increase in problems    - fever greater than 101 degrees  - increasing shortness of breath or chest pain  - any signs of infection (increasing redness, swelling, tenderness, warmth, change in appearance, or  increased drainage)  - blood in your urine or stool  - coughing or vomiting blood  - nausea (upset stomach) and vomiting and/or diarrhea that will not stop  - severe pain that is not relieved by medicine, rest or ice  You have had medications for sedation. Please be aware that this can cause drowsiness  "and impaired judgment for up to 24 hours after your procedure. Do not drive, operate power tools or drink alcohol for 24 hours.  If samples were taken-you will get a phone call and a letter with your results in the next 7-10 days. If you don't get results, please call and let us know!       Pending Results     Date and Time Order Name Status Description    6/7/2018 1124 Surgical pathology exam In process             Admission Information     Date & Time Provider Department Dept. Phone    6/7/2018 Bisi Rivera MD [facility] [phone]      Your Vitals Were     Blood Pressure Pulse Temperature Respirations Height Weight    117/57 (Cuff Size: Adult Regular) 58 96.8  F (36  C) (Temporal) 16 1.651 m (5' 5\") 68 kg (150 lb)    Pulse Oximetry BMI (Body Mass Index)                97% 24.96 kg/m2          MyChart Information     [employer] gives you secure access to your electronic health record. If you see a primary care provider, you can also send messages to your care team and make appointments. If you have questions, please call your primary care clinic.  If you do not have a primary care provider, please call [phone] and they will assist you.        Care EveryWhere ID     This is your Care EveryWhere ID. This could be used by other organizations to access your [facility] medical records  [id number]        Equal Access to Services     ANDERSON ELIZONDO : Hadii eric Lees, waaxda luqadaha, qaybta kaalmada luis, flora teran. So [facility] [phone].    ATENCIÓN: Si habla español, tiene a ceballos disposición servicios gratuitos de asistencia lingüística. Llame al [phone].    We comply with applicable federal civil rights laws and [place] laws. We do not discriminate on the basis of race, color, national origin, age, disability, sex, sexual orientation, or gender identity.               Review of your medicines      CONTINUE these medicines which have NOT " CHANGED        Dose / Directions    acetaminophen 650 MG CR tablet   Commonly known as:  TYLENOL        Dose:  1300 mg   Take 1,300 mg by mouth   Refills:  0       ascorbic acid 1000 MG Tabs   Commonly known as:  vitamin C        1 tablet oral daily   Refills:  0       Blood Pressure Monitor Misc        As directed. OMRON   Refills:  0       carboxymethylcellulose sodium 0.5 % Soln ophthalmic solution   Generic drug:  carboxymethylcellulose        1 drop both eyes once daily   Quantity:  1 Bottle   Refills:  0       chlorthalidone 25 MG tablet   Commonly known as:  HYGROTON        Dose:  25 mg   Take 1 tablet (25 mg) by mouth daily   Quantity:  30 tablet   Refills:  0       docusate sodium 100 MG capsule   Commonly known as:  COLACE        Take 1 capsule by mouth for constipation. Can take 2 in 24 hours.   Refills:  0       ESTRACE VAGINAL 0.1 MG/GM cream   Generic drug:  estradiol        Place vaginally twice a week   Quantity:  42.5 g   Refills:  0       folic acid 1 MG tablet   Commonly known as:  FOLVITE        Dose:  1 mg   Take 1 tablet (1 mg) by mouth daily   Quantity:  30 tablet   Refills:  0       gabapentin 300 MG capsule   Commonly known as:  NEURONTIN        Dose:  300 mg   Take 1 capsule (300 mg) by mouth 3 times daily   Quantity:  90 capsule   Refills:  0       levothyroxine 112 MCG tablet   Commonly known as:  SYNTHROID/LEVOTHROID   Used for:  Hypothyroidism, unspecified type        TAKE 1 TABLET BY MOUTH ONCE DAILY   Quantity:  90 tablet   Refills:  3       loratadine 10 MG tablet   Commonly known as:  CLARITIN        Dose:  10 mg   Take 1 tablet (10 mg) by mouth daily   Quantity:  30 tablet   Refills:  0       losartan 25 MG tablet   Commonly known as:  COZAAR        Dose:  25 mg   Take 1 tablet (25 mg) by mouth daily   Quantity:  30 tablet   Refills:  0       methylcellulose 500 MG Tabs tablet   Commonly known as:  CITRUCEL   Used for:  Irritable bowel syndrome with both constipation and diarrhea         Dose:  1000 mg   Take 2 tablets (1,000 mg) by mouth daily   Quantity:  14 each   Refills:  0       OCUVITE ADULT 50+ Caps        1 tablet by mouth once daily.   Refills:  0       pantoprazole 20 MG EC tablet   Commonly known as:  PROTONIX   Used for:  Gastroesophageal reflux disease, esophagitis presence not specified        TAKE 1 TABLET BY MOUTH ONCE DAILY   Quantity:  90 tablet   Refills:  3       SF 5000 PLUS 1.1 % Crea   Generic drug:  Sodium Fluoride        Refills:  0       zolpidem 5 MG tablet   Commonly known as:  AMBIEN        Dose:  5 mg   Take 1 tablet (5 mg) by mouth nightly as needed for sleep   Quantity:  30 tablet   Refills:  0                Protect others around you: Learn how to safely use, store and throw away your medicines at www.disposemymeds.org.             Medication List: This is a list of all your medications and when to take them. Check marks below indicate your daily home schedule. Keep this list as a reference.      Medications           Morning Afternoon Evening Bedtime As Needed    acetaminophen 650 MG CR tablet   Commonly known as:  TYLENOL   Take 1,300 mg by mouth                                ascorbic acid 1000 MG Tabs   Commonly known as:  vitamin C   1 tablet oral daily                                Blood Pressure Monitor Misc   As directed. OMRON                                carboxymethylcellulose sodium 0.5 % Soln ophthalmic solution   1 drop both eyes once daily   Generic drug:  carboxymethylcellulose                                chlorthalidone 25 MG tablet   Commonly known as:  HYGROTON   Take 1 tablet (25 mg) by mouth daily                                docusate sodium 100 MG capsule   Commonly known as:  COLACE   Take 1 capsule by mouth for constipation. Can take 2 in 24 hours.                                ESTRACE VAGINAL 0.1 MG/GM cream   Place vaginally twice a week   Generic drug:  estradiol                                folic acid 1 MG tablet    Commonly known as:  FOLVITE   Take 1 tablet (1 mg) by mouth daily                                gabapentin 300 MG capsule   Commonly known as:  NEURONTIN   Take 1 capsule (300 mg) by mouth 3 times daily                                levothyroxine 112 MCG tablet   Commonly known as:  SYNTHROID/LEVOTHROID   TAKE 1 TABLET BY MOUTH ONCE DAILY                                loratadine 10 MG tablet   Commonly known as:  CLARITIN   Take 1 tablet (10 mg) by mouth daily                                losartan 25 MG tablet   Commonly known as:  COZAAR   Take 1 tablet (25 mg) by mouth daily                                methylcellulose 500 MG Tabs tablet   Commonly known as:  CITRUCEL   Take 2 tablets (1,000 mg) by mouth daily                                OCUVITE ADULT 50+ Caps   1 tablet by mouth once daily.                                pantoprazole 20 MG EC tablet   Commonly known as:  PROTONIX   TAKE 1 TABLET BY MOUTH ONCE DAILY                                SF 5000 PLUS 1.1 % Crea   Generic drug:  Sodium Fluoride                                zolpidem 5 MG tablet   Commonly known as:  AMBIEN   Take 1 tablet (5 mg) by mouth nightly as needed for sleep

## 2018-06-22 ENCOUNTER — TELEPHONE (OUTPATIENT)
Dept: SURGERY | Facility: OTHER | Age: 83
End: 2018-06-22

## 2018-06-22 DIAGNOSIS — R13.10 DYSPHAGIA, UNSPECIFIED TYPE: Primary | ICD-10-CM

## 2018-06-23 DIAGNOSIS — G47.00 INSOMNIA, UNSPECIFIED TYPE: Primary | ICD-10-CM

## 2018-06-25 DIAGNOSIS — I10 BENIGN ESSENTIAL HYPERTENSION: Primary | ICD-10-CM

## 2018-06-25 RX ORDER — LOSARTAN POTASSIUM 25 MG/1
25 TABLET ORAL DAILY
Qty: 90 TABLET | Refills: 3 | Status: SHIPPED | OUTPATIENT
Start: 2018-06-25 | End: 2018-09-14

## 2018-06-25 RX ORDER — ZOLPIDEM TARTRATE 5 MG/1
TABLET ORAL
Qty: 40 TABLET | Refills: 5 | Status: SHIPPED | OUTPATIENT
Start: 2018-06-25 | End: 2019-01-11

## 2018-06-25 NOTE — TELEPHONE ENCOUNTER
Spoke with patient and she said that you recommended speech pathology.  She would like a referral.  Qi Trevino LPN..........6/25/2018  8:41 AM

## 2018-06-25 NOTE — TELEPHONE ENCOUNTER
This is a Refill request from: [facility]  Name of Medication and Dose:  zolpidem (AMBIEN) 5 mg tablet     Quantity requested:  40  Last fill date: 5/22/2018  Last Office Visit:  5/22/2018  Narcotic Agreement Signed:  PHILIPP  PCP:  Haydee Aly  Associated Diagnosis: Insomnia, unspecified type    Michelle Austin LPN Supervisor 6/25/2018   7:23 AM

## 2018-06-25 NOTE — TELEPHONE ENCOUNTER
Patient was last seen 5/22/18.  Last refill date: 5/27/18 #30 no refills    Indira Rodriguez CMA..............6/25/2018........9:56 AM

## 2018-06-27 ENCOUNTER — TELEPHONE (OUTPATIENT)
Dept: INTERNAL MEDICINE | Facility: OTHER | Age: 83
End: 2018-06-27

## 2018-06-27 NOTE — TELEPHONE ENCOUNTER
Pt states that she has had weight loss and frequent urination and would like to know if she should be seen or if labs should be ordered.

## 2018-06-27 NOTE — TELEPHONE ENCOUNTER
I think she should schedule an appointment to be seen.  For best evaluation of blood sugar, she should be fasting when those labs are drawn.

## 2018-06-27 NOTE — TELEPHONE ENCOUNTER
Patient tells me she has noticed a 5 pound weight loss over the last 2 months without changing her eating habits. Does have swallowing difficulties, but feels she gets adequate nutrition. Also has noticed in the last week that she has had an increase in urination. She is drinking more fluids, but not where it would have an effect as to how much frequency she is having, she feels. She is not having any pain whatsoever, or any other UTI sx.     She states she does not have a thyroid, and is wondering about getting levels checked, as well as glucose?     She also says she has had diarrhea along with constipation, but this is an ongoing issue. The diarrhea has not increased, she states it happens every week and a half to 2 weeks.    Wondering about being seen or lab only?  Jelena Beckett LPN...................6/27/2018   11:01 AM

## 2018-07-09 ENCOUNTER — OFFICE VISIT (OUTPATIENT)
Dept: INTERNAL MEDICINE | Facility: OTHER | Age: 83
End: 2018-07-09
Attending: NURSE PRACTITIONER
Payer: MEDICARE

## 2018-07-09 VITALS
BODY MASS INDEX: 24.04 KG/M2 | HEART RATE: 60 BPM | WEIGHT: 149.6 LBS | DIASTOLIC BLOOD PRESSURE: 74 MMHG | TEMPERATURE: 96.6 F | HEIGHT: 66 IN | SYSTOLIC BLOOD PRESSURE: 122 MMHG

## 2018-07-09 DIAGNOSIS — F17.210 CIGARETTE SMOKER: ICD-10-CM

## 2018-07-09 DIAGNOSIS — R63.4 LOSS OF WEIGHT: Primary | ICD-10-CM

## 2018-07-09 DIAGNOSIS — E03.9 HYPOTHYROIDISM, UNSPECIFIED TYPE: ICD-10-CM

## 2018-07-09 DIAGNOSIS — N18.30 CHRONIC KIDNEY DISEASE, STAGE III (MODERATE) (H): ICD-10-CM

## 2018-07-09 DIAGNOSIS — K58.2 IRRITABLE BOWEL SYNDROME WITH BOTH CONSTIPATION AND DIARRHEA: ICD-10-CM

## 2018-07-09 DIAGNOSIS — R35.0 URINARY FREQUENCY: ICD-10-CM

## 2018-07-09 LAB
ALBUMIN SERPL-MCNC: 4.1 G/DL (ref 3.5–5.7)
ALBUMIN UR-MCNC: NEGATIVE MG/DL
ALP SERPL-CCNC: 43 U/L (ref 34–104)
ALT SERPL W P-5'-P-CCNC: 13 U/L (ref 7–52)
AMYLASE SERPL-CCNC: 30 U/L (ref 29–103)
ANION GAP SERPL CALCULATED.3IONS-SCNC: 6 MMOL/L (ref 3–14)
APPEARANCE UR: CLEAR
AST SERPL W P-5'-P-CCNC: 13 U/L (ref 13–39)
BILIRUB SERPL-MCNC: 0.5 MG/DL (ref 0.3–1)
BILIRUB UR QL STRIP: NEGATIVE
BUN SERPL-MCNC: 31 MG/DL (ref 7–25)
CALCIUM SERPL-MCNC: 9.5 MG/DL (ref 8.6–10.3)
CHLORIDE SERPL-SCNC: 107 MMOL/L (ref 98–107)
CO2 SERPL-SCNC: 28 MMOL/L (ref 21–31)
COLOR UR AUTO: YELLOW
CREAT SERPL-MCNC: 1.54 MG/DL (ref 0.6–1.2)
ERYTHROCYTE [DISTWIDTH] IN BLOOD BY AUTOMATED COUNT: 13 % (ref 10–15)
GFR SERPL CREATININE-BSD FRML MDRD: 32 ML/MIN/1.7M2
GLUCOSE SERPL-MCNC: 94 MG/DL (ref 70–105)
GLUCOSE UR STRIP-MCNC: NEGATIVE MG/DL
HCT VFR BLD AUTO: 37.3 % (ref 35–47)
HGB BLD-MCNC: 12 G/DL (ref 11.7–15.7)
HGB UR QL STRIP: NEGATIVE
KETONES UR STRIP-MCNC: NEGATIVE MG/DL
LEUKOCYTE ESTERASE UR QL STRIP: NEGATIVE
LIPASE SERPL-CCNC: 17 U/L (ref 11–82)
MCH RBC QN AUTO: 32.9 PG (ref 26.5–33)
MCHC RBC AUTO-ENTMCNC: 32.2 G/DL (ref 31.5–36.5)
MCV RBC AUTO: 102 FL (ref 78–100)
NITRATE UR QL: NEGATIVE
PH UR STRIP: 5.5 PH (ref 5–7)
PLATELET # BLD AUTO: 251 10E9/L (ref 150–450)
POTASSIUM SERPL-SCNC: 4.1 MMOL/L (ref 3.5–5.1)
PROT SERPL-MCNC: 7.2 G/DL (ref 6.4–8.9)
RBC # BLD AUTO: 3.65 10E12/L (ref 3.8–5.2)
SODIUM SERPL-SCNC: 141 MMOL/L (ref 134–144)
SOURCE: NORMAL
SP GR UR STRIP: 1.02 (ref 1–1.03)
T4 FREE SERPL-MCNC: 1.31 NG/DL (ref 0.6–1.6)
TSH SERPL DL<=0.05 MIU/L-ACNC: 1.3 IU/ML (ref 0.34–5.6)
UROBILINOGEN UR STRIP-ACNC: 0.2 EU/DL (ref 0.2–1)
WBC # BLD AUTO: 9.6 10E9/L (ref 4–11)

## 2018-07-09 PROCEDURE — 85027 COMPLETE CBC AUTOMATED: CPT | Performed by: NURSE PRACTITIONER

## 2018-07-09 PROCEDURE — 80053 COMPREHEN METABOLIC PANEL: CPT | Performed by: NURSE PRACTITIONER

## 2018-07-09 PROCEDURE — G0463 HOSPITAL OUTPT CLINIC VISIT: HCPCS

## 2018-07-09 PROCEDURE — 82150 ASSAY OF AMYLASE: CPT | Performed by: NURSE PRACTITIONER

## 2018-07-09 PROCEDURE — 36415 COLL VENOUS BLD VENIPUNCTURE: CPT | Performed by: NURSE PRACTITIONER

## 2018-07-09 PROCEDURE — 99214 OFFICE O/P EST MOD 30 MIN: CPT | Performed by: NURSE PRACTITIONER

## 2018-07-09 PROCEDURE — 84439 ASSAY OF FREE THYROXINE: CPT | Performed by: NURSE PRACTITIONER

## 2018-07-09 PROCEDURE — 83690 ASSAY OF LIPASE: CPT | Performed by: NURSE PRACTITIONER

## 2018-07-09 PROCEDURE — 81003 URINALYSIS AUTO W/O SCOPE: CPT | Performed by: NURSE PRACTITIONER

## 2018-07-09 PROCEDURE — 84443 ASSAY THYROID STIM HORMONE: CPT | Performed by: NURSE PRACTITIONER

## 2018-07-09 ASSESSMENT — PATIENT HEALTH QUESTIONNAIRE - PHQ9: 5. POOR APPETITE OR OVEREATING: NOT AT ALL

## 2018-07-09 ASSESSMENT — ANXIETY QUESTIONNAIRES
5. BEING SO RESTLESS THAT IT IS HARD TO SIT STILL: NOT AT ALL
3. WORRYING TOO MUCH ABOUT DIFFERENT THINGS: NOT AT ALL
7. FEELING AFRAID AS IF SOMETHING AWFUL MIGHT HAPPEN: NOT AT ALL
6. BECOMING EASILY ANNOYED OR IRRITABLE: NOT AT ALL
IF YOU CHECKED OFF ANY PROBLEMS ON THIS QUESTIONNAIRE, HOW DIFFICULT HAVE THESE PROBLEMS MADE IT FOR YOU TO DO YOUR WORK, TAKE CARE OF THINGS AT HOME, OR GET ALONG WITH OTHER PEOPLE: NOT DIFFICULT AT ALL
2. NOT BEING ABLE TO STOP OR CONTROL WORRYING: NOT AT ALL
GAD7 TOTAL SCORE: 0
1. FEELING NERVOUS, ANXIOUS, OR ON EDGE: NOT AT ALL

## 2018-07-09 ASSESSMENT — PAIN SCALES - GENERAL: PAINLEVEL: NO PAIN (0)

## 2018-07-09 NOTE — PATIENT INSTRUCTIONS
Check labs today.  If negative then may want to proceed with colonoscopy, CT of abdomen/pelvis and possibly repeat lung CT again.  Would definitely want to proceed if you continue to lose weight.  May also need to check another mammogram if weight loss persist.  Normal mammogram last September.

## 2018-07-09 NOTE — PROGRESS NOTES
Subjective:  She is here today for a weight loss of 6 pounds in the past 2 months.  She was having some urinary frequency.  She has a family history of diabetes and also has stage III chronic kidney disease.  She has history of hypothyroidism.  That was evaluated in February and well-controlled on replacement.  She has chronic irritable bowel syndrome with alternating between constipation and diarrhea.  That really is unchanged.  She is a smoker.  She had a normal chest x-ray in December 2017 and also a normal chest CT evaluation in June 2017.  During that time she also had hepatic abscess that has resolved.  She has had no further fevers.  She denies abdominal pain.  Last mammogram was September 2017 and normal.  Last colonoscopy 2014 and she recently had EGD last month.  She does have dysphagia secondary to presbyesophagus.  She states this is not changed her dietary intake and she is set up with speech therapy.    Patient Active Problem List   Diagnosis     Acid reflux disease     ACP (advance care planning)     CAP (community acquired pneumonia)     Chronic kidney disease, stage III (moderate)     Cigarette smoker     Diarrhea     Essential hypertension     Hypothyroidism     Insomnia     Iron deficiency anemia     Irritable bowel syndrome     Liver abscess     Low folic acid     Mononeuritis     Hereditary and idiopathic peripheral neuropathy     Nontoxic multinodular goiter     Osteopenia     Raynaud phenomenon     Tinea pedis     Acute left-sided low back pain without sciatica     Segmental and somatic dysfunction of sacral region     Segmental and somatic dysfunction of pelvic region     Past Medical History:   Diagnosis Date     Asymptomatic menopausal state     DXA 10/27/06 normal     Calculus of kidney     1990,s/p lithotripsy     Chronic kidney disease, stage III (moderate)     7/27/2011     Diarrhea     11/6/2014     Dysphagia     2007,EGD 3/14/07 nl; sx from goiter.     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     2004,on Tapazole     Past Surgical History:   Procedure Laterality Date     APPENDECTOMY OPEN      1979,,& Meckel's diverticulum removed     COLONOSCOPY      11/17/06,polyps; repeat in 5 YEARS     COLONOSCOPY      11/8/11     COLONOSCOPY      11/6/2014     ESOPHAGOSCOPY, GASTROSCOPY, DUODENOSCOPY (EGD), COMBINED      3/14/07,done for dysphagia; normal     ESOPHAGOSCOPY, GASTROSCOPY, DUODENOSCOPY (EGD), COMBINED N/A 6/7/2018    Procedure: COMBINED ESOPHAGOSCOPY, GASTROSCOPY, DUODENOSCOPY (EGD);  Gastroscopy;  Surgeon: Bisi Rivera MD;  Location: [facility] OR     HYSTERECTOMY TOTAL ABDOMINAL, BILATERAL SALPINGO-OOPHORECTOMY, COMBINED      1979     LAPAROSCOPIC CHOLECYSTECTOMY      1991,common bile duct transected     OTHER SURGICAL HISTORY      4/05,[id number],PREMALIG/BENIGN SKIN LESION EXCISION,epidermal inclusion cyst     OTHER SURGICAL HISTORY      10/26/2015,[id number],FOOT SURGERY,hammertoe repair, [facility] with Dr. Grimaldo     OTHER SURGICAL HISTORY      776288,IR BILIARY STRICTURE DILATATION WO STENT,x3- per h/p /Choledochojejunostomy with Vane-En-Y due to transection of common bile duct     THYROIDECTOMY      3/26/08,Total thyroidectomy; multinodular goiter     Social History     Social History     Marital status:      Spouse name: N/A     Number of children: N/A     Years of education: N/A     Occupational History     Not on file.     Social History Main Topics     Smoking status: Current Every Day Smoker     Packs/day: 0.50     Years: 30.00     Types: Cigarettes     Smokeless  tobacco: Never Used      Comment: Quit smokin/2 - 3/ ppd; not interested in quitting at this time.     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; 4 children; Retired RN     Family History   Problem Relation Age of Onset     Other - See Comments Mother       85yo, emphysema, dementia     HEART DISEASE Father      Heart Disease,MI in 50s,  64yo     Diabetes Father      Diabetes     Substance Abuse Father      Alcohol/Drug,Etoh     Diabetes Daughter      Diabetes     Other - See Comments Daughter      Psychiatric illness,depr/anxiety     Thyroid Disease Sister      Thyroid Disease     Breast Cancer No family hx of      Cancer-breast     Current Outpatient Prescriptions   Medication Sig Dispense Refill     acetaminophen (TYLENOL) 650 MG CR tablet Take 1,300 mg by mouth       ascorbic acid (VITAMIN C) 1000 MG TABS 1 tablet oral daily       Blood Pressure Monitor MISC As directed. OMRON       carboxymethylcellulose (CARBOXYMETHYLCELLULOSE SODIUM) 0.5 % SOLN ophthalmic solution 1 drop both eyes once daily 1 Bottle      chlorthalidone (HYGROTON) 25 MG tablet Take 1 tablet (25 mg) by mouth daily 30 tablet      docusate sodium (COLACE) 100 MG capsule Take 1 capsule by mouth for constipation. Can take 2 in 24 hours.       estradiol (ESTRACE VAGINAL) 0.1 MG/GM cream Place vaginally twice a week 42.5 g      folic acid (FOLVITE) 1 MG tablet Take 1 tablet (1 mg) by mouth daily 30 tablet      gabapentin (NEURONTIN) 300 MG capsule Take 1 capsule (300 mg) by mouth 3 times daily 90 capsule      levothyroxine (SYNTHROID/LEVOTHROID) 112 MCG tablet TAKE 1 TABLET BY MOUTH ONCE DAILY 90 tablet 3     loratadine (CLARITIN) 10 MG tablet Take 1 tablet (10 mg) by mouth daily 30 tablet      losartan (COZAAR) 25 MG tablet Take 1 tablet (25 mg) by mouth daily 90 tablet 3     methylcellulose (CITRUCEL) 500 MG TABS tablet Take 2  "tablets (1,000 mg) by mouth daily 14 each 0     Multiple Vitamins-Minerals (OCUVITE ADULT 50+) CAPS 1 tablet by mouth once daily.       pantoprazole (PROTONIX) 20 MG EC tablet TAKE 1 TABLET BY MOUTH ONCE DAILY 90 tablet 3     Sodium Fluoride (SF 5000 PLUS) 1.1 % CREA        zolpidem (AMBIEN) 5 MG tablet TAKE 1 TABLET BY MOUTH NIGHTLY AT BEDTIME AS NEEDED FOR SLEEP 40 tablet 5     Review of patient's allergies indicates no known allergies.      Review of Systems:  Review of Systems  12 point complete review of systems discussed with patient, HPI discusses positive otherwise all review of systems negative  Objective:   /74 (BP Location: Right arm, Patient Position: Chair, Cuff Size: Adult Regular)  Pulse 60  Temp 96.6  F (35.9  C) (Tympanic)  Ht 5' 6\" (1.676 m)  Wt 149 lb 9.6 oz (67.9 kg)  Breastfeeding? No  BMI 24.15 kg/m2  Physical Exam  Pleasant female, no acute distress.  Affect normal.  Alert and oriented ×4.  Sclera nonicteric.  Conjunctiva noninflamed.  Mucous membranes moist.  Neck supple without adenopathy.  No thyromegaly.  Lung fields clear to auscultation.  Cardiovascular regular rate and rhythm, no S3 auscultated.  Abdomen soft without masses, tenderness and organomegaly.  No abdominal bruits or pulsatile masses.  No axillary or inguinal adenopathy.  No suprapubic tenderness.  Extremities without edema.  Gait is stable.  Full gross motor examination within normal limits.    Assessment:    ICD-10-CM    1. Loss of weight R63.4 CBC with platelets     Thyrotropin [facility]     *UA reflex to Microscopic     Amylase     Lipase     Comprehensive metabolic panel   2. Hypothyroidism, unspecified type E03.9 Thyrotropin [facility]     T4 free   3. Irritable bowel syndrome with both constipation and diarrhea K58.2    4. Urinary frequency R35.0 *UA reflex to Microscopic   5. Chronic kidney disease, stage III (moderate) N18.3 *UA reflex to Microscopic   6. Cigarette smoker F17.210        Plan:   For the last will be " obtained today to evaluate for the weight loss with her history of urinary frequency, hypothyroidism and family history of diabetes.  She will check have CBC, chemistry panel, amylase, lipase, TSH, free T4 and urinalysis obtained.  Discussed scheduling a follow-up appointment in 3 weeks to evaluate further if weight loss continues if all the studies are negative.  Patient will watch this on her own and follow-up if weight loss continues.  If negative lab findings then may want to check colonoscopy, mammogram, and consider repeat lung evaluation with chest CT.    MARA Mullins   7/9/2018  9:48 AM

## 2018-07-09 NOTE — MR AVS SNAPSHOT
After Visit Summary   7/9/2018    Sylvie Bautista    MRN: [id number]           Patient Information     Date Of Birth          1/7/1934        Visit Information        Provider Department      7/9/2018 9:20 AM Mirella Aly NP [facility]        Today's Diagnoses     Loss of weight    -  1    Hypothyroidism, unspecified type        Irritable bowel syndrome with both constipation and diarrhea        Urinary frequency        Chronic kidney disease, stage III (moderate)          Care Instructions    Check labs today.  If negative then may want to proceed with colonoscopy, CT of abdomen/pelvis and possibly repeat lung CT again.  Would definitely want to proceed if you continue to lose weight.  May also need to check another mammogram if weight loss persist.  Normal mammogram last September.          Follow-ups after your visit        Your next 10 appointments already scheduled     Jul 12, 2018 11:00 AM CDT   Evaluation with Ivone Eli, SLP   [facility] ([facility] [place] [facility])    [address] [place] [address] [id number] [phone]              Who to contact     If you have questions or need follow up information about today's clinic visit or your schedule please contact [facility] directly at [phone].  Normal or non-critical lab and imaging results will be communicated to you by MyChart, letter or phone within 4 business days after the clinic has received the results. If you do not hear from us within 7 days, please contact the clinic through MyChart or phone. If you have a critical or abnormal lab result, we will notify you by phone as soon as possible.  Submit refill requests through [employer] or call your pharmacy and they will forward the refill request to us. Please allow 3 business days for your refill to be completed.          Additional Information About Your Visit        MyChart Information      "[employer] gives you secure access to your electronic health record. If you see a primary care provider, you can also send messages to your care team and make appointments. If you have questions, please call your primary care clinic.  If you do not have a primary care provider, please call [phone] and they will assist you.        Care EveryWhere ID     This is your Care EveryWhere ID. This could be used by other organizations to access your [facility] medical records  [id number]        Your Vitals Were     Pulse Temperature Height Breastfeeding? BMI (Body Mass Index)       60 96.6  F (35.9  C) (Tympanic) 5' 6\" (1.676 m) No 24.15 kg/m2        Blood Pressure from Last 3 Encounters:   07/09/18 122/74   06/07/18 114/61   05/22/18 114/72    Weight from Last 3 Encounters:   07/09/18 149 lb 9.6 oz (67.9 kg)   06/07/18 150 lb (68 kg)   05/22/18 150 lb (68 kg)              We Performed the Following     *UA reflex to Microscopic     Amylase     CBC with platelets     Comprehensive metabolic panel     Lipase     T4 free     Thyrotropin [facility]        Primary Care Provider Office Phone # Fax #    Mirella JANETH Aly [phone]       [address] [facility] [address]        Equal Access to Services     ANDERSON ELIZONDO AH: Hadii aad ku hadasho Soomaali, waaxda luqadaha, qaybta kaalmada adeegyada, flora staufferin haytoddn macy almonte . So [facility] [phone].    ATENCIÓN: Si habla español, tiene a ceballos disposición servicios gratuitos de asistencia lingüística. Llame al [phone].    We comply with applicable federal civil rights laws and [place] laws. We do not discriminate on the basis of race, color, national origin, age, disability, sex, sexual orientation, or gender identity.            Thank you!     Thank you for choosing [facility]  for your care. Our goal is always to provide you with excellent care. Hearing back from our patients is one way we can continue to improve our " services. Please take a few minutes to complete the written survey that you may receive in the mail after your visit with us. Thank you!             Your Updated Medication List - Protect others around you: Learn how to safely use, store and throw away your medicines at www.disposemymeds.org.          This list is accurate as of 7/9/18  9:49 AM.  Always use your most recent med list.                   Brand Name Dispense Instructions for use Diagnosis    acetaminophen 650 MG CR tablet    TYLENOL     Take 1,300 mg by mouth        ascorbic acid 1000 MG Tabs    vitamin C     1 tablet oral daily        Blood Pressure Monitor Misc      As directed. OMRON        carboxymethylcellulose sodium 0.5 % Soln ophthalmic solution   Generic drug:  carboxymethylcellulose     1 Bottle    1 drop both eyes once daily        chlorthalidone 25 MG tablet    HYGROTON    30 tablet    Take 1 tablet (25 mg) by mouth daily        docusate sodium 100 MG capsule    COLACE     Take 1 capsule by mouth for constipation. Can take 2 in 24 hours.        ESTRACE VAGINAL 0.1 MG/GM cream   Generic drug:  estradiol     42.5 g    Place vaginally twice a week        folic acid 1 MG tablet    FOLVITE    30 tablet    Take 1 tablet (1 mg) by mouth daily        gabapentin 300 MG capsule    NEURONTIN    90 capsule    Take 1 capsule (300 mg) by mouth 3 times daily        levothyroxine 112 MCG tablet    SYNTHROID/LEVOTHROID    90 tablet    TAKE 1 TABLET BY MOUTH ONCE DAILY    Hypothyroidism, unspecified type       loratadine 10 MG tablet    CLARITIN    30 tablet    Take 1 tablet (10 mg) by mouth daily        losartan 25 MG tablet    COZAAR    90 tablet    Take 1 tablet (25 mg) by mouth daily    Benign essential hypertension       methylcellulose 500 MG Tabs tablet    CITRUCEL    14 each    Take 2 tablets (1,000 mg) by mouth daily    Irritable 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         pantoprazole 20 MG EC tablet    PROTONIX    90 tablet    TAKE 1 TABLET BY MOUTH ONCE DAILY    Gastroesophageal reflux disease, esophagitis presence not specified       SF 5000 PLUS 1.1 % Crea   Generic drug:  Sodium Fluoride           zolpidem 5 MG tablet    AMBIEN    40 tablet    TAKE 1 TABLET BY MOUTH NIGHTLY AT BEDTIME AS NEEDED FOR SLEEP    Insomnia, unspecified type

## 2018-07-10 ASSESSMENT — ANXIETY QUESTIONNAIRES: GAD7 TOTAL SCORE: 0

## 2018-07-10 ASSESSMENT — PATIENT HEALTH QUESTIONNAIRE - PHQ9: SUM OF ALL RESPONSES TO PHQ QUESTIONS 1-9: 0

## 2018-07-25 ENCOUNTER — OFFICE VISIT (OUTPATIENT)
Dept: INTERNAL MEDICINE | Facility: OTHER | Age: 83
End: 2018-07-25
Attending: NURSE PRACTITIONER
Payer: MEDICARE

## 2018-07-25 VITALS
SYSTOLIC BLOOD PRESSURE: 140 MMHG | BODY MASS INDEX: 24.28 KG/M2 | WEIGHT: 150.4 LBS | TEMPERATURE: 96.4 F | DIASTOLIC BLOOD PRESSURE: 82 MMHG | HEART RATE: 56 BPM

## 2018-07-25 DIAGNOSIS — K58.2 IRRITABLE BOWEL SYNDROME WITH BOTH CONSTIPATION AND DIARRHEA: ICD-10-CM

## 2018-07-25 DIAGNOSIS — N18.30 CHRONIC KIDNEY DISEASE, STAGE III (MODERATE) (H): Primary | ICD-10-CM

## 2018-07-25 DIAGNOSIS — R63.4 LOSS OF WEIGHT: ICD-10-CM

## 2018-07-25 LAB
ANION GAP SERPL CALCULATED.3IONS-SCNC: 5 MMOL/L (ref 3–14)
BUN SERPL-MCNC: 17 MG/DL (ref 7–25)
CALCIUM SERPL-MCNC: 9.6 MG/DL (ref 8.6–10.3)
CHLORIDE SERPL-SCNC: 108 MMOL/L (ref 98–107)
CO2 SERPL-SCNC: 27 MMOL/L (ref 21–31)
CREAT SERPL-MCNC: 1.18 MG/DL (ref 0.6–1.2)
GFR SERPL CREATININE-BSD FRML MDRD: 44 ML/MIN/1.7M2
GLUCOSE SERPL-MCNC: 82 MG/DL (ref 70–105)
POTASSIUM SERPL-SCNC: 4.4 MMOL/L (ref 3.5–5.1)
SODIUM SERPL-SCNC: 140 MMOL/L (ref 134–144)

## 2018-07-25 PROCEDURE — G0463 HOSPITAL OUTPT CLINIC VISIT: HCPCS

## 2018-07-25 PROCEDURE — 99214 OFFICE O/P EST MOD 30 MIN: CPT | Performed by: NURSE PRACTITIONER

## 2018-07-25 PROCEDURE — 36415 COLL VENOUS BLD VENIPUNCTURE: CPT | Performed by: NURSE PRACTITIONER

## 2018-07-25 PROCEDURE — 80048 BASIC METABOLIC PNL TOTAL CA: CPT | Performed by: NURSE PRACTITIONER

## 2018-07-25 ASSESSMENT — PAIN SCALES - GENERAL: PAINLEVEL: NO PAIN (0)

## 2018-07-25 NOTE — NURSING NOTE
Patient is here for follow up on labs.   Jelena Beckett LPN...................7/25/2018   10:14 AM

## 2018-07-25 NOTE — PROGRESS NOTES
Subjective:  She is here today for follow-up on stage III chronic kidney disease and weight loss.  She has been checking her weight at home.  Her weight is up 1 pound.  Her renal function had declined when labs were checked on July 9 so chlorthalidone has been held.  Her blood pressure continues to be stable.  She also is on losartan and pantoprazole which could affect her renal function.  She does not drink a lot of water.  She continues to use tobacco.  She also reports that she has been having problems with constipation.  This seems to have occurred since she started Citrucel.  She began the Citrucel to help with diarrhea.  She has been diagnosed with irritable bowel syndrome.  She has reduce the Citrucel down to 1 tablet daily rather than 2 tablets daily and rarely takes the Colace but does occasionally.  She drinks prune juice last night and had a good bowel movement this morning.  Denies rectal bleeding and dark stools.  Last colonoscopy was 2014 with no needed follow-up.  She had EGD recently.  See previous visit note for other details of workup.    Patient Active Problem List   Diagnosis     Acid reflux disease     ACP (advance care planning)     CAP (community acquired pneumonia)     Chronic kidney disease, stage III (moderate)     Cigarette smoker     Diarrhea     Essential hypertension     Hypothyroidism     Insomnia     Iron deficiency anemia     Irritable bowel syndrome     Liver abscess     Low folic acid     Mononeuritis     Hereditary and idiopathic peripheral neuropathy     Nontoxic multinodular goiter     Osteopenia     Raynaud phenomenon     Tinea pedis     Acute left-sided low back pain without sciatica     Segmental and somatic dysfunction of sacral region     Segmental and somatic dysfunction of pelvic region     Past Medical History:   Diagnosis Date     Asymptomatic menopausal state     DXA 10/27/06 normal     Calculus of kidney     1990,s/p lithotripsy     Chronic kidney disease, stage III  (moderate)     7/27/2011     Diarrhea     11/6/2014     Dysphagia     2007,EGD 3/14/07 nl; sx from goiter.     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     2004,on Tapazole     Past Surgical History:   Procedure Laterality Date     APPENDECTOMY OPEN      1979,,& Meckel's diverticulum removed     COLONOSCOPY      11/17/06,polyps; repeat in 5 YEARS     COLONOSCOPY      11/8/11     COLONOSCOPY      11/6/2014     ESOPHAGOSCOPY, GASTROSCOPY, DUODENOSCOPY (EGD), COMBINED      3/14/07,done for dysphagia; normal     ESOPHAGOSCOPY, GASTROSCOPY, DUODENOSCOPY (EGD), COMBINED N/A 6/7/2018    Procedure: COMBINED ESOPHAGOSCOPY, GASTROSCOPY, DUODENOSCOPY (EGD);  Gastroscopy;  Surgeon: Bisi Rivera MD;  Location: [facility] OR     HYSTERECTOMY TOTAL ABDOMINAL, BILATERAL SALPINGO-OOPHORECTOMY, COMBINED      1979     LAPAROSCOPIC CHOLECYSTECTOMY      1991,common bile duct transected     OTHER SURGICAL HISTORY      4/05,TVJ658,PREMALIG/BENIGN SKIN LESION EXCISION,epidermal inclusion cyst     OTHER SURGICAL HISTORY      10/26/2015,[id number],FOOT SURGERY,hammertoe repair, [facility] with Dr. Grimaldo     OTHER SURGICAL HISTORY      367578,IR BILIARY STRICTURE DILATATION WO STENT,x3- per h/p /Choledochojejunostomy with Vane-En-Y due to transection of common bile duct     THYROIDECTOMY      3/26/08,Total thyroidectomy; multinodular goiter     Social History     Social History     Marital status:      Spouse name: N/A     Number of children: N/A     Years of education: N/A     Occupational History     Not on file.     Social  History Main Topics     Smoking status: Current Every Day Smoker     Packs/day: 0.50     Years: 30.00     Types: Cigarettes     Smokeless tobacco: Never Used      Comment: Quit smokin/2 - 3/4 ppd; not interested in quitting at this time.     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; 4 children; Retired RN     Family History   Problem Relation Age of Onset     Other - See Comments Mother       87yo, emphysema, dementia     HEART DISEASE Father      Heart Disease,MI in 50s,  64yo     Diabetes Father      Diabetes     Substance Abuse Father      Alcohol/Drug,Etoh     Diabetes Daughter      Diabetes     Other - See Comments Daughter      Psychiatric illness,depr/anxiety     Thyroid Disease Sister      Thyroid Disease     Breast Cancer No family hx of      Cancer-breast     Current Outpatient Prescriptions   Medication Sig Dispense Refill     acetaminophen (TYLENOL) 650 MG CR tablet Take 1,300 mg by mouth       ascorbic acid (VITAMIN C) 1000 MG TABS 1 tablet oral daily       Blood Pressure Monitor MISC As directed. OMRON       carboxymethylcellulose (CARBOXYMETHYLCELLULOSE SODIUM) 0.5 % SOLN ophthalmic solution 1 drop both eyes once daily 1 Bottle      docusate sodium (COLACE) 100 MG capsule Take 1 capsule by mouth for constipation. Can take 2 in 24 hours.       estradiol (ESTRACE VAGINAL) 0.1 MG/GM cream Place vaginally twice a week 42.5 g      folic acid (FOLVITE) 1 MG tablet Take 1 tablet (1 mg) by mouth daily 30 tablet      gabapentin (NEURONTIN) 300 MG capsule Take 1 capsule (300 mg) by mouth 3 times daily 90 capsule      levothyroxine (SYNTHROID/LEVOTHROID) 112 MCG tablet TAKE 1 TABLET BY MOUTH ONCE DAILY 90 tablet 3     loratadine (CLARITIN) 10 MG tablet Take 1 tablet (10 mg) by mouth daily 30 tablet      losartan (COZAAR) 25 MG tablet Take 1 tablet (25 mg) by mouth daily 90 tablet 3      methylcellulose (CITRUCEL) 500 MG TABS tablet Take 1 tablet (500 mg) by mouth daily 14 each 0     Multiple Vitamins-Minerals (OCUVITE ADULT 50+) CAPS 1 tablet by mouth once daily.       pantoprazole (PROTONIX) 20 MG EC tablet TAKE 1 TABLET BY MOUTH ONCE DAILY 90 tablet 3     Sodium Fluoride (SF 5000 PLUS) 1.1 % CREA        zolpidem (AMBIEN) 5 MG tablet TAKE 1 TABLET BY MOUTH NIGHTLY AT BEDTIME AS NEEDED FOR SLEEP 40 tablet 5     Review of patient's allergies indicates no known allergies.      Review of Systems:  Review of Systems  See HPI    Objective:   /82 (BP Location: Left arm, Patient Position: Chair, Cuff Size: Adult Regular)  Pulse 56  Temp 96.4  F (35.8  C) (Tympanic)  Wt 150 lb 6.4 oz (68.2 kg)  Breastfeeding? No  BMI 24.28 kg/m2  Physical Exam  Pleasant female, no acute distress.  Affect normal.  Alert and oriented ×4.  Skin color pink.  Mucous members moist.  Neck supple without adenopathy.  Lung fields clear to auscultation.  Cardiovascular regular rate and rhythm.  Abdomen soft with normal active bowel sounds.  No tenderness, masses or organomegaly.  No hepatosplenomegaly.  Laboratory studies from July 9 reviewed and discussed with patient    Assessment:    ICD-10-CM    1. Chronic kidney disease, stage III (moderate) N18.3 Basic metabolic panel   2. Loss of weight R63.4    3. Irritable bowel syndrome with both constipation and diarrhea K58.2 methylcellulose (CITRUCEL) 500 MG TABS tablet       Plan:   Recheck basic metabolic panel today.  Stay off the chlorthalidone from now.  Discussed with patient that pantoprazole and losartan could also cause decline in renal function.  If she develops some hyperkalemia then may need to restart chlorthalidone at lower dose.  Recommend to increase water intake.  She will continue to weigh herself daily and if the weight loss returns then I would recommend further workup to include CT of chest and abdomen and pelvis and colonoscopy.  At this time since  patient's weight is going back up with stopping the chlorthalidone she would like to hold off on any further workup at this time.  She also will stop the Citrucel to see if the constipation resolves.  If this does not improve then I would recommend a colonoscopy.    MARA Mullins   7/25/2018  10:54 AM

## 2018-07-25 NOTE — MR AVS SNAPSHOT
After Visit Summary   7/25/2018    Sylvie Bautista    MRN: [id number]           Patient Information     Date Of Birth          1/7/1934        Visit Information        Provider Department      7/25/2018 10:20 AM Mirella Aly NP [facility]        Today's Diagnoses     Chronic kidney disease, stage III (moderate)    -  1    Loss of weight        Irritable bowel syndrome with both constipation and diarrhea           Follow-ups after your visit        Future tests that were ordered for you today     Open Future Orders        Priority Expected Expires Ordered    Basic metabolic panel Routine  7/26/2019 7/25/2018            Who to contact     If you have questions or need follow up information about today's clinic visit or your schedule please contact [facility] directly at [phone].  Normal or non-critical lab and imaging results will be communicated to you by [employer], letter or phone within 4 business days after the clinic has received the results. If you do not hear from us within 7 days, please contact the clinic through [employer] or phone. If you have a critical or abnormal lab result, we will notify you by phone as soon as possible.  Submit refill requests through [employer] or call your pharmacy and they will forward the refill request to us. Please allow 3 business days for your refill to be completed.          Additional Information About Your Visit        [employer] Information     [employer] gives you secure access to your electronic health record. If you see a primary care provider, you can also send messages to your care team and make appointments. If you have questions, please call your primary care clinic.  If you do not have a primary care provider, please call [phone] and they will assist you.        Care EveryWhere ID     This is your Care EveryWhere ID. This could be used by other organizations to access your [facility]  records  [id number]        Your Vitals Were     Pulse Temperature Breastfeeding? BMI (Body Mass Index)          56 96.4  F (35.8  C) (Tympanic) No 24.28 kg/m2         Blood Pressure from Last 3 Encounters:   07/25/18 140/82   07/09/18 122/74   06/07/18 114/61    Weight from Last 3 Encounters:   07/25/18 150 lb 6.4 oz (68.2 kg)   07/09/18 149 lb 9.6 oz (67.9 kg)   06/07/18 150 lb (68 kg)                 Today's Medication Changes          These changes are accurate as of 7/25/18 10:58 AM.  If you have any questions, ask your nurse or doctor.               These medicines have changed or have updated prescriptions.        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 Please contact your care team if you have questions.     chlorthalidone 25 MG tablet   Commonly known as:  HYGROTON   Stopped by:  Mirella Aly NP                Where to get your medicines      Some of these will need a paper prescription and others can be bought over the counter.  Ask your nurse if you have questions.     You don't need a prescription for these medications     methylcellulose 500 MG Tabs tablet                Primary Care Provider Office Phone # Fax #    Mirella Aly [phone]       [address] [facility] [address]        Equal Access to Services     [facility] AH: Hadii eric zurita Sojeffery, waaxda luqadaha, qaybta kaalmada luis, flora teran. So [facility] [phone].    ATENCIÓN: Si habla español, tiene a ceballos disposición servicios gratuitos de asistencia lingüística. Llame al [phone].    We comply  with applicable federal civil rights laws and [place] laws. We do not discriminate on the basis of race, color, national origin, age, disability, sex, sexual orientation, or gender identity.            Thank you!     Thank you for choosing [facility]  for your care. Our goal is always to provide you with excellent care. Hearing back from our patients is one way we can continue to improve our services. Please take a few minutes to complete the written survey that you may receive in the mail after your visit with us. Thank you!             Your Updated Medication List - Protect others around you: Learn how to safely use, store and throw away your medicines at www.disposemymeds.org.          This list is accurate as of 7/25/18 10:58 AM.  Always use your most recent med list.                   Brand Name Dispense Instructions for use Diagnosis    acetaminophen 650 MG CR tablet    TYLENOL     Take 1,300 mg by mouth        ascorbic acid 1000 MG Tabs    vitamin C     1 tablet oral daily        Blood Pressure Monitor Misc      As directed. OMRON        carboxymethylcellulose sodium 0.5 % Soln ophthalmic solution   Generic drug:  carboxymethylcellulose     1 Bottle    1 drop both eyes once daily        docusate sodium 100 MG capsule    COLACE     Take 1 capsule by mouth for constipation. Can take 2 in 24 hours.        ESTRACE VAGINAL 0.1 MG/GM cream   Generic drug:  estradiol     42.5 g    Place vaginally twice a week        folic acid 1 MG tablet    FOLVITE    30 tablet    Take 1 tablet (1 mg) by mouth daily        gabapentin 300 MG capsule    NEURONTIN    90 capsule    Take 1 capsule (300 mg) by mouth 3 times daily        levothyroxine 112 MCG tablet    SYNTHROID/LEVOTHROID    90 tablet    TAKE 1 TABLET BY MOUTH ONCE DAILY    Hypothyroidism, unspecified type       loratadine 10 MG tablet    CLARITIN    30 tablet    Take 1 tablet (10 mg) by mouth daily        losartan 25 MG tablet    COZAAR    90  tablet    Take 1 tablet (25 mg) by mouth daily    Benign essential hypertension       methylcellulose 500 MG Tabs tablet    CITRUCEL    14 each    Take 1 tablet (500 mg) by mouth daily    Irritable 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        pantoprazole 20 MG EC tablet    PROTONIX    90 tablet    TAKE 1 TABLET BY MOUTH ONCE DAILY    Gastroesophageal reflux disease, esophagitis presence not specified       SF 5000 PLUS 1.1 % Crea   Generic drug:  Sodium Fluoride           zolpidem 5 MG tablet    AMBIEN    40 tablet    TAKE 1 TABLET BY MOUTH NIGHTLY AT BEDTIME AS NEEDED FOR SLEEP    Insomnia, unspecified type

## 2018-08-14 RX ORDER — GABAPENTIN 300 MG/1
CAPSULE ORAL
Qty: 270 CAPSULE | OUTPATIENT
Start: 2018-08-14

## 2018-08-14 NOTE — TELEPHONE ENCOUNTER
Gabapentin 300 mg #270 x 3 refills refilled on 10/26/17  Tereza Latham RN on 8/14/2018 at 10:03 AM

## 2018-09-04 ENCOUNTER — MYC MEDICAL ADVICE (OUTPATIENT)
Dept: INTERNAL MEDICINE | Facility: OTHER | Age: 83
End: 2018-09-04

## 2018-09-04 NOTE — TELEPHONE ENCOUNTER
Increase the losartan to 2 pills daily and see me in 2-3 weeks for blood pressure and he will need blood work to check kidney and electrolytes with medication increase.

## 2018-09-04 NOTE — TELEPHONE ENCOUNTER
The patient was contacted and given the information below.  Agnes Benjamin LPN on 9/4/2018 at 4:27 PM

## 2018-09-06 ENCOUNTER — OFFICE VISIT (OUTPATIENT)
Dept: FAMILY MEDICINE | Facility: OTHER | Age: 83
End: 2018-09-06
Attending: FAMILY MEDICINE
Payer: MEDICARE

## 2018-09-06 VITALS
BODY MASS INDEX: 24.92 KG/M2 | SYSTOLIC BLOOD PRESSURE: 132 MMHG | DIASTOLIC BLOOD PRESSURE: 68 MMHG | WEIGHT: 154.4 LBS | HEART RATE: 60 BPM

## 2018-09-06 DIAGNOSIS — M70.62 TROCHANTERIC BURSITIS OF LEFT HIP: Primary | ICD-10-CM

## 2018-09-06 PROCEDURE — 20610 DRAIN/INJ JOINT/BURSA W/O US: CPT | Performed by: FAMILY MEDICINE

## 2018-09-06 PROCEDURE — G0463 HOSPITAL OUTPT CLINIC VISIT: HCPCS

## 2018-09-06 PROCEDURE — 25000128 H RX IP 250 OP 636: Performed by: FAMILY MEDICINE

## 2018-09-06 RX ORDER — TRIAMCINOLONE ACETONIDE 40 MG/ML
40 INJECTION, SUSPENSION INTRA-ARTICULAR; INTRAMUSCULAR ONCE
Status: COMPLETED | OUTPATIENT
Start: 2018-09-06 | End: 2018-09-06

## 2018-09-06 RX ADMIN — TRIAMCINOLONE ACETONIDE 40 MG: 40 INJECTION, SUSPENSION INTRA-ARTICULAR; INTRAMUSCULAR at 14:19

## 2018-09-06 ASSESSMENT — PAIN SCALES - GENERAL: PAINLEVEL: SEVERE PAIN (7)

## 2018-09-06 NOTE — PROGRESS NOTES
SUBJECTIVE:  Sylvie Bautista is a 84 year old female here for left hip pain.  She has had trochanteric bursitis off and on throughout the years.  She was seen in May where she had steroid injections which were quite helpful.  She reports over the last month her left lateral hip has become more painful.  She has pain when she gets up from a seated position.  She has had no pain going into her groin.  No trauma or new overuse activities.  She has tried some ice at home recently with good success.    ROS:    As above otherwise ROS is unremarkable.    OBJECTIVE:  /68  Pulse 60  Wt 154 lb 6.4 oz (70 kg)  BMI 24.92 kg/m2    EXAM:  General Appearance: Pleasant, alert, appropriate appearance for age. No acute distress  Musculoskeletal: Left hip shows flexion of 120 , internal rotation 50 , external rotation 45 .  She has exquisite tenderness over her trochanteric bursa.  No pain with logrolling.    ASSESSEMENT AND PLAN:    1. Trochanteric bursitis of left hip      Her exam today is consistent with left-sided trochanteric bursitis.  We discussed her options and she wanted to try a steroid injection as she has had good success with that in the past.  Informed consent was obtained.  Her left lateral hip was cleansed in normal fashion.  A 5 mL mixture of 40 mg of Kenalog and lidocaine were used to infiltrate the point of maximal tenderness using a fanlike fashion.  She tolerated this well without any immediate complications.  She will ice the area to prevent postinjection flare and follow-up as needed.    Adam Koroma MD    This document was prepared using voice generated software.  While every attempt was made for accuracy, grammatical errors may exist.

## 2018-09-12 ENCOUNTER — OFFICE VISIT (OUTPATIENT)
Dept: INTERNAL MEDICINE | Facility: OTHER | Age: 83
End: 2018-09-12
Attending: NURSE PRACTITIONER
Payer: MEDICARE

## 2018-09-12 ENCOUNTER — HOSPITAL ENCOUNTER (OUTPATIENT)
Dept: GENERAL RADIOLOGY | Facility: OTHER | Age: 83
Discharge: HOME | End: 2018-09-12
Attending: NURSE PRACTITIONER | Admitting: NURSE PRACTITIONER
Payer: MEDICARE

## 2018-09-12 VITALS
SYSTOLIC BLOOD PRESSURE: 142 MMHG | BODY MASS INDEX: 24.36 KG/M2 | TEMPERATURE: 96.2 F | DIASTOLIC BLOOD PRESSURE: 82 MMHG | WEIGHT: 150.9 LBS | HEART RATE: 68 BPM

## 2018-09-12 DIAGNOSIS — M25.562 CHRONIC PAIN OF LEFT KNEE: ICD-10-CM

## 2018-09-12 DIAGNOSIS — G89.29 CHRONIC PAIN OF LEFT KNEE: ICD-10-CM

## 2018-09-12 DIAGNOSIS — M70.62 TROCHANTERIC BURSITIS OF LEFT HIP: Primary | ICD-10-CM

## 2018-09-12 DIAGNOSIS — N18.30 CHRONIC KIDNEY DISEASE, STAGE III (MODERATE) (H): ICD-10-CM

## 2018-09-12 DIAGNOSIS — I10 ESSENTIAL HYPERTENSION: ICD-10-CM

## 2018-09-12 LAB
ANION GAP SERPL CALCULATED.3IONS-SCNC: 6 MMOL/L (ref 3–14)
BUN SERPL-MCNC: 19 MG/DL (ref 7–25)
CALCIUM SERPL-MCNC: 9.4 MG/DL (ref 8.6–10.3)
CHLORIDE SERPL-SCNC: 108 MMOL/L (ref 98–107)
CO2 SERPL-SCNC: 26 MMOL/L (ref 21–31)
CREAT SERPL-MCNC: 1.17 MG/DL (ref 0.6–1.2)
GFR SERPL CREATININE-BSD FRML MDRD: 44 ML/MIN/1.7M2
GLUCOSE SERPL-MCNC: 91 MG/DL (ref 70–105)
POTASSIUM SERPL-SCNC: 4 MMOL/L (ref 3.5–5.1)
SODIUM SERPL-SCNC: 140 MMOL/L (ref 134–144)

## 2018-09-12 PROCEDURE — 99214 OFFICE O/P EST MOD 30 MIN: CPT | Performed by: NURSE PRACTITIONER

## 2018-09-12 PROCEDURE — G0463 HOSPITAL OUTPT CLINIC VISIT: HCPCS

## 2018-09-12 PROCEDURE — 73562 X-RAY EXAM OF KNEE 3: CPT | Mod: LT

## 2018-09-12 PROCEDURE — G0463 HOSPITAL OUTPT CLINIC VISIT: HCPCS | Mod: 25

## 2018-09-12 PROCEDURE — 36415 COLL VENOUS BLD VENIPUNCTURE: CPT | Performed by: NURSE PRACTITIONER

## 2018-09-12 PROCEDURE — 80048 BASIC METABOLIC PNL TOTAL CA: CPT | Performed by: NURSE PRACTITIONER

## 2018-09-12 ASSESSMENT — PAIN SCALES - GENERAL: PAINLEVEL: MODERATE PAIN (5)

## 2018-09-12 NOTE — NURSING NOTE
"Patient has been having bilateral hip pain. Left is the worst. Had injection on Thursday, felt ok until Saturday. Today feels ok.    Chief Complaint   Patient presents with     Hip Pain       Initial /88 (BP Location: Right arm, Patient Position: Chair, Cuff Size: Adult Regular)  Pulse 68  Temp 96.2  F (35.7  C) (Tympanic)  Wt 150 lb 14.4 oz (68.4 kg)  Breastfeeding? No  BMI 24.36 kg/m2 Estimated body mass index is 24.36 kg/(m^2) as calculated from the following:    Height as of 7/9/18: 5' 6\" (1.676 m).    Weight as of this encounter: 150 lb 14.4 oz (68.4 kg).  Medication Reconciliation: complete      Jelena Beckett LPN on 9/12/2018 at 8:23 AM    "

## 2018-09-12 NOTE — MR AVS SNAPSHOT
"              After Visit Summary   9/12/2018    Sylvie Bautista    MRN: [id number]           Patient Information     Date Of Birth          1/7/1934        Visit Information        Provider Department      9/12/2018 8:20 AM Mirella Aly NP [facility]        Today's Diagnoses     Trochanteric bursitis of left hip    -  1    Essential hypertension        Chronic kidney disease, stage III (moderate)        Chronic pain of left knee           Follow-ups after your visit        Additional Services     PHYSICAL THERAPY REFERRAL       *This therapy referral will be filtered to a centralized scheduling office at [facility] and the patient will receive a call to schedule an appointment at a [place] location most convenient for them. *     [facility] provides Physical Therapy evaluation and treatment and many specialty services across the [place] system.  If requesting a specialty program, please choose from the list below.    If you have not heard from the scheduling office within 2 business days, please call [phone] for all locations, with the exception of [place], please call [phone].  Treatment: Evaluation & Treatment  Special Instructions/Modalities: pain  Special Programs: pain, strengthening    Please be aware that coverage of these services is subject to the terms and limitations of your health insurance plan.  Call member services at your health plan with any benefit or coverage questions.      **Note to Provider:  If you are referring outside of [place] for the therapy appointment, please list the name of the location in the \"special instructions\" above, print the referral and give to the patient to schedule the appointment.                  Future tests that were ordered for you today     Open Future Orders        Priority Expected Expires Ordered    XR Knee Left 3 Views Routine 9/12/2018 9/12/2019 9/12/2018    Basic " metabolic panel Routine  9/13/2019 9/12/2018            Who to contact     If you have questions or need follow up information about today's clinic visit or your schedule please contact [facility] directly at [phone].  Normal or non-critical lab and imaging results will be communicated to you by [employer], letter or phone within 4 business days after the clinic has received the results. If you do not hear from us within 7 days, please contact the clinic through [employer] or phone. If you have a critical or abnormal lab result, we will notify you by phone as soon as possible.  Submit refill requests through [employer] or call your pharmacy and they will forward the refill request to us. Please allow 3 business days for your refill to be completed.          Additional Information About Your Visit        [employer]Enthuse Information     [employer] gives you secure access to your electronic health record. If you see a primary care provider, you can also send messages to your care team and make appointments. If you have questions, please call your primary care clinic.  If you do not have a primary care provider, please call [phone] and they will assist you.        Care EveryWhere ID     This is your Care EveryWhere ID. This could be used by other organizations to access your [place] medical records  [id number]        Your Vitals Were     Pulse Temperature Breastfeeding? BMI (Body Mass Index)          68 96.2  F (35.7  C) (Tympanic) No 24.36 kg/m2         Blood Pressure from Last 3 Encounters:   09/12/18 142/82   09/06/18 132/68   07/25/18 140/82    Weight from Last 3 Encounters:   09/12/18 150 lb 14.4 oz (68.4 kg)   09/06/18 154 lb 6.4 oz (70 kg)   07/25/18 150 lb 6.4 oz (68.2 kg)              We Performed the Following     PHYSICAL THERAPY REFERRAL        Primary Care Provider Office Phone # Fax #    Mirella Aly [phone] [address] [facility] [address]         Equal Access to Services     [facility]: Hadii eric casas yudith Lees, wabhaksarda luqadaha, qaybta kabrennanporfirio smith, flora teran. So [facility] [phone].    ATENCIÓN: Si habla español, tiene a ceballos disposición servicios gratuitos de asistencia lingüística. Llame al [phone].    We comply with applicable federal civil rights laws and [place] laws. We do not discriminate on the basis of race, color, national origin, age, disability, sex, sexual orientation, or gender identity.            Thank you!     Thank you for choosing [facility]  for your care. Our goal is always to provide you with excellent care. Hearing back from our patients is one way we can continue to improve our services. Please take a few minutes to complete the written survey that you may receive in the mail after your visit with us. Thank you!             Your Updated Medication List - Protect others around you: Learn how to safely use, store and throw away your medicines at www.disposemymeds.org.          This list is accurate as of 9/12/18  8:56 AM.  Always use your most recent med list.                   Brand Name Dispense Instructions for use Diagnosis    acetaminophen 650 MG CR tablet    TYLENOL     Take 1,300 mg by mouth        ascorbic acid 1000 MG Tabs    vitamin C     1 tablet oral daily        Blood Pressure Monitor Misc      As directed. OMRON        carboxymethylcellulose sodium 0.5 % Soln ophthalmic solution   Generic drug:  carboxymethylcellulose     1 Bottle    1 drop both eyes once daily        docusate sodium 100 MG capsule    COLACE     Take 1 capsule by mouth for constipation. Can take 2 in 24 hours.        ESTRACE VAGINAL 0.1 MG/GM cream   Generic drug:  estradiol     42.5 g    Place vaginally twice a week        folic acid 1 MG tablet    FOLVITE    30 tablet    Take 1 tablet (1 mg) by mouth daily        gabapentin 300 MG capsule    NEURONTIN    90 capsule    Take 1  capsule (300 mg) by mouth 3 times daily        levothyroxine 112 MCG tablet    SYNTHROID/LEVOTHROID    90 tablet    TAKE 1 TABLET BY MOUTH ONCE DAILY    Hypothyroidism, unspecified type       loratadine 10 MG tablet    CLARITIN    30 tablet    Take 1 tablet (10 mg) by mouth daily        losartan 25 MG tablet    COZAAR    90 tablet    Take 1 tablet (25 mg) by mouth daily    Benign essential hypertension       methylcellulose 500 MG Tabs tablet    CITRUCEL    14 each    Take 1 tablet (500 mg) by mouth daily    Irritable 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        pantoprazole 20 MG EC tablet    PROTONIX    90 tablet    TAKE 1 TABLET BY MOUTH ONCE DAILY    Gastroesophageal reflux disease, esophagitis presence not specified       SF 5000 PLUS 1.1 % Crea   Generic drug:  Sodium Fluoride           zolpidem 5 MG tablet    AMBIEN    40 tablet    TAKE 1 TABLET BY MOUTH NIGHTLY AT BEDTIME AS NEEDED FOR SLEEP    Insomnia, unspecified type

## 2018-09-12 NOTE — PROGRESS NOTES
Subjective:  She is here today for follow-up on multiple issues.  Last week she had injection of the left hip for bursitis.  She states that it worked a couple of days and then the pain returned.  She would like to get some physical therapy.  She has been applying ice.  She has taken ibuprofen 200 mg in the morning and 1 in the afternoon.  She is going to switch over to taking Aleve 1 tablet daily possibly twice daily.  She does not find that Tylenol is effective.  She has been checking her blood pressure and these range from 129//74.  She is taking losartan 25 mg 2 pills daily that was increased in July.  She had been on chlorthalidone without was discontinued due to some renal dysfunction.  That stabilized.  She has stage III chronic kidney disease.  She also adds that about a year ago she fell while walking up her stairs and landed on the left knee.  She states that the knee is  if she kneels on it and she can also feel some particles in the knee at times.  She would like to get an x-ray.  She has not tried icing the area.  She does not usually have pain unless she is kneeling.    Patient Active Problem List   Diagnosis     Acid reflux disease     ACP (advance care planning)     CAP (community acquired pneumonia)     Chronic kidney disease, stage III (moderate)     Cigarette smoker     Diarrhea     Essential hypertension     Hypothyroidism     Insomnia     Iron deficiency anemia     Irritable bowel syndrome     Liver abscess     Low folic acid     Mononeuritis     Hereditary and idiopathic peripheral neuropathy     Nontoxic multinodular goiter     Osteopenia     Raynaud phenomenon     Tinea pedis     Acute left-sided low back pain without sciatica     Segmental and somatic dysfunction of sacral region     Segmental and somatic dysfunction of pelvic region     Past Medical History:   Diagnosis Date     Asymptomatic menopausal state     DXA 10/27/06 normal     Calculus of kidney     1990,s/p  lithotripsy     Chronic kidney disease, stage III (moderate)     7/27/2011     Diarrhea     11/6/2014     Dysphagia     2007,EGD 3/14/07 nl; sx from goiter.     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     2004,on Tapazole     Past Surgical History:   Procedure Laterality Date     APPENDECTOMY OPEN      1979,,& Meckel's diverticulum removed     COLONOSCOPY      11/17/06,polyps; repeat in 5 YEARS     COLONOSCOPY      11/8/11     COLONOSCOPY      11/6/2014     ESOPHAGOSCOPY, GASTROSCOPY, DUODENOSCOPY (EGD), COMBINED      3/14/07,done for dysphagia; normal     ESOPHAGOSCOPY, GASTROSCOPY, DUODENOSCOPY (EGD), COMBINED N/A 6/7/2018    Procedure: COMBINED ESOPHAGOSCOPY, GASTROSCOPY, DUODENOSCOPY (EGD);  Gastroscopy;  Surgeon: Bisi Rivera MD;  Location: [facility] OR     HYSTERECTOMY TOTAL ABDOMINAL, BILATERAL SALPINGO-OOPHORECTOMY, COMBINED      1979     LAPAROSCOPIC CHOLECYSTECTOMY      1991,common bile duct transected     OTHER SURGICAL HISTORY      4/05,SJY959,PREMALIG/BENIGN SKIN LESION EXCISION,epidermal inclusion cyst     OTHER SURGICAL HISTORY      10/26/2015,[id number],FOOT SURGERY,hammertoe repair, [facility] with Dr. Grimaldo     OTHER SURGICAL HISTORY      888161,IR BILIARY STRICTURE DILATATION WO STENT,x3- per h/p /Choledochojejunostomy with Vane-En-Y due to transection of common bile duct     THYROIDECTOMY      3/26/08,Total thyroidectomy; multinodular goiter     Social History     Social History     Marital status:      Spouse name: N/A     Number of children: N/A     Years of education: N/A      Occupational History     Not on file.     Social History Main Topics     Smoking status: Current Every Day Smoker     Packs/day: 0.50     Years: 30.00     Types: Cigarettes     Smokeless tobacco: Never Used      Comment: Quit smokin/2 - 3/4 ppd; not interested in quitting at this time.     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; 4 children; Retired RN     Family History   Problem Relation Age of Onset     Other - See Comments Mother       85yo, emphysema, dementia     HEART DISEASE Father      Heart Disease,MI in 50s,  64yo     Diabetes Father      Diabetes     Substance Abuse Father      Alcohol/Drug,Etoh     Diabetes Daughter      Diabetes     Other - See Comments Daughter      Psychiatric illness,depr/anxiety     Thyroid Disease Sister      Thyroid Disease     Breast Cancer No family hx of      Cancer-breast     Current Outpatient Prescriptions   Medication Sig Dispense Refill     acetaminophen (TYLENOL) 650 MG CR tablet Take 1,300 mg by mouth       ascorbic acid (VITAMIN C) 1000 MG TABS 1 tablet oral daily       Blood Pressure Monitor MISC As directed. OMRON       carboxymethylcellulose (CARBOXYMETHYLCELLULOSE SODIUM) 0.5 % SOLN ophthalmic solution 1 drop both eyes once daily 1 Bottle      docusate sodium (COLACE) 100 MG capsule Take 1 capsule by mouth for constipation. Can take 2 in 24 hours.       estradiol (ESTRACE VAGINAL) 0.1 MG/GM cream Place vaginally twice a week 42.5 g      folic acid (FOLVITE) 1 MG tablet Take 1 tablet (1 mg) by mouth daily 30 tablet      gabapentin (NEURONTIN) 300 MG capsule Take 1 capsule (300 mg) by mouth 3 times daily 90 capsule      levothyroxine (SYNTHROID/LEVOTHROID) 112 MCG tablet TAKE 1 TABLET BY MOUTH ONCE DAILY 90 tablet 3     loratadine (CLARITIN) 10 MG tablet Take 1 tablet (10 mg) by mouth daily 30 tablet      losartan (COZAAR) 25 MG tablet Take 1  tablet (25 mg) by mouth daily 90 tablet 3     methylcellulose (CITRUCEL) 500 MG TABS tablet Take 1 tablet (500 mg) by mouth daily 14 each 0     Multiple Vitamins-Minerals (OCUVITE ADULT 50+) CAPS 1 tablet by mouth once daily.       pantoprazole (PROTONIX) 20 MG EC tablet TAKE 1 TABLET BY MOUTH ONCE DAILY 90 tablet 3     Sodium Fluoride (SF 5000 PLUS) 1.1 % CREA        zolpidem (AMBIEN) 5 MG tablet TAKE 1 TABLET BY MOUTH NIGHTLY AT BEDTIME AS NEEDED FOR SLEEP 40 tablet 5     Review of patient's allergies indicates no known allergies.      Review of Systems:  Review of Systems  See HPI    Objective:   /82 (BP Location: Right arm, Patient Position: Chair, Cuff Size: Adult Regular)  Pulse 68  Temp 96.2  F (35.7  C) (Tympanic)  Wt 150 lb 14.4 oz (68.4 kg)  Breastfeeding? No  BMI 24.36 kg/m2  Physical Exam  Pleasant female no acute distress.  Affect normal.  Alert and oriented ×4.  Skin color pink.  Sclera nonicteric.  Mucous members moist.  Lung fields clear to auscultation.  Cardiovascular regular rate and rhythm.  Left hip over the trochanter has tenderness with palpation.  Gait is stable but she does limp.  Left knee has crepitation and there is tenderness over the lateral patella.  No swelling.  Full range of motion.    Basic metabolic panel from July reviewed and discussed with patient    Assessment:    ICD-10-CM    1. Trochanteric bursitis of left hip M70.62 PHYSICAL THERAPY REFERRAL   2. Essential hypertension I10 Basic metabolic panel   3. Chronic kidney disease, stage III (moderate) N18.3 Basic metabolic panel   4. Chronic pain of left knee M25.562 XR Knee Left 3 Views    G89.29        Plan:   1.  She is referred for physical therapy.  She will begin Aleve 1 tablet once or twice daily.  Use with caution.  Also recommend topical pain patches to the affected area.  Ice up to 3 times daily.  2.  Check basic metabolic panel.  If all stable then will likely increase Cozaar to 75 mg daily.  She has been  taking 50 mg daily since July.  She will continue to monitor blood pressures and follow-up in a month depending upon if they are still elevated.  3.  Check basic metabolic panel for follow-up today.  4.  She would like to have an x-ray of the knee.  Explained to patient that there is likely not anything that can be done if there are some loose particles that she is only having pain with kneeling.  Will discuss after x-ray review.  In the meantime would recommend ice 3 times daily.  Aleve may be beneficial as well.    MARA Mullins   9/12/2018  8:51 AM

## 2018-09-13 ASSESSMENT — PATIENT HEALTH QUESTIONNAIRE - PHQ9: SUM OF ALL RESPONSES TO PHQ QUESTIONS 1-9: 0

## 2018-09-14 ENCOUNTER — HOSPITAL ENCOUNTER (OUTPATIENT)
Dept: PHYSICAL THERAPY | Facility: OTHER | Age: 83
Setting detail: THERAPIES SERIES
End: 2018-09-14
Attending: NURSE PRACTITIONER
Payer: MEDICARE

## 2018-09-14 DIAGNOSIS — I10 ESSENTIAL HYPERTENSION: Primary | ICD-10-CM

## 2018-09-14 DIAGNOSIS — I10 BENIGN ESSENTIAL HYPERTENSION: ICD-10-CM

## 2018-09-14 PROCEDURE — 97161 PT EVAL LOW COMPLEX 20 MIN: CPT | Mod: GP | Performed by: PHYSICAL THERAPIST

## 2018-09-14 PROCEDURE — G8978 MOBILITY CURRENT STATUS: HCPCS | Mod: GP,CL | Performed by: PHYSICAL THERAPIST

## 2018-09-14 PROCEDURE — 40000185 ZZHC STATISTIC PT OUTPT VISIT: Performed by: PHYSICAL THERAPIST

## 2018-09-14 PROCEDURE — 97110 THERAPEUTIC EXERCISES: CPT | Mod: GP | Performed by: PHYSICAL THERAPIST

## 2018-09-14 PROCEDURE — G8979 MOBILITY GOAL STATUS: HCPCS | Mod: GP,CI | Performed by: PHYSICAL THERAPIST

## 2018-09-14 NOTE — PROGRESS NOTES
09/14/18 1100   General Information   Type of Visit Initial OP Ortho PT Evaluation   Start of Care Date 09/14/18   Referring Physician Haydee Aly NP   Orders Evaluate and Treat   Date of Order 09/12/18   Insurance Type Medicare   Medical Diagnosis trochanteric bursitis, L hip   Surgical/Medical history reviewed Yes   Precautions/Limitations no known precautions/limitations   Body Part(s)   Body Part(s) Hip   Presentation and Etiology   Pertinent history of current problem (include personal factors and/or comorbidities that impact the POC) Has had bursitis in both hips previously.  Current episode started about 1 month ago.  Got a cortisone injection on 9/6/2018.  Felt better the next day and on the 8th went to [place] which seemed to aggravate the hip more.  Was seen in clinic and was referred to physical therapy.  Has been using a bean bag that goes in microwave or freezer.  Keeps on for about 30 min.  Initially was using 3x/day and now maybe 1x/day.  Usually early afternoon and then again in the evening.   Impairments A. Pain;E. Decreased flexibility;G. Impaired balance;H. Impaired gait;K. Numbness;M. Locking or catching;P. Bowel or bladder problems   Functional Limitations perform activities of daily living   Symptom Location Points to lateral/posterior hip at trochanter.   How/Where did it occur From insidious onset   Onset date of current episode/exacerbation 09/12/18   Chronicity Chronic   Pain rating (0-10 point scale) Best (/10);Worst (/10)   Best (/10) 3/10   Worst (/10) 8/10   Pain quality A. Sharp;C. Aching   Frequency of pain/symptoms B. Intermittent   Pain/symptoms exacerbated by A. Sitting;G. Certain positions   Pain/symptoms eased by C. Rest;F. Certain positions;H. Cold;I. 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 Yes   Have you fallen and had an injury in the past year? Yes   Hip Objective Findings   Side (if bilateral, select both right and left) Right;Left   Observation Patient has replaced right insole of shoe with an OTC insole that is slightly thicker.  Reports she was told by a previous therapist that her right leg is shorter.  Moderat limitation with supine-sit transfer and rolling bilaterally.  Assist required.   Gait/Locomotion Slow self-paced gait with wide base of support.   Hip ROM Comments Knee Extension: R = 0, L = -8    Knee Flexion: R = 118 (active) L = 128 - report limitation due to hip discomfort   Pelvic Screen SUPINE:  L malleoli elevated; L ASIS elevated; Minimal motion with sup to sit (L?); PRONE: R PSIS elevated, significant quadriceps limitation bilaterally, left slightly worse than right   Straight Leg Raise Test R = 48, L = 56   Palpation Pain with wincing along Left TFL and gluteus medius, directly over lateral greater trochanter, and just inferior/posterior to greater trochanter   Right Hip ER PROM 38   Right Hip IR PROM 9   Right Hip Abduction Strength 3/5   Right Hip Extension Strength 3+/5   Right Hip ER Strength 4/5   Right Piriformis Flexibility Stretch felt more intensely on left compared to right.   Right Prone Quad Flexibility significant limitation bilaterally, left worse than right per patient report   Left Hip ER PROM 28   Left Hip IR PROM 21   Left Hip Abduction Strength 3/5   Left Hip Extension Strength 2/5   Left Hip ER Strength 4/5   Left Piriformis Flexibility Stretch felt more intensely on left compared to right.   Left Prone Quad Flexibility significant limitation bilaterally, left worse than right per patient report   Planned Therapy Interventions   Planned Therapy Interventions joint mobilization;manual therapy;ROM;strengthening;stretching   Planned Modality Interventions    Planned Modality Interventions Cryotherapy;Hot packs;Iontophoresis;Ultrasound   Planned Modality Interventions Comments .4 dexamethasone   Clinical Impression   Criteria for Skilled Therapeutic Interventions Met yes, treatment indicated   PT Diagnosis impaired mobility   Influenced by the following impairments impaired hip ROM, pelvic alignment dysfunction, impaired strength, pain   Functional limitations due to impairments transfers, laying down, walking, prolonged positioning   Clinical Presentation Evolving/Changing   Clinical Presentation Rationale clinical observation   Clinical Decision Making (Complexity) Low complexity   Therapy Frequency 2 times/Week   Predicted Duration of Therapy Intervention (days/wks) 8 weeks   Risk & Benefits of therapy have been explained Yes   Patient, Family & other staff in agreement with plan of care Yes   ORTHO GOALS   PT Ortho Eval Goals 1;2;3   Ortho Goal 1   Goal Identifier pelvic alignment   Goal Description Patient will demonstrate neutral pelvic alignment for improved positioning with standing and walking.   Target Date 10/12/18   Ortho Goal 2   Goal Identifier strength   Goal Description Patient will demonstrate 4/5 strength or better in hips for improved ability to transfer without difficulty.   Target Date 11/09/18   Ortho Goal 3   Goal Identifier pain   Goal Description Patient will report decreased pain in left hip from 8/10 worst to 0-5/10 worst with activity.   Target Date 11/09/18   Therapy Certification   Certification date from 09/14/18   Certification date to 11/09/18

## 2018-09-14 NOTE — PROGRESS NOTES
[facility]          OUTPATIENT PHYSICAL THERAPY ORTHOPEDIC EVALUATION  PLAN OF TREATMENT FOR OUTPATIENT REHABILITATION  (COMPLETE FOR INITIAL CLAIMS ONLY)  Patient's Last Name, First Name, M.I.  YOB: 1934  Sylvie Bautista    Provider s Name:  [facility]   Medical Record No.  [id number]   Start of Care Date:  09/14/18   Onset Date:  09/12/18   Type:     _X__PT   ___OT   ___SLP Medical Diagnosis:        PT Diagnosis:  impaired mobility   Visits from SOC:  1      _________________________________________________________________________________  Plan of Treatment/Functional Goals:  joint mobilization, manual therapy, ROM, strengthening, stretching     Cryotherapy, Hot packs, Iontophoresis, Ultrasound  .4 dexamethasone      Goals  Goal Identifier: pelvic alignment  Goal Description: Patient will demonstrate neutral pelvic alignment for improved positioning with standing and walking.  Target Date: 10/12/18    Goal Identifier: strength  Goal Description: Patient will demonstrate 4/5 strength or better in hips for improved ability to transfer without difficulty.  Target Date: 11/09/18    Goal Identifier: pain  Goal Description: Patient will report decreased pain in left hip from 8/10 worst to 0-5/10 worst with activity.  Target Date: 11/09/18            Therapy Frequency:  2 times/Week  Predicted Duration of Therapy Intervention:  8 weeks    Rocio Lopez, PT                 I CERTIFY THE NEED FOR THESE SERVICES FURNISHED UNDER        THIS PLAN OF TREATMENT AND WHILE UNDER MY CARE     (Physician co-signature of this document indicates review and certification of the therapy plan).                       Certification Date From:  09/14/18   Certification Date To:  11/09/18    Referring Provider:  Haydee Aly NP    Initial Assessment        See Epic Evaluation Start of Care Date: 09/14/18

## 2018-09-17 RX ORDER — LOSARTAN POTASSIUM 25 MG/1
50 TABLET ORAL DAILY
Qty: 180 TABLET | Refills: 2 | Status: SHIPPED | OUTPATIENT
Start: 2018-09-17 | End: 2019-05-23

## 2018-09-21 ENCOUNTER — HOSPITAL ENCOUNTER (OUTPATIENT)
Dept: PHYSICAL THERAPY | Facility: OTHER | Age: 83
Setting detail: THERAPIES SERIES
End: 2018-09-21
Attending: NURSE PRACTITIONER
Payer: MEDICARE

## 2018-09-21 ENCOUNTER — MYC MEDICAL ADVICE (OUTPATIENT)
Dept: INTERNAL MEDICINE | Facility: OTHER | Age: 83
End: 2018-09-21

## 2018-09-21 PROCEDURE — 97035 APP MDLTY 1+ULTRASOUND EA 15: CPT | Mod: GP | Performed by: PHYSICAL THERAPIST

## 2018-09-21 PROCEDURE — 97140 MANUAL THERAPY 1/> REGIONS: CPT | Mod: GP | Performed by: PHYSICAL THERAPIST

## 2018-09-21 PROCEDURE — 97110 THERAPEUTIC EXERCISES: CPT | Mod: GP | Performed by: PHYSICAL THERAPIST

## 2018-09-21 PROCEDURE — 40000185 ZZHC STATISTIC PT OUTPT VISIT: Performed by: PHYSICAL THERAPIST

## 2018-09-26 ENCOUNTER — HOSPITAL ENCOUNTER (OUTPATIENT)
Dept: PHYSICAL THERAPY | Facility: OTHER | Age: 83
Setting detail: THERAPIES SERIES
End: 2018-09-26
Attending: NURSE PRACTITIONER
Payer: MEDICARE

## 2018-09-26 PROCEDURE — 97110 THERAPEUTIC EXERCISES: CPT | Mod: GP | Performed by: PHYSICAL THERAPIST

## 2018-09-26 PROCEDURE — 97035 APP MDLTY 1+ULTRASOUND EA 15: CPT | Mod: GP | Performed by: PHYSICAL THERAPIST

## 2018-09-26 PROCEDURE — 40000185 ZZHC STATISTIC PT OUTPT VISIT: Performed by: PHYSICAL THERAPIST

## 2018-09-26 PROCEDURE — 97140 MANUAL THERAPY 1/> REGIONS: CPT | Mod: GP | Performed by: PHYSICAL THERAPIST

## 2018-09-28 ENCOUNTER — HOSPITAL ENCOUNTER (OUTPATIENT)
Dept: PHYSICAL THERAPY | Facility: OTHER | Age: 83
Setting detail: THERAPIES SERIES
End: 2018-09-28
Attending: NURSE PRACTITIONER
Payer: MEDICARE

## 2018-09-28 PROCEDURE — 97140 MANUAL THERAPY 1/> REGIONS: CPT | Mod: GP | Performed by: PHYSICAL THERAPIST

## 2018-09-28 PROCEDURE — 40000185 ZZHC STATISTIC PT OUTPT VISIT: Performed by: PHYSICAL THERAPIST

## 2018-09-28 PROCEDURE — 97035 APP MDLTY 1+ULTRASOUND EA 15: CPT | Mod: GP | Performed by: PHYSICAL THERAPIST

## 2018-10-01 ENCOUNTER — HOSPITAL ENCOUNTER (OUTPATIENT)
Dept: PHYSICAL THERAPY | Facility: OTHER | Age: 83
Setting detail: THERAPIES SERIES
End: 2018-10-01
Attending: NURSE PRACTITIONER
Payer: MEDICARE

## 2018-10-01 PROCEDURE — 97035 APP MDLTY 1+ULTRASOUND EA 15: CPT | Mod: GP | Performed by: PHYSICAL THERAPIST

## 2018-10-01 PROCEDURE — 97140 MANUAL THERAPY 1/> REGIONS: CPT | Mod: GP | Performed by: PHYSICAL THERAPIST

## 2018-10-01 PROCEDURE — 40000185 ZZHC STATISTIC PT OUTPT VISIT: Performed by: PHYSICAL THERAPIST

## 2018-10-01 PROCEDURE — 97110 THERAPEUTIC EXERCISES: CPT | Mod: GP | Performed by: PHYSICAL THERAPIST

## 2018-10-08 ENCOUNTER — HOSPITAL ENCOUNTER (OUTPATIENT)
Dept: PHYSICAL THERAPY | Facility: OTHER | Age: 83
Setting detail: THERAPIES SERIES
End: 2018-10-08
Attending: NURSE PRACTITIONER
Payer: MEDICARE

## 2018-10-08 PROCEDURE — 40000185 ZZHC STATISTIC PT OUTPT VISIT: Performed by: PHYSICAL THERAPIST

## 2018-10-08 PROCEDURE — 97035 APP MDLTY 1+ULTRASOUND EA 15: CPT | Mod: GP | Performed by: PHYSICAL THERAPIST

## 2018-10-08 PROCEDURE — 97110 THERAPEUTIC EXERCISES: CPT | Mod: GP | Performed by: PHYSICAL THERAPIST

## 2018-10-10 ENCOUNTER — HOSPITAL ENCOUNTER (OUTPATIENT)
Dept: PHYSICAL THERAPY | Facility: OTHER | Age: 83
Setting detail: THERAPIES SERIES
End: 2018-10-10
Attending: NURSE PRACTITIONER
Payer: MEDICARE

## 2018-10-10 PROCEDURE — G8978 MOBILITY CURRENT STATUS: HCPCS | Mod: GP,CK | Performed by: PHYSICAL THERAPIST

## 2018-10-10 PROCEDURE — 97140 MANUAL THERAPY 1/> REGIONS: CPT | Mod: GP | Performed by: PHYSICAL THERAPIST

## 2018-10-10 PROCEDURE — 97035 APP MDLTY 1+ULTRASOUND EA 15: CPT | Mod: GP | Performed by: PHYSICAL THERAPIST

## 2018-10-10 PROCEDURE — 40000185 ZZHC STATISTIC PT OUTPT VISIT: Performed by: PHYSICAL THERAPIST

## 2018-10-10 PROCEDURE — G8979 MOBILITY GOAL STATUS: HCPCS | Mod: GP,CI | Performed by: PHYSICAL THERAPIST

## 2018-10-10 NOTE — PROGRESS NOTES
Outpatient Physical Therapy Progress Note     Patient: Sylvie Bautista  : 1934    Beginning/End Dates of Reporting Period:  2018 to 10/10/2018    Referring Provider: Haydee Aly NP    Diagnosis: trochanteric bursitis, left hip     Client Self Report: Minimal overall change in left hip pain.  Will usually feel better after therapy or use of cold pack, but only temporary.  Thought it was helping more initially, but not sure therapy is really making much difference any more.  Noted that yesterday her right hip started hurting as well and was actually more painful than the left side.    Objective Measurements:  Objective Measure: pain rating  Details: up to 8/10        Goals:  Goal Identifier pelvic alignment   Goal Description Patient will demonstrate neutral pelvic alignment for improved positioning with standing and walking.   Target Date 10/12/18   Date Met      Progress:   No change     Goal Identifier strength   Goal Description Patient will demonstrate 4/5 strength or better in hips for improved ability to transfer without difficulty.   Target Date 18   Date Met      Progress:  Not met     Goal Identifier pain   Goal Description Patient will report decreased pain in left hip from 8/10 worst to 0-5/10 worst with activity.   Target Date 18   Date Met      Progress:  Not met         Progress Toward Goals:   Progress this reporting period: Patient has participated in 7 physical therapy sessions, initially consisting of phonophoresis, soft tissue mobilization, stretching, low-level strengthening, iontophoresis, and use of ice.  Last 2 sessions changed from phonophoresis to continuous ultrasound, recommended patient stop doing all exercise due to continued aggravation of hip pain.      Patient initially noted some decreased pain, but seems to be temporary only.      Plan:  Changes to therapy plan of care: Current certification period through 18, but patient will likely follow up  with primary care provider for reassessment due to lack of progress with therapy treatment.    Discharge:  No

## 2018-10-11 ENCOUNTER — TELEPHONE (OUTPATIENT)
Dept: INTERNAL MEDICINE | Facility: OTHER | Age: 83
End: 2018-10-11

## 2018-10-11 DIAGNOSIS — M70.62 TROCHANTERIC BURSITIS OF LEFT HIP: Primary | ICD-10-CM

## 2018-10-11 NOTE — TELEPHONE ENCOUNTER
RKV:  Patient called stating she is continuing to have hip problems, PT does not seem to be improving condition.  Please call.  OK to wait till MIKAEL back in clinic    Thank you

## 2018-10-12 ENCOUNTER — TELEPHONE (OUTPATIENT)
Dept: INTERNAL MEDICINE | Facility: OTHER | Age: 83
End: 2018-10-12

## 2018-10-12 NOTE — TELEPHONE ENCOUNTER
If she is still having pain and I would recommend she have an appointment with 1 of our orthopedic doctors.

## 2018-10-12 NOTE — TELEPHONE ENCOUNTER
Patient will continue with what she has been doing. Does not want to go to [facility].   Jelena Beckett LPN...................10/12/2018   2:11 PM

## 2018-10-12 NOTE — TELEPHONE ENCOUNTER
I would like ortho to decide if she needs x-rays.  She could probably be seen sooner at [facility] if she would prefer to be seen sooner.  I would recommend continuing with physical therapy

## 2018-10-12 NOTE — TELEPHONE ENCOUNTER
Patient is fine with this, wondering if you can place referral. I did tell her they would call her after this was placed  Jelena Beckett LPN...................10/12/2018   8:28 AM

## 2018-10-12 NOTE — TELEPHONE ENCOUNTER
Patient states can not get into ortho here until December. Did make an appt. In [place] with Dr. Herrera on 11-1-18. Wondering if she should have x rays here prior? Did tell her they may or may not take them again there. Also she is wondering if she should continue PT? States gets some kind of a patch when she has PT and this helps. Will continue to use the ice packs on hip unless you think she should not continue?     Jelena Beckett LPN...................10/12/2018   1:58 PM

## 2018-10-16 ENCOUNTER — HOSPITAL ENCOUNTER (OUTPATIENT)
Dept: PHYSICAL THERAPY | Facility: OTHER | Age: 83
Setting detail: THERAPIES SERIES
End: 2018-10-16
Attending: NURSE PRACTITIONER
Payer: MEDICARE

## 2018-10-16 PROCEDURE — 97140 MANUAL THERAPY 1/> REGIONS: CPT | Mod: GP | Performed by: PHYSICAL THERAPIST

## 2018-10-16 PROCEDURE — 40000185 ZZHC STATISTIC PT OUTPT VISIT: Performed by: PHYSICAL THERAPIST

## 2018-10-16 PROCEDURE — 97035 APP MDLTY 1+ULTRASOUND EA 15: CPT | Mod: GP | Performed by: PHYSICAL THERAPIST

## 2018-10-18 ENCOUNTER — HOSPITAL ENCOUNTER (OUTPATIENT)
Dept: PHYSICAL THERAPY | Facility: OTHER | Age: 83
Setting detail: THERAPIES SERIES
End: 2018-10-18
Attending: NURSE PRACTITIONER
Payer: MEDICARE

## 2018-10-18 PROCEDURE — 97140 MANUAL THERAPY 1/> REGIONS: CPT | Mod: GP | Performed by: PHYSICAL THERAPIST

## 2018-10-18 PROCEDURE — 97035 APP MDLTY 1+ULTRASOUND EA 15: CPT | Mod: GP | Performed by: PHYSICAL THERAPIST

## 2018-10-18 PROCEDURE — 40000185 ZZHC STATISTIC PT OUTPT VISIT: Performed by: PHYSICAL THERAPIST

## 2018-10-19 ENCOUNTER — MYC MEDICAL ADVICE (OUTPATIENT)
Dept: INTERNAL MEDICINE | Facility: OTHER | Age: 83
End: 2018-10-19

## 2018-10-19 NOTE — TELEPHONE ENCOUNTER
Let her know that Dr. Herrera should have all of her records.  Whenever we place a referral, records are sent to consulting provider

## 2018-10-20 DIAGNOSIS — G60.9 HEREDITARY AND IDIOPATHIC PERIPHERAL NEUROPATHY: Primary | ICD-10-CM

## 2018-10-23 ENCOUNTER — HOSPITAL ENCOUNTER (OUTPATIENT)
Dept: PHYSICAL THERAPY | Facility: OTHER | Age: 83
Setting detail: THERAPIES SERIES
End: 2018-10-23
Attending: NURSE PRACTITIONER
Payer: MEDICARE

## 2018-10-23 PROCEDURE — 97140 MANUAL THERAPY 1/> REGIONS: CPT | Mod: GP | Performed by: PHYSICAL THERAPIST

## 2018-10-23 PROCEDURE — 97035 APP MDLTY 1+ULTRASOUND EA 15: CPT | Mod: GP | Performed by: PHYSICAL THERAPIST

## 2018-10-23 PROCEDURE — 40000185 ZZHC STATISTIC PT OUTPT VISIT: Performed by: PHYSICAL THERAPIST

## 2018-10-24 RX ORDER — GABAPENTIN 300 MG/1
CAPSULE ORAL
Qty: 270 CAPSULE | Refills: 3 | Status: SHIPPED | OUTPATIENT
Start: 2018-10-24 | End: 2019-10-21

## 2018-10-24 NOTE — TELEPHONE ENCOUNTER
Gabapentin      Last Written Prescription Date:  10/26/17 per care everywhere  Last Fill Quantity: 270,   # refills: 3  Last Office Visit: 9/12/18  Future Office visit:       Routing refill request to provider for review/approval because:  Drug not on the [facility], P or [facility] refill protocol or controlled substance  Medication is reported/historical    Tereza Latham RN on 10/24/2018 at 8:39 AM

## 2018-10-26 ENCOUNTER — HOSPITAL ENCOUNTER (OUTPATIENT)
Dept: PHYSICAL THERAPY | Facility: OTHER | Age: 83
Setting detail: THERAPIES SERIES
End: 2018-10-26
Attending: NURSE PRACTITIONER
Payer: MEDICARE

## 2018-10-26 PROCEDURE — 97140 MANUAL THERAPY 1/> REGIONS: CPT | Mod: GP | Performed by: PHYSICAL THERAPIST

## 2018-10-26 PROCEDURE — 97035 APP MDLTY 1+ULTRASOUND EA 15: CPT | Mod: GP | Performed by: PHYSICAL THERAPIST

## 2018-10-26 PROCEDURE — 40000185 ZZHC STATISTIC PT OUTPT VISIT: Performed by: PHYSICAL THERAPIST

## 2018-10-29 ENCOUNTER — HOSPITAL ENCOUNTER (OUTPATIENT)
Dept: PHYSICAL THERAPY | Facility: OTHER | Age: 83
Setting detail: THERAPIES SERIES
End: 2018-10-29
Attending: NURSE PRACTITIONER
Payer: MEDICARE

## 2018-10-29 PROCEDURE — 97140 MANUAL THERAPY 1/> REGIONS: CPT | Mod: GP | Performed by: PHYSICAL THERAPIST

## 2018-10-29 PROCEDURE — 97035 APP MDLTY 1+ULTRASOUND EA 15: CPT | Mod: GP | Performed by: PHYSICAL THERAPIST

## 2018-10-29 PROCEDURE — 40000185 ZZHC STATISTIC PT OUTPT VISIT: Performed by: PHYSICAL THERAPIST

## 2018-10-31 ENCOUNTER — HOSPITAL ENCOUNTER (OUTPATIENT)
Dept: PHYSICAL THERAPY | Facility: OTHER | Age: 83
Setting detail: THERAPIES SERIES
End: 2018-10-31
Attending: NURSE PRACTITIONER
Payer: MEDICARE

## 2018-10-31 PROCEDURE — 97140 MANUAL THERAPY 1/> REGIONS: CPT | Mod: GP | Performed by: PHYSICAL THERAPIST

## 2018-10-31 PROCEDURE — 40000185 ZZHC STATISTIC PT OUTPT VISIT: Performed by: PHYSICAL THERAPIST

## 2018-10-31 PROCEDURE — 97035 APP MDLTY 1+ULTRASOUND EA 15: CPT | Mod: GP | Performed by: PHYSICAL THERAPIST

## 2018-11-01 ENCOUNTER — TRANSFERRED RECORDS (OUTPATIENT)
Dept: HEALTH INFORMATION MANAGEMENT | Facility: OTHER | Age: 83
End: 2018-11-01

## 2018-11-05 DIAGNOSIS — N95.2 POSTMENOPAUSAL ATROPHIC VAGINITIS: ICD-10-CM

## 2018-11-06 ENCOUNTER — HOSPITAL ENCOUNTER (OUTPATIENT)
Dept: PHYSICAL THERAPY | Facility: OTHER | Age: 83
Setting detail: THERAPIES SERIES
End: 2018-11-06
Attending: NURSE PRACTITIONER
Payer: MEDICARE

## 2018-11-06 ENCOUNTER — TELEPHONE (OUTPATIENT)
Dept: INTERNAL MEDICINE | Facility: OTHER | Age: 83
End: 2018-11-06

## 2018-11-06 PROCEDURE — 97035 APP MDLTY 1+ULTRASOUND EA 15: CPT | Mod: GP | Performed by: PHYSICAL THERAPIST

## 2018-11-06 PROCEDURE — 97140 MANUAL THERAPY 1/> REGIONS: CPT | Mod: GP | Performed by: PHYSICAL THERAPIST

## 2018-11-06 PROCEDURE — 40000185 ZZHC STATISTIC PT OUTPT VISIT: Performed by: PHYSICAL THERAPIST

## 2018-11-06 NOTE — TELEPHONE ENCOUNTER
Patient had been seeing Dr. Herrera, are these available or should she contact [place]? (she saw him there)  Jelena Beckett LPN...................11/6/2018   12:56 PM

## 2018-11-06 NOTE — TELEPHONE ENCOUNTER
After birth date was verified, spoke with layne. She stated that she will contact [place] to have files sent from orthopedics.          Beny Mcwilliams LPN 11/06/18 2:42 PM

## 2018-11-06 NOTE — TELEPHONE ENCOUNTER
After birth date was verified, spoke with patient and she stated that [place] has not sent information yet. Will be doing it shortly within the next few days.        Beny Mcwilliams LPN 11/06/18 3:20 PM

## 2018-11-08 PROBLEM — N95.2 POSTMENOPAUSAL ATROPHIC VAGINITIS: Status: ACTIVE | Noted: 2018-11-08

## 2018-11-08 NOTE — TELEPHONE ENCOUNTER
Routing refill request to provider for review/approval because:  Medication is reported/historical    LOV: 9/12/18  Per care Everywhere  ESTRACE 0.01 % (0.1 mg/gram) vaginal cream    Indications: Postmenopausal atrophic vaginitis INSERT 2GM INTO THE VAGINA EVERY MONDAY AND FRIDAY 42.5 g         Tereza Latham RN on 11/8/2018 at 9:42 AM

## 2018-11-09 RX ORDER — ESTRADIOL 0.1 MG/G
CREAM VAGINAL
Qty: 42.5 G | Refills: 11 | Status: SHIPPED | OUTPATIENT
Start: 2018-11-09 | End: 2019-12-26

## 2018-11-13 ENCOUNTER — TELEPHONE (OUTPATIENT)
Dept: INTERNAL MEDICINE | Facility: OTHER | Age: 83
End: 2018-11-13

## 2018-11-13 NOTE — TELEPHONE ENCOUNTER
Patient is wondering if you did/could review note from orthopedic visit in the beginning of November. Wondering your thoughts , and what the next step would be, if any? She said she did get an injection during visit, and has not had any pain since then. Was told no exercise a week after injection. States is going to PT still has 4 visits scheduled still. Will not be making the one visit after Thanksgiving. So will be doing 3 more visits total.     Any other recommendations? Please advise.   Jelena Beckett LPN...................11/13/2018   2:28 PM

## 2018-11-14 ENCOUNTER — HOSPITAL ENCOUNTER (OUTPATIENT)
Dept: PHYSICAL THERAPY | Facility: OTHER | Age: 83
Setting detail: THERAPIES SERIES
End: 2018-11-14
Attending: NURSE PRACTITIONER
Payer: MEDICARE

## 2018-11-14 PROCEDURE — G8978 MOBILITY CURRENT STATUS: HCPCS | Mod: GP,CJ | Performed by: PHYSICAL THERAPIST

## 2018-11-14 PROCEDURE — G8979 MOBILITY GOAL STATUS: HCPCS | Mod: GP,CI | Performed by: PHYSICAL THERAPIST

## 2018-11-14 PROCEDURE — 40000185 ZZHC STATISTIC PT OUTPT VISIT: Performed by: PHYSICAL THERAPIST

## 2018-11-14 PROCEDURE — 97110 THERAPEUTIC EXERCISES: CPT | Mod: GP | Performed by: PHYSICAL THERAPIST

## 2018-11-14 NOTE — PROGRESS NOTES
Outpatient Physical Therapy Progress Note     Patient: Sylvie Bautista  : 1934    Beginning/End Dates of Reporting Period:  2018 to 10/10/2018  10/10/2018 to 2018    Referring Provider: Haydee Aly NP    Diagnosis: trochanteric bursitis, left hip     Client Self Report: Hip has been feeling pretty good since getting the injection.  Plans to follow up with primary physician, but has not been able to make an appointment yet.  Patient feels about 75% better overall since beginning of therapy.  Feels she is ready to start strengthening, but not sure what to do.     Objective Measurements:  Objective Measure: pain rating  Details: 0-2/10 average          Goals:  Goal Identifier strength   Goal Description Patient will demonstrate 4/5 strength or better in hips for improved ability to transfer without difficulty.   Target Date 18   Date Met      Progress:  Limited previously due to pain and recommendation from MD to postpone exercise until at least 1 week after injection.     Goal Identifier pain   Goal Description Patient will report decreased pain in left hip from 8/10 worst to 0-5/10 worst with activity.   Target Date 18   Date Met      Progress:  MET; 0-2/10 average         Progress Toward Goals:   Progress this reporting period: Variable progress with physical therapy alone.  Improvement noted, especially with walking, but continued pain with sit-stand transfers.  Improvement noted with transfers following recent injection for left hip.      Plan:  Continue therapy per current plan of care.  Gradual progression of strengthening as tolerated.  Will progress to HEP as able.    Discharge:  No

## 2018-11-14 NOTE — PROGRESS NOTES
[facility]      OUTPATIENT PHYSICAL THERAPY  PLAN OF TREATMENT FOR OUTPATIENT REHABILITATION    Patient's Last Name, First Name, M.I.                YOB: 1934  Sylvie Bautista                        Provider's Name  [facility] Medical Record No.  [id number]                               Onset Date: 9/12/2018   Start of Care Date: 9/14/2018   Type:     _X_PT   ___OT   ___SLP Medical Diagnosis: trochanteric bursitis, left hip                       PT Diagnosis: impaired mobility      _________________________________________________________________________________  Plan of Treatment:  Therapeutic Exercise, Ultrasound, Manual Therapy, Cold pack    Frequency/Duration: 2x/week, 8 weeks       Goals:  Goal Identifier strength   Goal Description Patient will demonstrate 4/5 strength or better in hips for improved ability to transfer without difficulty.   Target Date 11/09/18   Date Met       Progress:  Limited previously due to pain and recommendation from MD to postpone exercise until at least 1 week after injection.      Goal Identifier pain   Goal Description Patient will report decreased pain in left hip from 8/10 worst to 0-5/10 worst with activity.   Target Date 11/09/18   Date Met       Progress:  MET; 0-2/10 average          Progress Toward Goals: Progress this reporting period: Variable progress with physical therapy alone.  Improvement noted, especially with walking, but continued pain with sit-stand transfers.  Improvement noted with transfers following recent injection for left hip.      Certification date from 11/10/2018 to 1/10/2019    Rocio Lopez, PT          I CERTIFY THE NEED FOR THESE SERVICES FURNISHED UNDER        THIS PLAN OF TREATMENT AND WHILE UNDER MY CARE     (Physician co-signature of this document indicates review and certification of the  therapy plan).                Referring Provider: Haydee Aly NP

## 2018-11-15 NOTE — TELEPHONE ENCOUNTER
I would agree with orthopedics recommendation.  It is been longer than a week so she should be able to resume exercise and may continue with physical therapy.  If her pain returns then she can have another corticosteroid injection in 3 months.

## 2018-11-15 NOTE — TELEPHONE ENCOUNTER
Called patient and informed her of message below. Patient states she will continue with the physical therapy and follow up at her next appointment.    Irene Delgado LPN on 11/15/2018 at 8:39 AM

## 2018-11-27 ENCOUNTER — HOSPITAL ENCOUNTER (OUTPATIENT)
Dept: PHYSICAL THERAPY | Facility: OTHER | Age: 83
Setting detail: THERAPIES SERIES
End: 2018-11-27
Attending: NURSE PRACTITIONER
Payer: MEDICARE

## 2018-11-27 PROCEDURE — 40000185 ZZHC STATISTIC PT OUTPT VISIT: Performed by: PHYSICAL THERAPIST

## 2018-11-27 PROCEDURE — 97110 THERAPEUTIC EXERCISES: CPT | Mod: GP | Performed by: PHYSICAL THERAPIST

## 2018-12-14 ENCOUNTER — HOSPITAL ENCOUNTER (OUTPATIENT)
Dept: PHYSICAL THERAPY | Facility: OTHER | Age: 83
Setting detail: THERAPIES SERIES
End: 2018-12-14
Attending: NURSE PRACTITIONER
Payer: MEDICARE

## 2018-12-14 PROCEDURE — 97140 MANUAL THERAPY 1/> REGIONS: CPT | Mod: GP

## 2018-12-14 PROCEDURE — 97035 APP MDLTY 1+ULTRASOUND EA 15: CPT | Mod: GP

## 2018-12-18 ENCOUNTER — HOSPITAL ENCOUNTER (OUTPATIENT)
Dept: PHYSICAL THERAPY | Facility: OTHER | Age: 83
Setting detail: THERAPIES SERIES
End: 2018-12-18
Attending: NURSE PRACTITIONER
Payer: MEDICARE

## 2018-12-18 PROCEDURE — 97140 MANUAL THERAPY 1/> REGIONS: CPT | Mod: GP | Performed by: PHYSICAL THERAPIST

## 2018-12-31 ENCOUNTER — HOSPITAL ENCOUNTER (OUTPATIENT)
Dept: PHYSICAL THERAPY | Facility: OTHER | Age: 83
Setting detail: THERAPIES SERIES
End: 2018-12-31
Attending: NURSE PRACTITIONER
Payer: MEDICARE

## 2018-12-31 PROCEDURE — 97035 APP MDLTY 1+ULTRASOUND EA 15: CPT | Mod: GP | Performed by: PHYSICAL THERAPIST

## 2018-12-31 PROCEDURE — 97140 MANUAL THERAPY 1/> REGIONS: CPT | Mod: GP | Performed by: PHYSICAL THERAPIST

## 2019-01-03 ENCOUNTER — HOSPITAL ENCOUNTER (OUTPATIENT)
Dept: PHYSICAL THERAPY | Facility: OTHER | Age: 84
Setting detail: THERAPIES SERIES
End: 2019-01-03
Attending: NURSE PRACTITIONER
Payer: MEDICARE

## 2019-01-03 PROCEDURE — 97110 THERAPEUTIC EXERCISES: CPT | Mod: GP | Performed by: PHYSICAL THERAPIST

## 2019-01-03 PROCEDURE — 97035 APP MDLTY 1+ULTRASOUND EA 15: CPT | Mod: GP | Performed by: PHYSICAL THERAPIST

## 2019-01-03 NOTE — PROGRESS NOTES
"Outpatient Physical Therapy Discharge Note     Patient: Sylvie Bautista  : 1934    Beginning/End Dates of Reporting Period:  2018 to 1/3/2019    Referring Provider: Haydee Aly NP    Therapy Diagnosis: trochanteric bursitis, left hip     Client Self Report: Was feeling \"so-so\", but was really sore this morning.  Worst at left lateral hip.  Not sure if aggravated by exercises.  Wasn't really worse until this morning.  Patient agrees we have not been able to make significant/consistent progress recently.  Will continue with self-management and follow up with specialist in about 1 month.    Objective Measurements:  Objective Measure: pain rating  Details: morning after initially sitting = 5/10, then decreases as she moves around    Objective Measure: leg length  Details: L = 97.25-87.5  R = 88.5-89    Objective Measure: hip rotation (passive)  Details: R: ER = 25, IR = 21  TOTAL = 46   L: ER = 38, IR = 18   TOTAL = 56           Goals:  Goal Identifier strength   Goal Description Patient will demonstrate 4/5 strength or better in hips for improved ability to transfer without difficulty.   Target Date 18   Date Met      Progress:  Patient continues to not tolerate exercise well.       Goal Identifier pain   Goal Description Patient will report decreased pain in left hip from 8/10 worst to 0-5/10 worst with activity.   Target Date 18   Date Met      Progress:  Variable day to day; Inconsistent aggravating factors.         Progress Toward Goals:   Progress this reporting period: Minimal change/improvement over the past several weeks.        Plan:  Discharge from therapy.    Discharge:    Reason for Discharge: No further expectation of progress.    Equipment Issued: none    Discharge Plan: Patient to continue home program/self management and follow up with specialist in about 1 month.  "

## 2019-01-11 DIAGNOSIS — G47.00 INSOMNIA, UNSPECIFIED TYPE: ICD-10-CM

## 2019-01-11 RX ORDER — ZOLPIDEM TARTRATE 5 MG/1
TABLET ORAL
Qty: 15 TABLET | Refills: 0 | OUTPATIENT
Start: 2019-01-11

## 2019-01-11 RX ORDER — ZOLPIDEM TARTRATE 5 MG/1
TABLET ORAL
Qty: 15 TABLET | Refills: 0 | Status: SHIPPED | OUTPATIENT
Start: 2019-01-11 | End: 2019-01-14

## 2019-01-11 NOTE — TELEPHONE ENCOUNTER
Routing refill request to provider for review/approval because:  Drug not on the [facility] refill protocol     LOV: 9/12/18  Patient is due for refills  Mirella SIMPSON out of office will route to covering team let for review and consideration of refill  Tereza Latham RN on 1/11/2019 at 11:44 AM

## 2019-01-11 NOTE — TELEPHONE ENCOUNTER
For prescription for zolpidem 5mg tablet. Pt is needing a refill. Multiple refill requests were sent.

## 2019-01-14 DIAGNOSIS — G47.00 INSOMNIA, UNSPECIFIED TYPE: ICD-10-CM

## 2019-01-14 RX ORDER — ZOLPIDEM TARTRATE 5 MG/1
TABLET ORAL
Qty: 30 TABLET | Refills: 5 | Status: SHIPPED | OUTPATIENT
Start: 2019-01-14 | End: 2019-07-17

## 2019-01-14 NOTE — TELEPHONE ENCOUNTER
" Patient upset regarding her Zolpidem refill. She states that she called over a week ago to get this refilled. States has had nothing but problems since. Is calling again to get this filled, says that [facility] has nothing there. She says she is updet with how the refill process is going, and how long it is taking, causing her to be out of medication. Did explain to her the refill process and that sometimes fax does not always go through. Did offer for her to talk to supervisor, she declined. Did apologize.    Did call [facility] after I spoke with patient. They have the refill from Friday that was done for #15 tablets of Zolpidem ready for . Did call patient back and tell her this, but she was wondering why for only #15 tablets. I did tell her that another provider did refill this in PCP absence. Patient is wanting to know if she can get the \"full amount\" for #30 tablets, so that she does not have to make extra trips and copays. I did tell her that I would ask you. Patient wants a call back.  Also when Is she due to come in?   Jelena Beckett LPN...................1/14/2019   9:17 AM   "

## 2019-01-14 NOTE — TELEPHONE ENCOUNTER
Patient was notified that the refill was sent for #30 with refills.   Jelena Beckett LPN...................1/14/2019   10:23 AM

## 2019-01-17 ENCOUNTER — DOCUMENTATION ONLY (OUTPATIENT)
Dept: OTHER | Facility: CLINIC | Age: 84
End: 2019-01-17

## 2019-02-01 NOTE — MR AVS SNAPSHOT
After Visit Summary   9/6/2018    Sylvie Bautista    MRN: [id number]           Patient Information     Date Of Birth          1/7/1934        Visit Information        Provider Department      9/6/2018 1:45 PM Shahid Koroma MD [facility]        Today's Diagnoses     Trochanteric bursitis of left hip    -  1       Follow-ups after your visit        Who to contact     If you have questions or need follow up information about today's clinic visit or your schedule please contact [facility] directly at [phone].  Normal or non-critical lab and imaging results will be communicated to you by Cympelhart, letter or phone within 4 business days after the clinic has received the results. If you do not hear from us within 7 days, please contact the clinic through [employer] or phone. If you have a critical or abnormal lab result, we will notify you by phone as soon as possible.  Submit refill requests through [employer] or call your pharmacy and they will forward the refill request to us. Please allow 3 business days for your refill to be completed.          Additional Information About Your Visit        MyChart Information     [employer] gives you secure access to your electronic health record. If you see a primary care provider, you can also send messages to your care team and make appointments. If you have questions, please call your primary care clinic.  If you do not have a primary care provider, please call [phone] and they will assist you.        Care EveryWhere ID     This is your Care EveryWhere ID. This could be used by other organizations to access your [place] medical records  [id number]        Your Vitals Were     Pulse BMI (Body Mass Index)                60 24.92 kg/m2           Blood Pressure from Last 3 Encounters:   09/06/18 132/68   07/25/18 140/82   07/09/18 122/74    Weight from Last 3 Encounters:   09/06/18 154 lb 6.4 oz (70 kg)   07/25/18 150  Patient was advised to take all meds as directed , and to follow up regularly , to bring all meds each time she is coming to see me, medication SE discussed  . RTC as needed   ER warnings reviewed      lb 6.4 oz (68.2 kg)   07/09/18 149 lb 9.6 oz (67.9 kg)              We Performed the Following     DRAIN/INJECT LARGE JOINT/BURSA        Primary Care Provider Office Phone # Fax #    Mirella FOWLER BEROT Aly [phone]       [address]  [place] [address]        Equal Access to Services     [facility]: Hadii aad ku hadasho Soomaali, waaxda luqadaha, qaybta kaalmada adeegyada, waxay idiin hayaan adeeg kharash la'aan . So [facility] [phone].    ATENCIÓN: Si habla español, tiene a ceballos disposición servicios gratuitos de asistencia lingüística. Llame al [phone].    We comply with applicable federal civil rights laws and [place] laws. We do not discriminate on the basis of race, color, national origin, age, disability, sex, sexual orientation, or gender identity.            Thank you!     Thank you for choosing [facility] AND [facility]  for your care. Our goal is always to provide you with excellent care. Hearing back from our patients is one way we can continue to improve our services. Please take a few minutes to complete the written survey that you may receive in the mail after your visit with us. Thank you!             Your Updated Medication List - Protect others around you: Learn how to safely use, store and throw away your medicines at www.disposemymeds.org.          This list is accurate as of 9/6/18  2:28 PM.  Always use your most recent med list.                   Brand Name Dispense Instructions for use Diagnosis    acetaminophen 650 MG CR tablet    TYLENOL     Take 1,300 mg by mouth        ascorbic acid 1000 MG Tabs    vitamin C     1 tablet oral daily        Blood Pressure Monitor Misc      As directed. OMRON        carboxymethylcellulose sodium 0.5 % Soln ophthalmic solution   Generic drug:  carboxymethylcellulose     1 Bottle    1 drop both eyes once daily        docusate sodium 100 MG capsule    COLACE     Take 1 capsule by mouth for constipation. Can take 2 in  24 hours.        ESTRACE VAGINAL 0.1 MG/GM cream   Generic drug:  estradiol     42.5 g    Place vaginally twice a week        folic acid 1 MG tablet    FOLVITE    30 tablet    Take 1 tablet (1 mg) by mouth daily        gabapentin 300 MG capsule    NEURONTIN    90 capsule    Take 1 capsule (300 mg) by mouth 3 times daily        levothyroxine 112 MCG tablet    SYNTHROID/LEVOTHROID    90 tablet    TAKE 1 TABLET BY MOUTH ONCE DAILY    Hypothyroidism, unspecified type       loratadine 10 MG tablet    CLARITIN    30 tablet    Take 1 tablet (10 mg) by mouth daily        losartan 25 MG tablet    COZAAR    90 tablet    Take 1 tablet (25 mg) by mouth daily    Benign essential hypertension       methylcellulose 500 MG Tabs tablet    CITRUCEL    14 each    Take 1 tablet (500 mg) by mouth daily    Irritable 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        pantoprazole 20 MG EC tablet    PROTONIX    90 tablet    TAKE 1 TABLET BY MOUTH ONCE DAILY    Gastroesophageal reflux disease, esophagitis presence not specified       SF 5000 PLUS 1.1 % Crea   Generic drug:  Sodium Fluoride           zolpidem 5 MG tablet    AMBIEN    40 tablet    TAKE 1 TABLET BY MOUTH NIGHTLY AT BEDTIME AS NEEDED FOR SLEEP    Insomnia, unspecified type

## 2019-02-05 ENCOUNTER — OFFICE VISIT (OUTPATIENT)
Dept: ORTHOPEDICS | Facility: OTHER | Age: 84
End: 2019-02-05
Attending: ORTHOPAEDIC SURGERY
Payer: MEDICARE

## 2019-02-05 DIAGNOSIS — Z00.00 ROUTINE GENERAL MEDICAL EXAMINATION AT A HEALTH CARE FACILITY: Primary | ICD-10-CM

## 2019-02-05 PROCEDURE — G0463 HOSPITAL OUTPT CLINIC VISIT: HCPCS

## 2019-02-08 NOTE — PROGRESS NOTES
CHIEF COMPLAINT: Left Hip Pain Recheck     PROBLEMS:  Pain in left hip (ICD-719.45) (FOP67-J89.552)  Pain in right hip (ICD-719.45) (BAL46-B72.551)    PATIENT REPORTED MEDICATIONS:  FOLIC ACID 1 MG ORAL TABLET (FOLIC ACID) 1 tablet by mouth every day; Route: ORAL  TYLENOL EXTRA STRENGTH TABLET (ACETAMINOPHEN TABS)   ZOLPIDEM TARTRATE TABLET (ZOLPIDEM TARTRATE TABS)   GABAPENTIN 300 MG ORAL CAPSULE (GABAPENTIN) ; Route: ORAL  PANTOPRAZOLE SODIUM 20 MG ORAL TABLET DELAYED RELEASE (PANTOPRAZOLE SODIUM) 1 tablet by mouth one time a day; Route: ORAL  LOSARTAN POTASSIUM 25 MG ORAL TABLET (LOSARTAN POTASSIUM) ; Route: ORAL  LEVOTHYROXINE SODIUM TABLET (LEVOTHYROXINE SODIUM TABS)     PATIENT REPORTED ALLERGIES:   Patient has no noted allergies.      HISTORY OF PRESENT ILLNESS:    REASON FOR EVALUATION:  Left hip pain.     HISTORY OF PRESENT ILLNESS:  Mireya comes in with regard to her left hip.  She is here for hip injection.  Again last injection was done November with good success, but it has now worn down. Would like to have a repeat at this time.     PAST MEDICAL HISTORY:    Hypertension  Hypothyroidism    PAST ORTHOPEDIC SURGICAL HISTORY:    Right Foot Multiple Tenotomies 10/26/15    PAST SURGICAL HISTORY:    Cholecystectomy  Repair Common Bile Duct  Hysterectomy  Thyroidectomy    FAMILY HISTORY:    Father: Heart Disease  Mother: COPD    SOCIAL HISTORY:     Retired RN  Alcohol Use:  Never  Tobacco Use:  Yes, 1/2 PPD  Secondhand Smoke:  No  Blood Transfusion:  No  HIV/Hepatitis:  No  IV Drug Use:  No    PHYSICAL EXAMINATION:    Examination of her hip shows tenderness across the trochanteric bursa on the left side.  Mild range of motion is tolerable.  Neurovascular examination is otherwise intact.  Overall abductor strength is adequate.      ASSESSMENT:    IMPRESSION:  Left hip trochanteric bursitis.     PROCEDURES:   Risks and benefits of the procedure were reviewed with the patient. Informed consent was  obtained. Blood sugar risks for our diabetic patients were also discussed.    After achieving informed consent and sterile preparation, the patient's left hip trochanteric bursa is injected with 4 cc 2% prilocaine and 40 mg Kenalog under sterile conditions.  The patient did tolerate the procedure well.      PLAN:   Injection as stated above, repeat in the future as appropriate and necessary.  Continue home stretching program, as well.      Dictated by:  Nik Herrera MD  Copy to:  MARA Cole     D:  02/05/19   T:  02/07/19    Typed and/or reviewed and corrected by signing  below, and sent to the Physician for final review and signature.      This report was created using voice recording software and computer-generated templates. Although every effort has been made to review for and eliminate errors, some errors may still occur.         Electronically signed by Jessica Mar on 02/07/2019 at 2:34 PM    ________________________________________________________________________

## 2019-02-13 ENCOUNTER — TELEPHONE (OUTPATIENT)
Dept: INTERNAL MEDICINE | Facility: OTHER | Age: 84
End: 2019-02-13

## 2019-02-13 DIAGNOSIS — M70.62 TROCHANTERIC BURSITIS OF LEFT HIP: Primary | ICD-10-CM

## 2019-02-13 NOTE — TELEPHONE ENCOUNTER
"Patient states that she previously had been going to PT for her left hip for several weeks prior to her getting an injection with orthopedics on 2/5/19. It did help with the pain, but patient says every once in awhile when she gets up it \"will catch when I get up and down\". She tells me that she is not sure if it was recommended by ortho whether or not if she should continue PT. Patient states that she was discharged from PT by her therapist as it was not helping her hip. Patient states that therapist suggested patient maybe seeing another therapist as they may have different ideas for patient. She would like to pursue this. Patient is interested in seeing Romario from [facility] and is wanting a referral to her. Please advise. Patient wants a call back.  Jelena Beckett LPN...................2/13/2019   8:55 AM   "

## 2019-03-01 ENCOUNTER — HOSPITAL ENCOUNTER (OUTPATIENT)
Dept: PHYSICAL THERAPY | Facility: OTHER | Age: 84
Setting detail: THERAPIES SERIES
End: 2019-03-01
Attending: NURSE PRACTITIONER
Payer: MEDICARE

## 2019-03-01 DIAGNOSIS — M70.62 TROCHANTERIC BURSITIS OF LEFT HIP: ICD-10-CM

## 2019-03-01 PROCEDURE — 97110 THERAPEUTIC EXERCISES: CPT | Mod: GP

## 2019-03-01 PROCEDURE — 97162 PT EVAL MOD COMPLEX 30 MIN: CPT | Mod: GP

## 2019-03-01 NOTE — PROGRESS NOTES
[facility]          OUTPATIENT PHYSICAL THERAPY ORTHOPEDIC EVALUATION  PLAN OF TREATMENT FOR OUTPATIENT REHABILITATION  (COMPLETE FOR INITIAL CLAIMS ONLY)  Patient's Last Name, First Name, M.I.  YOB: 1934  Sylvie Bautista  [place]    Provider s Name:  [facility]   Medical Record No.  [id number]   Start of Care Date:  03/01/19   Onset Date:  02/13/19   Type:     _X__PT   ___OT   ___SLP Medical Diagnosis:  L GT bursitis     PT Diagnosis:  L GT bursitis with LE weakness   Visits from SOC:  1      _________________________________________________________________________________  Plan of Treatment/Functional Goals:  balance training, gait training, joint mobilization, manual therapy, neuromuscular re-education, strengthening, stretching, transfer training     Ultrasound, Iontophoresis  if needed  Goals  Goal Identifier: transfers  Goal Description: Pt able to get up from chair with minimal to no pain in 8 weeks.   Target Date: 04/26/19    Goal Identifier: strength  Goal Description: Pt increase strength to be able to get up to 20 reps or more of HEP in 8 weeks.   Target Date: 04/26/19    Goal Identifier: HEP  Goal Description: Pt able to manage residual sx with HEP in 8 weeks.   Target Date: 04/26/19             Therapy Frequency:  2 times/Week  Predicted Duration of Therapy Intervention:  8 weeks    Darling Akbar, PT                 I CERTIFY THE NEED FOR THESE SERVICES FURNISHED UNDER        THIS PLAN OF TREATMENT AND WHILE UNDER MY CARE     (Physician co-signature of this document indicates review and certification of the therapy plan).                       Certification Date From:  03/01/19   Certification Date To:  04/26/19    Referring Provider:  Mirella Aly NP    Initial Assessment        See Epic Evaluation Start of Care Date: 03/01/19

## 2019-03-02 DIAGNOSIS — E03.9 HYPOTHYROIDISM, UNSPECIFIED TYPE: ICD-10-CM

## 2019-03-04 RX ORDER — LEVOTHYROXINE SODIUM 112 UG/1
TABLET ORAL
Qty: 90 TABLET | Refills: 0 | Status: SHIPPED | OUTPATIENT
Start: 2019-03-04 | End: 2019-06-10

## 2019-03-04 NOTE — TELEPHONE ENCOUNTER
Prescription approved per [facility] Refill Protocol.  LOV: 9/12/18  Last TSH: 7/9/18  Tereza Latham RN on 3/4/2019 at 2:41 PM

## 2019-03-07 ENCOUNTER — HOSPITAL ENCOUNTER (OUTPATIENT)
Dept: PHYSICAL THERAPY | Facility: OTHER | Age: 84
Setting detail: THERAPIES SERIES
End: 2019-03-07
Attending: NURSE PRACTITIONER
Payer: MEDICARE

## 2019-03-07 PROCEDURE — 97110 THERAPEUTIC EXERCISES: CPT | Mod: GP

## 2019-03-07 PROCEDURE — 97140 MANUAL THERAPY 1/> REGIONS: CPT | Mod: GP

## 2019-03-09 DIAGNOSIS — K21.9 GASTROESOPHAGEAL REFLUX DISEASE, ESOPHAGITIS PRESENCE NOT SPECIFIED: ICD-10-CM

## 2019-03-11 ENCOUNTER — HOSPITAL ENCOUNTER (OUTPATIENT)
Dept: PHYSICAL THERAPY | Facility: OTHER | Age: 84
Setting detail: THERAPIES SERIES
End: 2019-03-11
Attending: NURSE PRACTITIONER
Payer: MEDICARE

## 2019-03-11 PROCEDURE — 97110 THERAPEUTIC EXERCISES: CPT | Mod: GP

## 2019-03-11 PROCEDURE — 97140 MANUAL THERAPY 1/> REGIONS: CPT | Mod: GP

## 2019-03-11 RX ORDER — PANTOPRAZOLE SODIUM 20 MG/1
TABLET, DELAYED RELEASE ORAL
Qty: 90 TABLET | Refills: 1 | Status: SHIPPED | OUTPATIENT
Start: 2019-03-11 | End: 2019-09-03

## 2019-03-11 NOTE — TELEPHONE ENCOUNTER
Prescription approved per [facility] Refill Protocol..  LOV: 9/12/18    Tereza Latham RN on 3/11/2019 at 8:15 AM

## 2019-03-14 ENCOUNTER — HOSPITAL ENCOUNTER (OUTPATIENT)
Dept: PHYSICAL THERAPY | Facility: OTHER | Age: 84
Setting detail: THERAPIES SERIES
End: 2019-03-14
Attending: NURSE PRACTITIONER
Payer: MEDICARE

## 2019-03-14 PROCEDURE — 97110 THERAPEUTIC EXERCISES: CPT | Mod: GP

## 2019-03-14 PROCEDURE — 97140 MANUAL THERAPY 1/> REGIONS: CPT | Mod: GP

## 2019-03-18 ENCOUNTER — HOSPITAL ENCOUNTER (OUTPATIENT)
Dept: PHYSICAL THERAPY | Facility: OTHER | Age: 84
Setting detail: THERAPIES SERIES
End: 2019-03-18
Attending: NURSE PRACTITIONER
Payer: MEDICARE

## 2019-03-18 PROCEDURE — 97140 MANUAL THERAPY 1/> REGIONS: CPT | Mod: GP

## 2019-03-18 PROCEDURE — 97110 THERAPEUTIC EXERCISES: CPT | Mod: GP

## 2019-03-23 DIAGNOSIS — E03.9 HYPOTHYROIDISM, UNSPECIFIED TYPE: ICD-10-CM

## 2019-03-25 ENCOUNTER — HOSPITAL ENCOUNTER (OUTPATIENT)
Dept: PHYSICAL THERAPY | Facility: OTHER | Age: 84
Setting detail: THERAPIES SERIES
End: 2019-03-25
Attending: NURSE PRACTITIONER
Payer: MEDICARE

## 2019-03-25 PROCEDURE — 97110 THERAPEUTIC EXERCISES: CPT | Mod: GP

## 2019-03-25 PROCEDURE — 97140 MANUAL THERAPY 1/> REGIONS: CPT | Mod: GP

## 2019-03-25 RX ORDER — LEVOTHYROXINE SODIUM 112 UG/1
TABLET ORAL
Qty: 90 TABLET | Refills: 0 | OUTPATIENT
Start: 2019-03-25

## 2019-03-27 ENCOUNTER — OFFICE VISIT (OUTPATIENT)
Dept: INTERNAL MEDICINE | Facility: OTHER | Age: 84
End: 2019-03-27
Attending: NURSE PRACTITIONER
Payer: MEDICARE

## 2019-03-27 VITALS
DIASTOLIC BLOOD PRESSURE: 76 MMHG | RESPIRATION RATE: 12 BRPM | WEIGHT: 141.8 LBS | BODY MASS INDEX: 22.89 KG/M2 | TEMPERATURE: 96.3 F | HEART RATE: 60 BPM | SYSTOLIC BLOOD PRESSURE: 120 MMHG

## 2019-03-27 DIAGNOSIS — K58.2 IRRITABLE BOWEL SYNDROME WITH BOTH CONSTIPATION AND DIARRHEA: ICD-10-CM

## 2019-03-27 DIAGNOSIS — I10 ESSENTIAL HYPERTENSION: ICD-10-CM

## 2019-03-27 DIAGNOSIS — D75.89 MACROCYTOSIS WITHOUT ANEMIA: ICD-10-CM

## 2019-03-27 DIAGNOSIS — G60.9 HEREDITARY AND IDIOPATHIC PERIPHERAL NEUROPATHY: ICD-10-CM

## 2019-03-27 DIAGNOSIS — Z23 NEED FOR VARICELLA VACCINE: ICD-10-CM

## 2019-03-27 DIAGNOSIS — E03.9 HYPOTHYROIDISM, UNSPECIFIED TYPE: ICD-10-CM

## 2019-03-27 DIAGNOSIS — R63.4 WEIGHT LOSS: ICD-10-CM

## 2019-03-27 DIAGNOSIS — K22.89 PRESBYESOPHAGUS: Primary | ICD-10-CM

## 2019-03-27 DIAGNOSIS — M70.62 TROCHANTERIC BURSITIS OF LEFT HIP: ICD-10-CM

## 2019-03-27 LAB
ALBUMIN SERPL-MCNC: 4 G/DL (ref 3.5–5.7)
ALP SERPL-CCNC: 42 U/L (ref 34–104)
ALT SERPL W P-5'-P-CCNC: 12 U/L (ref 7–52)
ANION GAP SERPL CALCULATED.3IONS-SCNC: 7 MMOL/L (ref 3–14)
AST SERPL W P-5'-P-CCNC: 17 U/L (ref 13–39)
BILIRUB SERPL-MCNC: 0.3 MG/DL (ref 0.3–1)
BUN SERPL-MCNC: 17 MG/DL (ref 7–25)
CALCIUM SERPL-MCNC: 9.4 MG/DL (ref 8.6–10.3)
CHLORIDE SERPL-SCNC: 111 MMOL/L (ref 98–107)
CO2 SERPL-SCNC: 24 MMOL/L (ref 21–31)
CREAT SERPL-MCNC: 1.1 MG/DL (ref 0.6–1.2)
ERYTHROCYTE [DISTWIDTH] IN BLOOD BY AUTOMATED COUNT: 12.7 % (ref 10–15)
GFR SERPL CREATININE-BSD FRML MDRD: 47 ML/MIN/{1.73_M2}
GLUCOSE SERPL-MCNC: 99 MG/DL (ref 70–105)
HCT VFR BLD AUTO: 37.4 % (ref 35–47)
HGB BLD-MCNC: 11.9 G/DL (ref 11.7–15.7)
MCH RBC QN AUTO: 33 PG (ref 26.5–33)
MCHC RBC AUTO-ENTMCNC: 31.8 G/DL (ref 31.5–36.5)
MCV RBC AUTO: 104 FL (ref 78–100)
PLATELET # BLD AUTO: 233 10E9/L (ref 150–450)
POTASSIUM SERPL-SCNC: 4.1 MMOL/L (ref 3.5–5.1)
PROT SERPL-MCNC: 7.2 G/DL (ref 6.4–8.9)
RBC # BLD AUTO: 3.61 10E12/L (ref 3.8–5.2)
SODIUM SERPL-SCNC: 142 MMOL/L (ref 134–144)
T4 FREE SERPL-MCNC: 1.37 NG/DL (ref 0.6–1.6)
TSH SERPL DL<=0.05 MIU/L-ACNC: 0.68 IU/ML (ref 0.34–5.6)
WBC # BLD AUTO: 8.6 10E9/L (ref 4–11)

## 2019-03-27 PROCEDURE — 82607 VITAMIN B-12: CPT | Performed by: NURSE PRACTITIONER

## 2019-03-27 PROCEDURE — 99214 OFFICE O/P EST MOD 30 MIN: CPT | Performed by: NURSE PRACTITIONER

## 2019-03-27 PROCEDURE — 84439 ASSAY OF FREE THYROXINE: CPT | Performed by: NURSE PRACTITIONER

## 2019-03-27 PROCEDURE — G0463 HOSPITAL OUTPT CLINIC VISIT: HCPCS

## 2019-03-27 PROCEDURE — 36415 COLL VENOUS BLD VENIPUNCTURE: CPT | Performed by: NURSE PRACTITIONER

## 2019-03-27 PROCEDURE — 82746 ASSAY OF FOLIC ACID SERUM: CPT | Performed by: NURSE PRACTITIONER

## 2019-03-27 PROCEDURE — 85027 COMPLETE CBC AUTOMATED: CPT | Performed by: NURSE PRACTITIONER

## 2019-03-27 PROCEDURE — 84443 ASSAY THYROID STIM HORMONE: CPT | Performed by: NURSE PRACTITIONER

## 2019-03-27 PROCEDURE — 80053 COMPREHEN METABOLIC PANEL: CPT | Performed by: NURSE PRACTITIONER

## 2019-03-27 ASSESSMENT — PATIENT HEALTH QUESTIONNAIRE - PHQ9: SUM OF ALL RESPONSES TO PHQ QUESTIONS 1-9: 0

## 2019-03-27 NOTE — PROGRESS NOTES
Subjective:  She is here today for several concerns.  She did have an EGD last year which showed presbyesophagus.  It was recommended that she have speech therapy.  She had an appointment and then the speech therapist was 6 that day so it got canceled and it was never rescheduled.  She is following that she is having more difficulty with swallowing.  She is having eats foods slower.  It especially occurs with meat, water and breads.  She also has irritable bowel with both constipation and diarrhea.  Diarrhea has been more frequent.  She is not taking Colace.  She stopped taking Citrucel about 6 months ago because she thought maybe it was causing her to be constipated.  She has not had any change in her bowel patterns otherwise.  Sometimes it is hard to hold the diarrhea and make it to the restroom on time.  No dark stools or rectal bleeding.  No abdominal pain.  She has trochanteric bursitis of the left hip and also peripheral neuropathy.  She sees orthopedics for the bursitis and likely will need another injection next month.  She would like to walk outdoors and does have an old walker that was given to her but it does not have a seat.  She would like to have a seat as she needs to stop and rest because of the hip pain.  She has lost weight since last visit.  Weight is down 9 pounds since September.  She feels she has lost weight because of her swallowing issues.  She does have hypothyroidism and denies symptoms otherwise of thyroid disease.  Blood pressure has been well controlled.  She is being treated for hypertension and is due for lab work.  Denies chest pain, chest pressure and shortness of breath    Patient Active Problem List   Diagnosis     Acid reflux disease     CAP (community acquired pneumonia)     Chronic kidney disease, stage III (moderate) (H)     Cigarette smoker     Diarrhea     Essential hypertension     Hypothyroidism     Insomnia     Iron deficiency anemia     Irritable bowel syndrome     Liver  abscess     Low folic acid     Mononeuritis     Hereditary and idiopathic peripheral neuropathy     Nontoxic multinodular goiter     Osteopenia     Raynaud phenomenon     Tinea pedis     Acute left-sided low back pain without sciatica     Segmental and somatic dysfunction of sacral region     Segmental and somatic dysfunction of pelvic region     Postmenopausal atrophic vaginitis     Presbyesophagus     Past Medical History:   Diagnosis Date     Asymptomatic menopausal state     DXA 10/27/06 normal     Calculus of kidney     1990,s/p lithotripsy     Chronic kidney disease, stage III (moderate) (H)     7/27/2011     Diarrhea     11/6/2014     Dysphagia     2007,EGD 3/14/07 nl; sx from goiter.     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     2004,on Tapazole     Past Surgical History:   Procedure Laterality Date     APPENDECTOMY OPEN      1979,,& Meckel's diverticulum removed     COLONOSCOPY      11/17/06,polyps; repeat in 5 YEARS     COLONOSCOPY      11/8/11     COLONOSCOPY      11/6/2014     ESOPHAGOSCOPY, GASTROSCOPY, DUODENOSCOPY (EGD), COMBINED      3/14/07,done for dysphagia; normal     ESOPHAGOSCOPY, GASTROSCOPY, DUODENOSCOPY (EGD), COMBINED N/A 6/7/2018    Procedure: COMBINED ESOPHAGOSCOPY, GASTROSCOPY, DUODENOSCOPY (EGD);  Gastroscopy;  Surgeon: Bisi Rivera MD;  Location: [facility] OR     HYSTERECTOMY TOTAL ABDOMINAL, BILATERAL SALPINGO-OOPHORECTOMY, COMBINED      1979     LAPAROSCOPIC CHOLECYSTECTOMY      1991,common bile duct transected     OTHER SURGICAL HISTORY      4/05,DLK643,PREMALIG/BENIGN SKIN LESION  EXCISION,epidermal inclusion cyst     OTHER SURGICAL HISTORY      10/26/2015,[id number],FOOT SURGERY,hammertoe repair, [facility] with Dr. Grimaldo     OTHER SURGICAL HISTORY      063009,IR BILIARY STRICTURE DILATATION WO STENT,x3- per h/p /Choledochojejunostomy with Vane-En-Y due to transection of common bile duct     THYROIDECTOMY      3/26/08,Total thyroidectomy; multinodular goiter     Social History     Socioeconomic History     Marital status:      Spouse name: Not on file     Number of children: Not on file     Years of education: Not on file     Highest education level: Not on file   Occupational History     Not on file   Social Needs     Financial resource strain: Not on file     Food insecurity:     Worry: Not on file     Inability: Not on file     Transportation needs:     Medical: Not on file     Non-medical: Not on file   Tobacco Use     Smoking status: Current Every Day Smoker     Packs/day: 0.50     Years: 30.00     Pack years: 15.00     Types: Cigarettes     Smokeless tobacco: Never Used     Tobacco comment: Quit smokin/2 - 3 ppd; not interested in quitting at this time.   Substance and Sexual Activity     Alcohol use: No     Alcohol/week: 0.0 oz     Drug use: No     Sexual activity: No     Partners: Male   Lifestyle     Physical activity:     Days per week: Not on file     Minutes per session: Not on file     Stress: Not on file   Relationships     Social connections:     Talks on phone: Not on file     Gets together: Not on file     Attends [employer] service: Not on file     Active member of club or organization: Not on file     Attends meetings of clubs or organizations: Not on file     Relationship status: Not on file     Intimate partner violence:     Fear of current or ex partner: Not on file     Emotionally abused: Not on file     Physically abused: Not on file     Forced sexual activity: Not on file   Other Topics Concern     Not on file   Social History Narrative     ; 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; 4 children; Retired RN     Family History   Problem Relation Age of Onset     Other - See Comments Mother          87yo, emphysema, dementia     Heart Disease Father         Heart Disease,MI in 50s,  62yo     Diabetes Father         Diabetes     Substance Abuse Father         Alcohol/Drug,Etoh     Diabetes Daughter         Diabetes     Other - See Comments Daughter         Psychiatric illness,depr/anxiety     Thyroid Disease Sister         Thyroid Disease     Breast Cancer No family hx of         Cancer-breast     Current Outpatient Medications   Medication Sig Dispense Refill     acetaminophen (TYLENOL) 650 MG CR tablet Take 1,300 mg by mouth       ascorbic acid (VITAMIN C) 1000 MG TABS 1 tablet oral daily       Blood Pressure Monitor MISC As directed. OMRON       carboxymethylcellulose (CARBOXYMETHYLCELLULOSE SODIUM) 0.5 % SOLN ophthalmic solution 1 drop both eyes once daily 1 Bottle      estradiol (ESTRACE) 0.1 MG/GM cream INSERT 2GM INTO THE VAGINA EVERY MONDAY AND FRIDAY 42.5 g 11     folic acid (FOLVITE) 1 MG tablet Take 1 tablet (1 mg) by mouth daily 30 tablet      gabapentin (NEURONTIN) 300 MG capsule TAKE 1 CAPSULE BY MOUTH THREE TIMES DAILY 270 capsule 3     levothyroxine (SYNTHROID/LEVOTHROID) 112 MCG tablet TAKE 1 TABLET BY MOUTH ONCE DAILY 90 tablet 0     loratadine (CLARITIN) 10 MG tablet Take 1 tablet (10 mg) by mouth daily 30 tablet      losartan (COZAAR) 25 MG tablet Take 2 tablets (50 mg) by mouth daily 180 tablet 2     methylcellulose (CITRUCEL) 500 MG TABS tablet Take 1 tablet (500 mg) by mouth daily 14 each 0     Multiple Vitamins-Minerals (OCUVITE ADULT 50+) CAPS 1 tablet by mouth once daily.       order for DME Equipment being ordered: wheeled walker with seat 1 Units 0     pantoprazole (PROTONIX) 20 MG EC tablet TAKE 1 TABLET BY MOUTH ONCE DAILY 90 tablet 1     Sodium Fluoride (SF 5000 PLUS) 1.1 % CREA        zolpidem (AMBIEN) 5 MG tablet TAKE 1 TABLET BY MOUTH  NIGHTLY AT BEDTIME AS NEEDED FOR SLEEP 30 tablet 5     Patient has no known allergies.      Review of Systems:  Review of Systems  See HPI    Objective:   /76 (BP Location: Right arm, Patient Position: Chair, Cuff Size: Adult Regular)   Pulse 60   Temp 96.3  F (35.7  C) (Tympanic)   Resp 12   Wt 64.3 kg (141 lb 12.8 oz)   Breastfeeding? No   BMI 22.89 kg/m    Physical Exam  Pleasant female in no acute distress.  Affect normal.  Alert and oriented x4.  Skin color pink.  Sclera nonicteric.  Mucous memories moist.  Lung fields clear to auscultation throughout.  Cardiovascular regular rate and rhythm, abdomen soft and without masses, tenderness and organomegaly.  Extremities without edema.  EGD operative note is reviewed and discussed.    Assessment:    ICD-10-CM    1. Presbyesophagus K22.8 SPEECH THERAPY REFERRAL   2. Irritable bowel syndrome with both constipation and diarrhea K58.2    3. Need for varicella vaccine Z23    4. Trochanteric bursitis of left hip M70.62 order for DME   5. Hereditary and idiopathic peripheral neuropathy G60.9 order for DME   6. Weight loss R63.4 Thyrotropin [facility]     T4 free     CBC with platelets     Comprehensive metabolic panel   7. Essential hypertension I10 Comprehensive metabolic panel     CANCELED: Basic metabolic panel   8. Hypothyroidism, unspecified type E03.9        Plan:   1.  She is referred to speech therapy for further treatment plan of presbyesophagus.  I would like to see her back 1 month after she began speech therapy to see how she is doing.  2.  Recommend that she restart the Citrucel 1 teaspoon in 8 ounces of water daily.  She can increase this or decrease this depending upon effects of stool production whether too loose or too  firm.  3.  Order provided for wheeled walker with seat.  She is at risk of falls and injury.  4.  She has had a 9 pound weight loss in the past 6 months.  Likely from swallowing difficulties.  Will evaluate further by checking CBC,  chemistry panel, TSH and free T4.  5.  Blood pressure well controlled.  Continue with same medications.  Labs today.  6.  Continue with Synthroid and dose adjustment depending upon thyroid lab results    MARA Mullins   3/27/2019  2:23 PM

## 2019-03-27 NOTE — PATIENT INSTRUCTIONS
You will be referred to speech therapy for swallowing issues.  Recommend that you restart the citrucel 1 tsp in 8 oz of water once daily and titrating up or down depending upon stools being too loose or too firm.

## 2019-03-27 NOTE — NURSING NOTE
"Chief Complaint   Patient presents with     GI Problem     Diarrhea, Abdominal Cramping , Trouble Swallowing       Initial /76 (BP Location: Right arm, Patient Position: Chair, Cuff Size: Adult Regular)   Pulse 60   Temp 96.3  F (35.7  C) (Tympanic)   Resp 12   Wt 64.3 kg (141 lb 12.8 oz)   Breastfeeding? No   BMI 22.89 kg/m   Estimated body mass index is 22.89 kg/m  as calculated from the following:    Height as of 7/9/18: 1.676 m (5' 6\").    Weight as of this encounter: 64.3 kg (141 lb 12.8 oz).  Medication Reconciliation: complete      Jelena Beckett LPN on 3/27/2019 at 2:00 PM    "

## 2019-03-28 DIAGNOSIS — D75.89 MACROCYTOSIS WITHOUT ANEMIA: Primary | ICD-10-CM

## 2019-03-28 LAB
FOLATE SERPL-MCNC: >24.8 NG/ML
VIT B12 SERPL-MCNC: 737 PG/ML (ref 180–914)

## 2019-03-29 ENCOUNTER — MYC MEDICAL ADVICE (OUTPATIENT)
Dept: INTERNAL MEDICINE | Facility: OTHER | Age: 84
End: 2019-03-29

## 2019-03-29 NOTE — TELEPHONE ENCOUNTER
"From Mirella HUGHES-MITCHP note on 3/27 it states \"Recommend that she restart the Citrucel 1 teaspoon in 8 ounces of water daily\". Would you like patient to be taking 1 teaspoon or 1 tablespoon like the patient stated in the message?  Thank You  Maddie Vitale LPN 3/29/2019 9:17 AM    "

## 2019-04-05 DIAGNOSIS — K22.89 PRESBYESOPHAGUS: Primary | ICD-10-CM

## 2019-04-19 ENCOUNTER — HOSPITAL ENCOUNTER (OUTPATIENT)
Dept: PHYSICAL THERAPY | Facility: OTHER | Age: 84
Setting detail: THERAPIES SERIES
End: 2019-04-19
Attending: NURSE PRACTITIONER
Payer: MEDICARE

## 2019-04-19 PROCEDURE — 97140 MANUAL THERAPY 1/> REGIONS: CPT | Mod: GP

## 2019-04-19 PROCEDURE — 97110 THERAPEUTIC EXERCISES: CPT | Mod: GP

## 2019-05-06 ENCOUNTER — HOSPITAL ENCOUNTER (OUTPATIENT)
Dept: SPEECH THERAPY | Facility: OTHER | Age: 84
Setting detail: THERAPIES SERIES
End: 2019-05-06
Attending: NURSE PRACTITIONER
Payer: MEDICARE

## 2019-05-06 DIAGNOSIS — K22.89 PRESBYESOPHAGUS: Primary | ICD-10-CM

## 2019-05-06 DIAGNOSIS — K22.89 PRESBYESOPHAGUS: ICD-10-CM

## 2019-05-06 PROCEDURE — 92610 EVALUATE SWALLOWING FUNCTION: CPT | Mod: GN

## 2019-05-06 NOTE — PROGRESS NOTES
"   05/06/19 1100       Present No   General Information   Type Of Visit Initial   Start Of Care Date 05/06/19   Referring Physician Mirella Aly, NP   Orders Evaluate And Treat   Medical Diagnosis Presbyesophagus   Hearing WFL    Pertinent History of Current Problem/OT: Additional Occupational Profile Info Mireya presents to evaluation with a diagnosis of Presbyesophagus and complaints of worsening swallowing difficulty. Mireya reports she will often eat a 1/3 of a meal before coughing up food that \"has no where to go\".  Mireya reports weight loss of ~ 10 pounds in the last six months. Mireya attributes this weight loss to swallowing difficulty. Most difficult foods identified include pasta and breads.  Current compensatory strategies include small bites.     Respiratory Status Room air   Patient Role/employment History Retired   Living Environment [facility]   General Observations Patient presents to evlaution with comlaints of swallowing difficulty. Patient reports being frustrated with difficulty swallowing.    Patient/family Goals Assessment of swallow function.    Fall Risk Screen   Fall screen completed by SLP   Have you fallen 2 or more times in the past year? No   Have you fallen and had an injury in the past year? No   Abuse Screen (yes response referral indicated)   Feels Unsafe at Home or Work/School no   Feels Threatened by Someone no   Does Anyone Try to Keep You From Having Contact with Others or Doing Things Outside Your Home? no   Physical Signs of Abuse Present no   Clinical Swallow Evaluation   Oral Musculature generally intact   Structural Abnormalities none present   Dentition upper dentures;uses dentures to eat   Mucosal Quality good   Mandibular Strength and Mobility intact   Oral Labial Strength and Mobility WFL   Lingual Strength and Mobility impaired coordination;WFL   Velar Elevation intact   Buccal Strength and Mobility intact   Laryngeal Function Dry " swallow palpated;Voicing initiated;Swallow;Throat clear;Cough   Additional Documentation Yes   Swallow Eval   Feeding Assistance no assistance needed   Clinical Swallow Eval: Thin Liquid Texture Trial   Mode of Presentation, Thin Liquids cup;self-fed   Volume of Liquid or Food Presented Single and sequential swallows    Oral Phase of Swallow WFL   Pharyngeal Phase of Swallow repeated swallows;reduction in laryngeal movement   Successful Strategies Trialed During Procedure hard swallow   Diagnostic Statement Patient tolerating thin liquid via cup with no overt s/s aspiration. Patient demonstrating use of multiple swallows per bolus and audible second swallow.    Clinical Swallow Eval: Puree Solid Texture Trial   Mode of Presentation, Puree spoon;self-fed   Volume of Puree Presented 1 TBS Bolus    Oral Phase, Puree WFL   Pharyngeal Phase, Puree repeated swallows;reduction in laryngeal movement   Successful Strategies Trialed During Procedure, Puree hard swallow   Diagnostic Statement Patient tolerating puree with no overt s/s aspiration. Patient demonstrating multiple swallows.   Clinical Swallow Eval: Semisolid Texture Trial   Mode of Presentation, Semisolid spoon;self-fed   Volume of Semisolid Food Presented 1 TBS Bolus    Oral Phase, Semisolid Poor AP movement;WFL   Pharyngeal Phase, Semisolid repeated swallows;reduction in laryngeal movement;feeling of something stuck in throat   Diagnostic Statement Patient tolerating semi-solids with no overt s/s aspiration. Patient having difficulty with AP movement and initiation of swallow. Approximately 2 second delay in swallow initiation. Multiple swallows to clear pharyngeal residue.    Clinical Swallow Eval: Solid Food Texture Trial   Mode of Presentation, Solid self-fed   Volume of Solid Food Presented 1/2 TBS Bolus    Oral Phase, Solid Poor AP movement;WFL   Pharyngeal Phase, Solid feeling of something stuck in throat;repeated swallows;reduction in laryngeal movement    Successful Strategies Trialed During Procedure, Solid hard swallow   Diagnostic Statement Patient tolerating solid textures with no overt s/s aspiration. Increased mastication time. Difficulty with swallow initiation and AP movemeent. Multiple swallows to clear pharyngeal residue.    Swallow Compensations   Swallow Compensations Multiple swallow;Reduce amounts;Pacing;Effortful swallow   Educational Assessment   Barriers to Learning No barriers   Preferred Learning Style Demonstration;Pictures/video   Esophageal Phase of Swallow   Patient reports or presents with symptoms of esophageal dysphagia Yes   General Therapy Interventions   Planned Therapy Interventions Dysphagia Treatment   Dysphagia treatment Oropharyngeal exercise training;Modified diet education;Instruction of safe swallow strategies;Compensatory strategies for swallowing  (VFSS)   Swallow Eval: Clinical Impressions   Skilled Criteria for Therapy Intervention Skilled criteria met.  Treatment indicated.   Functional Assessment Scale (FAS) 5   Dysphagia Outcome Severity Scale (SHARIF) Level 5 - SHARIF   Treatment Diagnosis Dysphagia    Diet texture recommendations Regular diet;Thin liquids   Recommended Feeding/Eating Techniques alternate between small bites and sips of food/liquid;hard swallow w/ each bite or sip;maintain upright posture during/after eating for 30 mins;small sips/bites   Rehab Potential good, to achieve stated therapy goals   Therapy Frequency   (1 time per week )   Predicted Duration of Therapy Intervention (days/wks) 60 days    Anticipated Discharge Disposition home   Risks and Benefits of Treatment have been explained. Yes   Patient, family and/or staff in agreement with Plan of Care Yes   Clinical Impression Comments Patient presents with hisotry of presbyesophagus and presents with some pharyngeal difficulties. Patient will benefit furhter skilled services . VFSS ordered to further evaluate swallow.    Swallow Goals   SLP Swallow Goals  1;2;3;4   Swallow Goal 1   Goal Identifier Compensatory    Goal Description Patient will demonstrate the ability to self-monitor swallowing skills and implement appropriate techniques as needed on 9/10 opportunities.    Target Date 07/05/19   Swallow Goal 2   Goal Identifier Exercise    Goal Description Patient will complete swallowing exercises ( Idania Maneuver, Effortful Swallow and Singing Scales) to increase hyolaryngeal excursion and esophageal opening with teach-back at 90% with minimum verbal cues.     Target Date 07/05/19   Swallow Goal 3   Goal Identifier Diet    Goal Description Client will maintain adequate nutrition with optimum safety and efficiency of swallowing function on P.O. intake without overt signs and symptoms of aspiration on the least restrictive diet.    Target Date 07/05/19   Swallow Goal 4   Goal Identifier VFSS    Goal Description Patient will participate in VFSS to further evaluate swallow function.    Target Date 06/05/19   Total Session Time   SLP Eval: oral/pharyngeal swallow function, clinical minutes ([id number]) 40   Total Evaluation Time 40   Therapy Certification   Certification date from 05/06/19   Certification date to 07/05/19   Medical Diagnosis Presbyesophagus

## 2019-05-06 NOTE — PROGRESS NOTES
[facility]          OUTPATIENT SWALLOW  EVALUATION  PLAN OF TREATMENT FOR OUTPATIENT REHABILITATION  (COMPLETE FOR INITIAL CLAIMS ONLY)  Patient's Last Name, First Name, M.I.  YOB: 1934  Sylvie Bautista     Provider's Name   [facility]   Medical Record No.  [id number]     Start of Care Date:  05/06/19   Onset Date:      Type:     ___PT   ____OT  ___X_SLP Medical Diagnosis:  Presbyesophagus     Treatment Diagnosis:  Dysphagia  Visits from SOC:  1     _________________________________________________________________________________  Plan of Treatment/Functional Goals:  Planned Therapy Interventions: Dysphagia Treatment  Dysphagia treatment: Oropharyngeal exercise training, Modified diet education, Instruction of safe swallow strategies, Compensatory strategies for swallowing(VFSS)     Goals   1. Goal Identifier: Compensatory        Goal Description: Patient will demonstrate the ability to self-monitor swallowing skills and implement appropriate techniques as needed on 9/10 opportunities.        Target Date: 07/05/19           2. Goal Identifier: Exercise        Goal Description: Patient will complete swallowing exercises ( Idania Maneuver, Effortful Swallow and Singing Scales) to increase hyolaryngeal excursion and esophageal opening with teach-back at 90% with minimum verbal cues.         Target Date: 07/05/19           3. Goal Identifier: Diet        Goal Description: Client will maintain adequate nutrition with optimum safety and efficiency of swallowing function on P.O. intake without overt signs and symptoms of aspiration on the least restrictive diet.        Target Date: 07/05/19           4. Goal Identifier: VFSS        Goal Description: Patient will participate in VFSS to further evaluate swallow function.        Target Date: 06/05/19             Therapy Frequency: (1 time per  week )  Predicted Duration of Therapy Intervention (days/wks): 60 days     Apple Medina, SLP       I CERTIFY THE NEED FOR THESE SERVICES FURNISHED UNDER        THIS PLAN OF TREATMENT AND WHILE UNDER MY CARE     (Physician co-signature of this document indicates review and certification of the therapy plan).                  Certification date from: 05/06/19 Certification date to: 07/05/19          Referring Physician: Mirella Aly NP    Initial Assessment        See Epic Evaluation Start Of Care Date: 05/06/19

## 2019-05-07 ENCOUNTER — OFFICE VISIT (OUTPATIENT)
Dept: ORTHOPEDICS | Facility: OTHER | Age: 84
End: 2019-05-07
Attending: ORTHOPAEDIC SURGERY
Payer: MEDICARE

## 2019-05-07 DIAGNOSIS — Z00.00 ROUTINE GENERAL MEDICAL EXAMINATION AT A HEALTH CARE FACILITY: Primary | ICD-10-CM

## 2019-05-07 PROCEDURE — G0463 HOSPITAL OUTPT CLINIC VISIT: HCPCS

## 2019-05-10 ENCOUNTER — OFFICE VISIT (OUTPATIENT)
Dept: INTERNAL MEDICINE | Facility: OTHER | Age: 84
End: 2019-05-10
Attending: NURSE PRACTITIONER
Payer: COMMERCIAL

## 2019-05-10 VITALS
HEART RATE: 60 BPM | HEIGHT: 65 IN | TEMPERATURE: 96.3 F | SYSTOLIC BLOOD PRESSURE: 136 MMHG | WEIGHT: 137.7 LBS | RESPIRATION RATE: 12 BRPM | BODY MASS INDEX: 22.94 KG/M2 | DIASTOLIC BLOOD PRESSURE: 78 MMHG

## 2019-05-10 DIAGNOSIS — R63.4 WEIGHT LOSS: ICD-10-CM

## 2019-05-10 DIAGNOSIS — K22.89 PRESBYESOPHAGUS: Primary | ICD-10-CM

## 2019-05-10 DIAGNOSIS — K58.2 IRRITABLE BOWEL SYNDROME WITH BOTH CONSTIPATION AND DIARRHEA: ICD-10-CM

## 2019-05-10 PROCEDURE — 99214 OFFICE O/P EST MOD 30 MIN: CPT | Performed by: NURSE PRACTITIONER

## 2019-05-10 PROCEDURE — G0463 HOSPITAL OUTPT CLINIC VISIT: HCPCS

## 2019-05-10 ASSESSMENT — ENCOUNTER SYMPTOMS
COUGH: 0
SORE THROAT: 0
VOMITING: 0
ABDOMINAL DISTENTION: 0
TROUBLE SWALLOWING: 1
ANAL BLEEDING: 0
HEMATOCHEZIA: 0
APPETITE CHANGE: 0
CHEST TIGHTNESS: 0
ENDOCRINE NEGATIVE: 1
HEARTBURN: 0
SLEEP DISTURBANCE: 0
SHORTNESS OF BREATH: 0
ARTHRALGIAS: 1
WHEEZING: 0
NAUSEA: 0
FATIGUE: 0
ABDOMINAL PAIN: 0
FEVER: 0

## 2019-05-10 ASSESSMENT — PAIN SCALES - GENERAL: PAINLEVEL: MILD PAIN (2)

## 2019-05-10 ASSESSMENT — MIFFLIN-ST. JEOR: SCORE: 1069.86

## 2019-05-10 NOTE — NURSING NOTE
"Patient presents to the clinic today for follow up GI and swallowing issues.  Debby Romero LPN 5/10/2019   9:57 AM    Chief Complaint   Patient presents with     RECHECK     GI and Swallowing issues       Initial /78 (BP Location: Right arm, Patient Position: Sitting, Cuff Size: Adult Regular)   Pulse 60   Temp 96.3  F (35.7  C) (Tympanic)   Resp 12   Ht 1.65 m (5' 4.96\")   Wt 62.5 kg (137 lb 11.2 oz)   BMI 22.94 kg/m   Estimated body mass index is 22.94 kg/m  as calculated from the following:    Height as of this encounter: 1.65 m (5' 4.96\").    Weight as of this encounter: 62.5 kg (137 lb 11.2 oz).  Medication Reconciliation: complete    Debby Romero LPN    "

## 2019-05-10 NOTE — PROGRESS NOTES
CHIEF COMPLAINT: Left Hip Pain Recheck     PROBLEMS:  Pain in left hip (ICD-719.45) (ZNH31-J92.552)  Pain in right hip (ICD-719.45) (ZUU56-A23.551)    PATIENT REPORTED MEDICATIONS:  FOLIC ACID 1 MG ORAL TABLET (FOLIC ACID) 1 tablet by mouth every day; Route: ORAL  TYLENOL EXTRA STRENGTH TABLET (ACETAMINOPHEN TABS)   ZOLPIDEM TARTRATE TABLET (ZOLPIDEM TARTRATE TABS)   GABAPENTIN 300 MG ORAL CAPSULE (GABAPENTIN) ; Route: ORAL  PANTOPRAZOLE SODIUM 20 MG ORAL TABLET DELAYED RELEASE (PANTOPRAZOLE SODIUM) 1 tablet by mouth one time a day; Route: ORAL  LOSARTAN POTASSIUM 25 MG ORAL TABLET (LOSARTAN POTASSIUM) ; Route: ORAL  LEVOTHYROXINE SODIUM TABLET (LEVOTHYROXINE SODIUM TABS)     PATIENT REPORTED ALLERGIES:   Patient has no noted allergies.      HISTORY OF PRESENT ILLNESS:    REASON FOR EVALUATION:  Left hip bursal pain.      HISTORY OF PRESENT ILLNESS:  Mireya comes in with regard to her left hip bursitis.  She is here for an injection at this point in time.  The injection did seem to work, last injection was done in February.     PAST MEDICAL HISTORY:    Hypertension  Hypothyroidism    PAST ORTHOPEDIC SURGICAL HISTORY:    Right Foot Multiple Tenotomies 10/26/15    PAST SURGICAL HISTORY:    Cholecystectomy  Repair Common Bile Duct  Hysterectomy  Thyroidectomy    FAMILY HISTORY:    Father: Heart Disease  Mother: COPD    SOCIAL HISTORY:     Retired RN  Alcohol Use:  Never  Tobacco Use:  Yes, 1/2 PPD  Secondhand Smoke:  No  Blood Transfusion:  No  HIV/Hepatitis:  No  IV Drug Use:  No    PHYSICAL EXAMINATION:    Examination of her hip shows tenderness across the trochanteric bursa.  Mild swelling is seen there, as well.  Neurovascular examination is otherwise intact.     ASSESSMENT:    IMPRESSION:  Left hip trochanteric bursitis.     PROCEDURES:   Risks and benefits of the procedure were reviewed with the patient. Informed consent was obtained. Blood sugar risks for our diabetic patients were also  discussed.    After achieving informed consent and sterile preparation, the patient's left hip trochanteric bursa is injected with 4 cc 1% lidocaine and 40 mg Kenalog under sterile conditions.  The patient did tolerate the procedure well.      PLAN:   Injection as stated above, repeat in the future as appropriate and necessary moving forward.      Dictated by:  Nik Herrera MD  Copy to:  MARA Cole    D:  05/07/19  T:  05/09/19    Typed and/or reviewed and corrected by signing  below, and sent to the Physician for final review and signature.      This report was created using voice recording software and computer-generated templates. Although every effort has been made to review for and eliminate errors, some errors may still occur.         Electronically signed by Jessica Mar on 05/09/2019 at 12:27 PM    ________________________________________________________________________

## 2019-05-10 NOTE — PROGRESS NOTES
Subjective:  She is here today for follow-up.  She has been diagnosed with Presbyesophagus by Dr. Rivera.  She had EGD June 2018.  It was recommended at that time that she do speech therapy.  She had some scheduling changes and her schedule had to get canceled and she forgot to reschedule.  She noticed a couple months ago that her swallowing seem to be getting a little worse and she thought she should get this set up.  She saw speech therapy once last week and has a video swallowing study scheduled.  She finds that Posta is the hardest with swallowing.  She has been losing weight.  Her weight is down another 4 pounds since last visit at the end of March.  She states she is eating less but she still has a good appetite.  She does not go out to eat with her daughter as much as often.  She is really not concerned that the weight loss is from anything besides the difficulty with the swallowing.  She is a smoker.  She did have CT of the chest abdomen and pelvis back in June 2017 at the time of a liver abscess.  She was having fevers and that was a part of the work-up.  She had that treated and has been fine since.  She also has history of irritable bowel syndrome with both constipation and diarrhea.  She started Citrucel and has noticed that she is having diarrhea less often.  She still has constipation occasionally but is not real bothersome to her.    Patient Active Problem List   Diagnosis     Acid reflux disease     CAP (community acquired pneumonia)     Chronic kidney disease, stage III (moderate) (H)     Cigarette smoker     Diarrhea     Essential hypertension     Hypothyroidism     Insomnia     Iron deficiency anemia     Irritable bowel syndrome     Liver abscess     Low folic acid     Mononeuritis     Hereditary and idiopathic peripheral neuropathy     Nontoxic multinodular goiter     Osteopenia     Raynaud phenomenon     Tinea pedis     Acute left-sided low back pain without sciatica     Segmental and somatic  dysfunction of sacral region     Segmental and somatic dysfunction of pelvic region     Postmenopausal atrophic vaginitis     Presbyesophagus     Past Medical History:   Diagnosis Date     Asymptomatic menopausal state     DXA 10/27/06 normal     Calculus of kidney     1990,s/p lithotripsy     Chronic kidney disease, stage III (moderate) (H)     7/27/2011     Diarrhea     11/6/2014     Dysphagia     2007,EGD 3/14/07 nl; sx from goiter.     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     2004,on Tapazole     Past Surgical History:   Procedure Laterality Date     APPENDECTOMY OPEN      1979,,& Meckel's diverticulum removed     COLONOSCOPY      11/17/06,polyps; repeat in 5 YEARS     COLONOSCOPY      11/8/11     COLONOSCOPY      11/6/2014     ESOPHAGOSCOPY, GASTROSCOPY, DUODENOSCOPY (EGD), COMBINED      3/14/07,done for dysphagia; normal     ESOPHAGOSCOPY, GASTROSCOPY, DUODENOSCOPY (EGD), COMBINED N/A 6/7/2018    Procedure: COMBINED ESOPHAGOSCOPY, GASTROSCOPY, DUODENOSCOPY (EGD);  Gastroscopy;  Surgeon: Bisi Rivera MD;  Location: [facility]     HYSTERECTOMY TOTAL ABDOMINAL, BILATERAL SALPINGO-OOPHORECTOMY, COMBINED      1979     LAPAROSCOPIC CHOLECYSTECTOMY      1991,common bile duct transected     OTHER SURGICAL HISTORY      4/05,[id number],PREMALIG/BENIGN SKIN LESION EXCISION,epidermal inclusion cyst     OTHER SURGICAL HISTORY      10/26/2015,[id number],FOOT SURGERY,hammertoe repair, [facility] with Dr. Grimaldo     OTHER SURGICAL HISTORY      [id number],IR BILIARY STRICTURE DILATATION WO STENT,x3- per h/p  /Choledochojejunostomy with Vane-En-Y due to transection of common bile duct     THYROIDECTOMY      3/26/08,Total thyroidectomy; multinodular goiter     Social History     Socioeconomic History     Marital status:      Spouse name: Not on file     Number of children: Not on file     Years of education: Not on file     Highest education level: Not on file   Occupational History     Not on file   Social Needs     Financial resource strain: Not on file     Food insecurity:     Worry: Not on file     Inability: Not on file     Transportation needs:     Medical: Not on file     Non-medical: Not on file   Tobacco Use     Smoking status: Current Every Day Smoker     Packs/day: 0.50     Years: 30.00     Pack years: 15.00     Types: Cigarettes     Smokeless tobacco: Never Used     Tobacco comment: Quit smokin/2 - 3/4 ppd; not interested in quitting at this time.   Substance and Sexual Activity     Alcohol use: No     Alcohol/week: 0.0 oz     Drug use: No     Sexual activity: Not Currently     Partners: Male   Lifestyle     Physical activity:     Days per week: Not on file     Minutes per session: Not on file     Stress: Not on file   Relationships     Social connections:     Talks on phone: Not on file     Gets together: Not on file     Attends Baptism service: Not on file     Active member of club or organization: Not on file     Attends meetings of clubs or organizations: Not on file     Relationship status: Not on file     Intimate partner violence:     Fear of current or ex partner: Not on file     Emotionally abused: Not on file     Physically abused: Not on file     Forced sexual activity: Not on file   Other Topics Concern     Not on file   Social History Narrative    ; 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; 4 children; Retired RN     Family History   Problem Relation Age of Onset     Other - See Comments Mother          85yo, emphysema, dementia     Heart Disease Father         Heart Disease,MI in 50s,  64yo      Diabetes Father         Diabetes     Substance Abuse Father         Alcohol/Drug,Etoh     Diabetes Daughter         Diabetes     Other - See Comments Daughter         Psychiatric illness,depr/anxiety     Thyroid Disease Sister         Thyroid Disease     Breast Cancer No family hx of         Cancer-breast     Current Outpatient Medications   Medication Sig Dispense Refill     acetaminophen (TYLENOL) 650 MG CR tablet Take 1,300 mg by mouth       ascorbic acid (VITAMIN C) 1000 MG TABS 1 tablet oral daily       Blood Pressure Monitor MISC As directed. OMRON       carboxymethylcellulose (CARBOXYMETHYLCELLULOSE SODIUM) 0.5 % SOLN ophthalmic solution 1 drop both eyes once daily 1 Bottle      estradiol (ESTRACE) 0.1 MG/GM cream INSERT 2GM INTO THE VAGINA EVERY MONDAY AND FRIDAY 42.5 g 11     folic acid (FOLVITE) 1 MG tablet Take 1 tablet (1 mg) by mouth daily 30 tablet      gabapentin (NEURONTIN) 300 MG capsule TAKE 1 CAPSULE BY MOUTH THREE TIMES DAILY 270 capsule 3     levothyroxine (SYNTHROID/LEVOTHROID) 112 MCG tablet TAKE 1 TABLET BY MOUTH ONCE DAILY 90 tablet 0     loratadine (CLARITIN) 10 MG tablet Take 1 tablet (10 mg) by mouth daily 30 tablet      losartan (COZAAR) 25 MG tablet Take 2 tablets (50 mg) by mouth daily 180 tablet 2     methylcellulose (CITRUCEL) 500 MG TABS tablet Take 1 tablet (500 mg) by mouth daily 14 each 0     Multiple Vitamins-Minerals (OCUVITE ADULT 50+) CAPS 1 tablet by mouth once daily.       order for DME Equipment being ordered: wheeled walker with seat 1 Units 0     pantoprazole (PROTONIX) 20 MG EC tablet TAKE 1 TABLET BY MOUTH ONCE DAILY 90 tablet 1     Sodium Fluoride (SF 5000 PLUS) 1.1 % CREA        zolpidem (AMBIEN) 5 MG tablet TAKE 1 TABLET BY MOUTH NIGHTLY AT BEDTIME AS NEEDED FOR SLEEP 30 tablet 5     Patient has no known allergies.      Review of Systems:  Review of Systems   Constitutional: Negative for appetite change, fatigue and fever.   HENT: Positive for trouble swallowing.  "Negative for sore throat.    Respiratory: Negative for cough, chest tightness, shortness of breath and wheezing.    Cardiovascular: Negative for chest pain.   Gastrointestinal: Negative for abdominal distention, abdominal pain, anal bleeding, heartburn, hematochezia, nausea and vomiting.   Endocrine: Negative.    Musculoskeletal: Positive for arthralgias.   Psychiatric/Behavioral: Negative for sleep disturbance.       Objective:   /78 (BP Location: Right arm, Patient Position: Sitting, Cuff Size: Adult Regular)   Pulse 60   Temp 96.3  F (35.7  C) (Tympanic)   Resp 12   Ht 1.65 m (5' 4.96\")   Wt 62.5 kg (137 lb 11.2 oz)   BMI 22.94 kg/m    Physical Exam  Pleasant female in no acute distress.  Affect normal.  Alert and oriented x4.  Sclera nonicteric.  Conjunctiva noninflamed.  Mucous memories moist.  Neck supple without adenopathy.  No thyromegaly.  No thyroid tenderness.  Lung fields clear to auscultation throughout.  Cardiovascular regular rate and rhythm.  Abdomen soft and without masses, tenderness and organomegaly.  Extremities without edema.  Gait stable.  March 2019 labs reviewed and discussed  Assessment:    ICD-10-CM    1. Presbyesophagus K22.8    2. Weight loss R63.4    3. Irritable bowel syndrome with both constipation and diarrhea K58.2        Plan:   1.  Video swallowing study is ordered.  She will continue to work with speech therapy.  2.  Expressed to patient that I am concerned about the weight loss.  It likely is related to the swallowing difficulties however she is have risk factors of tobacco use and has history of liver abscess therefore would recommend proceeding with obtaining CT of chest abdomen and pelvis.  She wants to hold off at this time and will work with speech therapy over the next month.  If she finds that her weight loss continues then she will consider following through with the studies.  She did have a EGD recently and last colonoscopy in 2014 was normal with no " recommended follow-up.  May need to evaluate this at some point if the weight loss continues.  3.  Irritable bowel syndrome with diarrhea and constipation has improved with the Citrucel.  She will continue with this and increase as tolerated.  We will see her back in 1 month, sooner with concerns.    MARA Mullins   5/10/2019  10:42 AM

## 2019-05-13 ENCOUNTER — HOSPITAL ENCOUNTER (OUTPATIENT)
Dept: SPEECH THERAPY | Facility: OTHER | Age: 84
Setting detail: THERAPIES SERIES
End: 2019-05-13
Attending: NURSE PRACTITIONER
Payer: MEDICARE

## 2019-05-13 PROCEDURE — 92526 ORAL FUNCTION THERAPY: CPT | Mod: GN

## 2019-05-16 ENCOUNTER — HOSPITAL ENCOUNTER (OUTPATIENT)
Dept: GENERAL RADIOLOGY | Facility: OTHER | Age: 84
Discharge: HOME | End: 2019-05-16
Attending: NURSE PRACTITIONER | Admitting: NURSE PRACTITIONER
Payer: MEDICARE

## 2019-05-16 ENCOUNTER — HOSPITAL ENCOUNTER (OUTPATIENT)
Dept: SPEECH THERAPY | Facility: OTHER | Age: 84
Setting detail: THERAPIES SERIES
End: 2019-05-16
Attending: NURSE PRACTITIONER
Payer: MEDICARE

## 2019-05-16 DIAGNOSIS — K22.89 PRESBYESOPHAGUS: ICD-10-CM

## 2019-05-16 PROCEDURE — 74230 X-RAY XM SWLNG FUNCJ C+: CPT

## 2019-05-16 PROCEDURE — 92611 MOTION FLUOROSCOPY/SWALLOW: CPT | Mod: GN

## 2019-05-21 NOTE — PROGRESS NOTES
05/16/19 1400       Present No   General Information   Start Of Care Date 05/06/19   Referring Physician Mirella Aly NP   Orders Evaluate And Treat   Medical Diagnosis Presbyesophagus   Precautions/limitations Swallowing Precautions   Hearing WFL    Pertinent History of Current Problem/OT: Additional Occupational Profile Info Mireya reports to evaluation with complaints of difficulty swallowing. Mireya was diagnosed with Presbyesophagus, however is receiving outpatient therapy for concerns of pharyngeal dysphagia. Mireya often coughs up food and has difficulty completing meals. Mireya has lost weight due to difficulty eating.    Respiratory Status Room air   Patient Role/employment History Retired   Living Environment [place]   Patient/family Goals Assessment of swallow function.    Fall Risk Screen   Fall screen completed by SLP   Have you fallen 2 or more times in the past year? No   Have you fallen and had an injury in the past year? No   Abuse Screen (yes response referral indicated)   Feels Unsafe at Home or Work/School no   Feels Threatened by Someone no   Does Anyone Try to Keep You From Having Contact with Others or Doing Things Outside Your Home? no   Physical Signs of Abuse Present no   Clinical Swallow Evaluation   Oral Musculature generally intact   Structural Abnormalities none present   Dentition upper dentures;uses dentures to eat   Mucosal Quality good   Mandibular Strength and Mobility intact   Oral Labial Strength and Mobility WFL   Lingual Strength and Mobility impaired coordination;WFL   Velar Elevation intact   Buccal Strength and Mobility intact   Laryngeal Function Dry swallow palpated;Voicing initiated;Swallow;Throat clear;Cough   Oral Musculature Comments Completed on Clinical Swallow Assessment    VFSS Evaluation   VFSS Additional Documentation Yes   VFSS Eval: Radiology   Radiologist DELVIN MONTIEL MD   Views Taken left lateral   VFSS Eval:  Thin Liquid Texture Trial   Mode of Presentation, Thin Liquid cup;self-fed   Order of Presentation 1   Preparatory Phase WFL   Oral Phase, Thin Liquid WFL   Pharyngeal Phase, Thin Liquid Pharyngeal wall coating;Residue in valleculae;Residue in pyriform sinus   Rosenbek's Penetration Aspiration Scale: Thin Liquid Trial Results 3 - contrast remains above the vocal cords, visible residue remains (penetration)   Response to Aspiration productive volitional cough following clinician cue   Successful Strategies Trialed During Procedure, Thin Liquid chin tuck   Diagnostic Statement Patient demonstrating significant residue in in valleculae, pyriform sinus, and posterior pharyngeal wall coating. Reduced laryngeal movement and reduced tongue base retraction noted. Penetration of residue noted during attempt to clear residue. Patient reports feeling of something stuck in pharyngeal cavity.    VFSS Eval: Nectar Thick Liquid Texture Trial   Mode of Presentation, Nectar self-fed;cup   Order of Presentation 2   Preparatory Phase WFL   Oral Phase, Nectar WFL   Pharyngeal Phase, Nectar Residue in valleculae;Pharyngeal wall coating;Residue in pyriform sinus   Rosenbek's Penetration Aspiration Scale: Nectar-Thick Liquid Trial Results 1 - no aspiration, contrast does not enter airway   Successful Strategies Trialed During Procedure, Nectar chin tuck   Diagnostic Statement Patient tolerating nectar thick liquid with no penetration/aspiration noted. Moderate reside in valleculae, pyriform sinus, and posterior pharyngeal wall coating. Reduced laryngeal movement and reduced tongue base retraction noted. Chin tuck implemented to partially clear residue   VFSS Eval: Puree Solid Texture Trial   Mode of Presentation, Puree spoon;self-fed   Order of Presentation 3   Preparatory Phase WFL   Oral Phase, Puree Premature pharyngeal entry   Pharyngeal Phase, Puree Residue in pyriform sinus;Residue in valleculae  (Significant residue post swallow )    Rosenbek's Penetration Aspiration Scale: Puree Food Trial Results 1 - no aspiration, contrast does not enter airway   Successful Strategies Trialed During Procedure, Puree hard swallow;chin tuck   Diagnostic Statement Patient tolerating puree with no penetration/aspiration noted. Significant residue in valleculae and pyriform sinus. Reduced laryngeal movement, reduced base of tongue retraction. Residue partially cleared with second swallow/chin tuck.    VFSS Eval: Semisolid Texture Trial   Mode of Presentation, Semisolid spoon;self-fed   Order of Presentation 4   Preparatory Phase WFL   Oral Phase, Semisolid WFL   Pharyngeal Phase, Semisolid Residue in pyriform sinus  (Mild )   Rosenbek's Penetration Aspiration Scale: Semisolid Food Trial Results 1 - no aspiration, contrast does not enter airway   Diagnostic Statement Patient tolerating semi-solid texture with no penetration/aspiration. Patient demonstrating mild residue post-swallow. Cleared with secondary swallow   VFSS Eval: Solid Food Texture Trial   Mode of Presentation, Solid self-fed   Order of Presentation 5   Preparatory Phase WFL   Oral Phase, Solid Premature pharyngeal entry   Pharyngeal Phase, Solid Pharyngeal wall coating;Residue in valleculae;Residue in pyriform sinus  (Mild )   Rosenbek's Penetration Aspiration Scale: Solid Food Trial Results 1 - no aspiration, contrast does not enter airway   Successful Strategies Trialed During Procedure, Solid hard swallow   Diagnostic Statement Patient tolerating solid textures with no penetration/aspiration noted. Multiple swallows required to clear residue.    Swallow Compensations   Swallow Compensations Chin tuck;Effortful swallow;Pacing;Reduce amounts;Multiple swallow   Educational Assessment   Barriers to Learning No barriers   Preferred Learning Style Demonstration;Pictures/video   General Therapy Interventions   Planned Therapy Interventions Dysphagia Treatment   Dysphagia treatment Oropharyngeal  exercise training;Modified diet education;Instruction of safe swallow strategies;Compensatory strategies for swallowing   Swallow Eval: Clinical Impressions   Skilled Criteria for Therapy Intervention Skilled criteria met.  Treatment indicated.   Functional Assessment Scale (FAS) 5   Dysphagia Outcome Severity Scale (SHARIF) Level 5 - SHARIF   Treatment Diagnosis Dysphagia    Diet texture recommendations Regular diet;Thin liquids   Recommended Feeding/Eating Techniques alternate between small bites and sips of food/liquid;hard swallow w/ each bite or sip;maintain upright posture during/after eating for 30 mins;small sips/bites  (Chin tuck when needed )   Rehab Potential good, to achieve stated therapy goals   Therapy Frequency other (see comments)  (Bi-weekly )   Predicted Duration of Therapy Intervention (days/wks) 60 days    Anticipated Discharge Disposition home   Risks and Benefits of Treatment have been explained. Yes   Patient, family and/or staff in agreement with Plan of Care Yes   Clinical Impression Comments Patient will benefit from continued SLP services   Swallow Goals   SLP Swallow Goals 1;2;3;4   Swallow Goal 1   Goal Identifier Compensatory    Goal Description Patient will demonstrate the ability to self-monitor swallowing skills and implement appropriate techniques as needed on 9/10 opportunities.    Target Date 07/05/19   Swallow Goal 2   Goal Identifier Exercise    Goal Description Patient will complete swallowing exercises ( Idania Maneuver, Effortful Swallow and Singing Scales) to increase hyolaryngeal excursion and esophageal opening with teach-back at 90% with minimum verbal cues.     Target Date 07/05/19   Swallow Goal 3   Goal Identifier Diet    Goal Description Client will maintain adequate nutrition with optimum safety and efficiency of swallowing function on P.O. intake without overt signs and symptoms of aspiration on the least restrictive diet.    Target Date 07/05/19   Swallow Goal 4    Goal Identifier VFSS    Goal Description Patient will participate in VFSS to further evaluate swallow function.    Target Date 06/05/19   Date Met 05/16/19   Total Session Time   SLP Eval: VideoFluoroscopic Swallow function Minutes (96153) 30   Total Evaluation Time 30   Therapy Certification   Certification date from 05/06/19   Certification date to 07/05/19   Medical Diagnosis Presbyesophagus

## 2019-05-21 NOTE — ADDENDUM NOTE
Encounter addended by: Apple Medina, SLP on: 5/21/2019 2:15 PM   Actions taken: Flowsheet data copied forward, Sign clinical note, Flowsheet accepted, Charge Capture section accepted

## 2019-05-23 DIAGNOSIS — I10 BENIGN ESSENTIAL HYPERTENSION: ICD-10-CM

## 2019-05-24 ENCOUNTER — HOSPITAL ENCOUNTER (OUTPATIENT)
Dept: SPEECH THERAPY | Facility: OTHER | Age: 84
Setting detail: THERAPIES SERIES
End: 2019-05-24
Attending: NURSE PRACTITIONER
Payer: MEDICARE

## 2019-05-24 PROCEDURE — 92526 ORAL FUNCTION THERAPY: CPT | Mod: GN

## 2019-05-24 RX ORDER — LOSARTAN POTASSIUM 25 MG/1
TABLET ORAL
Qty: 180 TABLET | Refills: 2 | Status: SHIPPED | OUTPATIENT
Start: 2019-05-24 | End: 2019-12-02

## 2019-05-24 NOTE — TELEPHONE ENCOUNTER
Refill request for: Losartan 25 mg tablets   From: [facility]   Rx written date: 09/17/2018 #180 with 2 refills  LOV: 05/10/2019 with PCP  Next appt: none noted  Protocol: Angiotensin-II Receptors Passed     LOV reviewed. No changes made to requested medication. Prescription approved per [facility] Refill Protocol. Johanna Spivey RN on 5/24/2019 at 2:14 PM

## 2019-06-05 ENCOUNTER — HOSPITAL ENCOUNTER (OUTPATIENT)
Dept: SPEECH THERAPY | Facility: OTHER | Age: 84
Setting detail: THERAPIES SERIES
End: 2019-06-05
Attending: NURSE PRACTITIONER
Payer: MEDICARE

## 2019-06-05 PROCEDURE — 92526 ORAL FUNCTION THERAPY: CPT | Mod: GN

## 2019-06-05 NOTE — PROGRESS NOTES
Outpatient Speech Language Pathology Discharge Note     Patient: Sylvie Bautista  : 1934    Beginning/End Dates of Reporting Period:  2019 to 2019    Referring Provider: Mirella Aly NP    Therapy Diagnosis: Presbyesophagus; Dysphagia     Client Self Report:   Mireya reports she feels she is ready to discharge with the use of a home program at this time. Mireya is seeing small improvements in swallow function and would like to continue with her home program.     Objective Measurements:   Objective Measures: Objective Measure 1, Objective Measure 2, Objective Measure 3, Objective Measure 4  Objective Measure: Compensatory   Details: (P) Mireya reports using chin tuck, pacing and reduction of bolus size. Mireya reports using these strategies at home with no cues.   Objective Measure: Exercise   Details: (P) Completing all exercises with modified independence, extra time. Mireya reports she attempts to complete all exercises at home.   Objective Measure: Diet   Details: (P) Mireya reports no significant changes in weight. Tolerating regular diet and thin liquids with use of compensatory strategies. Reduction in coughin up foods.   Objective Measure: VFSS   Details: Completed on       Goals:  Goal Identifier Compensatory    Goal Description Patient will demonstrate the ability to self-monitor swallowing skills and implement appropriate techniques as needed on 9/10 opportunities.    Target Date 19   Date Met  19   Progress: Goal Met.      Goal Identifier Exercise    Goal Description Patient will complete swallowing exercises ( Idania Maneuver, Effortful Swallow and Singing Scales) to increase hyolaryngeal excursion and esophageal opening with teach-back at 90% with minimum verbal cues.     Target Date 19   Date Met  19   Progress:Goal Met.      Goal Identifier Diet    Goal Description Client will maintain adequate nutrition with optimum safety and efficiency of  swallowing function on P.O. intake without overt signs and symptoms of aspiration on the least restrictive diet.    Target Date 07/05/19   Date Met  06/05/19   Progress:Goal Met.      Goal Identifier VFSS    Goal Description Patient will participate in VFSS to further evaluate swallow function.    Target Date 06/05/19   Date Met  05/16/19   Progress:Goal Met.        Progress Toward Goals:   Progress this reporting period: Patient has met all goals and demonstrates ability to independently continue home program.  Plan:  Discharge from therapy.    Discharge:    Reason for Discharge: Patient has met all goals.  Discharge Plan: Patient to continue home program.

## 2019-06-10 DIAGNOSIS — E03.9 HYPOTHYROIDISM, UNSPECIFIED TYPE: ICD-10-CM

## 2019-06-10 RX ORDER — LEVOTHYROXINE SODIUM 112 UG/1
112 TABLET ORAL DAILY
Qty: 90 TABLET | Refills: 2 | Status: SHIPPED | OUTPATIENT
Start: 2019-06-10 | End: 2020-02-24

## 2019-06-10 NOTE — TELEPHONE ENCOUNTER
"Requested Prescriptions   Pending Prescriptions Disp Refills     levothyroxine (SYNTHROID/LEVOTHROID) 112 MCG tablet 90 tablet 0     Sig: Take 1 tablet (112 mcg) by mouth daily       Thyroid Protocol Passed - 6/10/2019  9:35 AM        Passed - Patient is 12 years or older        Passed - Recent (12 mo) or future (30 days) visit within the authorizing provider's specialty     Patient had office visit in the last 12 months or has a visit in the next 30 days with authorizing provider or within the authorizing provider's specialty.  See \"Patient Info\" tab in inbasket, or \"Choose Columns\" in Meds & Orders section of the refill encounter.              Passed - Medication is active on med list        Passed - Normal TSH on file in past 12 months     No lab results found.           Passed - No active pregnancy on record     If patient is pregnant or has had a positive pregnancy test, please check TSH.          Passed - No positive pregnancy test in past 12 months     If patient is pregnant or has had a positive pregnancy test, please check TSH.          Prescription approved per [facility] Refill Protocol.    "

## 2019-06-19 ENCOUNTER — OFFICE VISIT (OUTPATIENT)
Dept: INTERNAL MEDICINE | Facility: OTHER | Age: 84
End: 2019-06-19
Attending: NURSE PRACTITIONER
Payer: MEDICARE

## 2019-06-19 ENCOUNTER — HOSPITAL ENCOUNTER (OUTPATIENT)
Dept: MAMMOGRAPHY | Facility: OTHER | Age: 84
Discharge: HOME | End: 2019-06-19
Attending: NURSE PRACTITIONER | Admitting: NURSE PRACTITIONER
Payer: MEDICARE

## 2019-06-19 VITALS
OXYGEN SATURATION: 95 % | SYSTOLIC BLOOD PRESSURE: 138 MMHG | TEMPERATURE: 96.6 F | HEART RATE: 60 BPM | DIASTOLIC BLOOD PRESSURE: 80 MMHG | BODY MASS INDEX: 23.66 KG/M2 | WEIGHT: 142 LBS | RESPIRATION RATE: 16 BRPM

## 2019-06-19 DIAGNOSIS — K58.2 IRRITABLE BOWEL SYNDROME WITH BOTH CONSTIPATION AND DIARRHEA: ICD-10-CM

## 2019-06-19 DIAGNOSIS — R63.4 WEIGHT LOSS: ICD-10-CM

## 2019-06-19 DIAGNOSIS — K22.89 PRESBYESOPHAGUS: Primary | ICD-10-CM

## 2019-06-19 DIAGNOSIS — M54.50 ACUTE LEFT-SIDED LOW BACK PAIN WITHOUT SCIATICA: ICD-10-CM

## 2019-06-19 DIAGNOSIS — Z12.39 BREAST SCREENING, UNSPECIFIED: ICD-10-CM

## 2019-06-19 PROCEDURE — G0463 HOSPITAL OUTPT CLINIC VISIT: HCPCS

## 2019-06-19 PROCEDURE — 99213 OFFICE O/P EST LOW 20 MIN: CPT | Performed by: NURSE PRACTITIONER

## 2019-06-19 PROCEDURE — G0463 HOSPITAL OUTPT CLINIC VISIT: HCPCS | Mod: 25

## 2019-06-19 PROCEDURE — 77063 BREAST TOMOSYNTHESIS BI: CPT

## 2019-06-19 ASSESSMENT — PAIN SCALES - GENERAL: PAINLEVEL: NO PAIN (0)

## 2019-06-19 NOTE — NURSING NOTE
Chief Complaint   Patient presents with     RECHECK     PT and test results       Medication Reconciliation: complete  Gayla Carrasquillo LPN  ..................6/19/2019   10:29 AM

## 2019-06-19 NOTE — PROGRESS NOTES
Subjective:  She is here today for follow-up.  She has presbycusis and has been doing home therapy.  She was seen by the speech therapist and had a video swallow.  This did show swelling difficulties with one episode of penetration.  She is never had aspiration pneumonia.  She is not coughing with meals.  She has been checking her weight at home and it is not as high as it is today in the clinic and she feels today's weight in clinic is an accurate however she tells me that her weight has been stable at home and she is not losing weight any longer.  She is careful with what she eats but does not feel that the swallowing issues are getting any worse.  She also has left-sided low back pain and that has been pretty stable.  She does daily exercises that were prescribed by physical therapy.  She has irritable bowel with constipation and diarrhea which is going well using the Citrucel.  She had one episode of loose stools last week but significantly better than her usual.  Had a a mammogram this morning.    Patient Active Problem List   Diagnosis     Acid reflux disease     CAP (community acquired pneumonia)     Chronic kidney disease, stage III (moderate) (H)     Cigarette smoker     Diarrhea     Essential hypertension     Hypothyroidism     Insomnia     Iron deficiency anemia     Irritable bowel syndrome     Liver abscess     Low folic acid     Mononeuritis     Hereditary and idiopathic peripheral neuropathy     Nontoxic multinodular goiter     Osteopenia     Raynaud phenomenon     Tinea pedis     Acute left-sided low back pain without sciatica     Segmental and somatic dysfunction of sacral region     Segmental and somatic dysfunction of pelvic region     Postmenopausal atrophic vaginitis     Presbyesophagus     Past Medical History:   Diagnosis Date     Asymptomatic menopausal state     DXA 10/27/06 normal     Calculus of kidney     1990,s/p lithotripsy     Chronic kidney disease, stage III (moderate) (H)      7/27/2011     Diarrhea     11/6/2014     Dysphagia     2007,EGD 3/14/07 nl; sx from goiter.     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     2004,on Tapazole     Past Surgical History:   Procedure Laterality Date     APPENDECTOMY OPEN      1979,,& Meckel's diverticulum removed     COLONOSCOPY      11/17/06,polyps; repeat in 5 YEARS     COLONOSCOPY      11/8/11     COLONOSCOPY      11/6/2014     ESOPHAGOSCOPY, GASTROSCOPY, DUODENOSCOPY (EGD), COMBINED      3/14/07,done for dysphagia; normal     ESOPHAGOSCOPY, GASTROSCOPY, DUODENOSCOPY (EGD), COMBINED N/A 6/7/2018    Procedure: COMBINED ESOPHAGOSCOPY, GASTROSCOPY, DUODENOSCOPY (EGD);  Gastroscopy;  Surgeon: Bisi Rivera MD;  Location: [facility] OR     HYSTERECTOMY TOTAL ABDOMINAL, BILATERAL SALPINGO-OOPHORECTOMY, COMBINED      1979     LAPAROSCOPIC CHOLECYSTECTOMY      1991,common bile duct transected     OTHER SURGICAL HISTORY      4/05,[id number],PREMALIG/BENIGN SKIN LESION EXCISION,epidermal inclusion cyst     OTHER SURGICAL HISTORY      10/26/2015,[id number],FOOT SURGERY,hammertoe repair, [facility] with Dr. Grimaldo     OTHER SURGICAL HISTORY      860901,IR BILIARY STRICTURE DILATATION WO STENT,x3- per h/p /Choledochojejunostomy with Vane-En-Y due to transection of common bile duct     THYROIDECTOMY      3/26/08,Total thyroidectomy; multinodular goiter     Social History     Socioeconomic History     Marital status:      Spouse name: Not on file     Number of children: Not on file     Years of education: Not on file     Highest education level: Not on  file   Occupational History     Not on file   Social Needs     Financial resource strain: Not on file     Food insecurity:     Worry: Not on file     Inability: Not on file     Transportation needs:     Medical: Not on file     Non-medical: Not on file   Tobacco Use     Smoking status: Current Every Day Smoker     Packs/day: 0.50     Years: 30.00     Pack years: 15.00     Types: Cigarettes     Smokeless tobacco: Never Used     Tobacco comment: Quit smokin/2 - 3/4 ppd; not interested in quitting at this time.   Substance and Sexual Activity     Alcohol use: No     Alcohol/week: 0.0 oz     Drug use: No     Sexual activity: Not Currently     Partners: Male   Lifestyle     Physical activity:     Days per week: Not on file     Minutes per session: Not on file     Stress: Not on file   Relationships     Social connections:     Talks on phone: Not on file     Gets together: Not on file     Attends Mandaeism service: Not on file     Active member of club or organization: Not on file     Attends meetings of clubs or organizations: Not on file     Relationship status: Not on file     Intimate partner violence:     Fear of current or ex partner: Not on file     Emotionally abused: Not on file     Physically abused: Not on file     Forced sexual activity: Not on file   Other Topics Concern     Not on file   Social History Narrative    ; 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; 4 children; Retired RN     Family History   Problem Relation Age of Onset     Other - See Comments Mother          87yo, emphysema, dementia     Heart Disease Father         Heart Disease,MI in 50s,  64yo     Diabetes Father         Diabetes     Substance Abuse Father         Alcohol/Drug,Etoh     Diabetes Daughter         Diabetes     Other - See Comments Daughter         Psychiatric illness,depr/anxiety     Thyroid Disease Sister         Thyroid Disease     Breast Cancer No family hx of         Cancer-breast     Current Outpatient Medications   Medication Sig  Dispense Refill     acetaminophen (TYLENOL) 650 MG CR tablet Take 1,300 mg by mouth       ascorbic acid (VITAMIN C) 1000 MG TABS 1 tablet oral daily       Blood Pressure Monitor MISC As directed. OMRON       carboxymethylcellulose (CARBOXYMETHYLCELLULOSE SODIUM) 0.5 % SOLN ophthalmic solution 1 drop both eyes once daily 1 Bottle      estradiol (ESTRACE) 0.1 MG/GM cream INSERT 2GM INTO THE VAGINA EVERY MONDAY AND FRIDAY 42.5 g 11     folic acid (FOLVITE) 1 MG tablet Take 1 tablet (1 mg) by mouth daily 30 tablet      gabapentin (NEURONTIN) 300 MG capsule TAKE 1 CAPSULE BY MOUTH THREE TIMES DAILY 270 capsule 3     levothyroxine (SYNTHROID/LEVOTHROID) 112 MCG tablet Take 1 tablet (112 mcg) by mouth daily 90 tablet 2     loratadine (CLARITIN) 10 MG tablet Take 1 tablet (10 mg) by mouth daily 30 tablet      losartan (COZAAR) 25 MG tablet TAKE 2 TABLETS BY MOUTH ONCE DAILY 180 tablet 2     methylcellulose (CITRUCEL) 500 MG TABS tablet Take 1 tablet (500 mg) by mouth daily 14 each 0     Multiple Vitamins-Minerals (OCUVITE ADULT 50+) CAPS 1 tablet by mouth once daily.       order for DME Equipment being ordered: wheeled walker with seat 1 Units 0     pantoprazole (PROTONIX) 20 MG EC tablet TAKE 1 TABLET BY MOUTH ONCE DAILY 90 tablet 1     Sodium Fluoride (SF 5000 PLUS) 1.1 % CREA        zolpidem (AMBIEN) 5 MG tablet TAKE 1 TABLET BY MOUTH NIGHTLY AT BEDTIME AS NEEDED FOR SLEEP 30 tablet 5     Patient has no known allergies.      Review of Systems:  Review of Systems  See HPI  Objective:   /80 (BP Location: Right arm, Patient Position: Sitting, Cuff Size: Adult Regular)   Pulse 60   Temp 96.6  F (35.9  C) (Tympanic)   Resp 16   Wt 64.4 kg (142 lb)   SpO2 95%   BMI 23.66 kg/m    Physical Exam  Pleasant female no acute distress affect normal alert and oriented x4 skin color pink mucous memories moist.  Lung fields clear to auscultation.  Cardiovascular regular rate and rhythm.  Viewed and discussed mammogram results  and consider follow-up mammogram in 1-2 years.    Assessment:    ICD-10-CM    1. Presbyesophagus K22.8    2. Weight loss R63.4    3. Acute left-sided low back pain without sciatica M54.5    4. Irritable bowel syndrome with both constipation and diarrhea K58.2        Plan:   1.  Continue with therapy as recommended by speech therapy and also physical therapy.  If she starts having problems with coughing or choking with meals or aspiration pneumonia we need to evaluate this further.  2.  Weight is stable.  Keep track of her weight at home.  If she starts losing weight again we will need to assess this further as discussed at last appointment.  3.  Continue with the Citrucel daily for irritable bowel syndrome.    MARA Mullins   6/19/2019  11:15 AM

## 2019-06-28 ENCOUNTER — OFFICE VISIT (OUTPATIENT)
Dept: INTERNAL MEDICINE | Facility: OTHER | Age: 84
End: 2019-06-28
Attending: NURSE PRACTITIONER
Payer: MEDICARE

## 2019-06-28 VITALS
HEART RATE: 64 BPM | RESPIRATION RATE: 20 BRPM | TEMPERATURE: 97.7 F | SYSTOLIC BLOOD PRESSURE: 136 MMHG | DIASTOLIC BLOOD PRESSURE: 72 MMHG

## 2019-06-28 DIAGNOSIS — L57.0 ACTINIC KERATOSIS: Primary | ICD-10-CM

## 2019-06-28 PROCEDURE — 17000 DESTRUCT PREMALG LESION: CPT | Performed by: NURSE PRACTITIONER

## 2019-06-28 PROCEDURE — G0463 HOSPITAL OUTPT CLINIC VISIT: HCPCS

## 2019-06-28 ASSESSMENT — PAIN SCALES - GENERAL: PAINLEVEL: NO PAIN (0)

## 2019-06-28 NOTE — NURSING NOTE
"Chief Complaint   Patient presents with     Derm Problem     brown spot near left eye       Initial /72   Pulse 64   Temp 97.7  F (36.5  C) (Tympanic)   Resp 20   Breastfeeding? No  Estimated body mass index is 23.66 kg/m  as calculated from the following:    Height as of 5/10/19: 1.65 m (5' 4.96\").    Weight as of 6/19/19: 64.4 kg (142 lb).  Medication Reconciliation: Completed     Aarti Salazar LPN  "

## 2019-06-28 NOTE — PROGRESS NOTES
Subjective:  She is here today for skin lesion on her face.  She noticed this last week.  It is raised and does itch.  She was concerned and thought she should have it evaluated.  It does not bleed and there is been no drainage.  It is not surrounded by redness and is not warm.    Patient Active Problem List   Diagnosis     Acid reflux disease     CAP (community acquired pneumonia)     Chronic kidney disease, stage III (moderate) (H)     Cigarette smoker     Diarrhea     Essential hypertension     Hypothyroidism     Insomnia     Iron deficiency anemia     Irritable bowel syndrome     Liver abscess     Low folic acid     Mononeuritis     Hereditary and idiopathic peripheral neuropathy     Nontoxic multinodular goiter     Osteopenia     Raynaud phenomenon     Tinea pedis     Acute left-sided low back pain without sciatica     Segmental and somatic dysfunction of sacral region     Segmental and somatic dysfunction of pelvic region     Postmenopausal atrophic vaginitis     Presbyesophagus     Past Medical History:   Diagnosis Date     Asymptomatic menopausal state     DXA 10/27/06 normal     Calculus of kidney     1990,s/p lithotripsy     Chronic kidney disease, stage III (moderate) (H)     7/27/2011     Diarrhea     11/6/2014     Dysphagia     2007,EGD 3/14/07 nl; sx from goiter.     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     2004,on Tapazole     Past Surgical History:   Procedure Laterality Date     APPENDECTOMY OPEN      1979,,& Meckel's diverticulum removed     COLONOSCOPY      11/17/06,polyps; repeat in 5  YEARS     COLONOSCOPY      11     COLONOSCOPY      2014     ESOPHAGOSCOPY, GASTROSCOPY, DUODENOSCOPY (EGD), COMBINED      3/14/07,done for dysphagia; normal     ESOPHAGOSCOPY, GASTROSCOPY, DUODENOSCOPY (EGD), COMBINED N/A 2018    Procedure: COMBINED ESOPHAGOSCOPY, GASTROSCOPY, DUODENOSCOPY (EGD);  Gastroscopy;  Surgeon: Bisi Rivera MD;  Location: [facility] OR     HYSTERECTOMY TOTAL ABDOMINAL, BILATERAL SALPINGO-OOPHORECTOMY, COMBINED           LAPAROSCOPIC CHOLECYSTECTOMY      ,common bile duct transected     OTHER SURGICAL HISTORY      ,[id number],PREMALIG/BENIGN SKIN LESION EXCISION,epidermal inclusion cyst     OTHER SURGICAL HISTORY      10/26/2015,[id number],FOOT SURGERY,hammertoe repair, [facility] with Dr. Grimaldo     OTHER SURGICAL HISTORY      472001,IR BILIARY STRICTURE DILATATION WO STENT,x3- per h/p /Choledochojejunostomy with Vane-En-Y due to transection of common bile duct     THYROIDECTOMY      3/26/08,Total thyroidectomy; multinodular goiter     Social History     Socioeconomic History     Marital status:      Spouse name: Not on file     Number of children: Not on file     Years of education: Not on file     Highest education level: Not on file   Occupational History     Not on file   Social Needs     Financial resource strain: Not on file     Food insecurity:     Worry: Not on file     Inability: Not on file     Transportation needs:     Medical: Not on file     Non-medical: Not on file   Tobacco Use     Smoking status: Current Every Day Smoker     Packs/day: 0.50     Years: 30.00     Pack years: 15.00     Types: Cigarettes     Smokeless tobacco: Never Used     Tobacco comment: Quit smokin/2 - 3 ppd; not interested in quitting at this time.   Substance and Sexual Activity     Alcohol use: No     Alcohol/week: 0.0 oz     Drug use: No     Sexual activity: Not Currently     Partners: Male   Lifestyle     Physical activity:     Days per week: Not on file      Minutes per session: Not on file     Stress: Not on file   Relationships     Social connections:     Talks on phone: Not on file     Gets together: Not on file     Attends Voodoo service: Not on file     Active member of club or organization: Not on file     Attends meetings of clubs or organizations: Not on file     Relationship status: Not on file     Intimate partner violence:     Fear of current or ex partner: Not on file     Emotionally abused: Not on file     Physically abused: Not on file     Forced sexual activity: Not on file   Other Topics Concern     Not on file   Social History Narrative    ; 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; 4 children; Retired RN     Family History   Problem Relation Age of Onset     Other - See Comments Mother          87yo, emphysema, dementia     Heart Disease Father         Heart Disease,MI in 50s,  62yo     Diabetes Father         Diabetes     Substance Abuse Father         Alcohol/Drug,Etoh     Diabetes Daughter         Diabetes     Other - See Comments Daughter         Psychiatric illness,depr/anxiety     Thyroid Disease Sister         Thyroid Disease     Breast Cancer No family hx of         Cancer-breast     Current Outpatient Medications   Medication Sig Dispense Refill     acetaminophen (TYLENOL) 650 MG CR tablet Take 1,300 mg by mouth       ascorbic acid (VITAMIN C) 1000 MG TABS 1 tablet oral daily       Blood Pressure Monitor MISC As directed. OMRON       carboxymethylcellulose (CARBOXYMETHYLCELLULOSE SODIUM) 0.5 % SOLN ophthalmic solution 1 drop both eyes once daily 1 Bottle      estradiol (ESTRACE) 0.1 MG/GM cream INSERT 2GM INTO THE VAGINA EVERY MONDAY AND FRIDAY 42.5 g 11     folic acid (FOLVITE) 1 MG tablet Take 1 tablet (1 mg) by mouth daily 30 tablet      gabapentin (NEURONTIN) 300 MG capsule TAKE 1 CAPSULE BY MOUTH THREE TIMES DAILY 270 capsule 3     levothyroxine (SYNTHROID/LEVOTHROID) 112 MCG tablet Take 1 tablet (112 mcg) by mouth daily 90 tablet 2     loratadine  (CLARITIN) 10 MG tablet Take 1 tablet (10 mg) by mouth daily 30 tablet      losartan (COZAAR) 25 MG tablet TAKE 2 TABLETS BY MOUTH ONCE DAILY 180 tablet 2     methylcellulose (CITRUCEL) 500 MG TABS tablet Take 1 tablet (500 mg) by mouth daily 14 each 0     Multiple Vitamins-Minerals (OCUVITE ADULT 50+) CAPS 1 tablet by mouth once daily.       order for DME Equipment being ordered: wheeled walker with seat 1 Units 0     pantoprazole (PROTONIX) 20 MG EC tablet TAKE 1 TABLET BY MOUTH ONCE DAILY 90 tablet 1     Sodium Fluoride (SF 5000 PLUS) 1.1 % CREA        zolpidem (AMBIEN) 5 MG tablet TAKE 1 TABLET BY MOUTH NIGHTLY AT BEDTIME AS NEEDED FOR SLEEP 30 tablet 5     Patient has no known allergies.      Review of Systems:  Review of Systems  See HPI  Objective:   /72   Pulse 64   Temp 97.7  F (36.5  C) (Tympanic)   Resp 20   Breastfeeding? No   Physical Exam  Pleasant female no acute distress.  Left cheek has a raised brown 0.5 cm in length lesion that is dry and scaly with no underlying ulceration.  Consistent with actinic keratosis.  After permission granted cryotherapy used to treat lesion x3 and well-tolerated.  Assessment:    ICD-10-CM    1. Actinic keratosis L57.0 DESTRUCT PREMALIGNANT LESION, FIRST       Plan:   Hygienic measures discussed.  Monitor closely for signs and symptoms of infection.  Lesion should resolve within the next 2 to 3 weeks and if not should follow-up for repeat treatment.    MARA Mullins   6/28/2019  1:09 PM

## 2019-07-17 ENCOUNTER — TELEPHONE (OUTPATIENT)
Dept: INTERNAL MEDICINE | Facility: OTHER | Age: 84
End: 2019-07-17

## 2019-07-17 DIAGNOSIS — G47.00 INSOMNIA, UNSPECIFIED TYPE: ICD-10-CM

## 2019-07-17 RX ORDER — ZOLPIDEM TARTRATE 5 MG/1
TABLET ORAL
Qty: 30 TABLET | Refills: 5 | Status: SHIPPED | OUTPATIENT
Start: 2019-07-17 | End: 2020-01-08

## 2019-07-17 NOTE — TELEPHONE ENCOUNTER
Pt Has questions regarding her ambian medication that she is taking. States she asked the pharmacy but they would not help her.

## 2019-07-22 NOTE — PROGRESS NOTES
Outpatient Physical Therapy Discharge Note     Patient: Sylvie Bautista  : 1934    Beginning/End Dates of Reporting Period:  3/1/19 to 2019    Referring Provider: Mirella Aly NP    Therapy Diagnosis: L greater trochanteric bursitis      Client Self Report: Still hurting in L greater trochanter area.  F/u with [facility]other in May looking forward to another injection.  Still pain getting up from chair, once she's up pain is better and as day goes on pain better. Getting in some of HEP each day, got a ball for hip add, got a electronic stepper but hasn't tried yet.     2019 haven't heard back for further PT, will discharge at this time.       Objective Measurements:  Objective Measure: pain L greater trochanter  Details: morning and getting up from chair 6/10 down to 1-2/10 at end of day          Goals:  Goal Identifier transfers   Goal Description Pt able to get up from chair with minimal to no pain in 8 weeks.    Target Date 19   Date Met      Progress:     Goal Identifier strength   Goal Description Pt increase strength to be able to get up to 20 reps or more of HEP in 8 weeks.    Target Date 19   Date Met      Progress:     Goal Identifier HEP   Goal Description Pt able to manage residual sx with HEP in 8 weeks.    Target Date 19   Date Met      Progress:     Progress Toward Goals:   Progress this reporting period: gains, but not met          Plan:  Discharge from therapy.    Discharge:    Reason for Discharge: No further expectation of progress.    Equipment Issued: na    Discharge Plan: Patient to continue home program.

## 2019-07-22 NOTE — ADDENDUM NOTE
Encounter addended by: Darling Akbar, PT on: 7/22/2019 8:48 AM   Actions taken: Sign clinical note, Flowsheet accepted, Episode resolved

## 2019-08-06 ENCOUNTER — OFFICE VISIT (OUTPATIENT)
Dept: ORTHOPEDICS | Facility: OTHER | Age: 84
End: 2019-08-06
Attending: ORTHOPAEDIC SURGERY
Payer: MEDICARE

## 2019-08-06 DIAGNOSIS — Z00.00 ROUTINE GENERAL MEDICAL EXAMINATION AT A HEALTH CARE FACILITY: Primary | ICD-10-CM

## 2019-08-06 PROCEDURE — G0463 HOSPITAL OUTPT CLINIC VISIT: HCPCS

## 2019-08-12 ENCOUNTER — TELEPHONE (OUTPATIENT)
Dept: ORTHOPEDICS | Facility: OTHER | Age: 84
End: 2019-08-12

## 2019-08-12 DIAGNOSIS — M48.061 LUMBAR SPINAL STENOSIS: Primary | ICD-10-CM

## 2019-08-12 NOTE — PROGRESS NOTES
CHIEF COMPLAINT: Left Hip Pain Recheck     PROBLEMS:  Pain in left hip (ICD-719.45) ([id number])  Pain in right hip (ICD-719.45) ([id number])    PATIENT REPORTED MEDICATIONS:  FOLIC ACID 1 MG ORAL TABLET (FOLIC ACID) 1 tablet by mouth every day; Route: ORAL  TYLENOL EXTRA STRENGTH TABLET (ACETAMINOPHEN TABS)   ZOLPIDEM TARTRATE TABLET (ZOLPIDEM TARTRATE TABS)   GABAPENTIN 300 MG ORAL CAPSULE (GABAPENTIN) ; Route: ORAL  PANTOPRAZOLE SODIUM 20 MG ORAL TABLET DELAYED RELEASE (PANTOPRAZOLE SODIUM) 1 tablet by mouth one time a day; Route: ORAL  LOSARTAN POTASSIUM 25 MG ORAL TABLET (LOSARTAN POTASSIUM) ; Route: ORAL  LEVOTHYROXINE SODIUM TABLET (LEVOTHYROXINE SODIUM TABS)     PATIENT REPORTED ALLERGIES:   Patient has no noted allergies.      HISTORY OF PRESENT ILLNESS:    REASON FOR EVALUATION:  Left hip pain.    HISTORY OF PRESENT ILLNESS:  Mireya comes in with regard to her left hip.  She is looking for an injection at this time.  Last injection was done three months back.     PAST MEDICAL HISTORY:    Hypertension  Hypothyroidism    PAST ORTHOPEDIC SURGICAL HISTORY:    Right Foot Multiple Tenotomies 10/26/15    PAST SURGICAL HISTORY:    Cholecystectomy  Repair Common Bile Duct  Hysterectomy  Thyroidectomy    FAMILY HISTORY:    Father: Heart Disease  Mother: COPD    SOCIAL HISTORY:     Retired RN  Alcohol Use:  Never  Tobacco Use:  Yes, 1/2 PPD  Secondhand Smoke:  No  Blood Transfusion:  No  HIV/Hepatitis:  No  IV Drug Use:  No    PHYSICAL EXAMINATION:    Examination of her hip shows tenderness across the trochanteric bursa.  Mild swelling is seen there, as well.  Does get a little bit of pain with straight leg raise.  Neurovascular examination is otherwise intact.      ASSESSMENT:    IMPRESSION:  Left hip trochanteric bursitis.     PROCEDURES:   Risks and benefits of the procedure were reviewed with the patient. Informed consent was obtained. Blood sugar risks for our diabetic patients were also  discussed.    After achieving informed consent and sterile preparation, the patient's left hip trochanteric bursa is injected with 4 cc 1% lidocaine and 40 mg Kenalog under sterile conditions.  The patient did tolerate the procedure well.      PLAN:   Injection as stated above, repeat in the future as appropriate and necessary.  Certainly a portion of this could be coming from her back, but we will keep a watchful eye on this at this point in time.      Dictated by:  Nik Herrera MD  Copy to:  MARA Cole     D:  08/06/19  T:  08/09/19    Typed and/or reviewed and corrected by signing  below, and sent to the Physician for final review and signature.      This report was created using voice recording software and computer-generated templates. Although every effort has been made to review for and eliminate errors, some errors may still occur.         Electronically signed by Jessica Mar on 08/09/2019 at 11:44 AM    Electronically signed by Nik Herrera MD on 08/11/2019 at 9:29 AM  ________________________________________________________________________

## 2019-08-12 NOTE — TELEPHONE ENCOUNTER
Hip injection on 8/6 did not help.  Would like to discuss what next step would be.    Thanks,  Verona Wiggins on 8/12/2019 at 9:31 AM

## 2019-08-14 ENCOUNTER — HOSPITAL ENCOUNTER (OUTPATIENT)
Dept: MRI IMAGING | Facility: OTHER | Age: 84
Discharge: HOME | End: 2019-08-14
Attending: ORTHOPAEDIC SURGERY | Admitting: ORTHOPAEDIC SURGERY
Payer: MEDICARE

## 2019-08-14 DIAGNOSIS — M48.061 LUMBAR SPINAL STENOSIS: ICD-10-CM

## 2019-08-14 PROCEDURE — 72148 MRI LUMBAR SPINE W/O DYE: CPT

## 2019-08-18 ENCOUNTER — TRANSFERRED RECORDS (OUTPATIENT)
Dept: HEALTH INFORMATION MANAGEMENT | Facility: OTHER | Age: 84
End: 2019-08-18

## 2019-08-19 DIAGNOSIS — M79.605 LEFT LEG PAIN: ICD-10-CM

## 2019-08-19 DIAGNOSIS — M48.061 SPINAL STENOSIS OF LUMBAR REGION, UNSPECIFIED WHETHER NEUROGENIC CLAUDICATION PRESENT: Primary | ICD-10-CM

## 2019-08-21 ENCOUNTER — HOSPITAL ENCOUNTER (OUTPATIENT)
Dept: GENERAL RADIOLOGY | Facility: OTHER | Age: 84
Discharge: HOME | End: 2019-08-21
Attending: ORTHOPAEDIC SURGERY | Admitting: FAMILY MEDICINE
Payer: MEDICARE

## 2019-08-21 DIAGNOSIS — M48.061 SPINAL STENOSIS OF LUMBAR REGION, UNSPECIFIED WHETHER NEUROGENIC CLAUDICATION PRESENT: ICD-10-CM

## 2019-08-21 DIAGNOSIS — M79.605 LEFT LEG PAIN: ICD-10-CM

## 2019-08-21 PROCEDURE — 25500064 ZZH RX 255 OP 636: Performed by: RADIOLOGY

## 2019-08-21 PROCEDURE — 62323 NJX INTERLAMINAR LMBR/SAC: CPT

## 2019-08-21 PROCEDURE — 25000128 H RX IP 250 OP 636: Performed by: RADIOLOGY

## 2019-08-21 PROCEDURE — 25000125 ZZHC RX 250: Performed by: RADIOLOGY

## 2019-08-21 RX ORDER — LIDOCAINE HYDROCHLORIDE 10 MG/ML
2 INJECTION, SOLUTION EPIDURAL; INFILTRATION; INTRACAUDAL; PERINEURAL ONCE
Status: COMPLETED | OUTPATIENT
Start: 2019-08-21 | End: 2019-08-21

## 2019-08-21 RX ORDER — METHYLPREDNISOLONE ACETATE 80 MG/ML
80 INJECTION, SUSPENSION INTRA-ARTICULAR; INTRALESIONAL; INTRAMUSCULAR; SOFT TISSUE ONCE
Status: COMPLETED | OUTPATIENT
Start: 2019-08-21 | End: 2019-08-21

## 2019-08-21 RX ADMIN — LIDOCAINE HYDROCHLORIDE 2 ML: 10 INJECTION, SOLUTION EPIDURAL; INFILTRATION; INTRACAUDAL; PERINEURAL at 14:35

## 2019-08-21 RX ADMIN — Medication 2 ML: at 14:34

## 2019-08-21 RX ADMIN — LIDOCAINE HYDROCHLORIDE 2 ML: 10 INJECTION, SOLUTION EPIDURAL; INFILTRATION; INTRACAUDAL; PERINEURAL at 14:34

## 2019-08-21 RX ADMIN — METHYLPREDNISOLONE ACETATE 80 MG: 80 INJECTION, SUSPENSION INTRA-ARTICULAR; INTRALESIONAL; INTRAMUSCULAR; SOFT TISSUE at 14:34

## 2019-09-03 DIAGNOSIS — K21.9 GASTROESOPHAGEAL REFLUX DISEASE, ESOPHAGITIS PRESENCE NOT SPECIFIED: ICD-10-CM

## 2019-09-04 ENCOUNTER — TRANSFERRED RECORDS (OUTPATIENT)
Dept: HEALTH INFORMATION MANAGEMENT | Facility: OTHER | Age: 84
End: 2019-09-04

## 2019-09-05 RX ORDER — PANTOPRAZOLE SODIUM 20 MG/1
TABLET, DELAYED RELEASE ORAL
Qty: 90 TABLET | Refills: 2 | Status: SHIPPED | OUTPATIENT
Start: 2019-09-05 | End: 2020-05-22

## 2019-09-05 NOTE — TELEPHONE ENCOUNTER
"Requested Prescriptions   Pending Prescriptions Disp Refills     pantoprazole (PROTONIX) 20 MG EC tablet [Pharmacy Med Name: PANTOPRAZOLE 20MG DR TABLET] 90 tablet 1     Sig: TAKE 1 TABLET BY MOUTH ONCE DAILY       PPI Protocol Passed - 9/3/2019  1:08 AM        Passed - Not on Clopidogrel (unless Pantoprazole ordered)        Passed - No diagnosis of osteoporosis on record        Passed - Recent (12 mo) or future (30 days) visit within the authorizing provider's specialty     Patient had office visit in the last 12 months or has a visit in the next 30 days with authorizing provider or within the authorizing provider's specialty.  See \"Patient Info\" tab in inbasket, or \"Choose Columns\" in Meds & Orders section of the refill encounter.              Passed - Medication is active on med list        Passed - Patient is age 18 or older        Passed - No active pregnacy on record        Passed - No positive pregnancy test in past 12 months      LOV -6/28/2019 with no changes in this medication.  Prescription refilled per RN Medication RefillPolicy.................... Dee Clark RN ....................  9/5/2019   12:08 PM        "

## 2019-10-04 ENCOUNTER — ALLIED HEALTH/NURSE VISIT (OUTPATIENT)
Dept: FAMILY MEDICINE | Facility: OTHER | Age: 84
End: 2019-10-04
Attending: NURSE PRACTITIONER
Payer: MEDICARE

## 2019-10-04 DIAGNOSIS — Z23 NEED FOR PROPHYLACTIC VACCINATION AND INOCULATION AGAINST INFLUENZA: Primary | ICD-10-CM

## 2019-10-04 PROCEDURE — 90662 IIV NO PRSV INCREASED AG IM: CPT

## 2019-10-04 PROCEDURE — 90471 IMMUNIZATION ADMIN: CPT

## 2019-10-21 DIAGNOSIS — G60.9 HEREDITARY AND IDIOPATHIC PERIPHERAL NEUROPATHY: ICD-10-CM

## 2019-10-24 RX ORDER — GABAPENTIN 300 MG/1
CAPSULE ORAL
Qty: 270 CAPSULE | Refills: 3 | Status: SHIPPED | OUTPATIENT
Start: 2019-10-24 | End: 2020-08-20

## 2019-10-24 NOTE — TELEPHONE ENCOUNTER
Routing refill request to provider for review/approval because:  Drug not on the [facility] refill protocol     LOV: 6/28/19  Tereza Latham RN on 10/24/2019 at 9:56 AM

## 2019-11-05 ENCOUNTER — OFFICE VISIT (OUTPATIENT)
Dept: ORTHOPEDICS | Facility: OTHER | Age: 84
End: 2019-11-05
Attending: ORTHOPAEDIC SURGERY
Payer: MEDICARE

## 2019-11-05 DIAGNOSIS — Z00.00 ROUTINE GENERAL MEDICAL EXAMINATION AT A HEALTH CARE FACILITY: Primary | ICD-10-CM

## 2019-11-05 PROCEDURE — G0463 HOSPITAL OUTPT CLINIC VISIT: HCPCS

## 2019-11-11 NOTE — PROGRESS NOTES
CHIEF COMPLAINT: Left Hip Pain & Leg Pain Recheck    PROBLEMS:  Pain in left hip (ICD-719.45) ([id number])  Pain in right hip (ICD-719.45) ([id number])    PATIENT REPORTED MEDICATIONS:  FOLIC ACID 1 MG ORAL TABLET (FOLIC ACID) 1 tablet by mouth every day; Route: ORAL  TYLENOL EXTRA STRENGTH TABLET (ACETAMINOPHEN TABS)   ZOLPIDEM TARTRATE TABLET (ZOLPIDEM TARTRATE TABS)   GABAPENTIN 300 MG ORAL CAPSULE (GABAPENTIN) ; Route: ORAL  PANTOPRAZOLE SODIUM 20 MG ORAL TABLET DELAYED RELEASE (PANTOPRAZOLE SODIUM) 1 tablet by mouth one time a day; Route: ORAL  LOSARTAN POTASSIUM 25 MG ORAL TABLET (LOSARTAN POTASSIUM) ; Route: ORAL  LEVOTHYROXINE SODIUM TABLET (LEVOTHYROXINE SODIUM TABS)     PATIENT REPORTED ALLERGIES:   Patient has no noted allergies.      HISTORY OF PRESENT ILLNESS:    REASON FOR EVALUATION:  Left hip pain and leg pain.    HISTORY OF PRESENT ILLNESS:  Mireya comes in with regard to left hip pain and leg pain.  Overall reports symptoms are getting better here with time.  Wanted to look at her options.  Does not feel like she at this point needs to concern herself with further injection, but just wanted to have things checked out and evaluated at this time.      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  Less sensitive across the trochanteric bursa region.  Strength is improving.  Pain with straight leg raise also improving, in addition to this.      ASSESSMENT:    IMPRESSION:  Left hip bursal pain, plus  stenosis.     PLAN:   Hold off on injection.  If symptoms do reappear told her to call [facility] and get her in for an injection of the bursa if necessary.  Continue to work on some light strengthening exercise regimen, as well.    Dictated by:  Nik Herrera MD  Copy to:  MARA Cole     D:  11/05/19  T:  11/08/19    Typed and/or reviewed and corrected by signing  below, and sent to the Physician for final review and signature.      This report was created using voice recording software and computer-generated templates. Although every effort has been made to review for and eliminate errors, some errors may still occur.         Electronically signed by Jessica Mar on 11/08/2019 at 1:59 PM    Electronically signed by Nik Herrera MD on 11/10/2019 at 11:56 AM  ________________________________________________________________________

## 2019-12-02 DIAGNOSIS — I10 BENIGN ESSENTIAL HYPERTENSION: ICD-10-CM

## 2019-12-02 RX ORDER — LOSARTAN POTASSIUM 25 MG/1
TABLET ORAL
Qty: 180 TABLET | Refills: 0 | Status: SHIPPED | OUTPATIENT
Start: 2019-12-02 | End: 2020-03-09

## 2019-12-02 NOTE — TELEPHONE ENCOUNTER
"Requested Prescriptions   Pending Prescriptions Disp Refills     losartan (COZAAR) 25 MG tablet [Pharmacy Med Name: LOSARTAN 25MG TABLET] 180 tablet 2     Sig: TAKE 2 TABLETS BY MOUTH ONCE DAILY       Angiotensin-II Receptors Passed - 12/2/2019  1:07 AM        Passed - Last blood pressure under 140/90 in past 12 months     BP Readings from Last 3 Encounters:   06/28/19 136/72   06/19/19 138/80   05/10/19 136/78                 Passed - Recent (12 mo) or future (30 days) visit within the authorizing provider's specialty     Patient has had an office visit with the authorizing provider or a provider within the authorizing providers department within the previous 12 mos or has a future within next 30 days. See \"Patient Info\" tab in inbasket, or \"Choose Columns\" in Meds & Orders section of the refill encounter.              Passed - Medication is active on med list        Passed - Patient is age 18 or older        Passed - No active pregnancy on record        Passed - Normal serum creatinine on file in past 12 months     Recent Labs   Lab Test 03/27/19  1423   CR 1.10             Passed - Normal serum potassium on file in past 12 months     Recent Labs   Lab Test 03/27/19  1423   POTASSIUM 4.1                    Passed - No positive pregnancy test in past 12 months        lov 06/28/19  Pres[place] [facility] [place]    "

## 2019-12-17 DIAGNOSIS — M48.061 SPINAL STENOSIS OF LUMBAR REGION, UNSPECIFIED WHETHER NEUROGENIC CLAUDICATION PRESENT: Primary | ICD-10-CM

## 2019-12-17 DIAGNOSIS — M79.605 LEFT LEG PAIN: ICD-10-CM

## 2019-12-26 ENCOUNTER — HOSPITAL ENCOUNTER (OUTPATIENT)
Dept: GENERAL RADIOLOGY | Facility: OTHER | Age: 84
Discharge: HOME | End: 2019-12-26
Attending: ORTHOPAEDIC SURGERY | Admitting: ORTHOPAEDIC SURGERY
Payer: MEDICARE

## 2019-12-26 DIAGNOSIS — M79.605 LEFT LEG PAIN: ICD-10-CM

## 2019-12-26 DIAGNOSIS — M48.061 SPINAL STENOSIS OF LUMBAR REGION, UNSPECIFIED WHETHER NEUROGENIC CLAUDICATION PRESENT: ICD-10-CM

## 2019-12-26 DIAGNOSIS — N95.2 POSTMENOPAUSAL ATROPHIC VAGINITIS: ICD-10-CM

## 2019-12-26 PROCEDURE — 62323 NJX INTERLAMINAR LMBR/SAC: CPT

## 2019-12-26 PROCEDURE — 25000125 ZZHC RX 250: Performed by: RADIOLOGY

## 2019-12-26 PROCEDURE — 25500064 ZZH RX 255 OP 636: Performed by: RADIOLOGY

## 2019-12-26 PROCEDURE — 25000128 H RX IP 250 OP 636: Performed by: RADIOLOGY

## 2019-12-26 RX ORDER — LIDOCAINE HYDROCHLORIDE 10 MG/ML
10 INJECTION, SOLUTION INFILTRATION; PERINEURAL ONCE
Status: COMPLETED | OUTPATIENT
Start: 2019-12-26 | End: 2019-12-26

## 2019-12-26 RX ORDER — ESTRADIOL 0.1 MG/G
CREAM VAGINAL
Qty: 42.5 G | Refills: 5 | Status: SHIPPED | OUTPATIENT
Start: 2019-12-26 | End: 2020-11-09

## 2019-12-26 RX ORDER — METHYLPREDNISOLONE ACETATE 80 MG/ML
80 INJECTION, SUSPENSION INTRA-ARTICULAR; INTRALESIONAL; INTRAMUSCULAR; SOFT TISSUE ONCE
Status: COMPLETED | OUTPATIENT
Start: 2019-12-26 | End: 2019-12-26

## 2019-12-26 RX ORDER — LIDOCAINE HYDROCHLORIDE 10 MG/ML
1 INJECTION, SOLUTION EPIDURAL; INFILTRATION; INTRACAUDAL; PERINEURAL ONCE
Status: COMPLETED | OUTPATIENT
Start: 2019-12-26 | End: 2019-12-26

## 2019-12-26 RX ADMIN — LIDOCAINE HYDROCHLORIDE 2 ML: 10 INJECTION, SOLUTION INFILTRATION; PERINEURAL at 13:01

## 2019-12-26 RX ADMIN — METHYLPREDNISOLONE ACETATE 80 MG: 80 INJECTION, SUSPENSION INTRA-ARTICULAR; INTRALESIONAL; INTRAMUSCULAR; SOFT TISSUE at 13:01

## 2019-12-26 RX ADMIN — LIDOCAINE HYDROCHLORIDE 2 ML: 10 INJECTION, SOLUTION EPIDURAL; INFILTRATION; INTRACAUDAL; PERINEURAL at 13:01

## 2019-12-26 RX ADMIN — IOHEXOL 2 ML: 240 INJECTION, SOLUTION INTRATHECAL; INTRAVASCULAR; INTRAVENOUS; ORAL at 13:00

## 2019-12-26 NOTE — TELEPHONE ENCOUNTER
"Requested Prescriptions   Pending Prescriptions Disp Refills     estradiol (ESTRACE) 0.1 MG/GM vaginal cream [Pharmacy Med Name: ESTRADIOL 0.01% VAGINAL CREAM] 42.5 g 11     Sig: INSERT 2GM INTO THE VAGINA EVERY MONDAY AND FRIDAY       Hormone Replacement Therapy Passed - 12/26/2019  9:59 AM        Passed - Blood pressure under 140/90 in past 12 months     BP Readings from Last 3 Encounters:   06/28/19 136/72   06/19/19 138/80   05/10/19 136/78                 Passed - Recent (12 mo) or future (30 days) visit within the authorizing provider's specialty     Patient has had an office visit with the authorizing provider or a provider within the authorizing providers department within the previous 12 mos or has a future within next 30 days. See \"Patient Info\" tab in inbasket, or \"Choose Columns\" in Meds & Orders section of the refill encounter.              Passed - Medication is active on med list        Passed - Patient is 18 years of age or older        Passed - No active pregnancy on record        Passed - No positive pregnancy test on record in past 12 months      Prescription approved per [facility] [place].  LOV 06/28/2019.  Jesica Salamanca RN on 12/26/2019 at 12:00 PM        "

## 2019-12-31 ENCOUNTER — APPOINTMENT (OUTPATIENT)
Dept: CT IMAGING | Facility: OTHER | Age: 84
End: 2019-12-31
Attending: FAMILY MEDICINE
Payer: MEDICARE

## 2019-12-31 ENCOUNTER — HOSPITAL ENCOUNTER (EMERGENCY)
Facility: OTHER | Age: 84
Discharge: HOME | End: 2019-12-31
Attending: FAMILY MEDICINE | Admitting: FAMILY MEDICINE
Payer: MEDICARE

## 2019-12-31 VITALS
HEIGHT: 65 IN | BODY MASS INDEX: 23.78 KG/M2 | HEART RATE: 61 BPM | WEIGHT: 142.7 LBS | DIASTOLIC BLOOD PRESSURE: 70 MMHG | TEMPERATURE: 97.3 F | SYSTOLIC BLOOD PRESSURE: 150 MMHG | RESPIRATION RATE: 22 BRPM | OXYGEN SATURATION: 98 %

## 2019-12-31 DIAGNOSIS — S16.1XXA STRAIN OF NECK MUSCLE, INITIAL ENCOUNTER: ICD-10-CM

## 2019-12-31 DIAGNOSIS — S00.03XA CONTUSION OF PARIETAL REGION OF SCALP, INITIAL ENCOUNTER: ICD-10-CM
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
## 2019-12-31 PROCEDURE — 99284 EMERGENCY DEPT VISIT MOD MDM: CPT | Mod: 25 | Performed by: FAMILY MEDICINE

## 2019-12-31 PROCEDURE — 70450 CT HEAD/BRAIN W/O DYE: CPT

## 2019-12-31 PROCEDURE — 99284 EMERGENCY DEPT VISIT MOD MDM: CPT | Performed by: FAMILY MEDICINE

## 2019-12-31 PROCEDURE — 72125 CT NECK SPINE W/O DYE: CPT

## 2019-12-31 PROCEDURE — 99283 EMERGENCY DEPT VISIT LOW MDM: CPT | Mod: Z6 | Performed by: FAMILY MEDICINE

## 2019-12-31 RX ORDER — VITAMIN B COMPLEX
1 CAPSULE ORAL
COMMUNITY
Start: 2018-02-02 | End: 2022-01-01

## 2019-12-31 RX ORDER — DOCUSATE SODIUM 100 MG/1
1 CAPSULE, LIQUID FILLED ORAL 2 TIMES DAILY PRN
COMMUNITY
Start: 2016-06-21

## 2019-12-31 ASSESSMENT — ENCOUNTER SYMPTOMS
RESPIRATORY NEGATIVE: 1
SHORTNESS OF BREATH: 0
CARDIOVASCULAR NEGATIVE: 1
EYES NEGATIVE: 1
TROUBLE SWALLOWING: 0
VOICE CHANGE: 0
WEAKNESS: 0
SPEECH DIFFICULTY: 0
GASTROINTESTINAL NEGATIVE: 1
NUMBNESS: 0
NECK STIFFNESS: 0
VOMITING: 0
NECK PAIN: 1
HEADACHES: 1
NAUSEA: 0
LIGHT-HEADEDNESS: 0
FACIAL ASYMMETRY: 0
ANAL BLEEDING: 0
BLOOD IN STOOL: 0
DIZZINESS: 0

## 2019-12-31 ASSESSMENT — MIFFLIN-ST. JEOR: SCORE: 1093.16

## 2019-12-31 NOTE — ED PROVIDER NOTES
History     Chief Complaint   Patient presents with     Fall     HPI  Sylvie Bautista is a 85 year old female who was walking in her kitchen this morning going to get something out of the refrigerator when she lost her balance and fell backward striking the back of her head on the linoleum floor.  This occurred about 8:15AM.  She felt a little stunned but had no LOC and got herself up and called her daughter at 8:30AM who then came to her home and brought her to the ED.  She has no change in vision/hearing/speaking/swallowing.  She denies weakness or numbness of her arms/legs.  She has large swelling to the back of her head on the right.  She has mild headache associated with her hematoma.    Allergies:  No Known Allergies    Problem List:    Patient Active Problem List    Diagnosis Date Noted     Presbyesophagus 03/27/2019     Priority: Medium     Postmenopausal atrophic vaginitis 11/08/2018     Priority: Medium     Acute left-sided low back pain without sciatica 03/16/2018     Priority: Medium     Segmental and somatic dysfunction of sacral region 03/16/2018     Priority: Medium     Segmental and somatic dysfunction of pelvic region 03/16/2018     Priority: Medium     Tinea pedis 01/16/2018     Priority: Medium     Osteopenia 10/06/2017     Priority: Medium     Iron deficiency anemia 06/30/2017     Priority: Medium     Liver abscess 06/30/2017     Priority: Medium     Overview:   With biliary stricture and duct dilation.  MRI 6/2017       Low folic acid 05/26/2017     Priority: Medium     CAP (community acquired pneumonia) 06/15/2016     Priority: Medium     Cigarette smoker 06/05/2015     Priority: Medium     Overview:   Age 16 to current; 1/2 to 1 ppd       Raynaud phenomenon 06/05/2015     Priority: Medium     Irritable bowel syndrome 11/19/2014     Priority: Medium     Diarrhea 11/06/2014     Priority: Medium     Chronic kidney disease, stage III (moderate) (H) 07/27/2011     Priority: Medium      Hereditary and idiopathic peripheral neuropathy 03/09/2011     Priority: Medium     Mononeuritis 02/28/2011     Priority: Medium     Acid reflux disease 06/23/2010     Priority: Medium     Essential hypertension 06/23/2010     Priority: Medium     Hypothyroidism 06/23/2010     Priority: Medium     Insomnia 06/23/2010     Priority: Medium     Nontoxic multinodular goiter 03/26/2008     Priority: Medium        Past Medical History:    Past Medical History:   Diagnosis Date     Asymptomatic menopausal state      Calculus of kidney      Chronic kidney disease, stage III (moderate) (H)      Diarrhea      Dysphagia      Essential (primary) hypertension      Nicotine dependence, uncomplicated      Other specified anxiety disorders      Personal history of other medical treatment (CODE)      Polyp of colon      Raynaud's syndrome without gangrene      Thyrotoxicosis with diffuse goiter and without thyroid storm        Past Surgical History:    Past Surgical History:   Procedure Laterality Date     APPENDECTOMY OPEN      1979,,& Meckel's diverticulum removed     COLONOSCOPY      11/17/06,polyps; repeat in 5 YEARS     COLONOSCOPY      11/8/11     COLONOSCOPY      11/6/2014     ESOPHAGOSCOPY, GASTROSCOPY, DUODENOSCOPY (EGD), COMBINED      3/14/07,done for dysphagia; normal     ESOPHAGOSCOPY, GASTROSCOPY, DUODENOSCOPY (EGD), COMBINED N/A 6/7/2018    Procedure: COMBINED ESOPHAGOSCOPY, GASTROSCOPY, DUODENOSCOPY (EGD);  Gastroscopy;  Surgeon: Bisi Rivera MD;  Location: [facility] OR     HYSTERECTOMY TOTAL ABDOMINAL, BILATERAL SALPINGO-OOPHORECTOMY, COMBINED      1979     LAPAROSCOPIC CHOLECYSTECTOMY      1991,common bile duct transected     OTHER SURGICAL HISTORY      4/05,[id number],PREMALIG/BENIGN SKIN LESION EXCISION,epidermal inclusion cyst     OTHER SURGICAL HISTORY      10/26/2015,[id number],FOOT SURGERY,hammertoe repair, [facility] with Dr. Grimaldo     OTHER SURGICAL HISTORY      [id number],IR BILIARY STRICTURE DILATATION WO  STENT,x3- per h/p /Choledochojejunostomy with Vane-En-Y due to transection of common bile duct     THYROIDECTOMY      3/26/08,Total thyroidectomy; multinodular goiter       Family History:    Family History   Problem Relation Age of Onset     Other - See Comments Mother          85yo, emphysema, dementia     Heart Disease Father         Heart Disease,MI in 50s,  62yo     Diabetes Father         Diabetes     Substance Abuse Father         Alcohol/Drug,Etoh     Diabetes Daughter         Diabetes     Other - See Comments Daughter         Psychiatric illness,depr/anxiety     Thyroid Disease Sister         Thyroid Disease     Breast Cancer No family hx of         Cancer-breast       Social History:  Marital Status:   [4]  Social History     Tobacco Use     Smoking status: Current Every Day Smoker     Packs/day: 0.50     Years: 30.00     Pack years: 15.00     Types: Cigarettes     Smokeless tobacco: Never Used   Substance Use Topics     Alcohol use: No     Alcohol/week: 0.0 standard drinks     Drug use: No        Medications:    docusate sodium (COLACE) 100 MG capsule  vitamin (B COMPLEX) capsule  acetaminophen (TYLENOL) 650 MG CR tablet  ascorbic acid (VITAMIN C) 1000 MG TABS  Bioflavonoid Products (VITAMIN C PLUS) 1000 MG TABS  Blood Pressure Monitor MISC  Calcium Carbonate-Vitamin D3 600-400 MG-UNIT TABS  carboxymethylcellulose (CARBOXYMETHYLCELLULOSE SODIUM) 0.5 % SOLN ophthalmic solution  estradiol (ESTRACE) 0.1 MG/GM vaginal cream  folic acid (FOLVITE) 1 MG tablet  gabapentin (NEURONTIN) 300 MG capsule  levothyroxine (SYNTHROID/LEVOTHROID) 112 MCG tablet  loratadine (CLARITIN) 10 MG tablet  losartan (COZAAR) 25 MG tablet  methylcellulose (CITRUCEL) 500 MG TABS tablet  Multiple Vitamins-Minerals (OCUVITE ADULT 50+) CAPS  order for DME  pantoprazole (PROTONIX) 20 MG EC tablet  Sodium Fluoride (SF 5000 PLUS) 1.1 % CREA  zolpidem (AMBIEN) 5 MG tablet          Review of Systems   Constitutional:        Hx  "of unsteady gait and has cane and walker at home but she was using neither this morning.  Her walker is in her garage.   HENT: Negative.  Negative for trouble swallowing and voice change.         No change in baseline hearing/speaking/swallowing.   Eyes: Negative.  Negative for visual disturbance.   Respiratory: Negative.  Negative for shortness of breath.    Cardiovascular: Negative.  Negative for chest pain.   Gastrointestinal: Negative.  Negative for anal bleeding, blood in stool, nausea and vomiting.   Genitourinary: Negative.    Musculoskeletal: Positive for neck pain (mild discomfort.). Negative for neck stiffness.   Skin: Negative.    Neurological: Positive for headaches. Negative for dizziness, facial asymmetry, speech difficulty, weakness, light-headedness and numbness.       Physical Exam   BP: (!) 161/73  Pulse: 65  Temp: 97.3  F (36.3  C)  Resp: 22  Height: 165.1 cm (5' 5\")  Weight: 64.7 kg (142 lb 11.2 oz)  SpO2: 97 %      Physical Exam  Vitals signs and nursing note reviewed.   Constitutional:       General: She is not in acute distress.     Appearance: Normal appearance. She is normal weight.   HENT:      Head: Contusion present. No laceration.        Comments: Large 6cm right parietal/occipital contusion/hematoma.  Some evolving ecchymosis and no laceration.     Right Ear: Tympanic membrane, ear canal and external ear normal.      Left Ear: Tympanic membrane, ear canal and external ear normal.      Nose: Nose normal. No congestion or rhinorrhea.      Mouth/Throat:      Mouth: Mucous membranes are moist.   Eyes:      General: No scleral icterus.     Extraocular Movements: Extraocular movements intact.      Conjunctiva/sclera: Conjunctivae normal.      Pupils: Pupils are equal, round, and reactive to light.   Neck:      Musculoskeletal: Normal range of motion.   Cardiovascular:      Rate and Rhythm: Normal rate and regular rhythm.      Heart sounds: Normal heart sounds.   Pulmonary:      Effort: " Pulmonary effort is normal.      Breath sounds: Normal breath sounds.   Abdominal:      General: Bowel sounds are normal.      Palpations: Abdomen is soft.   Musculoskeletal: Normal range of motion.         General: No tenderness.      Comments: Hips with painless internal/external rotation and stable NT pelvis.   Skin:     General: Skin is warm and dry.      Findings: Bruising (right parietal scalp contusion.) present.   Neurological:      General: No focal deficit present.      Mental Status: She is alert.      Cranial Nerves: No cranial nerve deficit.      Sensory: No sensory deficit.      Coordination: Coordination normal.      Gait: Gait normal.      Deep Tendon Reflexes: Reflexes normal.   Psychiatric:         Mood and Affect: Mood normal.         ED Course        Procedures               Critical Care time:  none               Results for orders placed or performed during the hospital encounter of 12/31/19 (from the past 24 hour(s))   CT Head w/o Contrast    Narrative    PROCEDURE: CT HEAD W/O CONTRAST     HISTORY: Head trauma, minor, GCS>=13, low clinical risk, initial exam;  Fall backward from standing in kitchen at 8:15AM.  Large right  occipital/parietal hematoma and mild right sided neck pain..    COMPARISON: 3/19/2017    TECHNIQUE:  Helical images of the head from the foramen magnum to the  vertex were obtained without contrast.    FINDINGS: The ventricles and sulci are prominent, compatible with  mild, generalized volume loss. No acute intracranial hemorrhage, mass  effect, midline shift, hydrocephalus or basilar cystern effacement are  present.    The grey-white matter interface is preserved. Patchy hypoattenuation  within the supratentorial subcortical and periventricular white matter  is most compatible with moderate chronic microvascular ischemic  change.     The calvarium is intact. A sizable subgaleal hematoma overlies right  parietal bone.      Impression    IMPRESSION: No acute intracranial  hemorrhage or calvarial fracture.  Unchanged CT appearance of the head.      MIRTA SARMIENTO MD   CT Cervical Spine w/o Contrast    Narrative    PROCEDURE: CT CERVICAL SPINE W/O CONTRAST     HISTORY: C-spine trauma, low clinical risk (NEXUS/CCR); Fall backward  from standing in kitchen at 8:15AM.  Large right occipital/parietal  hematoma and mild right sided neck pain..    TECHNIQUE: Helical noncontrast CT images of the cervical spine.    COMPARISON: None.    FINDINGS:     No acute fracture is identified. The vertebral bodies are normal in  height. The cervical lordosis is relatively preserved. The C1-2  articulation and the craniocervical junction are intact.     Diffuse degenerative changes are present, with facet and uncovertebral  hypertrophy most pronounced at C3-4, contributing to moderate spinal  stenosis. Scattered foraminal stenoses are present.     The paravertebral soft tissues are unremarkable. The lung apices  demonstrate mild centrilobular emphysema.      Impression    IMPRESSION: No evidence of acute cervical spine fracture.    MIRTA SARMIENTO MD       Medications - No data to display    I reviewed benign CT scans of head and C-spine.  Patient is up and walking in the halls with assist and doing well.  No other injuries or pain is noted.  Reviewed symptom cares for her scalp hematoma and encouraging her to get her walker out of the garage and use it to avoid falls in her home.  She is able to discharge home and will use Tylenol for pain.  Follow-up for any new pains or injuries discovered.  Assessments & Plan (with Medical Decision Making)   85-year-old female who stumbled and fell backwards in her kitchen striking her right parietal-occipital scalp on the linoleum floor with large hematoma but no loss of consciousness and no focal weakness or change in vision hearing speaking swallowing.  She does have some mild right sided neck discomfort as well.  She has a nonfocal neurologic exam on  arrival to the emergency department and with repeat exam.  She has reassuring CT scan of head and C-spine without fracture bleeding or other intracranial pathology.  She is up walking in the emergency department and is able to discharge home.  She is encouraged to use her walker.    I have reviewed the nursing notes.    I have reviewed the findings, diagnosis, plan and need for follow up with the patient.       Discharge Medication List as of 12/31/2019 10:56 AM          Final diagnoses:   Contusion of parietal region of scalp, initial encounter - right side.   Fall from slip, trip, or stumble, initial encounter   Strain of neck muscle, initial encounter       12/31/2019   [facility]     León Mcwilliams MD  12/31/19 [id number]

## 2019-12-31 NOTE — DISCHARGE INSTRUCTIONS
Dear Ms. Bautista,  It was very nice to meet you!  As we talked, after a fall and bump on your head you will likely have some soreness in your neck and also in other muscles that strained to protect you as you hit the floor.  Try to be up and around within reason to keep these muscles limber.    Use a cane or walker to steady your gait.      Use ice (or snow) packs to your scalp 4 times a day to help decrease pain and swelling.    Use tylenol 1-2 4 times a day as needed for pain.     The blood under the skin in your scalp with track under the skin with gravity.  Don't be surprised if you see some bruising behind your ear or down the right side of your neck in the next 1-2 weeks.    Follow up in clinic or the ED for any new or worsening symptoms.    Dr. Joselo Mcwilliams

## 2019-12-31 NOTE — ED AVS SNAPSHOT
[facility]  [address]  [place] [address]  Phone:  [phone]  Fax:  [phone]                                    Sylvie Bautista   MRN: [id number]    Department:  [facility]   Date of Visit:  12/31/2019           After Visit Summary Signature Page    I have received my discharge instructions, and my questions have been answered. I have discussed any challenges I see with this plan with the nurse or doctor.    ..........................................................................................................................................  Patient/Patient Representative Signature      ..........................................................................................................................................  Patient Representative Print Name and Relationship to Patient    ..................................................               ................................................  Date                                   Time    ..........................................................................................................................................  Reviewed by Signature/Title    ...................................................              ..............................................  Date                                               Time          22EPIC Rev 08/18

## 2019-12-31 NOTE — ED TRIAGE NOTES
"Pt to ER from home, was opening refridgerator door, hand slipped, lost balance, fell and hit back of head on floor.  Large goose egg present.  Denies LOC. Not dizzy at this time, no vision changes.  No cervical tenderness, however states she feels like her neck is \"pulling\" to the right. Occurred about 90 minutes prior to arrival.   "

## 2019-12-31 NOTE — ED NOTES
No change in neuro status.  New ice bag applied to head.  Declines any pain medications at this time.

## 2020-01-08 ENCOUNTER — TELEPHONE (OUTPATIENT)
Dept: INTERNAL MEDICINE | Facility: OTHER | Age: 85
End: 2020-01-08

## 2020-01-08 DIAGNOSIS — G47.00 INSOMNIA, UNSPECIFIED TYPE: ICD-10-CM

## 2020-01-08 RX ORDER — ZOLPIDEM TARTRATE 5 MG/1
TABLET ORAL
Qty: 30 TABLET | Refills: 5 | Status: SHIPPED | OUTPATIENT
Start: 2020-01-08 | End: 2020-07-17

## 2020-01-08 NOTE — TELEPHONE ENCOUNTER
Routing refill request to provider for review/approval because:  Drug not on the [facility] refill protocol     LOV: 6/28/19  Tereza Latham RN on 1/8/2020 at 11:40 AM

## 2020-01-15 ENCOUNTER — OFFICE VISIT (OUTPATIENT)
Dept: INTERNAL MEDICINE | Facility: OTHER | Age: 85
End: 2020-01-15
Attending: NURSE PRACTITIONER
Payer: COMMERCIAL

## 2020-01-15 VITALS
RESPIRATION RATE: 16 BRPM | DIASTOLIC BLOOD PRESSURE: 68 MMHG | SYSTOLIC BLOOD PRESSURE: 136 MMHG | HEART RATE: 80 BPM | TEMPERATURE: 97.5 F

## 2020-01-15 DIAGNOSIS — I10 BENIGN ESSENTIAL HYPERTENSION: ICD-10-CM

## 2020-01-15 DIAGNOSIS — R60.9 EDEMA, UNSPECIFIED TYPE: Primary | ICD-10-CM

## 2020-01-15 DIAGNOSIS — S09.90XD INJURY OF HEAD, SUBSEQUENT ENCOUNTER: ICD-10-CM

## 2020-01-15 DIAGNOSIS — E03.9 HYPOTHYROIDISM, UNSPECIFIED TYPE: ICD-10-CM

## 2020-01-15 LAB
ANION GAP SERPL CALCULATED.3IONS-SCNC: 6 MMOL/L (ref 3–14)
BUN SERPL-MCNC: 15 MG/DL (ref 7–25)
CALCIUM SERPL-MCNC: 9.5 MG/DL (ref 8.6–10.3)
CHLORIDE SERPL-SCNC: 108 MMOL/L (ref 98–107)
CO2 SERPL-SCNC: 26 MMOL/L (ref 21–31)
CREAT SERPL-MCNC: 1.05 MG/DL (ref 0.6–1.2)
GFR SERPL CREATININE-BSD FRML MDRD: 50 ML/MIN/{1.73_M2}
GLUCOSE SERPL-MCNC: 97 MG/DL (ref 70–105)
POTASSIUM SERPL-SCNC: 4.1 MMOL/L (ref 3.5–5.1)
SODIUM SERPL-SCNC: 140 MMOL/L (ref 134–144)
T4 FREE SERPL-MCNC: 1.57 NG/DL (ref 0.6–1.6)
TSH SERPL DL<=0.05 MIU/L-ACNC: 0.7 IU/ML (ref 0.34–5.6)

## 2020-01-15 PROCEDURE — 84443 ASSAY THYROID STIM HORMONE: CPT | Mod: ZL | Performed by: NURSE PRACTITIONER

## 2020-01-15 PROCEDURE — G0463 HOSPITAL OUTPT CLINIC VISIT: HCPCS

## 2020-01-15 PROCEDURE — 80048 BASIC METABOLIC PNL TOTAL CA: CPT | Mod: ZL | Performed by: NURSE PRACTITIONER

## 2020-01-15 PROCEDURE — 36415 COLL VENOUS BLD VENIPUNCTURE: CPT | Mod: ZL | Performed by: NURSE PRACTITIONER

## 2020-01-15 PROCEDURE — 99214 OFFICE O/P EST MOD 30 MIN: CPT | Performed by: NURSE PRACTITIONER

## 2020-01-15 PROCEDURE — 84439 ASSAY OF FREE THYROXINE: CPT | Mod: ZL | Performed by: NURSE PRACTITIONER

## 2020-01-15 ASSESSMENT — PAIN SCALES - GENERAL: PAINLEVEL: MODERATE PAIN (4)

## 2020-01-15 NOTE — NURSING NOTE
Chief Complaint   Patient presents with     Edema       Medication Reconciliation complete.   Gayla Carrasquillo LPN  ..................1/15/2020   3:27 PM

## 2020-01-15 NOTE — PROGRESS NOTES
Subjective:  She is here today for swelling in her legs.  She has had this problem before in the past.  She did have a fall and hit the back of her head on December 31.  The lump on her head is improving.  She has been applying ice.  She also has history of bursitis and has been taking ibuprofen 2 tablets twice daily at times.  She takes gabapentin.  She eats foods that are saltier as they taste better.  Her blood pressures been well controlled.  She has history of hypothyroidism with labs last spring within normal range.  She denies chest pain, chest pressure, shortness of breath, PND and orthopnea.  No difficulty with passing her urine.  No blood in her urine.    Patient Active Problem List   Diagnosis     Acid reflux disease     CAP (community acquired pneumonia)     Chronic kidney disease, stage III (moderate) (H)     Cigarette smoker     Diarrhea     Essential hypertension     Hypothyroidism     Insomnia     Iron deficiency anemia     Irritable bowel syndrome     Liver abscess     Low folic acid     Mononeuritis     Hereditary and idiopathic peripheral neuropathy     Nontoxic multinodular goiter     Osteopenia     Raynaud phenomenon     Tinea pedis     Acute left-sided low back pain without sciatica     Segmental and somatic dysfunction of sacral region     Segmental and somatic dysfunction of pelvic region     Postmenopausal atrophic vaginitis     Presbyesophagus     Past Medical History:   Diagnosis Date     Asymptomatic menopausal state     DXA 10/27/06 normal     Calculus of kidney     1990,s/p lithotripsy     Chronic kidney disease, stage III (moderate) (H)     7/27/2011     Diarrhea     11/6/2014     Dysphagia     2007,EGD 3/14/07 nl; sx from goiter.     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     2004,on Tapazole     Past Surgical History:   Procedure Laterality Date     APPENDECTOMY OPEN      1979,,& Meckel's diverticulum removed     COLONOSCOPY      11/17/06,polyps; repeat in 5 YEARS     COLONOSCOPY      11/8/11     COLONOSCOPY      11/6/2014     ESOPHAGOSCOPY, GASTROSCOPY, DUODENOSCOPY (EGD), COMBINED      3/14/07,done for dysphagia; normal     ESOPHAGOSCOPY, GASTROSCOPY, DUODENOSCOPY (EGD), COMBINED N/A 6/7/2018    Procedure: COMBINED ESOPHAGOSCOPY, GASTROSCOPY, DUODENOSCOPY (EGD);  Gastroscopy;  Surgeon: Bisi Rivera MD;  Location: [facility] OR     HYSTERECTOMY TOTAL ABDOMINAL, BILATERAL SALPINGO-OOPHORECTOMY, COMBINED      1979     LAPAROSCOPIC CHOLECYSTECTOMY      1991,common bile duct transected     OTHER SURGICAL HISTORY      4/05,UPJ444,PREMALIG/BENIGN SKIN LESION EXCISION,epidermal inclusion cyst     OTHER SURGICAL HISTORY      10/26/2015,[id number],FOOT SURGERY,hammertoe repair, [facility] with Dr. Grimaldo     OTHER SURGICAL HISTORY      [id number],IR BILIARY STRICTURE DILATATION WO STENT,x3- per h/p /Choledochojejunostomy with Vane-En-Y due to transection of common bile duct     THYROIDECTOMY      3/26/08,Total thyroidectomy; multinodular goiter     Social History     Socioeconomic History     Marital status:      Spouse name: Not on file     Number of children: Not on file     Years of education: Not on file     Highest education level: Not on file   Occupational History     Not on file   Social Needs     Financial resource strain: Not on file     Food insecurity:     Worry: Not on file     Inability: Not on file     Transportation needs:     Medical: Not on file     Non-medical: Not on file   Tobacco Use     Smoking status: Current Every Day Smoker     Packs/day: 0.50     Years: 30.00      Pack years: 15.00     Types: Cigarettes     Smokeless tobacco: Never Used     Tobacco comment: no ecig   Substance and Sexual Activity     Alcohol use: No     Alcohol/week: 0.0 standard drinks     Drug use: No     Sexual activity: Not Currently     Partners: Male   Lifestyle     Physical activity:     Days per week: Not on file     Minutes per session: Not on file     Stress: Not on file   Relationships     Social connections:     Talks on phone: Not on file     Gets together: Not on file     Attends Mandaeism service: Not on file     Active member of club or organization: Not on file     Attends meetings of clubs or organizations: Not on file     Relationship status: Not on file     Intimate partner violence:     Fear of current or ex partner: Not on file     Emotionally abused: Not on file     Physically abused: Not on file     Forced sexual activity: Not on file   Other Topics Concern     Not on file   Social History Narrative    ; 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; 4 children; Retired RN     Family History   Problem Relation Age of Onset     Other - See Comments Mother          85yo, emphysema, dementia     Heart Disease Father         Heart Disease,MI in 50s,  64yo     Diabetes Father         Diabetes     Substance Abuse Father         Alcohol/Drug,Etoh     Diabetes Daughter         Diabetes     Other - See Comments Daughter         Psychiatric illness,depr/anxiety     Thyroid Disease Sister         Thyroid Disease     Breast Cancer No family hx of         Cancer-breast     Current Outpatient Medications   Medication Sig Dispense Refill     acetaminophen (TYLENOL) 650 MG CR tablet Take 1,300 mg by mouth       ascorbic acid (VITAMIN C) 1000 MG TABS 1 tablet oral daily       Bioflavonoid Products (VITAMIN C PLUS) 1000 MG TABS        Blood Pressure Monitor MISC As directed. OMRON       Calcium Carbonate-Vitamin D3 600-400 MG-UNIT TABS        carboxymethylcellulose (CARBOXYMETHYLCELLULOSE SODIUM) 0.5 % SOLN ophthalmic  solution 1 drop both eyes once daily 1 Bottle      docusate sodium (COLACE) 100 MG capsule Take 1 capsule by mouth for constipation. Can take 2 in 24 hours.       estradiol (ESTRACE) 0.1 MG/GM vaginal cream INSERT 2GM INTO THE VAGINA EVERY MONDAY AND FRIDAY 42.5 g 5     folic acid (FOLVITE) 1 MG tablet Take 1 tablet (1 mg) by mouth daily 30 tablet      gabapentin (NEURONTIN) 300 MG capsule TAKE 1 CAPSULE BY MOUTH THREE TIMES DAILY 270 capsule 3     levothyroxine (SYNTHROID/LEVOTHROID) 112 MCG tablet Take 1 tablet (112 mcg) by mouth daily 90 tablet 2     loratadine (CLARITIN) 10 MG tablet Take 1 tablet (10 mg) by mouth daily 30 tablet      losartan (COZAAR) 25 MG tablet TAKE 2 TABLETS BY MOUTH ONCE DAILY 180 tablet 0     methylcellulose (CITRUCEL) 500 MG TABS tablet Take 1 tablet (500 mg) by mouth daily 14 each 0     Multiple Vitamins-Minerals (OCUVITE ADULT 50+) CAPS 1 tablet by mouth once daily.       order for DME Equipment being ordered: wheeled walker with seat 1 Units 0     pantoprazole (PROTONIX) 20 MG EC tablet TAKE 1 TABLET BY MOUTH ONCE DAILY 90 tablet 2     Sodium Fluoride (SF 5000 PLUS) 1.1 % CREA        vitamin (B COMPLEX) capsule Take 1 capsule by mouth       zolpidem (AMBIEN) 5 MG tablet TAKE 1 TABLET BY MOUTH NIGHTLY AT BEDTIME AS NEEDED FOR SLEEP 30 tablet 5     Patient has no known allergies.      Review of Systems:  Review of Systems  See HPI  Objective:   /68 (BP Location: Right arm, Patient Position: Sitting, Cuff Size: Adult Regular)   Pulse 80   Temp 97.5  F (36.4  C) (Temporal)   Resp 16   Physical Exam  Pleasant female no acute distress.  Affect normal.  Alert and oriented x4.  She continues to have a lump on the back of the right occipital scalp.  Tenderness.  She has some ecchymosis down into the right side of her neck but good range of motion of her neck.  Skin color pink.  Mucous memories moist.  Neck supple without adenopathy.  Lung fields clear to auscultation.  Cardiovascular  regular rate and rhythm.  Right lower extremity with trace pretibial edema, left lower extremity with a little more than trace pretibial edema but not +1 edema.  DP PT intact.  Capillary refill less than 3 seconds.    Assessment:    ICD-10-CM    1. Edema, unspecified type R60.9 Thyrotropin [facility]     T4 free     Basic metabolic panel     Echocardiogram Complete   2. Injury of head, subsequent encounter S09.90XD    3. Benign essential hypertension I10 Basic metabolic panel   4. Hypothyroidism, unspecified type E03.9 Thyrotropin [facility]     T4 free       Plan:   Swelling likely related to ibuprofen, gabapentin and being more sedentary since her fall.  Echocardiogram will be scheduled.  Recommend low-salt diet.  Labs today include BMP, TSH and free T4.  She will be contacted with results once available.    MARA Mullins   1/15/2020  3:47 PM

## 2020-01-29 ENCOUNTER — OFFICE VISIT (OUTPATIENT)
Dept: ORTHOPEDICS | Facility: OTHER | Age: 85
End: 2020-01-29
Attending: ORTHOPAEDIC SURGERY
Payer: MEDICARE

## 2020-01-29 DIAGNOSIS — Z00.00 ROUTINE GENERAL MEDICAL EXAMINATION AT A HEALTH CARE FACILITY: Primary | ICD-10-CM

## 2020-01-29 PROCEDURE — G0463 HOSPITAL OUTPT CLINIC VISIT: HCPCS

## 2020-01-30 ENCOUNTER — HOSPITAL ENCOUNTER (OUTPATIENT)
Dept: CARDIOLOGY | Facility: OTHER | Age: 85
Discharge: HOME | End: 2020-01-30
Attending: NURSE PRACTITIONER | Admitting: NURSE PRACTITIONER
Payer: MEDICARE

## 2020-01-30 PROCEDURE — 93306 TTE W/DOPPLER COMPLETE: CPT | Mod: 26 | Performed by: INTERNAL MEDICINE

## 2020-01-30 PROCEDURE — 93306 TTE W/DOPPLER COMPLETE: CPT

## 2020-01-31 PROBLEM — I07.1 MODERATE TRICUSPID INSUFFICIENCY: Status: ACTIVE | Noted: 2020-01-31

## 2020-01-31 PROBLEM — I51.89 GRADE II DIASTOLIC DYSFUNCTION: Status: ACTIVE | Noted: 2020-01-31

## 2020-02-04 NOTE — PROGRESS NOTES
CHIEF COMPLAINT: Left Hip Pain Recheck     PROBLEMS:  Pain in left hip (ICD-719.45) (DTK05-G67.552)  Pain in right hip (ICD-719.45) (PEP54-G83.551)    PATIENT REPORTED MEDICATIONS:  FOLIC ACID 1 MG ORAL TABLET (FOLIC ACID) 1 tablet by mouth every day; Route: ORAL  TYLENOL EXTRA STRENGTH TABLET (ACETAMINOPHEN TABS)   ZOLPIDEM TARTRATE TABLET (ZOLPIDEM TARTRATE TABS)   GABAPENTIN 300 MG ORAL CAPSULE (GABAPENTIN) ; Route: ORAL  PANTOPRAZOLE SODIUM 20 MG ORAL TABLET DELAYED RELEASE (PANTOPRAZOLE SODIUM) 1 tablet by mouth one time a day; Route: ORAL  LOSARTAN POTASSIUM 25 MG ORAL TABLET (LOSARTAN POTASSIUM) ; Route: ORAL  LEVOTHYROXINE SODIUM TABLET (LEVOTHYROXINE SODIUM TABS)     PATIENT REPORTED ALLERGIES:   Patient has no noted allergies.      HISTORY OF PRESENT ILLNESS:    REASON FOR EVALUATION:  Left hip pain.    HISTORY OF PRESENT ILLNESS:  Mireya comes in with regard to her left hip.  She is interested in having an injection done at this point in time.  Is having a fair amount of trouble with regard to this.  Wanted to have things checked out and evaluated.      PAST MEDICAL HISTORY:    Hypertension  Hypothyroidism    PAST ORTHOPEDIC SURGICAL HISTORY:    Right Foot Multiple Tenotomies 10/26/15    PAST SURGICAL HISTORY:    Cholecystectomy  Repair Common Bile Duct  Hysterectomy  Thyroidectomy    FAMILY HISTORY:    Father: Heart Disease  Mother: COPD    SOCIAL HISTORY:     Retired RN  Alcohol Use:  Never  Tobacco Use:  Yes, 1/2 PPD  Secondhand Smoke:  No  Blood Transfusion:  No  HIV/Hepatitis:  No  IV Drug Use:  No    PHYSICAL EXAMINATION:    Examination of her hip shows tenderness across the trochanteric bursa.  Mild swelling is seen there, as well.  Neurovascular examination is otherwise intact.      ASSESSMENT:    IMPRESSION:  Left hip arthrosis.     PROCEDURES:   Risks and benefits of the procedure were reviewed with the patient. Informed consent was obtained. Blood sugar risks for our diabetic patients  were also discussed.    After achieving informed consent and sterile preparation, the patient's left hip trochanteric bursa is injected with 4 cc 1% lidocaine and 40 mg Kenalog under sterile conditions.  The patient did tolerate the procedure well.      PLAN:   Injection as stated above, repeat in the future as appropriate and necessary.    Dictated by:  Nik Herrera MD  Copy to:  MARA Cole     D:  01/29/2020  T:  02/04/2020    Typed and/or reviewed and corrected by signing  below, and sent to the Physician for final review and signature.      This report was created using voice recording software and computer-generated templates. Although every effort has been made to review for and eliminate errors, some errors may still occur.         Electronically signed by Jessica Mar on 02/04/2020 at 9:05 AM    Electronically signed by Nik Herrera MD on 02/04/2020 at 9:11 AM  ________________________________________________________________________

## 2020-02-21 DIAGNOSIS — E03.9 HYPOTHYROIDISM, UNSPECIFIED TYPE: ICD-10-CM

## 2020-02-24 RX ORDER — LEVOTHYROXINE SODIUM 112 UG/1
112 TABLET ORAL DAILY
Qty: 90 TABLET | Refills: 2 | Status: SHIPPED | OUTPATIENT
Start: 2020-02-24 | End: 2020-11-09

## 2020-02-24 NOTE — TELEPHONE ENCOUNTER
Prescription approved per [facility] Refill Protocol.  LOV and labs: 1/15/2020  Tereza Latham RN on 2/24/2020 at 9:14 AM

## 2020-03-05 DIAGNOSIS — I10 BENIGN ESSENTIAL HYPERTENSION: ICD-10-CM

## 2020-03-09 RX ORDER — LOSARTAN POTASSIUM 25 MG/1
TABLET ORAL
Qty: 180 TABLET | Refills: 1 | Status: SHIPPED | OUTPATIENT
Start: 2020-03-09 | End: 2020-08-20

## 2020-03-09 NOTE — TELEPHONE ENCOUNTER
Prescription approved per [facility] Refill Protocol.  LOV and labs: 1/15/2020    Tereza Latham RN on 3/9/2020 at 8:19 AM

## 2020-03-11 ENCOUNTER — HEALTH MAINTENANCE LETTER (OUTPATIENT)
Age: 85
End: 2020-03-11

## 2020-05-05 ENCOUNTER — OFFICE VISIT (OUTPATIENT)
Dept: ORTHOPEDICS | Facility: OTHER | Age: 85
End: 2020-05-05
Attending: ORTHOPAEDIC SURGERY
Payer: MEDICARE

## 2020-05-05 DIAGNOSIS — M70.72 BURSITIS OF LEFT HIP, UNSPECIFIED BURSA: Primary | ICD-10-CM

## 2020-05-05 PROCEDURE — 25000128 H RX IP 250 OP 636: Performed by: ORTHOPAEDIC SURGERY

## 2020-05-05 PROCEDURE — 20610 DRAIN/INJ JOINT/BURSA W/O US: CPT | Mod: LT

## 2020-05-05 PROCEDURE — G0463 HOSPITAL OUTPT CLINIC VISIT: HCPCS

## 2020-05-05 PROCEDURE — 20610 DRAIN/INJ JOINT/BURSA W/O US: CPT | Mod: LT | Performed by: ORTHOPAEDIC SURGERY

## 2020-05-05 PROCEDURE — 25000125 ZZHC RX 250: Performed by: ORTHOPAEDIC SURGERY

## 2020-05-05 RX ORDER — TRIAMCINOLONE ACETONIDE 40 MG/ML
40 INJECTION, SUSPENSION INTRA-ARTICULAR; INTRAMUSCULAR ONCE
Status: COMPLETED | OUTPATIENT
Start: 2020-05-05 | End: 2020-05-05

## 2020-05-05 RX ORDER — LIDOCAINE HYDROCHLORIDE 10 MG/ML
4 INJECTION, SOLUTION EPIDURAL; INFILTRATION; INTRACAUDAL; PERINEURAL ONCE
Status: COMPLETED | OUTPATIENT
Start: 2020-05-05 | End: 2020-05-05

## 2020-05-05 RX ADMIN — LIDOCAINE HYDROCHLORIDE 4 ML: 10 INJECTION, SOLUTION EPIDURAL; INFILTRATION; INTRACAUDAL; PERINEURAL at 12:22

## 2020-05-05 RX ADMIN — TRIAMCINOLONE ACETONIDE 40 MG: 40 INJECTION, SUSPENSION INTRA-ARTICULAR; INTRAMUSCULAR at 12:22

## 2020-05-05 NOTE — PROGRESS NOTES
Visit Date:   2020      REASON FOR EVALUATION:  Left hip pain.      HISTORY:  Sylvie comes in with regards to left hip.  She is here for a bursal injection at this point in time.  Denies any other major concerns at this point.  Last shot did seem to help her out quite a bit.      PHYSICAL EXAMINATION:  Hip shows tenderness across the trochanteric bursa.  Mild swelling is seen as well.  Neurovascular exam intact.  Light range of motion definitely tolerable at this point in time.      PROCEDURE:  Following informed consent and sterile preparation, the patient's left hip was injected with 4 mL of 1% lidocaine and 40 mg of Kenalog under sterile conditions.  The patient did tolerate the procedure well.      IMPRESSION:  Left hip trochanteric bursitis.      PLAN:  Injection done trochanteric bursa repeat in the future as appropriate and necessary.         CHIVO MILLAN MD             D: 2020   T: 2020   MT: [id number]      Name:     SYLVIE KELLY   MRN:      [id number]        Account:      [id number]   :      1934           Visit Date:   2020      Document: [id number]

## 2020-05-22 DIAGNOSIS — K21.9 GASTROESOPHAGEAL REFLUX DISEASE, ESOPHAGITIS PRESENCE NOT SPECIFIED: ICD-10-CM

## 2020-05-22 RX ORDER — PANTOPRAZOLE SODIUM 20 MG/1
TABLET, DELAYED RELEASE ORAL
Qty: 90 TABLET | Refills: 2 | Status: SHIPPED | OUTPATIENT
Start: 2020-05-22 | End: 2020-11-09

## 2020-05-22 NOTE — TELEPHONE ENCOUNTER
"Requested Prescriptions   Pending Prescriptions Disp Refills     pantoprazole (PROTONIX) 20 MG EC tablet [Pharmacy Med Name: PANTOPRAZOLE 20MG DR TABLET] 90 tablet 2     Sig: TAKE 1 TABLET BY MOUTH ONCE DAILY       PPI Protocol Passed - 5/22/2020  1:00 AM        Passed - Not on Clopidogrel (unless Pantoprazole ordered)        Passed - No diagnosis of osteoporosis on record        Passed - Recent (12 mo) or future (30 days) visit within the authorizing provider's specialty     Patient has had an office visit with the authorizing provider or a provider within the authorizing providers department within the previous 12 mos or has a future within next 30 days. See \"Patient Info\" tab in inbasket, or \"Choose Columns\" in Meds & Orders section of the refill encounter.              Passed - Medication is active on med list        Passed - Patient is age 18 or older        Passed - No active pregnacy on record        Passed - No positive pregnancy test in past 12 months         LOV 1/15/20 Sheeba  Prescription approved per [facility] Refill Protocol.  Brenda J. Goodell, RN on 5/22/2020 at 9:26 AM      "

## 2020-07-14 DIAGNOSIS — G47.00 INSOMNIA, UNSPECIFIED TYPE: ICD-10-CM

## 2020-07-17 RX ORDER — ZOLPIDEM TARTRATE 5 MG/1
TABLET ORAL
Qty: 30 TABLET | Refills: 3 | Status: SHIPPED | OUTPATIENT
Start: 2020-07-17 | End: 2020-11-09

## 2020-07-17 NOTE — TELEPHONE ENCOUNTER
Zolpidem (AMBIEN) 5 MG tablet  Last Written Prescription Date: 01/08/2020  Last Fill Quantity: 30,   # refills: 5  Last Office Visit: 01/15/2020  Future Office visit:    Next 5 appointments (look out 90 days)    Aug 04, 2020 11:00 AM CDT  Return Visit with Nik Herrera MD  [facility] ([facility]) [address]  [place] [address]  [phone]           Routing refill request to provider for review/approval because:  Drug not on the [facility], [facility] or [facility] refill protocol or controlled substance.     Unable to complete prescription refill per RNMedication Refill Policy.................... Misty Quick RN ....................  7/17/2020   10:31 AM

## 2020-08-04 RX ORDER — TRIAMCINOLONE ACETONIDE 40 MG/ML
40 INJECTION, SUSPENSION INTRA-ARTICULAR; INTRAMUSCULAR ONCE
Status: CANCELLED | OUTPATIENT
Start: 2020-08-05

## 2020-08-04 RX ORDER — LIDOCAINE HYDROCHLORIDE 10 MG/ML
4 INJECTION, SOLUTION EPIDURAL; INFILTRATION; INTRACAUDAL; PERINEURAL ONCE
Status: CANCELLED | OUTPATIENT
Start: 2020-08-05

## 2020-08-05 ENCOUNTER — OFFICE VISIT (OUTPATIENT)
Dept: ORTHOPEDICS | Facility: OTHER | Age: 85
End: 2020-08-05
Attending: ORTHOPAEDIC SURGERY
Payer: MEDICARE

## 2020-08-05 DIAGNOSIS — M70.72 BURSITIS OF LEFT HIP, UNSPECIFIED BURSA: Primary | ICD-10-CM

## 2020-08-05 PROCEDURE — 99212 OFFICE O/P EST SF 10 MIN: CPT | Performed by: ORTHOPAEDIC SURGERY

## 2020-08-05 PROCEDURE — G0463 HOSPITAL OUTPT CLINIC VISIT: HCPCS

## 2020-08-05 NOTE — PROGRESS NOTES
Visit Date:   2020      REASON FOR EVALUATION:  Left hip bursitis.      HISTORY:  Sylvie comes in with left hip bursitis.  Overall, reports doing well at this point, really no major concerns, happy with the outcome of intervention, feels a lot better at this time.  Is questioning if she should proceed forward with an injection or not.  She reports about a month ago things have more or less settled down here for her.      PHYSICAL EXAMINATION:     MUSCULOSKELETAL:  Hip shows good range of motion, minimal swelling.  No significant pain with palpation across the trochanteric bursa.  Neurovascular examination otherwise intact.      IMPRESSION:  Resolving left hip trochanteric bursitis.      PLAN:  Hold off on an injection at this point in time.  If symptoms do progress or worsen, certainly we would look at injections, but at this point I do not think she needs to proceed forward.  She will see me back in the future as appropriate and necessary for an injection repeat down the road at that point in time.         CHIVO MILLAN MD             D: 2020   T: 2020   MT: YOLY      Name:     SYLVIE KELLY   MRN:      [id number]        Account:      [id number]   :      1934           Visit Date:   2020      Document: [id number]

## 2020-08-05 NOTE — PROGRESS NOTES
Patient is here for follow up on her left hip.  Lesly Caballero LPN .....................8/5/2020 10:43 AM

## 2020-08-24 DIAGNOSIS — G60.9 HEREDITARY AND IDIOPATHIC PERIPHERAL NEUROPATHY: ICD-10-CM

## 2020-08-24 RX ORDER — GABAPENTIN 300 MG/1
CAPSULE ORAL
Qty: 270 CAPSULE | Refills: 0 | OUTPATIENT
Start: 2020-08-24

## 2020-08-24 NOTE — TELEPHONE ENCOUNTER
[facility] #[id number] sent Rx request for the following:   gabapentin (NEURONTIN) 300 MG capsule  Sig:TAKE 1 CAPSULE BY MOUTH THREE TIMES DAILY    Last Prescription Date:   08/20/2020  Last Fill Qty/Refills:         270, R-0    Last Office Visit:              01/15/2020   Future Office visit:           None noted    Routing refill request to provider for review/approval because:  Drug not on the [facility] or [facility] refill protocol or controlled substance    Redundant refill request refused: Too soon:  Unable to complete prescription refill per RN Medication Refill Policy.................... Jess Gallo RN ....................  8/24/2020   11:53 AM

## 2020-08-25 ENCOUNTER — TELEPHONE (OUTPATIENT)
Dept: ORTHOPEDICS | Facility: OTHER | Age: 85
End: 2020-08-25
Payer: COMMERCIAL

## 2020-08-25 NOTE — TELEPHONE ENCOUNTER
Patient called this am and is wanting to get worked in to Dr Herrera schedule for an injection for the hip.  Please call patient back.  Thank you.  Sherri Moy on 8/25/2020 at 9:25 AM

## 2020-08-25 NOTE — TELEPHONE ENCOUNTER
Can you call her? Earliest I can do is 9/9 at 8:45. I would've worked her in today, but I don't have time to check my phone notes until the end of the day.   Lesly Caballero LPN .....................8/25/2020 3:57 PM

## 2020-10-16 ENCOUNTER — TELEPHONE (OUTPATIENT)
Dept: INTERNAL MEDICINE | Facility: OTHER | Age: 85
End: 2020-10-16

## 2020-10-16 NOTE — TELEPHONE ENCOUNTER
Spoke with patient and informed what a pheymo-poly injection was. Gayla Carrasquillo LPN .............10/16/2020  11:07 AM

## 2020-10-16 NOTE — TELEPHONE ENCOUNTER
Please follow up with patient as she has questions regarding immunizations and what it means.  She does not understand.     Please call her at [phone].  Thank you,   Jana Man on 10/16/2020 at 9:40 AM

## 2020-10-27 ENCOUNTER — OFFICE VISIT (OUTPATIENT)
Dept: ORTHOPEDICS | Facility: OTHER | Age: 85
End: 2020-10-27
Attending: ORTHOPAEDIC SURGERY
Payer: MEDICARE

## 2020-10-27 DIAGNOSIS — M70.72 BURSITIS OF LEFT HIP, UNSPECIFIED BURSA: Primary | ICD-10-CM

## 2020-10-27 PROCEDURE — 96372 THER/PROPH/DIAG INJ SC/IM: CPT | Performed by: ORTHOPAEDIC SURGERY

## 2020-10-27 PROCEDURE — 20610 DRAIN/INJ JOINT/BURSA W/O US: CPT | Mod: LT | Performed by: ORTHOPAEDIC SURGERY

## 2020-10-27 PROCEDURE — G0463 HOSPITAL OUTPT CLINIC VISIT: HCPCS

## 2020-10-27 PROCEDURE — 250N000011 HC RX IP 250 OP 636: Performed by: ORTHOPAEDIC SURGERY

## 2020-10-27 PROCEDURE — 250N000009 HC RX 250: Performed by: ORTHOPAEDIC SURGERY

## 2020-10-27 PROCEDURE — G0463 HOSPITAL OUTPT CLINIC VISIT: HCPCS | Mod: 25

## 2020-10-27 PROCEDURE — 99213 OFFICE O/P EST LOW 20 MIN: CPT | Mod: 25 | Performed by: ORTHOPAEDIC SURGERY

## 2020-10-27 RX ORDER — LIDOCAINE HYDROCHLORIDE 10 MG/ML
4 INJECTION, SOLUTION EPIDURAL; INFILTRATION; INTRACAUDAL; PERINEURAL ONCE
Status: COMPLETED | OUTPATIENT
Start: 2020-10-27 | End: 2020-10-27

## 2020-10-27 RX ORDER — TRIAMCINOLONE ACETONIDE 40 MG/ML
40 INJECTION, SUSPENSION INTRA-ARTICULAR; INTRAMUSCULAR ONCE
Status: COMPLETED | OUTPATIENT
Start: 2020-10-27 | End: 2020-10-27

## 2020-10-27 RX ADMIN — LIDOCAINE HYDROCHLORIDE 4 ML: 10 INJECTION, SOLUTION EPIDURAL; INFILTRATION; INTRACAUDAL; PERINEURAL at 12:30

## 2020-10-27 RX ADMIN — TRIAMCINOLONE ACETONIDE 40 MG: 40 INJECTION, SUSPENSION INTRA-ARTICULAR; INTRAMUSCULAR at 12:31

## 2020-10-27 NOTE — PROGRESS NOTES
Visit Date:   10/27/2020      REASON FOR EVALUATION:  Left hip pain.      HISTORY OF PRESENT ILLNESS:  Sylvie comes in.  She has had a bout of hip bursitis.  She has undergone periodic injections.  I saw her last in August; did not administer an injection, but did give her an injection last in May and this has worked well for her.  She gets some periodic discomfort of increase in pain here, especially on the outside aspect.      PHYSICAL EXAMINATION:     EXTREMITIES:  Examination of the hip does show pain with direct palpation there.  Neurovascular examination is intact.  Overall strength is 5/5 in addition to that.      PROCEDURE PERFORMED:  Following informed consent and sterile preparation, the patient's left hip trochanteric bursa was injected with 4 mL of 1% lidocaine and 40 mg of Kenalog under sterile conditions.      IMPRESSION:  Left hip trochanteric bursitis.      PLAN:  Injection as stated above.  Continue stretching regimen, activities as tolerated.  Repeat injection down the road as necessary.         CHIVO MILLAN MD             D: 10/27/2020   T: 10/27/2020   MT: YOLY      Name:     SYLVIE KELLY   MRN:      [id number]        Account:      [id number]   :      1934           Visit Date:   10/27/2020      Document: [id number]

## 2020-10-27 NOTE — PROGRESS NOTES
Patient is here for follow up on her left hip.  Lesly Caballero LPN .....................10/27/2020 12:09 PM

## 2020-11-09 ENCOUNTER — OFFICE VISIT (OUTPATIENT)
Dept: INTERNAL MEDICINE | Facility: OTHER | Age: 85
End: 2020-11-09
Attending: NURSE PRACTITIONER
Payer: COMMERCIAL

## 2020-11-09 VITALS
TEMPERATURE: 96.3 F | DIASTOLIC BLOOD PRESSURE: 70 MMHG | BODY MASS INDEX: 21.83 KG/M2 | HEART RATE: 60 BPM | HEIGHT: 65 IN | RESPIRATION RATE: 16 BRPM | SYSTOLIC BLOOD PRESSURE: 130 MMHG | WEIGHT: 131 LBS

## 2020-11-09 DIAGNOSIS — G60.9 HEREDITARY AND IDIOPATHIC PERIPHERAL NEUROPATHY: Primary | ICD-10-CM

## 2020-11-09 DIAGNOSIS — I51.89 GRADE II DIASTOLIC DYSFUNCTION: ICD-10-CM

## 2020-11-09 DIAGNOSIS — I07.1 MODERATE TRICUSPID INSUFFICIENCY: ICD-10-CM

## 2020-11-09 DIAGNOSIS — N18.31 STAGE 3A CHRONIC KIDNEY DISEASE (H): ICD-10-CM

## 2020-11-09 DIAGNOSIS — G47.00 INSOMNIA, UNSPECIFIED TYPE: ICD-10-CM

## 2020-11-09 DIAGNOSIS — M85.80 OSTEOPENIA, UNSPECIFIED LOCATION: ICD-10-CM

## 2020-11-09 DIAGNOSIS — I10 ESSENTIAL HYPERTENSION: ICD-10-CM

## 2020-11-09 DIAGNOSIS — F17.210 CIGARETTE SMOKER: ICD-10-CM

## 2020-11-09 DIAGNOSIS — K58.2 IRRITABLE BOWEL SYNDROME WITH BOTH CONSTIPATION AND DIARRHEA: ICD-10-CM

## 2020-11-09 DIAGNOSIS — N95.2 POSTMENOPAUSAL ATROPHIC VAGINITIS: ICD-10-CM

## 2020-11-09 DIAGNOSIS — E03.9 HYPOTHYROIDISM, UNSPECIFIED TYPE: ICD-10-CM

## 2020-11-09 DIAGNOSIS — I10 BENIGN ESSENTIAL HYPERTENSION: ICD-10-CM

## 2020-11-09 DIAGNOSIS — K21.9 GASTROESOPHAGEAL REFLUX DISEASE, UNSPECIFIED WHETHER ESOPHAGITIS PRESENT: ICD-10-CM

## 2020-11-09 DIAGNOSIS — R63.4 WEIGHT LOSS: ICD-10-CM

## 2020-11-09 LAB
ALBUMIN SERPL-MCNC: 4.1 G/DL (ref 3.5–5.7)
ALP SERPL-CCNC: 57 U/L (ref 34–104)
ALT SERPL W P-5'-P-CCNC: 11 U/L (ref 7–52)
ANION GAP SERPL CALCULATED.3IONS-SCNC: 6 MMOL/L (ref 3–14)
AST SERPL W P-5'-P-CCNC: 14 U/L (ref 13–39)
BILIRUB SERPL-MCNC: 0.5 MG/DL (ref 0.3–1)
BUN SERPL-MCNC: 19 MG/DL (ref 7–25)
CALCIUM SERPL-MCNC: 9.4 MG/DL (ref 8.6–10.3)
CHLORIDE SERPL-SCNC: 107 MMOL/L (ref 98–107)
CO2 SERPL-SCNC: 27 MMOL/L (ref 21–31)
CREAT SERPL-MCNC: 1.27 MG/DL (ref 0.6–1.2)
ERYTHROCYTE [DISTWIDTH] IN BLOOD BY AUTOMATED COUNT: 13 % (ref 10–15)
GFR SERPL CREATININE-BSD FRML MDRD: 40 ML/MIN/{1.73_M2}
GLUCOSE SERPL-MCNC: 96 MG/DL (ref 70–105)
HCT VFR BLD AUTO: 39.9 % (ref 35–47)
HGB BLD-MCNC: 12.5 G/DL (ref 11.7–15.7)
MCH RBC QN AUTO: 32.1 PG (ref 26.5–33)
MCHC RBC AUTO-ENTMCNC: 31.3 G/DL (ref 31.5–36.5)
MCV RBC AUTO: 102 FL (ref 78–100)
PLATELET # BLD AUTO: 275 10E9/L (ref 150–450)
POTASSIUM SERPL-SCNC: 4.3 MMOL/L (ref 3.5–5.1)
PROT SERPL-MCNC: 7.6 G/DL (ref 6.4–8.9)
RBC # BLD AUTO: 3.9 10E12/L (ref 3.8–5.2)
SODIUM SERPL-SCNC: 140 MMOL/L (ref 134–144)
T4 FREE SERPL-MCNC: 1.49 NG/DL (ref 0.6–1.6)
TSH SERPL DL<=0.05 MIU/L-ACNC: 0.56 IU/ML (ref 0.34–5.6)
WBC # BLD AUTO: 10.4 10E9/L (ref 4–11)

## 2020-11-09 PROCEDURE — 99214 OFFICE O/P EST MOD 30 MIN: CPT | Performed by: NURSE PRACTITIONER

## 2020-11-09 PROCEDURE — 84439 ASSAY OF FREE THYROXINE: CPT | Mod: ZL | Performed by: NURSE PRACTITIONER

## 2020-11-09 PROCEDURE — 84443 ASSAY THYROID STIM HORMONE: CPT | Mod: ZL | Performed by: NURSE PRACTITIONER

## 2020-11-09 PROCEDURE — G0463 HOSPITAL OUTPT CLINIC VISIT: HCPCS

## 2020-11-09 PROCEDURE — 85027 COMPLETE CBC AUTOMATED: CPT | Mod: ZL | Performed by: NURSE PRACTITIONER

## 2020-11-09 PROCEDURE — 36415 COLL VENOUS BLD VENIPUNCTURE: CPT | Mod: ZL | Performed by: NURSE PRACTITIONER

## 2020-11-09 PROCEDURE — 80053 COMPREHEN METABOLIC PANEL: CPT | Mod: ZL | Performed by: NURSE PRACTITIONER

## 2020-11-09 RX ORDER — PANTOPRAZOLE SODIUM 20 MG/1
20 TABLET, DELAYED RELEASE ORAL DAILY
Qty: 90 TABLET | Refills: 3 | Status: SHIPPED | OUTPATIENT
Start: 2020-11-09 | End: 2021-01-01

## 2020-11-09 RX ORDER — LORATADINE 10 MG/1
10 TABLET ORAL DAILY
COMMUNITY
End: 2020-11-09

## 2020-11-09 RX ORDER — ESTRADIOL 0.1 MG/G
CREAM VAGINAL
Qty: 42.5 G | Refills: 5 | Status: SHIPPED | OUTPATIENT
Start: 2020-11-09 | End: 2022-01-01

## 2020-11-09 RX ORDER — LEVOTHYROXINE SODIUM 112 UG/1
112 TABLET ORAL DAILY
Qty: 90 TABLET | Refills: 3 | Status: SHIPPED | OUTPATIENT
Start: 2020-11-09 | End: 2021-01-01

## 2020-11-09 RX ORDER — GABAPENTIN 300 MG/1
CAPSULE ORAL
Qty: 270 CAPSULE | Refills: 3 | Status: SHIPPED | OUTPATIENT
Start: 2020-11-09 | End: 2022-01-01

## 2020-11-09 RX ORDER — ZOLPIDEM TARTRATE 5 MG/1
5 TABLET ORAL AT BEDTIME
Qty: 90 TABLET | Refills: 3 | Status: SHIPPED | OUTPATIENT
Start: 2020-11-09 | End: 2021-01-01

## 2020-11-09 RX ORDER — LOSARTAN POTASSIUM 25 MG/1
TABLET ORAL
Qty: 180 TABLET | Refills: 3 | Status: SHIPPED | OUTPATIENT
Start: 2020-11-09 | End: 2021-01-01

## 2020-11-09 ASSESSMENT — ENCOUNTER SYMPTOMS
TROUBLE SWALLOWING: 1
COUGH: 0
FEVER: 0
AGITATION: 0
ARTHRALGIAS: 0
NAUSEA: 0
NERVOUS/ANXIOUS: 0
APPETITE CHANGE: 0
ABDOMINAL DISTENTION: 0
FATIGUE: 0
UNEXPECTED WEIGHT CHANGE: 1
LIGHT-HEADEDNESS: 0
DYSPHORIC MOOD: 0
ENDOCRINE NEGATIVE: 1
ANAL BLEEDING: 0
ABDOMINAL PAIN: 0
WEAKNESS: 0
DIFFICULTY URINATING: 0
DIARRHEA: 1
CONSTIPATION: 1
SHORTNESS OF BREATH: 0
FLANK PAIN: 0
DIZZINESS: 0
HEARTBURN: 0
VOMITING: 0

## 2020-11-09 ASSESSMENT — MIFFLIN-ST. JEOR: SCORE: 1034.47

## 2020-11-09 ASSESSMENT — PAIN SCALES - GENERAL: PAINLEVEL: NO PAIN (0)

## 2020-11-09 NOTE — NURSING NOTE
Chief Complaint   Patient presents with     Annual Visit       Medication Reconciliation complete.   Gayla Carrasquillo LPN  ..................11/9/2020   9:35 AM

## 2020-11-09 NOTE — PROGRESS NOTES
Subjective:  She is here today for chronic disease management.  She states that she has lost weight since her last evaluation.  She has Presbyesophagus and continues to have difficulty swallowing foods at times and coughing during swallowing.  She states she really has not been doing the exercises that were recommended by speech therapy.  She does do the singing exercise.  She is decided that she is going to be more vigilant with doing these exercises.  Over the past 2 months her weight has stabilized.  Her weight is down about 12 pounds since last year.  She otherwise is doing well.  She continues to use tobacco and is not interested in tobacco cessation.  She states the only thing that she notices is that in the evening her ankles will swell and her feet and ankles feel a little tight.  She only elevates legs in front of her not above her heart.  By the next morning the swelling and tightness have resolved.  As far as the weight loss she is not interested in any further investigation as she is quite sure it is related to her chronic swallowing issue.  She has had EGD and also many years ago was diagnosed by GI.  She is due for lab work today.  She is up-to-date on immunizations.  She does not want mammogram scheduled.  She is past the age of routine CT chest for lung cancer evaluation due to her tobacco status.    Patient Active Problem List   Diagnosis     Acid reflux disease     CAP (community acquired pneumonia)     Chronic kidney disease, stage III (moderate)     Cigarette smoker     Diarrhea     Essential hypertension     Hypothyroidism     Insomnia     Iron deficiency anemia     Irritable bowel syndrome     Liver abscess     Low folic acid     Mononeuritis     Hereditary and idiopathic peripheral neuropathy     Nontoxic multinodular goiter     Osteopenia     Raynaud phenomenon     Tinea pedis     Acute left-sided low back pain without sciatica     Segmental and somatic dysfunction of sacral region      Segmental and somatic dysfunction of pelvic region     Postmenopausal atrophic vaginitis     Presbyesophagus     Grade II diastolic dysfunction- echo Jan 2020     Moderate tricuspid insufficiency- echo Jan 2020     Past Medical History:   Diagnosis Date     Asymptomatic menopausal state     DXA 10/27/06 normal     Calculus of kidney     1990,s/p lithotripsy     Chronic kidney disease, stage III (moderate)     7/27/2011     Diarrhea     11/6/2014     Dysphagia     2007,EGD 3/14/07 nl; sx from goiter.     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     2004,on Tapazole     Past Surgical History:   Procedure Laterality Date     APPENDECTOMY OPEN      1979,,& Meckel's diverticulum removed     COLONOSCOPY      11/17/06,polyps; repeat in 5 YEARS     COLONOSCOPY      11/8/11     COLONOSCOPY      11/6/2014     ESOPHAGOSCOPY, GASTROSCOPY, DUODENOSCOPY (EGD), COMBINED      3/14/07,done for dysphagia; normal     ESOPHAGOSCOPY, GASTROSCOPY, DUODENOSCOPY (EGD), COMBINED N/A 6/7/2018    Procedure: COMBINED ESOPHAGOSCOPY, GASTROSCOPY, DUODENOSCOPY (EGD);  Gastroscopy;  Surgeon: Bisi Rivera MD;  Location: [facility] OR     HYSTERECTOMY TOTAL ABDOMINAL, BILATERAL SALPINGO-OOPHORECTOMY, COMBINED      1979     LAPAROSCOPIC CHOLECYSTECTOMY      1991,common bile duct transected     OTHER SURGICAL HISTORY      4/05,IGM218,PREMALIG/BENIGN SKIN LESION EXCISION,epidermal inclusion cyst     OTHER SURGICAL HISTORY      10/26/2015,[id number],FOOT SURGERY,hammertoe repair, [facility] with Dr. Grimaldo     OTHER SURGICAL HISTORY       281985,IR BILIARY STRICTURE DILATATION WO STENT,x3- per h/p /Choledochojejunostomy with Vane-En-Y due to transection of common bile duct     THYROIDECTOMY      3/26/08,Total thyroidectomy; multinodular goiter     Social History     Socioeconomic History     Marital status:      Spouse name: Not on file     Number of children: Not on file     Years of education: Not on file     Highest education level: Not on file   Occupational History     Not on file   Social Needs     Financial resource strain: Not on file     Food insecurity     Worry: Not on file     Inability: Not on file     Transportation needs     Medical: Not on file     Non-medical: Not on file   Tobacco Use     Smoking status: Current Every Day Smoker     Packs/day: 0.50     Years: 30.00     Pack years: 15.00     Types: Cigarettes     Smokeless tobacco: Never Used     Tobacco comment: no ecig   Substance and Sexual Activity     Alcohol use: No     Alcohol/week: 0.0 standard drinks     Drug use: No     Sexual activity: Not Currently     Partners: Male   Lifestyle     Physical activity     Days per week: Not on file     Minutes per session: Not on file     Stress: Not on file   Relationships     Social connections     Talks on phone: Not on file     Gets together: Not on file     Attends [employer] service: Not on file     Active member of club or organization: Not on file     Attends meetings of clubs or organizations: Not on file     Relationship status: Not on file     Intimate partner violence     Fear of current or ex partner: Not on file     Emotionally abused: Not on file     Physically abused: Not on file     Forced sexual activity: Not on file   Other Topics Concern     Not on file   Social History Narrative    ; 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; 4 children; Retired RN     Family History   Problem Relation Age of Onset     Other - See Comments Mother          85yo, emphysema, dementia     Heart Disease Father         Heart Disease,MI in 50s,  64yo      Diabetes Father         Diabetes     Substance Abuse Father         Alcohol/Drug,Etoh     Diabetes Daughter         Diabetes     Other - See Comments Daughter         Psychiatric illness,depr/anxiety     Thyroid Disease Sister         Thyroid Disease     Breast Cancer No family hx of         Cancer-breast     Current Outpatient Medications   Medication Sig Dispense Refill     estradiol (ESTRACE) 0.1 MG/GM vaginal cream INSERT 2GM INTO THE VAGINA EVERY MONDAY AND FRIDAY 42.5 g 5     gabapentin (NEURONTIN) 300 MG capsule TAKE 1 CAPSULE BY MOUTH THREE TIMES DAILY 270 capsule 3     levothyroxine (SYNTHROID/LEVOTHROID) 112 MCG tablet Take 1 tablet (112 mcg) by mouth daily 90 tablet 3     losartan (COZAAR) 25 MG tablet TAKE 2 TABLETS BY MOUTH ONCE DAILY 180 tablet 3     pantoprazole (PROTONIX) 20 MG EC tablet Take 1 tablet (20 mg) by mouth daily 90 tablet 3     zolpidem (AMBIEN) 5 MG tablet Take 1 tablet (5 mg) by mouth At Bedtime 90 tablet 3     acetaminophen (TYLENOL) 650 MG CR tablet Take 1,300 mg by mouth       ascorbic acid (VITAMIN C) 1000 MG TABS 1 tablet oral daily       Bioflavonoid Products (VITAMIN C PLUS) 1000 MG TABS        Blood Pressure Monitor MISC As directed. OMRON       Calcium Carbonate-Vitamin D3 600-400 MG-UNIT TABS        carboxymethylcellulose (CARBOXYMETHYLCELLULOSE SODIUM) 0.5 % SOLN ophthalmic solution 1 drop both eyes once daily 1 Bottle      docusate sodium (COLACE) 100 MG capsule Take 1 capsule by mouth for constipation. Can take 2 in 24 hours.       folic acid (FOLVITE) 1 MG tablet Take 1 tablet (1 mg) by mouth daily 30 tablet      loratadine (CLARITIN) 10 MG tablet Take 1 tablet (10 mg) by mouth daily 30 tablet      methylcellulose (CITRUCEL) 500 MG TABS tablet Take 1 tablet (500 mg) by mouth daily 14 each 0     Multiple Vitamins-Minerals (OCUVITE ADULT 50+) CAPS 1 tablet by mouth once daily.       order for DME Equipment being ordered: wheeled walker with seat 1 Units 0     Sodium  "Fluoride (SF 5000 PLUS) 1.1 % CREA        vitamin (B COMPLEX) capsule Take 1 capsule by mouth       Patient has no known allergies.      Review of Systems:  Review of Systems   Constitutional: Positive for unexpected weight change. Negative for appetite change, fatigue and fever.   HENT: Positive for trouble swallowing.    Respiratory: Negative for cough and shortness of breath.    Cardiovascular: Positive for peripheral edema. Negative for chest pain.   Gastrointestinal: Positive for constipation and diarrhea. Negative for abdominal distention, abdominal pain, anal bleeding, heartburn, nausea and vomiting.        IBS intermittent   Endocrine: Negative.    Genitourinary: Negative for difficulty urinating, flank pain and vaginal bleeding.   Musculoskeletal: Negative for arthralgias and gait problem.   Skin: Negative for pallor.   Neurological: Negative for dizziness, syncope, weakness and light-headedness.   Psychiatric/Behavioral: Negative for agitation and dysphoric mood. The patient is not nervous/anxious.        Objective:   /70 (BP Location: Right arm, Patient Position: Sitting, Cuff Size: Adult Regular)   Pulse 60   Temp 96.3  F (35.7  C) (Tympanic)   Resp 16   Ht 1.65 m (5' 4.96\")   Wt 59.4 kg (131 lb)   BMI 21.83 kg/m    Physical Exam  Pleasant female in no acute distress.  Affect normal.  Alert and oriented x4.  Sclera nonicteric.  Conjunctiva noninflamed.  TMs clear bilaterally.  Oral mucosa pink and moist.  Throat without erythema.  Neck supple and without adenopathy.  No thyromegaly.  Lung fields clear to auscultation throughout.  Cardiovascular regular rate and rhythm with no murmur or S3 auscultated.  Abdomen is soft with normal active bowel sounds x4 quadrants.  No tenderness masses or organomegaly.  Extremities without edema at this time.  DP PT 2+.  She does have varicose veins around the feet and ankles.  No ulcerations.    Assessment:    ICD-10-CM    1. Hereditary and idiopathic " peripheral neuropathy  G60.9 gabapentin (NEURONTIN) 300 MG capsule   2. Gastroesophageal reflux disease, unspecified whether esophagitis present  K21.9 pantoprazole (PROTONIX) 20 MG EC tablet   3. Irritable bowel syndrome with both constipation and diarrhea  K58.2    4. Hypothyroidism, unspecified type  E03.9 levothyroxine (SYNTHROID/LEVOTHROID) 112 MCG tablet   5. Essential hypertension  I10    6. Grade II diastolic dysfunction- echo Jan 2020  I51.9    7. Moderate tricuspid insufficiency- echo Jan 2020  I07.1    8. Osteopenia, unspecified location  M85.80    9. Stage 3a chronic kidney disease  N18.31    10. Cigarette smoker  F17.210    11. Benign essential hypertension  I10 losartan (COZAAR) 25 MG tablet   12. Insomnia, unspecified type  G47.00 zolpidem (AMBIEN) 5 MG tablet   13. Postmenopausal atrophic vaginitis  N95.2 estradiol (ESTRACE) 0.1 MG/GM vaginal cream   14. Weight loss  R63.4 Thyrotropin [facility]     T4 free     CBC with platelets     Comprehensive metabolic panel     Comprehensive metabolic panel     CBC with platelets     T4 free     Thyrotropin [facility]       Plan:   Medications are reviewed.  She is going to be more vigilant with doing the swallowing exercises as recommended by speech therapy.  Over the past couple of months her weight has stabilized.  We did discuss additional work-up of the weight loss which also would include EGD and colonoscopy, CT of chest abdomen pelvis, mammogram, etc.  She wants to hold off at this time.  If she is not finding provement in her swallowing difficulty then she will consider GI evaluation.  She will let me know.  She does not want to travel anywhere in the winter.  If she continues with the weight loss may also want to consider additional work-up.  At this time she request to hold off.  She is not interested in tobacco cessation.  Labs today include CBC, CHEM 13, TSH and free T4.    MARA Mullins   11/9/2020  11:50 AM

## 2020-11-27 ENCOUNTER — TELEPHONE (OUTPATIENT)
Dept: INTERNAL MEDICINE | Facility: OTHER | Age: 85
End: 2020-11-27

## 2020-11-27 NOTE — TELEPHONE ENCOUNTER
RKV-Patient is looking for a lab order for a kidney test Pleas call and advise     Thank You    Darshana Gay on 11/27/2020 at 11:19 AM

## 2020-11-27 NOTE — TELEPHONE ENCOUNTER
Spoke with patient and transferred to scheduling. Gayla Carrasquillo LPN .............11/27/2020  11:59 AM

## 2020-11-27 NOTE — TELEPHONE ENCOUNTER
Spoke with patient and transferred to scheduling for a lab only. Gayla Carrasquillo LPN .............11/27/2020  11:17 AM

## 2020-11-30 ENCOUNTER — TELEPHONE (OUTPATIENT)
Dept: INTERNAL MEDICINE | Facility: OTHER | Age: 85
End: 2020-11-30

## 2020-11-30 DIAGNOSIS — N18.31 STAGE 3A CHRONIC KIDNEY DISEASE (H): Primary | ICD-10-CM

## 2020-12-01 NOTE — TELEPHONE ENCOUNTER
Called patient and verified name and date of birth. Notified her of lab order being placed and sent to scheduling to make appointment.  Irene Delgado LPN on 12/1/2020 at 11:40 AM

## 2020-12-01 NOTE — TELEPHONE ENCOUNTER
I have placed orders for kidney test.  By reviewing the previous telephone note on 11/27 when she had asked for lab orders, it does not look like that message was never routed to me, instead patient was directed to set up a lab appointment and then it was closed.

## 2020-12-03 DIAGNOSIS — N18.31 STAGE 3A CHRONIC KIDNEY DISEASE (H): ICD-10-CM

## 2020-12-03 LAB
ANION GAP SERPL CALCULATED.3IONS-SCNC: 7 MMOL/L (ref 3–14)
BUN SERPL-MCNC: 16 MG/DL (ref 7–25)
CALCIUM SERPL-MCNC: 9.7 MG/DL (ref 8.6–10.3)
CHLORIDE SERPL-SCNC: 107 MMOL/L (ref 98–107)
CO2 SERPL-SCNC: 27 MMOL/L (ref 21–31)
CREAT SERPL-MCNC: 1.2 MG/DL (ref 0.6–1.2)
GFR SERPL CREATININE-BSD FRML MDRD: 43 ML/MIN/{1.73_M2}
GLUCOSE SERPL-MCNC: 103 MG/DL (ref 70–105)
POTASSIUM SERPL-SCNC: 3.9 MMOL/L (ref 3.5–5.1)
SODIUM SERPL-SCNC: 141 MMOL/L (ref 134–144)

## 2020-12-03 PROCEDURE — 80048 BASIC METABOLIC PNL TOTAL CA: CPT | Mod: ZL | Performed by: NURSE PRACTITIONER

## 2020-12-03 PROCEDURE — 36415 COLL VENOUS BLD VENIPUNCTURE: CPT | Mod: ZL | Performed by: NURSE PRACTITIONER

## 2020-12-27 ENCOUNTER — HEALTH MAINTENANCE LETTER (OUTPATIENT)
Age: 85
End: 2020-12-27

## 2021-01-01 ENCOUNTER — HOSPITAL ENCOUNTER (OUTPATIENT)
Dept: GENERAL RADIOLOGY | Facility: OTHER | Age: 86
Discharge: HOME | End: 2021-10-12
Attending: PHYSICIAN ASSISTANT | Admitting: PHYSICIAN ASSISTANT
Payer: MEDICARE

## 2021-01-01 ENCOUNTER — MYC MEDICAL ADVICE (OUTPATIENT)
Dept: INTERNAL MEDICINE | Facility: OTHER | Age: 86
End: 2021-01-01

## 2021-01-01 ENCOUNTER — HOSPITAL ENCOUNTER (OUTPATIENT)
Dept: CT IMAGING | Facility: OTHER | Age: 86
Discharge: HOME | End: 2021-08-12
Attending: NURSE PRACTITIONER | Admitting: NURSE PRACTITIONER
Payer: MEDICARE

## 2021-01-01 ENCOUNTER — TELEPHONE (OUTPATIENT)
Dept: INTERNAL MEDICINE | Facility: OTHER | Age: 86
End: 2021-01-01

## 2021-01-01 ENCOUNTER — VIRTUAL VISIT (OUTPATIENT)
Dept: INTERNAL MEDICINE | Facility: OTHER | Age: 86
End: 2021-01-01
Attending: NURSE PRACTITIONER
Payer: COMMERCIAL

## 2021-01-01 ENCOUNTER — TRANSFERRED RECORDS (OUTPATIENT)
Dept: HEALTH INFORMATION MANAGEMENT | Facility: OTHER | Age: 86
End: 2021-01-01

## 2021-01-01 ENCOUNTER — ALLIED HEALTH/NURSE VISIT (OUTPATIENT)
Dept: FAMILY MEDICINE | Facility: OTHER | Age: 86
End: 2021-01-01
Attending: FAMILY MEDICINE
Payer: MEDICARE

## 2021-01-01 ENCOUNTER — LAB (OUTPATIENT)
Dept: LAB | Facility: OTHER | Age: 86
End: 2021-01-01
Attending: FAMILY MEDICINE
Payer: MEDICARE

## 2021-01-01 ENCOUNTER — OFFICE VISIT (OUTPATIENT)
Dept: OTOLARYNGOLOGY | Facility: OTHER | Age: 86
End: 2021-01-01
Attending: OTOLARYNGOLOGY
Payer: MEDICARE

## 2021-01-01 ENCOUNTER — HOSPITAL ENCOUNTER (OUTPATIENT)
Dept: MAMMOGRAPHY | Facility: OTHER | Age: 86
Discharge: HOME | End: 2021-08-30
Attending: NURSE PRACTITIONER | Admitting: NURSE PRACTITIONER
Payer: MEDICARE

## 2021-01-01 ENCOUNTER — OFFICE VISIT (OUTPATIENT)
Dept: ORTHOPEDICS | Facility: OTHER | Age: 86
End: 2021-01-01
Attending: PODIATRIST
Payer: MEDICARE

## 2021-01-01 ENCOUNTER — TELEPHONE (OUTPATIENT)
Dept: INTERNAL MEDICINE | Facility: OTHER | Age: 86
End: 2021-01-01
Payer: COMMERCIAL

## 2021-01-01 ENCOUNTER — HEALTH MAINTENANCE LETTER (OUTPATIENT)
Age: 86
End: 2021-01-01

## 2021-01-01 VITALS
RESPIRATION RATE: 16 BRPM | DIASTOLIC BLOOD PRESSURE: 62 MMHG | WEIGHT: 124.3 LBS | BODY MASS INDEX: 20.71 KG/M2 | TEMPERATURE: 97.2 F | HEART RATE: 73 BPM | SYSTOLIC BLOOD PRESSURE: 128 MMHG | OXYGEN SATURATION: 96 %

## 2021-01-01 DIAGNOSIS — Z12.11 SCREENING FOR MALIGNANT NEOPLASM OF COLON: Primary | ICD-10-CM

## 2021-01-01 DIAGNOSIS — Z12.31 OTHER SCREENING MAMMOGRAM: Primary | ICD-10-CM

## 2021-01-01 DIAGNOSIS — Z12.31 VISIT FOR SCREENING MAMMOGRAM: ICD-10-CM

## 2021-01-01 DIAGNOSIS — G47.00 INSOMNIA, UNSPECIFIED TYPE: ICD-10-CM

## 2021-01-01 DIAGNOSIS — I25.10 CORONARY ARTERY DISEASE INVOLVING NATIVE HEART WITHOUT ANGINA PECTORIS, UNSPECIFIED VESSEL OR LESION TYPE: ICD-10-CM

## 2021-01-01 DIAGNOSIS — I25.10 CORONARY ARTERY DISEASE INVOLVING NATIVE HEART WITHOUT ANGINA PECTORIS, UNSPECIFIED VESSEL OR LESION TYPE: Primary | ICD-10-CM

## 2021-01-01 DIAGNOSIS — Z20.822 COVID-19 RULED OUT: Primary | ICD-10-CM

## 2021-01-01 DIAGNOSIS — E03.9 HYPOTHYROIDISM, UNSPECIFIED TYPE: Primary | ICD-10-CM

## 2021-01-01 DIAGNOSIS — F17.210 CIGARETTE SMOKER: ICD-10-CM

## 2021-01-01 DIAGNOSIS — R13.19 OTHER DYSPHAGIA: Primary | ICD-10-CM

## 2021-01-01 DIAGNOSIS — K22.89 PRESBYESOPHAGUS: ICD-10-CM

## 2021-01-01 DIAGNOSIS — K21.9 GASTROESOPHAGEAL REFLUX DISEASE, UNSPECIFIED WHETHER ESOPHAGITIS PRESENT: ICD-10-CM

## 2021-01-01 DIAGNOSIS — K22.5 ZENKER'S DIVERTICULUM: ICD-10-CM

## 2021-01-01 DIAGNOSIS — M20.41 HAMMER TOE OF RIGHT FOOT: Primary | ICD-10-CM

## 2021-01-01 DIAGNOSIS — R63.4 WEIGHT LOSS: ICD-10-CM

## 2021-01-01 DIAGNOSIS — I10 BENIGN ESSENTIAL HYPERTENSION: ICD-10-CM

## 2021-01-01 DIAGNOSIS — J39.2 CRICOPHARYNGEAL SPASM: Primary | ICD-10-CM

## 2021-01-01 DIAGNOSIS — R13.10 DYSPHAGIA, UNSPECIFIED TYPE: ICD-10-CM

## 2021-01-01 LAB
ALBUMIN SERPL-MCNC: 3.9 G/DL (ref 3.5–5.7)
ALBUMIN UR-MCNC: 30 MG/DL
ALP SERPL-CCNC: 52 U/L (ref 34–104)
ALT SERPL W P-5'-P-CCNC: 8 U/L (ref 7–52)
ANION GAP SERPL CALCULATED.3IONS-SCNC: 7 MMOL/L (ref 3–14)
APPEARANCE UR: CLEAR
AST SERPL W P-5'-P-CCNC: 13 U/L (ref 13–39)
BILIRUB SERPL-MCNC: 0.4 MG/DL (ref 0.3–1)
BILIRUB UR QL STRIP: NEGATIVE
BUN SERPL-MCNC: 17 MG/DL (ref 7–25)
CALCIUM SERPL-MCNC: 9.3 MG/DL (ref 8.6–10.3)
CHLORIDE BLD-SCNC: 106 MMOL/L (ref 98–107)
CHOLEST SERPL-MCNC: 172 MG/DL
CO2 SERPL-SCNC: 27 MMOL/L (ref 21–31)
COLOR UR AUTO: YELLOW
CREAT SERPL-MCNC: 1.25 MG/DL (ref 0.6–1.2)
ERYTHROCYTE [DISTWIDTH] IN BLOOD BY AUTOMATED COUNT: 12.3 % (ref 10–15)
FASTING STATUS PATIENT QL REPORTED: ABNORMAL
GFR SERPL CREATININE-BSD FRML MDRD: 39 ML/MIN/1.73M2
GLUCOSE BLD-MCNC: 79 MG/DL (ref 70–105)
GLUCOSE UR STRIP-MCNC: NEGATIVE MG/DL
HCT VFR BLD AUTO: 36.1 % (ref 35–47)
HDLC SERPL-MCNC: 55 MG/DL (ref 23–92)
HGB BLD-MCNC: 11.4 G/DL (ref 11.7–15.7)
HGB UR QL STRIP: NEGATIVE
HOLD SPECIMEN: NORMAL
HYALINE CASTS: 1 /LPF
KETONES UR STRIP-MCNC: NEGATIVE MG/DL
LDLC SERPL CALC-MCNC: 102 MG/DL
LEUKOCYTE ESTERASE UR QL STRIP: ABNORMAL
MCH RBC QN AUTO: 32 PG (ref 26.5–33)
MCHC RBC AUTO-ENTMCNC: 31.6 G/DL (ref 31.5–36.5)
MCV RBC AUTO: 101 FL (ref 78–100)
MUCOUS THREADS #/AREA URNS LPF: PRESENT /LPF
NITRATE UR QL: NEGATIVE
NONHDLC SERPL-MCNC: 117 MG/DL
PH UR STRIP: 5.5 [PH] (ref 5–9)
PLATELET # BLD AUTO: 217 10E3/UL (ref 150–450)
POTASSIUM BLD-SCNC: 4 MMOL/L (ref 3.5–5.1)
PROT SERPL-MCNC: 7.2 G/DL (ref 6.4–8.9)
RBC # BLD AUTO: 3.56 10E6/UL (ref 3.8–5.2)
RBC URINE: 1 /HPF
SARS-COV-2 RNA RESP QL NAA+PROBE: NEGATIVE
SODIUM SERPL-SCNC: 140 MMOL/L (ref 134–144)
SP GR UR STRIP: 1.02 (ref 1–1.03)
SQUAMOUS EPITHELIAL: 14 /HPF
TRIGL SERPL-MCNC: 77 MG/DL
TSH SERPL DL<=0.005 MIU/L-ACNC: 0.24 MU/L (ref 0.4–4)
UROBILINOGEN UR STRIP-MCNC: NORMAL MG/DL
WBC # BLD AUTO: 6.9 10E3/UL (ref 4–11)
WBC URINE: 8 /HPF

## 2021-01-01 PROCEDURE — 74220 X-RAY XM ESOPHAGUS 1CNTRST: CPT

## 2021-01-01 PROCEDURE — 36415 COLL VENOUS BLD VENIPUNCTURE: CPT | Mod: ZL | Performed by: NURSE PRACTITIONER

## 2021-01-01 PROCEDURE — 71250 CT THORAX DX C-: CPT

## 2021-01-01 PROCEDURE — U0003 INFECTIOUS AGENT DETECTION BY NUCLEIC ACID (DNA OR RNA); SEVERE ACUTE RESPIRATORY SYNDROME CORONAVIRUS 2 (SARS-COV-2) (CORONAVIRUS DISEASE [COVID-19]), AMPLIFIED PROBE TECHNIQUE, MAKING USE OF HIGH THROUGHPUT TECHNOLOGIES AS DESCRIBED BY CMS-2020-01-R: HCPCS | Mod: ZL

## 2021-01-01 PROCEDURE — C9803 HOPD COVID-19 SPEC COLLECT: HCPCS

## 2021-01-01 PROCEDURE — G0463 HOSPITAL OUTPT CLINIC VISIT: HCPCS

## 2021-01-01 PROCEDURE — 36415 COLL VENOUS BLD VENIPUNCTURE: CPT | Mod: ZL

## 2021-01-01 PROCEDURE — 99213 OFFICE O/P EST LOW 20 MIN: CPT | Performed by: PODIATRIST

## 2021-01-01 PROCEDURE — 99212 OFFICE O/P EST SF 10 MIN: CPT | Mod: 95 | Performed by: NURSE PRACTITIONER

## 2021-01-01 PROCEDURE — 82040 ASSAY OF SERUM ALBUMIN: CPT | Mod: ZL | Performed by: NURSE PRACTITIONER

## 2021-01-01 PROCEDURE — 84443 ASSAY THYROID STIM HORMONE: CPT | Mod: ZL | Performed by: NURSE PRACTITIONER

## 2021-01-01 PROCEDURE — 80061 LIPID PANEL: CPT | Mod: ZL

## 2021-01-01 PROCEDURE — 77063 BREAST TOMOSYNTHESIS BI: CPT

## 2021-01-01 PROCEDURE — 99214 OFFICE O/P EST MOD 30 MIN: CPT | Performed by: NURSE PRACTITIONER

## 2021-01-01 PROCEDURE — 85014 HEMATOCRIT: CPT | Mod: ZL | Performed by: NURSE PRACTITIONER

## 2021-01-01 PROCEDURE — 81003 URINALYSIS AUTO W/O SCOPE: CPT | Mod: ZL | Performed by: NURSE PRACTITIONER

## 2021-01-01 RX ORDER — ATORVASTATIN CALCIUM 10 MG/1
10 TABLET, FILM COATED ORAL DAILY
Qty: 90 TABLET | Refills: 3 | Status: ON HOLD | OUTPATIENT
Start: 2021-01-01 | End: 2022-01-01

## 2021-01-01 RX ORDER — ZOLPIDEM TARTRATE 5 MG/1
5 TABLET ORAL AT BEDTIME
Qty: 90 TABLET | Refills: 1 | Status: SHIPPED | OUTPATIENT
Start: 2021-01-01 | End: 2022-01-01

## 2021-01-01 RX ORDER — LOSARTAN POTASSIUM 25 MG/1
TABLET ORAL
Qty: 180 TABLET | Refills: 2 | Status: ON HOLD | OUTPATIENT
Start: 2021-01-01 | End: 2022-01-01

## 2021-01-01 RX ORDER — PANTOPRAZOLE SODIUM 20 MG/1
20 TABLET, DELAYED RELEASE ORAL DAILY
Qty: 90 TABLET | Refills: 2 | Status: ON HOLD | OUTPATIENT
Start: 2021-01-01 | End: 2022-01-01

## 2021-01-01 RX ORDER — LEVOTHYROXINE SODIUM 100 UG/1
100 TABLET ORAL DAILY
Qty: 90 TABLET | Refills: 3 | Status: SHIPPED | OUTPATIENT
Start: 2021-01-01 | End: 2022-01-01

## 2021-01-01 RX ORDER — ZOLPIDEM TARTRATE 5 MG/1
5 TABLET ORAL AT BEDTIME
Qty: 90 TABLET | OUTPATIENT
Start: 2021-01-01

## 2021-01-01 ASSESSMENT — ENCOUNTER SYMPTOMS
SORE THROAT: 0
CHEST TIGHTNESS: 0
NAUSEA: 0
BLOOD IN STOOL: 0
FATIGUE: 0
SHORTNESS OF BREATH: 0
CONSTIPATION: 1
ENDOCRINE NEGATIVE: 1
WHEEZING: 0
ABDOMINAL PAIN: 0
DIFFICULTY URINATING: 0
TROUBLE SWALLOWING: 1
ADENOPATHY: 0
VOMITING: 0
DIZZINESS: 0
DIARRHEA: 1
LIGHT-HEADEDNESS: 0
WEAKNESS: 0
STRIDOR: 0
FLANK PAIN: 0
ANAL BLEEDING: 0
HEMATURIA: 0
COUGH: 0

## 2021-01-01 ASSESSMENT — PAIN SCALES - GENERAL: PAINLEVEL: NO PAIN (0)

## 2021-01-04 ENCOUNTER — MYC MEDICAL ADVICE (OUTPATIENT)
Dept: INTERNAL MEDICINE | Facility: OTHER | Age: 86
End: 2021-01-04

## 2021-01-13 ENCOUNTER — MYC MEDICAL ADVICE (OUTPATIENT)
Dept: INTERNAL MEDICINE | Facility: OTHER | Age: 86
End: 2021-01-13

## 2021-02-08 DIAGNOSIS — M79.671 RIGHT FOOT PAIN: Primary | ICD-10-CM

## 2021-03-09 ENCOUNTER — OFFICE VISIT (OUTPATIENT)
Dept: ORTHOPEDICS | Facility: OTHER | Age: 86
End: 2021-03-09
Attending: ORTHOPAEDIC SURGERY
Payer: MEDICARE

## 2021-03-09 ENCOUNTER — HOSPITAL ENCOUNTER (OUTPATIENT)
Dept: GENERAL RADIOLOGY | Facility: OTHER | Age: 86
End: 2021-03-09
Attending: ORTHOPAEDIC SURGERY
Payer: MEDICARE

## 2021-03-09 DIAGNOSIS — M70.72 BURSITIS OF LEFT HIP, UNSPECIFIED BURSA: ICD-10-CM

## 2021-03-09 DIAGNOSIS — M79.671 RIGHT FOOT PAIN: ICD-10-CM

## 2021-03-09 DIAGNOSIS — M79.671 RIGHT FOOT PAIN: Primary | ICD-10-CM

## 2021-03-09 PROCEDURE — 73630 X-RAY EXAM OF FOOT: CPT | Mod: RT

## 2021-03-09 PROCEDURE — 99213 OFFICE O/P EST LOW 20 MIN: CPT | Performed by: ORTHOPAEDIC SURGERY

## 2021-03-09 PROCEDURE — G0463 HOSPITAL OUTPT CLINIC VISIT: HCPCS

## 2021-03-09 NOTE — PROGRESS NOTES
Patient is here for consult on her right foot.   Lesly Caballero LPN .....................3/9/2021 10:22 AM

## 2021-03-09 NOTE — PROGRESS NOTES
Visit Date:   2021      FOLLOWUP EVALUATION      REASON FOR EVALUATION:   1.  Left hip pain.   2.  Right foot lump.      HISTORY:  Sylvie comes in with her left hip, has actually been doing okay.  We were doing periodic bursal injections.  She is going to actually hold off on having 1 done here at this point in time as her hip really has not been troubling her too much.  She also has a little bit of a right foot pain issue and a bump on it and wanted this evaluated in addition to that.      PHYSICAL EXAMINATION:  Examination of the foot shows a prominence of the fifth metatarsal base.  No pain with palpation there, significant swelling or pain or torsion to the foot itself.  Examination of left hip shows fluid range of motion and minimal pain with palpation across the bursal region there as well.  Neurovascular examination intact.       IMAGING:  X-rays are reviewed in the right foot, 3 views.  5th metatarsal base  plus arthritic changes at the tarsometatarsal joint at the base of the fifth metatarsal at this time.      IMPRESSION:     1.  Right fifth metatarsal base osteoarthrosis.   2.  Left hip bursitis.      PLAN:  Observation for both at this time.  We will certainly look at a left hip bursal injection down the road if necessary and she will contact [facility] for scheduling when necessary.  As far as the foot is concerned, observation is certainly advised at this point in time.  She is in agreement with that plan otherwise.         CHIVO MILLAN MD             D: 2021   T: 2021   MT: CANDACE      Name:     SYLVIE KELLY   MRN:      [id number]        Account:      [id number]   :      1934           Visit Date:   2021      Document: [id number]

## 2021-04-07 ENCOUNTER — OFFICE VISIT (OUTPATIENT)
Dept: ORTHOPEDICS | Facility: OTHER | Age: 86
End: 2021-04-07
Attending: ORTHOPAEDIC SURGERY
Payer: MEDICARE

## 2021-04-07 DIAGNOSIS — M70.72 BURSITIS OF LEFT HIP, UNSPECIFIED BURSA: Primary | ICD-10-CM

## 2021-04-07 PROCEDURE — 96372 THER/PROPH/DIAG INJ SC/IM: CPT | Performed by: ORTHOPAEDIC SURGERY

## 2021-04-07 PROCEDURE — 250N000009 HC RX 250: Performed by: ORTHOPAEDIC SURGERY

## 2021-04-07 PROCEDURE — 250N000011 HC RX IP 250 OP 636: Performed by: ORTHOPAEDIC SURGERY

## 2021-04-07 PROCEDURE — 20610 DRAIN/INJ JOINT/BURSA W/O US: CPT | Mod: LT | Performed by: ORTHOPAEDIC SURGERY

## 2021-04-07 PROCEDURE — G0463 HOSPITAL OUTPT CLINIC VISIT: HCPCS

## 2021-04-07 PROCEDURE — 20610 DRAIN/INJ JOINT/BURSA W/O US: CPT | Mod: LT

## 2021-04-07 RX ORDER — TRIAMCINOLONE ACETONIDE 40 MG/ML
40 INJECTION, SUSPENSION INTRA-ARTICULAR; INTRAMUSCULAR ONCE
Status: COMPLETED | OUTPATIENT
Start: 2021-04-07 | End: 2021-04-07

## 2021-04-07 RX ORDER — LIDOCAINE HYDROCHLORIDE 10 MG/ML
4 INJECTION, SOLUTION EPIDURAL; INFILTRATION; INTRACAUDAL; PERINEURAL ONCE
Status: COMPLETED | OUTPATIENT
Start: 2021-04-07 | End: 2021-04-07

## 2021-04-07 RX ADMIN — TRIAMCINOLONE ACETONIDE 40 MG: 40 INJECTION, SUSPENSION INTRA-ARTICULAR; INTRAMUSCULAR at 13:34

## 2021-04-07 RX ADMIN — LIDOCAINE HYDROCHLORIDE 4 ML: 10 INJECTION, SOLUTION EPIDURAL; INFILTRATION; INTRACAUDAL; PERINEURAL at 13:34

## 2021-04-07 NOTE — PROGRESS NOTES
Visit Date:   2021      REASON FOR EVALUATION:  Left hip pain.      HISTORY:  Sylvie comes in with regards to left hip.  She is here for an injection at this time.  Last injection was done in October with reasonable success.  Symptoms have come back.  It is sporadic in nature but certainly not bothering her enough to the point where she would like to look at an intervention to be done here at this in point.      PHYSICAL EXAMINATION:  Left hip shows tenderness across the trochanteric bursa.  Mild swelling is seen as well.  Neurovascular examination is otherwise intact.      PROCEDURE:  Following informed consent and sterile preparation, the patient's left hip trochanteric bursa is injected with 4 mL of 1% lidocaine and 40 mg of Kenalog under sterile conditions.      IMPRESSION:  Left hip bursitis.      PLAN:  Injection as stated above.  Repeat in the future as appropriate and necessary at this time.  I will see her back otherwise in 3-4 months.         CHIVO MILLAN MD             D: 2021   T: 2021   MT: DIMITRI      Name:     SYLVIE KELLY   MRN:      [id number]        Account:      [id number]   :      1934           Visit Date:   2021      Document: [id number]

## 2021-04-07 NOTE — PROGRESS NOTES
Patient is here for follow up on her left hip.  Lesly Caballero LPN .....................4/7/2021 12:58 PM

## 2021-04-08 ENCOUNTER — OFFICE VISIT (OUTPATIENT)
Dept: ORTHOPEDICS | Facility: OTHER | Age: 86
End: 2021-04-08
Attending: PODIATRIST
Payer: COMMERCIAL

## 2021-04-08 DIAGNOSIS — M20.41 HAMMER TOE OF RIGHT FOOT: Primary | ICD-10-CM

## 2021-04-08 DIAGNOSIS — M79.671 RIGHT FOOT PAIN: ICD-10-CM

## 2021-04-08 PROCEDURE — G0463 HOSPITAL OUTPT CLINIC VISIT: HCPCS

## 2021-04-08 PROCEDURE — 99203 OFFICE O/P NEW LOW 30 MIN: CPT | Performed by: PODIATRIST

## 2021-04-08 NOTE — PROGRESS NOTES
Visit Date:   04/08/2021      HISTORY OF PRESENT ILLNESS:  Sylvie is an 87-year-old female I had seen 5 years ago.  We did hammertoe correction and flexor tenotomies in 2015.  She did well for a while, but then she had some recurrence.  She has had some rigid contractures.  These have become more problematic.  She is a smoker, half a pack a day, and has been for quite some time.  She has presently intact circulation and decent capillary fill and pedal pulses.  Again, here to have these toes evaluated and consider options.      PHYSICAL EXAMINATION:    CONSTITUTIONAL:  The patient is alert and oriented x3, well appearing and in no apparent distress.  Affect is pleasant and answers questions appropriately.   VASCULAR:  Circulation is intact with palpable pedal pulses and adequate capillary fill time to all digits.  Hair growth is present and appropriate to mid foot and digits.   NEUROLOGIC:  Light touch sensation is intact to digits.  There is a negative Tinel sign.  There are no signs of apparent nerve entrapment of superficial peroneal or common peroneal nerves.   INTEGUMENT:  No abnormal dermatologic lesions are noted.  Skin has normal texture and turgor.     MUSCULOSKELETAL:  Contracted digits 1 through 5, distal tip lesions, particularly of the third, all tender to palpate distally.  Again, circulation appears to be better than I would anticipate, given her significant pack-year history, which suggests that potentially she would do okay with surgery.  I did recommend at least minimizing the amount she smokes in the recovery process.  Ideally, she quits.  But given conservative cares with shoes, history of tenotomies, and recurrent rigid contracture, I likely would be amenable to IP joint fusions.      IMAGING:  Three views of the foot demonstrate no acute pathology.  Contracted digits are appreciated as discussed clinically.      ASSESSMENT:  Right hammertoes 1 through 5.      PLAN:  I discussed condition and  treatment with the patient today.  I did discuss an IPJ fusion of her hallux hammertoes 2 through 5.  We discussed this in detail.  She would need to be cleared by primary care.  Given relatively decent circulation, I think she would heal these, but given her pack-year history of smoking, certainly risk is higher than healthy population.  I discussed this in detail.  She thinks she would like to proceed.  I put a surgery order in.  She will get a preop and schedule at her convenience, otherwise.  I discussed surgery, recovery, risks, potential complications, alternatives and benefits.      Thirty-minute consultation today.         LUIS FERGUSON DPM             D: 2021   T: 2021   MT: JUDITH      Name:     MAYDA KELLY   MRN:      [id number]        Account:      [id number]   :      1934           Visit Date:   2021      Document: [id number]

## 2021-04-08 NOTE — PROGRESS NOTES
Patient is here for consult on her right foot.   Lesly Caballero LPN .....................4/8/2021 8:51 AM

## 2021-04-13 ENCOUNTER — HOSPITAL ENCOUNTER (OUTPATIENT)
Facility: OTHER | Age: 86
End: 2021-04-13
Attending: PODIATRIST | Admitting: PODIATRIST
Payer: MEDICARE

## 2021-04-13 DIAGNOSIS — M79.671 RIGHT FOOT PAIN: ICD-10-CM

## 2021-04-13 DIAGNOSIS — M20.41 HAMMER TOE OF RIGHT FOOT: ICD-10-CM

## 2021-04-25 ENCOUNTER — HEALTH MAINTENANCE LETTER (OUTPATIENT)
Age: 86
End: 2021-04-25

## 2021-05-13 ENCOUNTER — TELEPHONE (OUTPATIENT)
Dept: ORTHOPEDICS | Facility: OTHER | Age: 86
End: 2021-05-13

## 2021-05-13 NOTE — TELEPHONE ENCOUNTER
Pt has some questions about upcoming surgery.  Please call      Alex Spencer on 5/13/2021 at 9:18 AM

## 2021-05-14 ENCOUNTER — OFFICE VISIT (OUTPATIENT)
Dept: INTERNAL MEDICINE | Facility: OTHER | Age: 86
End: 2021-05-14
Attending: NURSE PRACTITIONER
Payer: MEDICARE

## 2021-05-14 VITALS
DIASTOLIC BLOOD PRESSURE: 72 MMHG | TEMPERATURE: 96.8 F | WEIGHT: 129.7 LBS | RESPIRATION RATE: 16 BRPM | SYSTOLIC BLOOD PRESSURE: 128 MMHG | HEART RATE: 64 BPM | BODY MASS INDEX: 21.61 KG/M2

## 2021-05-14 DIAGNOSIS — L30.4 INTERTRIGO: ICD-10-CM

## 2021-05-14 DIAGNOSIS — K21.9 GASTROESOPHAGEAL REFLUX DISEASE, UNSPECIFIED WHETHER ESOPHAGITIS PRESENT: Primary | ICD-10-CM

## 2021-05-14 DIAGNOSIS — F17.210 CIGARETTE SMOKER: ICD-10-CM

## 2021-05-14 DIAGNOSIS — N18.31 STAGE 3A CHRONIC KIDNEY DISEASE (H): ICD-10-CM

## 2021-05-14 DIAGNOSIS — Z01.818 PRE-OPERATIVE EXAMINATION: ICD-10-CM

## 2021-05-14 DIAGNOSIS — E03.9 HYPOTHYROIDISM, UNSPECIFIED TYPE: ICD-10-CM

## 2021-05-14 DIAGNOSIS — I51.89 GRADE II DIASTOLIC DYSFUNCTION: ICD-10-CM

## 2021-05-14 DIAGNOSIS — I10 ESSENTIAL HYPERTENSION: ICD-10-CM

## 2021-05-14 LAB
ANION GAP SERPL CALCULATED.3IONS-SCNC: 7 MMOL/L (ref 3–14)
BUN SERPL-MCNC: 15 MG/DL (ref 7–25)
CALCIUM SERPL-MCNC: 9.4 MG/DL (ref 8.6–10.3)
CHLORIDE SERPL-SCNC: 109 MMOL/L (ref 98–107)
CO2 SERPL-SCNC: 25 MMOL/L (ref 21–31)
CREAT SERPL-MCNC: 1.18 MG/DL (ref 0.6–1.2)
ERYTHROCYTE [DISTWIDTH] IN BLOOD BY AUTOMATED COUNT: 13.3 % (ref 10–15)
GFR SERPL CREATININE-BSD FRML MDRD: 43 ML/MIN/{1.73_M2}
GLUCOSE SERPL-MCNC: 87 MG/DL (ref 70–105)
HCT VFR BLD AUTO: 37.4 % (ref 35–47)
HGB BLD-MCNC: 11.8 G/DL (ref 11.7–15.7)
MCH RBC QN AUTO: 31.5 PG (ref 26.5–33)
MCHC RBC AUTO-ENTMCNC: 31.6 G/DL (ref 31.5–36.5)
MCV RBC AUTO: 100 FL (ref 78–100)
PLATELET # BLD AUTO: 230 10E9/L (ref 150–450)
POTASSIUM SERPL-SCNC: 4.3 MMOL/L (ref 3.5–5.1)
RBC # BLD AUTO: 3.75 10E12/L (ref 3.8–5.2)
SODIUM SERPL-SCNC: 141 MMOL/L (ref 134–144)
WBC # BLD AUTO: 7.6 10E9/L (ref 4–11)

## 2021-05-14 PROCEDURE — 80048 BASIC METABOLIC PNL TOTAL CA: CPT | Mod: ZL | Performed by: NURSE PRACTITIONER

## 2021-05-14 PROCEDURE — 85027 COMPLETE CBC AUTOMATED: CPT | Mod: ZL | Performed by: NURSE PRACTITIONER

## 2021-05-14 PROCEDURE — G0463 HOSPITAL OUTPT CLINIC VISIT: HCPCS

## 2021-05-14 PROCEDURE — 99213 OFFICE O/P EST LOW 20 MIN: CPT | Performed by: NURSE PRACTITIONER

## 2021-05-14 PROCEDURE — 36415 COLL VENOUS BLD VENIPUNCTURE: CPT | Mod: ZL | Performed by: NURSE PRACTITIONER

## 2021-05-14 PROCEDURE — 93005 ELECTROCARDIOGRAM TRACING: CPT | Performed by: NURSE PRACTITIONER

## 2021-05-14 PROCEDURE — 93010 ELECTROCARDIOGRAM REPORT: CPT | Performed by: INTERNAL MEDICINE

## 2021-05-14 RX ORDER — NYSTATIN 100000 U/G
CREAM TOPICAL 2 TIMES DAILY
Qty: 30 G | Refills: 1 | Status: SHIPPED | OUTPATIENT
Start: 2021-05-14 | End: 2022-01-01

## 2021-05-14 ASSESSMENT — PAIN SCALES - GENERAL: PAINLEVEL: NO PAIN (0)

## 2021-05-14 NOTE — PROGRESS NOTES
----------------- PREOPERATIVE EXAM ------------------  5/14/2021    SUBJECTIVE:  Sylvie Bautista is a 87 year old female here for preoperative optimization.    I was asked to see Sylvie Bautista by Dr. Carpenter for a preoperative optimization due to correction of right foot hammertoe.    Patient has a history of GERD which is well controlled with pantoprazole.  She has hypothyroidism and is on Synthroid.  Thyroid labs in November were normal.  She has hypertension and grade 2 diastolic heart failure.  She is asymptomatic of heart failure.  She has no symptoms of angina nor exertional dyspnea.  Last echocardiogram was January 2020 with normal ejection fraction.  She has stage III chronic kidney disease hypertension is well controlled with prescribed medication.  She continues to use tobacco.  She smokes about a half a pack per day.  She is planning to taper tobacco use and plans for upcoming surgery.  She is aware that this is an associated risk with surgical procedure and poor wound healing.  She states that sometimes she has a rash underneath her breast.  It is pink and itches at times.  She uses over-the-counter hydrocortisone and sometimes that helps.  It is only mild at this time.    There are no exam notes on file for this visit.    Patient Active Problem List    Diagnosis Date Noted     Hammer toe of right foot 04/13/2021     Priority: Medium     Added automatically from request for surgery [id number]       Right foot pain 04/13/2021     Priority: Medium     Added automatically from request for surgery [id number]       Grade II diastolic dysfunction- echo Jan 2020 01/31/2020     Priority: Medium     Moderate tricuspid insufficiency- echo Jan 2020 01/31/2020     Priority: Medium     Presbyesophagus 03/27/2019     Priority: Medium     Postmenopausal atrophic vaginitis 11/08/2018     Priority: Medium     Acute left-sided low back pain without sciatica 03/16/2018     Priority: Medium     Segmental and somatic  dysfunction of sacral region 03/16/2018     Priority: Medium     Segmental and somatic dysfunction of pelvic region 03/16/2018     Priority: Medium     Tinea pedis 01/16/2018     Priority: Medium     Osteopenia 10/06/2017     Priority: Medium     Iron deficiency anemia 06/30/2017     Priority: Medium     Liver abscess 06/30/2017     Priority: Medium     Overview:   With biliary stricture and duct dilation.  MRI 6/2017       Low folic acid 05/26/2017     Priority: Medium     CAP (community acquired pneumonia) 06/15/2016     Priority: Medium     Cigarette smoker 06/05/2015     Priority: Medium     Overview:   Age 16 to current; 1/2 to 1 ppd       Raynaud phenomenon 06/05/2015     Priority: Medium     Irritable bowel syndrome 11/19/2014     Priority: Medium     Diarrhea 11/06/2014     Priority: Medium     Chronic kidney disease, stage III (moderate) 07/27/2011     Priority: Medium     Hereditary and idiopathic peripheral neuropathy 03/09/2011     Priority: Medium     Mononeuritis 02/28/2011     Priority: Medium     Acid reflux disease 06/23/2010     Priority: Medium     Essential hypertension 06/23/2010     Priority: Medium     Hypothyroidism 06/23/2010     Priority: Medium     Insomnia 06/23/2010     Priority: Medium     Nontoxic multinodular goiter 03/26/2008     Priority: Medium       Past Medical History:   Diagnosis Date     Asymptomatic menopausal state     DXA 10/27/06 normal     Calculus of kidney     1990,s/p lithotripsy     Chronic kidney disease, stage III (moderate)     7/27/2011     Diarrhea     11/6/2014     Dysphagia     2007,EGD 3/14/07 nl; sx from goiter.     Essential (primary) hypertension     No Comments Provided     Nicotine dependence, uncomplicated     No Comments Provided     Other specified anxiety disorders     1972 with divorce - Imipramine;  Sertraline 5/08     Personal history of other medical treatment (CODE)     vaginal deliveries     Polyp of colon     2006,sessile adenoma     Raynaud's  syndrome without gangrene     6/5/2015     Thyrotoxicosis with diffuse goiter and without thyroid storm     2004,on Tapazole       Past Surgical History:   Procedure Laterality Date     APPENDECTOMY OPEN      1979,,& Meckel's diverticulum removed     COLONOSCOPY      11/17/06,polyps; repeat in 5 YEARS     COLONOSCOPY      11/8/11     COLONOSCOPY      11/6/2014     ESOPHAGOSCOPY, GASTROSCOPY, DUODENOSCOPY (EGD), COMBINED      3/14/07,done for dysphagia; normal     ESOPHAGOSCOPY, GASTROSCOPY, DUODENOSCOPY (EGD), COMBINED N/A 6/7/2018    Procedure: COMBINED ESOPHAGOSCOPY, GASTROSCOPY, DUODENOSCOPY (EGD);  Gastroscopy;  Surgeon: Bisi Rivera MD;  Location: [facility] OR     HYSTERECTOMY TOTAL ABDOMINAL, BILATERAL SALPINGO-OOPHORECTOMY, COMBINED      1979     LAPAROSCOPIC CHOLECYSTECTOMY      1991,common bile duct transected     OTHER SURGICAL HISTORY      4/05,OOR392,PREMALIG/BENIGN SKIN LESION EXCISION,epidermal inclusion cyst     OTHER SURGICAL HISTORY      10/26/2015,[id number],FOOT SURGERY,hammertoe repair, [facility] with Dr. Grimaldo     OTHER SURGICAL HISTORY      [id number],IR BILIARY STRICTURE DILATATION WO STENT,x3- per h/p /Choledochojejunostomy with Vane-En-Y due to transection of common bile duct     THYROIDECTOMY      3/26/08,Total thyroidectomy; multinodular goiter       Current Outpatient Medications   Medication Sig Dispense Refill     acetaminophen (TYLENOL) 650 MG CR tablet Take 1,300 mg by mouth       ascorbic acid (VITAMIN C) 1000 MG TABS 1 tablet oral daily       Bioflavonoid Products (VITAMIN C PLUS) 1000 MG TABS        Blood Pressure Monitor MISC As directed. OMRON       Calcium Carbonate-Vitamin D3 600-400 MG-UNIT TABS        carboxymethylcellulose (CARBOXYMETHYLCELLULOSE SODIUM) 0.5 % SOLN ophthalmic solution 1 drop both eyes once daily 1 Bottle      docusate sodium (COLACE) 100 MG capsule Take 1 capsule by mouth for constipation. Can take 2 in 24 hours.       estradiol (ESTRACE) 0.1 MG/GM vaginal  cream INSERT 2GM INTO THE VAGINA EVERY MONDAY AND FRIDAY 42.5 g 5     folic acid (FOLVITE) 1 MG tablet Take 1 tablet (1 mg) by mouth daily 30 tablet      gabapentin (NEURONTIN) 300 MG capsule TAKE 1 CAPSULE BY MOUTH THREE TIMES DAILY 270 capsule 3     levothyroxine (SYNTHROID/LEVOTHROID) 112 MCG tablet Take 1 tablet (112 mcg) by mouth daily 90 tablet 3     loratadine (CLARITIN) 10 MG tablet Take 1 tablet (10 mg) by mouth daily 30 tablet      losartan (COZAAR) 25 MG tablet TAKE 2 TABLETS BY MOUTH ONCE DAILY 180 tablet 3     methylcellulose (CITRUCEL) 500 MG TABS tablet Take 1 tablet (500 mg) by mouth daily 14 each 0     Multiple Vitamins-Minerals (OCUVITE ADULT 50+) CAPS 1 tablet by mouth once daily.       nystatin (MYCOSTATIN) 857278 UNIT/GM external cream Apply topically 2 times daily 30 g 1     pantoprazole (PROTONIX) 20 MG EC tablet Take 1 tablet (20 mg) by mouth daily 90 tablet 3     Sodium Fluoride (SF 5000 PLUS) 1.1 % CREA        vitamin (B COMPLEX) capsule Take 1 capsule by mouth       zolpidem (AMBIEN) 5 MG tablet Take 1 tablet (5 mg) by mouth At Bedtime 90 tablet 3     order for DME Equipment being ordered: wheeled walker with seat 1 Units 0       Recent use of ibuprofen, aspirin or aleve: Occasional ibuprofen use    Allergies:  No Known Allergies    Latex allergy  No    Family History   Problem Relation Age of Onset     Other - See Comments Mother          85yo, emphysema, dementia     Heart Disease Father         Heart Disease,MI in 50s,  64yo     Diabetes Father         Diabetes     Substance Abuse Father         Alcohol/Drug,Etoh     Diabetes Daughter         Diabetes     Other - See Comments Daughter         Psychiatric illness,depr/anxiety     Thyroid Disease Sister         Thyroid Disease     Breast Cancer No family hx of         Cancer-breast       Denies family hx of bleeding tendencies, anesthesia complications, or other problems with surgery.      Social History     Socioeconomic History      Marital status:      Spouse name: Not on file     Number of children: Not on file     Years of education: Not on file     Highest education level: Not on file   Occupational History     Not on file   Social Needs     Financial resource strain: Not on file     Food insecurity     Worry: Not on file     Inability: Not on file     Transportation needs     Medical: Not on file     Non-medical: Not on file   Tobacco Use     Smoking status: Current Every Day Smoker     Packs/day: 0.50     Years: 30.00     Pack years: 15.00     Types: Cigarettes     Smokeless tobacco: Never Used     Tobacco comment: no ecig   Substance and Sexual Activity     Alcohol use: No     Alcohol/week: 0.0 standard drinks     Drug use: No     Sexual activity: Not Currently     Partners: Male   Lifestyle     Physical activity     Days per week: Not on file     Minutes per session: Not on file     Stress: Not on file   Relationships     Social connections     Talks on phone: Not on file     Gets together: Not on file     Attends [employer] service: Not on file     Active member of club or organization: Not on file     Attends meetings of clubs or organizations: Not on file     Relationship status: Not on file     Intimate partner violence     Fear of current or ex partner: Not on file     Emotionally abused: Not on file     Physically abused: Not on file     Forced sexual activity: Not on file   Other Topics Concern     Not on file   Social History Narrative    ; 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      Comprehensive REVIEW OF SYSTEMS:      Constitutional: Negative for fever, chills and fatigue .   Eyes: Negative for irritation and redness .  Ears, nose, mouth, throat, and face: Negative for ear  drainage, nasal congestion, sore throat and hoarseness .  Positive for occasional popping and fullness in the right ear, concerned that she has cerumen present.  Respiratory: Negative for cough, sputum production , hemoptysis, dyspnea  and wheezing .  Cardiovascular: Negative for chest discomfort, palpitations and lightheadedness .  Gastrointestinal: Negative for dysphagia, nausea, vomiting, melena, diarrhea, constipation and abdominal pain.  Genitourinary: Negative for frequency, dysuria, urinary incontinence, hematuria.  Integument/breast: Positive for rash under breasts, mild recurrent occasionally improved with over-the-counter hydrocortisone cream..   Hematologic/lymphatic: Negative for easy bruising, bleeding, lymphadenopathy and petechiae.   Musculoskeletal: Negative for myalgias and arthralgias .  Neurological: Negative for headaches, dizziness, vertigo, seizures and weakness.   Psychiatric: Negative for anxiety, depression, panic attacks and suicidal ideations.       -------------------------------------------------------------    PHYSICAL EXAM:  /72   Pulse 64   Temp 96.8  F (36  C) (Tympanic)   Resp 16   Wt 58.8 kg (129 lb 11.2 oz)   Breastfeeding No   BMI 21.61 kg/m      EXAM:  General Appearance: Pleasant, alert, appropriate appearance for age. No acute distress  Head Exam: Normal. Normocephalic, atraumatic.  Eye Exam: Sclera non-icteric. Conjunctiva non-inflamed.  Ear Exam: Normal TM's bilaterally.  Right ear canal with 75% cerumen, flushed by nursing with good results.    OroPharynx Exam: Oral mucosa pink and moist. No erythema or exudate of throat.  Neck Exam: Supple, no masses or lymphadenopathy  Thyroid Exam:  No nodules or thyromegaly, non-tender.  Chest/Respiratory Exam: Normal chest wall and respirations. Clear to auscultation.  Cardiovascular Exam: Regular rate and rhythm. S1, S2, no murmur or S3 gallop.  Gastrointestinal Exam: Soft, nontender, no masses or organomegaly. BS x 4. No  abdominal bruit or pulsatile masses.  Lymphatic Exam: No axillary or inguinal adenopathy  Musculoskeletal Exam: No active synovitis  Skin: Mild light pink rash under the medial aspects of both breast, no excoriation or drainage.  Appears to be mild intertrigo.  Neurologic Exam: symmetric DTRs, normal gross motor movement, tone, strength and coordination. No tremor.  Psychiatric Exam: Alert and oriented, appropriate affect.    PHQ Depression Screen  PHQ-9 SCORE 7/9/2018 9/12/2018 3/27/2019   PHQ-9 Total Score 0 0 0       Patient can walk around home and doing household chores without becoming short of breath or having chest pain: YES   Patient is able to tolerate greater than 4 METs of activity without any cardiopulmonary symptoms.    LABS:    Results for orders placed or performed in visit on 05/14/21   Basic metabolic panel     Status: Abnormal   Result Value Ref Range    Sodium 141 134 - 144 mmol/L    Potassium 4.3 3.5 - 5.1 mmol/L    Chloride 109 (H) 98 - 107 mmol/L    Carbon Dioxide 25 21 - 31 mmol/L    Anion Gap 7 3 - 14 mmol/L    Glucose 87 70 - 105 mg/dL    Urea Nitrogen 15 7 - 25 mg/dL    Creatinine 1.18 0.60 - 1.20 mg/dL    GFR Estimate 43 (L) >60 mL/min/[1.73_m2]    GFR Estimate If Black 52 (L) >60 mL/min/[1.73_m2]    Calcium 9.4 8.6 - 10.3 mg/dL   CBC with platelets     Status: Abnormal   Result Value Ref Range    WBC 7.6 4.0 - 11.0 10e9/L    RBC Count 3.75 (L) 3.8 - 5.2 10e12/L    Hemoglobin 11.8 11.7 - 15.7 g/dL    Hematocrit 37.4 35.0 - 47.0 %     78 - 100 fl    MCH 31.5 26.5 - 33.0 pg    MCHC 31.6 31.5 - 36.5 g/dL    RDW 13.3 10.0 - 15.0 %    Platelet Count 230 150 - 450 10e9/L   EKG 12-lead, tracing only     Status: None (Preliminary result)   Result Value Ref Range    Interpretation ECG Click View Image link to view waveform and result          EKG:   Sinus bradycardia, rate 57, normal axis, no significant ST or T wave abnormality, normal axis compared to previous tracing June 9, 2017 no  significant change besides the bradycardia, IM over-read pending    ---------------------------------------------------------------    No family or personal history of problems with bleeding or anesthetia. Patient's perioperative risk is minimized and no further cardiopulmonary workup is neccesary.  Please contact the office with any questions or concerns.      ICD-10-CM    1. Gastroesophageal reflux disease, unspecified whether esophagitis present  K21.9 CBC with platelets     CBC with platelets   2. Hypothyroidism, unspecified type  E03.9    3. Essential hypertension  I10 CBC with platelets     Basic metabolic panel     EKG 12-lead, tracing only     Basic metabolic panel     CBC with platelets   4. Grade II diastolic dysfunction- echo Jan 2020  I51.9 Basic metabolic panel     Basic metabolic panel   5. Stage 3a chronic kidney disease  N18.31 Basic metabolic panel     Basic metabolic panel   6. Cigarette smoker  F17.210    7. Intertrigo  L30.4 nystatin (MYCOSTATIN) 700776 UNIT/GM external cream   8. Pre-operative examination  Z01.818 CBC with platelets     Basic metabolic panel     EKG 12-lead, tracing only     Basic metabolic panel     CBC with platelets         PLAN:  Patient will hold all over-the-counter supplements and avoid any aspirin and NSAID products 1 week prior to surgery.  May take all usual medication as prescribed.  She will use nystatin cream twice daily to rash beneath breast until resolved.  Would expect this would resolve within the next 7-10 days.  Needs to make sure she does not have any rash present at the time of surgery.  Follow-up if rash continues.  Ear wash secondary to cerumen impaction right ear.  Follow-up if continues to have problems.      Other:  Patient was advised to call our office and the surgical services with any change in condition or new symptoms if they were to develop between today and their surgical date.  Especially any cardiopulmonary symptoms or symptoms concerning for  an infection.         Greater than 30 minutes were spent in counseling andcoordination of care.    Mirella Aly NP..................5/14/2021 11:26 AM

## 2021-05-14 NOTE — PROGRESS NOTES
"Date of Surgery: 6/3/21  Type of Surgery: Right foot Hammer toe correction   Surgeon:   [facility]:  [facility]  Fax: [phone]    Fever/Chills or other infectious symptoms in past month: no  >10lb weight loss in past two months: no  O2 SAT: 98%    Health Care Directive/Code status:  Full Code Status on file   Hx of blood transfusions:   no  Td up to date:  no 1/26/2004   History of VRE/MRSA:  no Date: NA    Preoperative Evaluation: Obstructive Sleep Apnea screening    S: Snore -  Do you snore loudly? (louder than talking or loud enough to be heard through closed doors)no  T: Tired - Do you often feel tired, fatigued, or sleepy during the daytime?no  O: Observed - Has anyone ever observed you stop breathing during your sleep?no  P: Pressure - Do you have or are you being treated for high blood pressure?yes  B: BMI - BMI greater than 35kg/m2?no  A: Age - Age over 50 years old?yes  N: Neck - Neck circumference greater than 40 cm?no  G: Gender - Gender: Male? no    Total number of \"YES\" responses:  2    Scoring: Low risk of SYEDA 0-2  At Risk of SYEDA: >3 High Risk of SYEDA: 5-8    Roopa Obrien LPN 5/14/2021 9:56 AM  "

## 2021-05-17 LAB — INTERPRETATION ECG - MUSE: NORMAL

## 2021-05-18 ENCOUNTER — TELEPHONE (OUTPATIENT)
Dept: INTERNAL MEDICINE | Facility: OTHER | Age: 86
End: 2021-05-18

## 2021-05-18 NOTE — TELEPHONE ENCOUNTER
MIKAEL-pt wants to speak with Haydee re previous ear irrigation and surgery cancellation. Wants to speak with Haydee only. Thank you.  Yamilet Coulter

## 2021-05-18 NOTE — TELEPHONE ENCOUNTER
Phone call was made to patient.  She feels uneasy about the hammertoe correction surgery so she is planning to cancel the surgery and is going to work with a specialist at [facility] to try to get an appropriate fitting shoe.  She also states that she was able to get a lot of water out of her ear later in the afternoon after she left last appointment.  She does still have a fullness in the right ear.  No pain.  If she continues to have problems then she will follow-up for recheck.  May need ENT referral.

## 2021-06-22 ENCOUNTER — APPOINTMENT (OUTPATIENT)
Dept: GENERAL RADIOLOGY | Facility: OTHER | Age: 86
End: 2021-06-22
Attending: STUDENT IN AN ORGANIZED HEALTH CARE EDUCATION/TRAINING PROGRAM
Payer: MEDICARE

## 2021-06-22 ENCOUNTER — HOSPITAL ENCOUNTER (EMERGENCY)
Facility: OTHER | Age: 86
Discharge: HOME | End: 2021-06-22
Attending: STUDENT IN AN ORGANIZED HEALTH CARE EDUCATION/TRAINING PROGRAM | Admitting: STUDENT IN AN ORGANIZED HEALTH CARE EDUCATION/TRAINING PROGRAM
Payer: MEDICARE

## 2021-06-22 VITALS
OXYGEN SATURATION: 94 % | DIASTOLIC BLOOD PRESSURE: 66 MMHG | SYSTOLIC BLOOD PRESSURE: 119 MMHG | RESPIRATION RATE: 16 BRPM | HEART RATE: 67 BPM
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
## 2021-06-22 LAB
ANION GAP SERPL CALCULATED.3IONS-SCNC: 5 MMOL/L (ref 3–14)
BASOPHILS # BLD AUTO: 0 10E9/L (ref 0–0.2)
BASOPHILS NFR BLD AUTO: 0.4 %
BUN SERPL-MCNC: 13 MG/DL (ref 7–25)
CALCIUM SERPL-MCNC: 8.3 MG/DL (ref 8.6–10.3)
CHLORIDE SERPL-SCNC: 110 MMOL/L (ref 98–107)
CO2 SERPL-SCNC: 24 MMOL/L (ref 21–31)
CREAT SERPL-MCNC: 1.08 MG/DL (ref 0.6–1.2)
DIFFERENTIAL METHOD BLD: ABNORMAL
EOSINOPHIL # BLD AUTO: 0.3 10E9/L (ref 0–0.7)
EOSINOPHIL NFR BLD AUTO: 2.5 %
ERYTHROCYTE [DISTWIDTH] IN BLOOD BY AUTOMATED COUNT: 13.5 % (ref 10–15)
GFR SERPL CREATININE-BSD FRML MDRD: 48 ML/MIN/{1.73_M2}
GLUCOSE SERPL-MCNC: 91 MG/DL (ref 70–105)
HCT VFR BLD AUTO: 31.1 % (ref 35–47)
HGB BLD-MCNC: 10.1 G/DL (ref 11.7–15.7)
IMM GRANULOCYTES # BLD: 0 10E9/L (ref 0–0.4)
IMM GRANULOCYTES NFR BLD: 0.2 %
LYMPHOCYTES # BLD AUTO: 3 10E9/L (ref 0.8–5.3)
LYMPHOCYTES NFR BLD AUTO: 27.8 %
MAGNESIUM SERPL-MCNC: 1.8 MG/DL (ref 1.9–2.7)
MCH RBC QN AUTO: 32.3 PG (ref 26.5–33)
MCHC RBC AUTO-ENTMCNC: 32.5 G/DL (ref 31.5–36.5)
MCV RBC AUTO: 99 FL (ref 78–100)
MONOCYTES # BLD AUTO: 0.4 10E9/L (ref 0–1.3)
MONOCYTES NFR BLD AUTO: 3.9 %
NEUTROPHILS # BLD AUTO: 7 10E9/L (ref 1.6–8.3)
NEUTROPHILS NFR BLD AUTO: 65.2 %
PLATELET # BLD AUTO: 186 10E9/L (ref 150–450)
POTASSIUM SERPL-SCNC: 3.8 MMOL/L (ref 3.5–5.1)
RBC # BLD AUTO: 3.13 10E12/L (ref 3.8–5.2)
SODIUM SERPL-SCNC: 139 MMOL/L (ref 134–144)
WBC # BLD AUTO: 10.7 10E9/L (ref 4–11)

## 2021-06-22 PROCEDURE — 83735 ASSAY OF MAGNESIUM: CPT | Performed by: STUDENT IN AN ORGANIZED HEALTH CARE EDUCATION/TRAINING PROGRAM

## 2021-06-22 PROCEDURE — 99284 EMERGENCY DEPT VISIT MOD MDM: CPT | Mod: 25 | Performed by: STUDENT IN AN ORGANIZED HEALTH CARE EDUCATION/TRAINING PROGRAM

## 2021-06-22 PROCEDURE — 80048 BASIC METABOLIC PNL TOTAL CA: CPT | Performed by: STUDENT IN AN ORGANIZED HEALTH CARE EDUCATION/TRAINING PROGRAM

## 2021-06-22 PROCEDURE — 258N000003 HC RX IP 258 OP 636: Performed by: STUDENT IN AN ORGANIZED HEALTH CARE EDUCATION/TRAINING PROGRAM

## 2021-06-22 PROCEDURE — 72100 X-RAY EXAM L-S SPINE 2/3 VWS: CPT

## 2021-06-22 PROCEDURE — 250N000013 HC RX MED GY IP 250 OP 250 PS 637: Mod: GY | Performed by: STUDENT IN AN ORGANIZED HEALTH CARE EDUCATION/TRAINING PROGRAM

## 2021-06-22 PROCEDURE — 96360 HYDRATION IV INFUSION INIT: CPT | Performed by: STUDENT IN AN ORGANIZED HEALTH CARE EDUCATION/TRAINING PROGRAM

## 2021-06-22 PROCEDURE — 36415 COLL VENOUS BLD VENIPUNCTURE: CPT | Performed by: STUDENT IN AN ORGANIZED HEALTH CARE EDUCATION/TRAINING PROGRAM

## 2021-06-22 PROCEDURE — 73502 X-RAY EXAM HIP UNI 2-3 VIEWS: CPT

## 2021-06-22 PROCEDURE — 99283 EMERGENCY DEPT VISIT LOW MDM: CPT | Performed by: STUDENT IN AN ORGANIZED HEALTH CARE EDUCATION/TRAINING PROGRAM

## 2021-06-22 PROCEDURE — 85025 COMPLETE CBC W/AUTO DIFF WBC: CPT | Performed by: STUDENT IN AN ORGANIZED HEALTH CARE EDUCATION/TRAINING PROGRAM

## 2021-06-22 RX ORDER — MAGNESIUM OXIDE 400 MG/1
400 TABLET ORAL ONCE
Status: COMPLETED | OUTPATIENT
Start: 2021-06-22 | End: 2021-06-22

## 2021-06-22 RX ORDER — SODIUM CHLORIDE 9 MG/ML
INJECTION, SOLUTION INTRAVENOUS CONTINUOUS
Status: DISCONTINUED | OUTPATIENT
Start: 2021-06-22 | End: 2021-06-22 | Stop reason: HOSPADM

## 2021-06-22 RX ADMIN — SODIUM CHLORIDE 1000 ML: 9 INJECTION, SOLUTION INTRAVENOUS at 16:34

## 2021-06-22 RX ADMIN — MAGNESIUM OXIDE TAB 400 MG (241.3 MG ELEMENTAL MG) 400 MG: 400 (241.3 MG) TAB at 17:58

## 2021-06-22 NOTE — DISCHARGE INSTRUCTIONS
Your evaluation today was reassuring. Your magnesium was slightly low and recommend adding 1 or 2 foods in attached information that are higher in magnesium. Return to ER if you develop significantly worsening pain, shortness of breath, lightheadedness/dizziness that makes walking difficult, or other concerning symptoms.

## 2021-06-22 NOTE — ED TRIAGE NOTES
ED Nursing Triage Note (General)   ________________________________    Sylvie ORIANA Bautista is a 87 year old Female that presents to triage ambulance With history of falling this morning, denies LOC or hitting head, denies being on blood thinners. Pt states her daughter was adamant she come in. Concerns of being dehydrated.  reported by patient     /66   Pulse 67   Resp 16   SpO2 94% t  Patient appears alert , in no acute distress., and cooperative, pleasant and calm behavior.    GCS Total = 15  Airway: intact  Breathing noted as Normal  Circulation Normal  Skin:  Abnormal - bruising on outer left leg      PRE HOSPITAL PRIOR LIVING SITUATION Alone

## 2021-06-22 NOTE — ED PROVIDER NOTES
History     Chief Complaint   Patient presents with     Fall       Sylvie Bautista is a 87 year old female who presents from home after fall.  This morning patient was trying to get out of bed to get a drink of water and go to the bathroom when she felt very weak and unable to easily get out of bed, and subsequently fell.  Denies any head trauma or loss of consciousness.  She has left posterior pelvic pain with movement reaching 5/10.  At rest pain is very minimal.  Previously well.  No recent falls except for a dizziness spell several weeks ago which quickly resolved. Denies fever, chills, dyspnea, chest pain, nausea, vomiting, dysuria, diarrhea.  Her p.o. intake is declining due to Presbyesophagus.    No Known Allergies    Patient Active Problem List    Diagnosis Date Noted     Hammer toe of right foot 04/13/2021     Priority: Medium     Added automatically from request for surgery [id number]       Right foot pain 04/13/2021     Priority: Medium     Added automatically from request for surgery [id number]       Grade II diastolic dysfunction- echo Jan 2020 01/31/2020     Priority: Medium     Moderate tricuspid insufficiency- echo Jan 2020 01/31/2020     Priority: Medium     Presbyesophagus 03/27/2019     Priority: Medium     Postmenopausal atrophic vaginitis 11/08/2018     Priority: Medium     Acute left-sided low back pain without sciatica 03/16/2018     Priority: Medium     Segmental and somatic dysfunction of sacral region 03/16/2018     Priority: Medium     Segmental and somatic dysfunction of pelvic region 03/16/2018     Priority: Medium     Tinea pedis 01/16/2018     Priority: Medium     Osteopenia 10/06/2017     Priority: Medium     Iron deficiency anemia 06/30/2017     Priority: Medium     Liver abscess 06/30/2017     Priority: Medium     Overview:   With biliary stricture and duct dilation.  MRI 6/2017       Low folic acid 05/26/2017     Priority: Medium     CAP (community acquired pneumonia) 06/15/2016      Priority: Medium     Cigarette smoker 06/05/2015     Priority: Medium     Overview:   Age 16 to current; 1/2 to 1 ppd       Raynaud phenomenon 06/05/2015     Priority: Medium     Irritable bowel syndrome 11/19/2014     Priority: Medium     Diarrhea 11/06/2014     Priority: Medium     Chronic kidney disease, stage III (moderate) 07/27/2011     Priority: Medium     Hereditary and idiopathic peripheral neuropathy 03/09/2011     Priority: Medium     Mononeuritis 02/28/2011     Priority: Medium     Acid reflux disease 06/23/2010     Priority: Medium     Essential hypertension 06/23/2010     Priority: Medium     Hypothyroidism 06/23/2010     Priority: Medium     Insomnia 06/23/2010     Priority: Medium     Nontoxic multinodular goiter 03/26/2008     Priority: Medium       Past Medical History:   Diagnosis Date     Asymptomatic menopausal state      Calculus of kidney      Chronic kidney disease, stage III (moderate)      Diarrhea      Dysphagia      Essential (primary) hypertension      Nicotine dependence, uncomplicated      Other specified anxiety disorders      Personal history of other medical treatment (CODE)      Polyp of colon      Raynaud's syndrome without gangrene      Thyrotoxicosis with diffuse goiter and without thyroid storm        Past Surgical History:   Procedure Laterality Date     APPENDECTOMY OPEN      1979,,& Meckel's diverticulum removed     COLONOSCOPY      11/17/06,polyps; repeat in 5 YEARS     COLONOSCOPY      11/8/11     COLONOSCOPY      11/6/2014     ESOPHAGOSCOPY, GASTROSCOPY, DUODENOSCOPY (EGD), COMBINED      3/14/07,done for dysphagia; normal     ESOPHAGOSCOPY, GASTROSCOPY, DUODENOSCOPY (EGD), COMBINED N/A 6/7/2018    Procedure: COMBINED ESOPHAGOSCOPY, GASTROSCOPY, DUODENOSCOPY (EGD);  Gastroscopy;  Surgeon: Bisi Rivera MD;  Location: [facility] OR     HYSTERECTOMY TOTAL ABDOMINAL, BILATERAL SALPINGO-OOPHORECTOMY, COMBINED      1979     LAPAROSCOPIC CHOLECYSTECTOMY      1991,common bile duct  transected     OTHER SURGICAL HISTORY      ,[id number],PREMALIG/BENIGN SKIN LESION EXCISION,epidermal inclusion cyst     OTHER SURGICAL HISTORY      10/26/2015,[id number],FOOT SURGERY,hammertoe repair, [facility] with Dr. Grimaldo     OTHER SURGICAL HISTORY      [id number],IR BILIARY STRICTURE DILATATION WO STENT,x3- per h/p /Choledochojejunostomy with Vane-En-Y due to transection of common bile duct     THYROIDECTOMY      3/26/08,Total thyroidectomy; multinodular goiter       Family History   Problem Relation Age of Onset     Other - See Comments Mother          87yo, emphysema, dementia     Heart Disease Father         Heart Disease,MI in 50s,  62yo     Diabetes Father         Diabetes     Substance Abuse Father         Alcohol/Drug,Etoh     Diabetes Daughter         Diabetes     Other - See Comments Daughter         Psychiatric illness,depr/anxiety     Thyroid Disease Sister         Thyroid Disease     Breast Cancer No family hx of         Cancer-breast       Social History     Tobacco Use     Smoking status: Current Every Day Smoker     Packs/day: 0.50     Years: 30.00     Pack years: 15.00     Types: Cigarettes     Smokeless tobacco: Never Used     Tobacco comment: no ecig   Substance Use Topics     Alcohol use: No     Alcohol/week: 0.0 standard drinks     Drug use: No       Medications:    acetaminophen (TYLENOL) 650 MG CR tablet  ascorbic acid (VITAMIN C) 1000 MG TABS  Bioflavonoid Products (VITAMIN C PLUS) 1000 MG TABS  Blood Pressure Monitor MISC  Calcium Carbonate-Vitamin D3 600-400 MG-UNIT TABS  carboxymethylcellulose (CARBOXYMETHYLCELLULOSE SODIUM) 0.5 % SOLN ophthalmic solution  docusate sodium (COLACE) 100 MG capsule  estradiol (ESTRACE) 0.1 MG/GM vaginal cream  folic acid (FOLVITE) 1 MG tablet  gabapentin (NEURONTIN) 300 MG capsule  levothyroxine (SYNTHROID/LEVOTHROID) 112 MCG tablet  loratadine (CLARITIN) 10 MG tablet  losartan (COZAAR) 25 MG tablet  methylcellulose (CITRUCEL) 500 MG  TABS tablet  Multiple Vitamins-Minerals (OCUVITE ADULT 50+) CAPS  nystatin (MYCOSTATIN) 464742 UNIT/GM external cream  order for DME  pantoprazole (PROTONIX) 20 MG EC tablet  Sodium Fluoride (SF 5000 PLUS) 1.1 % CREA  vitamin (B COMPLEX) capsule  zolpidem (AMBIEN) 5 MG tablet        Review of Systems: See HPI for pertinent negatives and positives. All other systems reviewed and found to be negative.    Physical Exam   /66   Pulse 67   Resp 16   SpO2 94%      General: awake, comfortable  HEENT: atraumatic, neck supple  Respiratory: normal effort, clear to auscultation bilaterally  Cardiovascular: regular rate and rhythm, no murmurs  Abdomen: soft, nondistended, nontender  Extremities: no deformities, edema, or tenderness  MSK: left posterior iliac tenderness  Skin: warm, dry, no rashes  Neuro: alert, no focal deficits  Psych: appropriate mood and affect    ED Course           Results for orders placed or performed during the hospital encounter of 06/22/21 (from the past 24 hour(s))   CBC with platelets differential   Result Value Ref Range    WBC 10.7 4.0 - 11.0 10e9/L    RBC Count 3.13 (L) 3.8 - 5.2 10e12/L    Hemoglobin 10.1 (L) 11.7 - 15.7 g/dL    Hematocrit 31.1 (L) 35.0 - 47.0 %    MCV 99 78 - 100 fl    MCH 32.3 26.5 - 33.0 pg    MCHC 32.5 31.5 - 36.5 g/dL    RDW 13.5 10.0 - 15.0 %    Platelet Count 186 150 - 450 10e9/L    Diff Method Automated Method     % Neutrophils 65.2 %    % Lymphocytes 27.8 %    % Monocytes 3.9 %    % Eosinophils 2.5 %    % Basophils 0.4 %    % Immature Granulocytes 0.2 %    Absolute Neutrophil 7.0 1.6 - 8.3 10e9/L    Absolute Lymphocytes 3.0 0.8 - 5.3 10e9/L    Absolute Monocytes 0.4 0.0 - 1.3 10e9/L    Absolute Eosinophils 0.3 0.0 - 0.7 10e9/L    Absolute Basophils 0.0 0.0 - 0.2 10e9/L    Abs Immature Granulocytes 0.0 0 - 0.4 10e9/L   Basic metabolic panel   Result Value Ref Range    Sodium 139 134 - 144 mmol/L    Potassium 3.8 3.5 - 5.1 mmol/L    Chloride 110 (H) 98 - 107  mmol/L    Carbon Dioxide 24 21 - 31 mmol/L    Anion Gap 5 3 - 14 mmol/L    Glucose 91 70 - 105 mg/dL    Urea Nitrogen 13 7 - 25 mg/dL    Creatinine 1.08 0.60 - 1.20 mg/dL    GFR Estimate 48 (L) >60 mL/min/[1.73_m2]    GFR Estimate If Black 58 (L) >60 mL/min/[1.73_m2]    Calcium 8.3 (L) 8.6 - 10.3 mg/dL   Magnesium   Result Value Ref Range    Magnesium 1.8 (L) 1.9 - 2.7 mg/dL   XR Pelvis and Hip Left 2 Views    Narrative    PROCEDURE: XR PELVIS AND HIP LEFT 2 VIEWS 6/22/2021 5:22 PM    HISTORY: left pelvis pain s/p fall    COMPARISONS: None.    TECHNIQUE: Pelvis one view, left hip 2 views    FINDINGS: Pelvis: The pelvis is intact. The sacrum and sacroiliac  joints appear normal. The right proximal femur appears normal.    Left hip, the articular spaces are normal in height at the left hip.  The acetabulum and proximal femur appears normal.         Impression    IMPRESSION: No acute pelvic or left hip fracture.    EUSEBIA VALENCIA MD   XR Lumbar Spine 2/3 Views    Narrative    PROCEDURE: XR LUMBAR SPINE 2-3 VIEWS 6/22/2021 5:24 PM    HISTORY: rule out compression fx s/p fall    COMPARISONS: None.    TECHNIQUE: AP and lateral    FINDINGS: There is scoliosis of the lumbar spine concave left. There  is decrease in height in all the lumbar disc or lumbar facet joint  degenerative changes are noted. No fractures of the vertebral bodies  or arches are noted. Atherosclerotic calcifications are seen in the  abdomen.         Impression    IMPRESSION: Advanced degenerative changes in the lumbar spine. No  acute fractures    EUSEBIA VALENCIA MD       Medications   0.9% sodium chloride BOLUS (0 mLs Intravenous Stopped 6/22/21 1753)     Followed by   sodium chloride 0.9% infusion (has no administration in time range)   magnesium oxide (MAG-OX) tablet 400 mg (400 mg Oral Given 6/22/21 1758)       Assessments & Plan (with Medical Decision Making)     I have reviewed the nursing notes.    Patient evaluated for fall with left lower  back pain. Labs and XR imaging unremarkable. IVF given. Does not appear dehydrated per labs. Patient comfortable and desiring to discharge home. Discharged home with reassurance and ED return precautions given.     I have reviewed the findings, diagnosis, plan, and need for any follow up with the patient.    Discharge Medication List as of 6/22/2021  5:57 PM          Final diagnoses:   Fall, initial encounter       6/22/2021   [facility]     Mihai Ryan MD  06/22/21 1926

## 2021-06-24 ENCOUNTER — TELEPHONE (OUTPATIENT)
Dept: INTERNAL MEDICINE | Facility: OTHER | Age: 86
End: 2021-06-24

## 2021-06-24 NOTE — TELEPHONE ENCOUNTER
She has seen Dr Rivera in the past for swallowing and has worked with ST also.  I think her following up with Dr Rivera or working with ST would be an option

## 2021-06-24 NOTE — TELEPHONE ENCOUNTER
Patient is having difficulty swallowing and said this has been going on for years.  She does not know the next step and would like to talk with you or get a appointment sooner than 7/24/21.  She was also in the ED over the weekend due to not getting enough fluids because she can't swallow them well.  She fell due to weakness. Her family does not believe that she will consume the amount of fluids that she needs.  This can wait for your return.  Qi Trevino LPN..........6/24/2021  10:22 AM

## 2021-06-24 NOTE — TELEPHONE ENCOUNTER
Has appointment with you on 7/19/21.  She will keep that appointment. Also, she said she really has not done any of her exercises that ST has taught her to do and will start doing those. She will discuss all this with you at her visit.  Qi Trevino LPN..........6/24/2021  10:59 AM

## 2021-06-24 NOTE — TELEPHONE ENCOUNTER
RKV-patient would like an earlier appointment than what I offered 07.19.21  Please call and advise    Thank You    Darshana Gay on 6/24/2021 at 9:46 AM

## 2021-08-04 NOTE — TELEPHONE ENCOUNTER
Redundant refill request refused: Too soon:    Unable to complete prescription refill per RN Medication Refill Policy.................... Jess Noel RN ....................  8/4/2021   11:13 AM

## 2021-08-10 NOTE — TELEPHONE ENCOUNTER
Routing refill request to provider for review/approval because:  Drug not on the [facility] refill protocol     LOV: today 8/10/2021    Tereza Latham RN on 8/10/2021 at 3:31 PM

## 2021-08-10 NOTE — PROGRESS NOTES
ASSESSMENT:    ICD-10-CM    1. Other dysphagia  R13.19 Adult Gastro Ref - Consult Only   2. Presbyesophagus  K22.8 Adult Gastro Ref - Consult Only   3. Gastroesophageal reflux disease, unspecified whether esophagitis present  K21.9 Adult Gastro Ref - Consult Only   4. Weight loss  R63.4 CBC with platelets     Comprehensive metabolic panel     TSH     UA reflex to Microscopic     CT Chest w/o Contrast   5. Cigarette smoker  F17.210 CT Chest w/o Contrast       PLAN:  1.  Patient will be referred to gastroenterology due to swallowing difficulties.  She has a long-term history of present by esophagus.  Her last EGD was 2018.  She has worked with speech therapy but does admit that recently she has not been completing her therapy as directed.  Even when she was more diligent she did not find it was very helpful.  She has had weight loss of around 20 pounds in the last couple of years.  2.  Does not sound like she is having any breakthrough reflux symptoms so did not increase her pantoprazole.  We will leave to the discretion of gastroenterology.  3.  Weight loss most likely is related to her swallowing difficulty however I do think she needs further evaluation at this time to include labs, CBC, chemistry panel, TSH and urinalysis.  Also will set her up for CT chest without contrast due to her long history of tobacco use.  We did discuss she may need CT of abdomen and pelvis, colonoscopy, mammogram, etc.  At this time she wants to hold off until she sees the gastroenterologist and would like to hold off on mammogram due to her advanced age. Not interested in tobacco cessation.    SUBJECTIVE:    Patient is here today for chronic swallowing difficulties.  She was diagnosed with presbyesophagus approximately 8 years ago.  Her last EGD was 2018.  She has difficulty swallowing mostly solid foods.  She states that she feels that the food gets backed up.  She has worked with speech therapy and for a while was quite diligent  with the prescribed therapy but overall did not find that it was beneficial.  She also has GERD with no breakthrough symptoms.  She takes pantoprazole 20 mg daily.  She has experienced a weight loss of approximately 20 pounds in the last couple of years which she is quite sure is related to swallowing difficulties.  She has not had any additional work-up and has been hesitant to do so.  She did have blood work when she was in the emergency room in June which showed a hemoglobin slightly low at 10.1 g/dL.  She does use tobacco half pack daily.  She has averaged between half to 1 pack daily since the age of 16.    PROBLEM LIST:  Patient Active Problem List   Diagnosis     Acid reflux disease     CAP (community acquired pneumonia)     Chronic kidney disease, stage III (moderate)     Cigarette smoker     Diarrhea     Essential hypertension     Hypothyroidism     Insomnia     Iron deficiency anemia     Irritable bowel syndrome     Liver abscess     Low folic acid     Mononeuritis     Hereditary and idiopathic peripheral neuropathy     Nontoxic multinodular goiter     Osteopenia     Raynaud phenomenon     Tinea pedis     Acute left-sided low back pain without sciatica     Segmental and somatic dysfunction of sacral region     Segmental and somatic dysfunction of pelvic region     Postmenopausal atrophic vaginitis     Presbyesophagus     Grade II diastolic dysfunction- echo Jan 2020     Moderate tricuspid insufficiency- echo Jan 2020     Hammer toe of right foot     Right foot pain     PAST MEDICAL HISTORY:  Past Medical History:   Diagnosis Date     Asymptomatic menopausal state     DXA 10/27/06 normal     Calculus of kidney     1990,s/p lithotripsy     Chronic kidney disease, stage III (moderate)     7/27/2011     Diarrhea     11/6/2014     Dysphagia     2007,EGD 3/14/07 nl; sx from goiter.     Essential (primary) hypertension     No Comments Provided     Nicotine dependence, uncomplicated     No Comments Provided      Other specified anxiety disorders     1972 with divorce - Imipramine;  Sertraline 5/08     Personal history of other medical treatment (CODE)     vaginal deliveries     Polyp of colon     2006,sessile adenoma     Raynaud's syndrome without gangrene     6/5/2015     Thyrotoxicosis with diffuse goiter and without thyroid storm     2004,on Tapazole     SURGICAL HISTORY:  Past Surgical History:   Procedure Laterality Date     APPENDECTOMY OPEN      1979,,& Meckel's diverticulum removed     COLONOSCOPY      11/17/06,polyps; repeat in 5 YEARS     COLONOSCOPY      11/8/11     COLONOSCOPY      11/6/2014     ESOPHAGOSCOPY, GASTROSCOPY, DUODENOSCOPY (EGD), COMBINED      3/14/07,done for dysphagia; normal     ESOPHAGOSCOPY, GASTROSCOPY, DUODENOSCOPY (EGD), COMBINED N/A 6/7/2018    Procedure: COMBINED ESOPHAGOSCOPY, GASTROSCOPY, DUODENOSCOPY (EGD);  Gastroscopy;  Surgeon: Bisi Rivera MD;  Location: [facility] OR     HYSTERECTOMY TOTAL ABDOMINAL, BILATERAL SALPINGO-OOPHORECTOMY, COMBINED      1979     LAPAROSCOPIC CHOLECYSTECTOMY      1991,common bile duct transected     OTHER SURGICAL HISTORY      4/05,AND390,PREMALIG/BENIGN SKIN LESION EXCISION,epidermal inclusion cyst     OTHER SURGICAL HISTORY      10/26/2015,[id number],FOOT SURGERY,hammertoe repair, [facility] with Dr. Grimaldo     OTHER SURGICAL HISTORY      [id number],IR BILIARY STRICTURE DILATATION WO STENT,x3- per h/p /Choledochojejunostomy with Vane-En-Y due to transection of common bile duct     THYROIDECTOMY      3/26/08,Total thyroidectomy; multinodular goiter       SOCIAL HISTORY:  Social History     Socioeconomic History     Marital status:      Spouse name: Not on file     Number of children: Not on file     Years of education: Not on file     Highest education level: Not on file   Occupational History     Not on file   Tobacco Use     Smoking status: Current Every Day Smoker     Packs/day: 0.50     Years: 30.00     Pack years: 15.00     Types: Cigarettes      Smokeless tobacco: Never Used     Tobacco comment: no ecig   Vaping Use     Vaping Use: Never used   Substance and Sexual Activity     Alcohol use: No     Alcohol/week: 0.0 standard drinks     Drug use: No     Sexual activity: Not Currently     Partners: Male   Other Topics Concern     Not on file   Social History Narrative    ; 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; 4 children; Retired RN     Social Determinants of Health     Financial Resource Strain:      Difficulty of Paying Living Expenses:    Food Insecurity:      Worried About Running Out of Food in the Last Year:      Ran Out of Food in the Last Year:    Transportation Needs:      Lack of Transportation (Medical):      Lack of Transportation (Non-Medical):    Physical Activity:      Days of Exercise per Week:      Minutes of Exercise per Session:    Stress:      Feeling of Stress :    Social Connections:      Frequency of Communication with Friends and Family:      Frequency of Social Gatherings with Friends and Family:      Attends Hoahaoism Services:      Active Member of Clubs or Organizations:      Attends Club or Organization Meetings:      Marital Status:    Intimate Partner Violence:      Fear of Current or Ex-Partner:      Emotionally Abused:      Physically Abused:      Sexually Abused:      FAMILYHISTORY:  Family History   Problem Relation Age of Onset     Other - See Comments Mother          85yo, emphysema, dementia     Heart Disease Father         Heart Disease,MI in 50s,  62yo     Diabetes Father         Diabetes     Substance Abuse Father         Alcohol/Drug,Etoh     Diabetes Daughter         Diabetes     Other - See Comments Daughter         Psychiatric illness,depr/anxiety     Thyroid Disease Sister         Thyroid Disease     Breast Cancer No family hx of         Cancer-breast     CURRENT MEDICATIONS:   Current Outpatient Medications   Medication Sig Dispense Refill     acetaminophen (TYLENOL) 650 MG CR tablet Take 1,300 mg by mouth        ascorbic acid (VITAMIN C) 1000 MG TABS 1 tablet oral daily       Bioflavonoid Products (VITAMIN C PLUS) 1000 MG TABS        Blood Pressure Monitor MISC As directed. OMRON       Calcium Carbonate-Vitamin D3 600-400 MG-UNIT TABS        carboxymethylcellulose (CARBOXYMETHYLCELLULOSE SODIUM) 0.5 % SOLN ophthalmic solution 1 drop both eyes once daily 1 Bottle      docusate sodium (COLACE) 100 MG capsule Take 1 capsule by mouth for constipation. Can take 2 in 24 hours.       estradiol (ESTRACE) 0.1 MG/GM vaginal cream INSERT 2GM INTO THE VAGINA EVERY MONDAY AND FRIDAY 42.5 g 5     folic acid (FOLVITE) 1 MG tablet Take 1 tablet (1 mg) by mouth daily 30 tablet      gabapentin (NEURONTIN) 300 MG capsule TAKE 1 CAPSULE BY MOUTH THREE TIMES DAILY 270 capsule 3     levothyroxine (SYNTHROID/LEVOTHROID) 112 MCG tablet Take 1 tablet (112 mcg) by mouth daily 90 tablet 3     loratadine (CLARITIN) 10 MG tablet Take 1 tablet (10 mg) by mouth daily 30 tablet      losartan (COZAAR) 25 MG tablet TAKE 2 TABLETS BY MOUTH ONCE DAILY 180 tablet 3     methylcellulose (CITRUCEL) 500 MG TABS tablet Take 1 tablet (500 mg) by mouth daily 14 each 0     Multiple Vitamins-Minerals (OCUVITE ADULT 50+) CAPS 1 tablet by mouth once daily.       nystatin (MYCOSTATIN) 687898 UNIT/GM external cream Apply topically 2 times daily 30 g 1     pantoprazole (PROTONIX) 20 MG EC tablet Take 1 tablet (20 mg) by mouth daily 90 tablet 3     Sodium Fluoride (SF 5000 PLUS) 1.1 % CREA        vitamin (B COMPLEX) capsule Take 1 capsule by mouth       zolpidem (AMBIEN) 5 MG tablet Take 1 tablet (5 mg) by mouth At Bedtime 90 tablet 3     order for DME Equipment being ordered: wheeled walker with seat 1 Units 0     ALLERGIES:  Patient has no known allergies.    REVIEW OF SYSTEMS:  Review of Systems   Constitutional: Negative for fatigue.   HENT: Positive for trouble swallowing. Negative for sore throat.    Respiratory: Negative for cough, chest tightness, shortness of  breath, wheezing and stridor.    Cardiovascular: Negative for chest pain.   Gastrointestinal: Positive for constipation and diarrhea. Negative for abdominal pain, anal bleeding, blood in stool, nausea and vomiting.        Patient has IBS alternating between constipation and diarrhea and unchanged for many years.   Endocrine: Negative.    Genitourinary: Negative for difficulty urinating, flank pain, hematuria and vaginal bleeding.   Neurological: Negative for dizziness, weakness and light-headedness.   Hematological: Negative for adenopathy.         OBJECTIVE:  /62 (BP Location: Right arm, Patient Position: Sitting, Cuff Size: Adult Regular)   Pulse 73   Temp 97.2  F (36.2  C) (Tympanic)   Resp 16   Wt 56.4 kg (124 lb 4.8 oz)   SpO2 96%   BMI 20.71 kg/m    EXAM:   Pleasant female in no acute distress.  Affect normal.  Alert and oriented x4.  Sclera nonicteric.  Skin color pink.  Lung fields clear to auscultation.  Cardiovascular regular rate and rhythm.  Abdomen without tenderness.  Last colonoscopy 2014, last EGD 2018      Mirella Aly, BERTO

## 2021-08-10 NOTE — NURSING NOTE
"Chief Complaint   Patient presents with     Pharyngitis       FOOD SECURITY SCREENING QUESTIONS  Hunger Vital Signs:  Within the past 12 months we worried whether our food would run out before we got money to buy more. Never  Within the past 12 months the food we bought just didn't last and we didn't have money to get more. Never  Jess Simons LPN 8/10/2021 1:13 PM      Initial /62 (BP Location: Right arm, Patient Position: Sitting, Cuff Size: Adult Regular)   Pulse 73   Temp 97.2  F (36.2  C) (Tympanic)   Resp 16   Wt 56.4 kg (124 lb 4.8 oz)   SpO2 96%   BMI 20.71 kg/m   Estimated body mass index is 20.71 kg/m  as calculated from the following:    Height as of 11/9/20: 1.65 m (5' 4.96\").    Weight as of this encounter: 56.4 kg (124 lb 4.8 oz).  Medication Reconciliation: complete    Jess Simons LPN  "

## 2021-08-31 NOTE — TELEPHONE ENCOUNTER
The patient called stating she just had a telephone conversation with Haydee but has  another question.  Please advise.

## 2021-08-31 NOTE — NURSING NOTE
"Chief Complaint   Patient presents with     Follow Up       Initial Breastfeeding No  Estimated body mass index is 20.71 kg/m  as calculated from the following:    Height as of 11/9/20: 1.65 m (5' 4.96\").    Weight as of 8/10/21: 56.4 kg (124 lb 4.8 oz).  Medication Reconciliation: complete  FOOD SECURITY SCREENING QUESTIONS  Hunger Vital Signs:  Within the past 12 months we worried whether our food would run out before we got money to buy more. Never  Within the past 12 months the food we bought just didn't last and we didn't have money to get more. Never    She has questions re mammogram results, and also if she should get a Covid-19 booster shot.      Jelena Beckett LPN on 8/31/2021 at 12:06 PM   "

## 2021-08-31 NOTE — TELEPHONE ENCOUNTER
Patient states she looked up who she was seeing in [place], she was a surprised that it was a PA. Patient was worried about having to make more appointments. Patient doesn't want to change appointment. Patient just wanted to vent.  Gayla Carrasquillo LPN .............8/31/2021  4:33 PM

## 2021-08-31 NOTE — PROGRESS NOTES
Mireya is a 87 year old who is being evaluated via a billable telephone visit.        Subjective   Mireya is a 87 year old who presents for the following health issues    HPI     Patient has telephone visit today in regards to follow-up labs and coronary artery disease.  She did have CT chest recently which showed moderate coronary artery calcification.  She is a smoker.  She had lipids obtained which showed a mildly elevated LDL of 102.  She is not on aspirin nor statin.  She has presbyesophagus and has appointment with gastroenterology later this week.    Review of Systems   Positive for difficulty swallowing secondary to presbyesophagus.      Objective    Vitals - Patient Reported  Pain Score: No Pain (0)        Physical Exam   healthy, alert and no distress  PSYCH: Alert and oriented times 3; coherent speech, normal   rate and volume, able to articulate logical thoughts, able   to abstract reason, no tangential thoughts, no hallucinations   or delusions  Her affect is normal  RESP: No cough, no audible wheezing, able to talk in full sentences  Remainder of exam unable to be completed due to telephone visits    CT chest without contrast results reviewed and discussed with patient indicating moderate coronary artery calcification.  Fasting lipid panel reviewed and discussed.      ICD-10-CM    1. Coronary artery disease involving native heart without angina pectoris, unspecified vessel or lesion type  I25.10 atorvastatin (LIPITOR) 10 MG tablet     aspirin (ASA) 81 MG EC tablet     Lipid Panel     AST   2. Cigarette smoker  F17.210    3. Presbyesophagus  K22.8      Patient will start Lipitor 10 mg at bedtime and aspirin 81 mg every morning with breakfast.  Side effects of medication reviewed and discussed and if she has any difficulty she will stop medication and let us know.  She will have fasting lipids and AST in 6 weeks.  Patient continues to smoke and has not been interested in tobacco cessation.  Tobacco use  does increase risk of cardiovascular events.  Patient is still quite active and healthy therefore reduction of cardiovascular events is encouraged.  She also keep appointment with GI later this week.        Phone call duration: 13 minutes

## 2021-09-23 NOTE — PROGRESS NOTES
Visit Date: 09/23/2021    Sylvie is an 87-year-old we have done tenotomies on before.  We are going to do some hammertoes and IPJ fusion of her hallux.  She opted against it and may have been a better idea.  She is a smoker, has been for quite some time.  She has some vasculopathy and question ability to heal open toe surgery given findings today.  She comes in today with new pain on the plantar aspect of her first metatarsal head.  She has a little callus here.  Would like this evaluated and discuss options.     HISTORY REVIEW:  I have reviewed this patient's past medical history, family history, social history as well as medications and allergies.  Any changes/additions were appropriately charted in the patient's electronic medical record.      PHYSICAL EXAMINATION:   CONSTITUTIONAL:  The patient is alert and oriented x3, well appearing and in no apparent distress.  Affect is pleasant and answers questions appropriately.  VASCULAR:  Circulation is intact with palpable pedal pulses and adequate capillary fill time to all digits.  Hair growth is present and appropriate to mid foot and digits.  NEUROLOGIC:  Light touch sensation is intact to digits.  There is a negative Tinel sign.  There are no signs of apparent nerve entrapment of superficial peroneal or common peroneal nerves.  INTEGUMENT:  No abnormal dermatologic lesions are noted.  Skin has normal texture and turgor.    MUSCULOSKELETAL:  Contracted digits 1 through 5, consistent with previous findings.  A new area of pain is a very small callus underneath lateral or fibular sesamoid, which was debrided today.  No open lesions associated with this.  Capillary fill time is significantly diminished today.    ASSESSMENT:  Hammertoes 1 through 5 with metatarsalgia.    PLAN:  I discussed condition and treatment with the patient today, which the patient states is not debilitating pain at this point, she opted against surgery, which again I think is likely a good  idea given a questionable vascular status here, I did debride this callus.  We discussed appropriate padding and shoe gear, which she will continue with for ongoing pain.  20-minute consultation.    Sunday Carpenter DPM        D: 2021   T: 2021   MT: [id number]    Name:     MAYDA KELLY  MRN:      [id number]        Account:    [id number]   :      1934           Visit Date: 2021     Document: [id number]

## 2021-10-12 NOTE — TELEPHONE ENCOUNTER
sent Rx request for the following:     Requested Prescriptions   Pending Prescriptions Disp Refills     pantoprazole (PROTONIX) 20 MG EC tablet [Pharmacy Med Name: PANTOPRAZOLE 20MG DR TABLET] 90 tablet 2     Sig: TAKE 1 TABLET (20 MG) BY MOUTH DAILY       Last Prescription Date:  11/9/2020  Last Fill Qty/Refills:         90, R-3    Last Office Visit:             8/31/2021 virtual visit   Future Office visit:          none    Routing refill request to provider for review/approval because:  Review refills.       losartan (COZAAR) 25 MG tablet [Pharmacy Med Name: LOSARTAN 25MG TABLET] 180 tablet 2     Sig: TAKE 2 TABLETS BY MOUTH ONCE DAILY      Last Prescription Date:   11/9/2020  Last Fill Qty/Refills:         180, R-3      Routing refill request to provider for review/approval because:     Angiotensin-II Receptors Failed - 10/9/2021  1:02 AM        Failed - Normal serum creatinine on file in past 12 months     Recent Labs   Lab Test 08/10/21  1402   CR 1.25*       Ok to refill medication if creatinine is low       Unable to complete prescription refill per RN Medication Refill Policy.................... Selina Lara RN ....................  10/12/2021   8:44 AM

## 2021-11-01 NOTE — INITIAL ASSESSMENTS
Patient Information     Patient Name MRN Sex Sylvie Raza [id number] Female 1934      Initial Assessments by Darling Akbar PT at 2017 10:13 AM     Author:  Darling Akbar PT  Service:  (none) Author Type:  PT- Physical Therapist     Filed:  2017 12:50 PM  Date of Service:  2017 10:13 AM Status:  Addendum     :  Darling Akbar PT (PT- Physical Therapist)        Related Notes: Original Note by Darling Akbar PT (PT- Physical Therapist) filed at 2017 12:44 PM            [facility]  Outpatient PT - Initial   Neurological Evaluation      Date of Service: 2017   Visit #: 1 (of 10 for PSFS)     Patient Name: Sylvie Bautista (Mireya)  YOB: 1934   Referring MD/Provider: Referring MD/Provider: Haydee Aly  Diagnosis: Medical and Treatment Diagnosis: Gait instability  Treatment Diagnosis: Gait instability   Insurance:  Medicare: G Codes/C Modifiers at Initial Assessment:       Start of Care Date: Start of Service: 17  Certification Dates: From: Start of Service: 17  Re-Cert Due: Medicare/MA Re-Cert Due: 10/04/17    Precautions: none     Were cultural / age or other special adaptations needed? No      Patient is a vulnerable adult: No      Patient is aware of diagnosis: Yes      Risks and benefits explained: Yes      Subjective      Current Complaints/Reason for Referral: Sylvie Bautista  is a 83 y.o. female  who comes to physical therapy today with a chief complaint of unsteady gait.  She has noticed she walks favoring R foot and when she had pant shortened she was told one was shorter, she had R foot surgery for hammer toe but not successful and sometimes painful but usually tight.  Uses cane when she walks outside but sick since from liver abscess that started , started with antibiotic for UTI, MRI found, [place] in [place] on  for procedure, and it's been fine ever since.  She lost weight,  about 6-7 pounds.  No energy.  Has had decreased balance before this, winter doesn't walk much and uses cart when shops.  Neuropathy in feet, elevates feet in afternoon and can notices it then, not notices when she is walking.  Gabapentin helps some, interested in trying different med to see if helps more.  Can tell when in shower she has to hold on because she feels unstable when she closes her eyes to rinse hair.  Doesn't feel safe with walking outside, walk with holding daughter's arm or cane.  No pain.      Symptoms at evaluation:   Decreased Motion - no  Weakness - not now but when she was sick  Decreased Balance - yes  Gait Abnormalities- yes, cane outside or cart for shopping  Visual Disturbances - wears glasses, but no changes    Prior Level of Function: independent and some use of cane for over last year    Preadmission Functional Mobility: Independent    Functional Limitations/Difficulty: Patient reports difficulty with tasks such as: walking outside and stairs, especially if they don't have a rail, she'll turn and go sideways.    Social History:   Patient reports that they currently live in a small house and has a park 1/2 block away that she can walk to with her cane.    Falls: Pt reports no falls.  Unsteady but grabs onto something.     Previous Treatment: none    Latex Allergy: See chart    Medications: See patient s list of medications in electronic medical record.       Past Medical History:     Diagnosis  Date     CHRONIC KIDNEY DISEASE STAGE III (MODERATE) 7/27/2011     Colon polyp 2006    sessile adenoma       Depression with anxiety     1972 with divorce - Imipramine;  Sertraline 5/08      Diarrhea 11/6/2014     Dysphagia 2007    EGD 3/14/07 nl; sx from goiter.      Grave's disease 2004    on Tapazole      HTN (hypertension)      Hx of pregnancy     vaginal deliveries       Kidney stones 1990    s/p lithotripsy      Menopause     DXA 10/27/06 normal      Raynaud phenomenon 6/5/2015     Tobacco  use disorder      Past Surgical History:      Procedure  Laterality Date     APPENDECTOMY  1979,     & Meckel's diverticulum removed       COLONOSCOPY DIAGNOSTIC  11/17/06    polyps; repeat in 5 YEARS       COLONOSCOPY DIAGNOSTIC  11/8/11     ESOPHAGOGASTRODUODENOSCOPY  3/14/07    done for dysphagia; normal       FOOT SURGERY  10/26/2015    [facility] with Dr. Dillan DECKER BILIARY STRICTURE DILATATION WO STENT      x3- per h/p /Choledochojejunostomy with Vane-En-Y due to transection of common bile duct       LAP CHOLECYSTECTOMY  1991    common bile duct transected       percutaneous drainage liver abscess      6/30/17 [facility]       CA COLONOSCOPY REMOVE PAMELA POLYP LESN HOT BPSY FORCEP  11/6/2014            PREMALIG/BENIGN SKIN LESION EXCISION  4/05    epidermal inclusion cyst       ARMEN AND BSO  1979     THYROIDECTOMY  3/26/08    Total thyroidectomy; multinodular goiter           Patient Specific Functional and Pain Scales (PSFS): 8/9/2017 based on pt reports during subjective history    Clinician Instructions: Complete after the history and before the exam.    Initial Assessment: We want to know what 3 activities in your life you are unable to perform, or are having the most difficulty performing, as a result of your chief problem. Please list and score at least 3 activities that you are unable to perform, or having the most difficulty performing, because of your chief problem.   Patient Specific Activity Scoring Scheme (score one number for each activity):   Activity Score (0-10)  0= Unable to perform activity  10= Able to perform activity at same level as before injury or problem   1. Walking outside - needs cane 5/10   2. Steps - needs rail 5/10   3. Shopping -needs cart 7/10   Totals:  17/30 = 57% ability which relates to 43% impairment    Patient verbally states that they understand that the information they have provided above is current and complete to the best of their  knowledge.     G codes and Modifier taken from patient completing the PSFS:   Initial Primary G Code and Modifier:    Per the Patient's intake and/or assessment the Primary G Code is: Mobility .   The Patient's Impairment, Limitation or Restriction Modifier would be best described as: CK - 40% - 60% Impairment.   Goal Primary G Code and Modifier:    The Patient's G Code Goal would be: Mobility    The Patient's Impairment, Limitation or Restriction Modifier goal would be best described as: CJ - 20% - 40% Impairment.       Objective    Items left blank indicate that the test was inappropriate or not meaningful at the time of evaluation and therefore not performed.    Observation: Patient comes into physical therapy clinic today with no assistive device.      Cognition:  Oriented to Person, Place, and Time: yes      Posture:    Seated Posture: leans to R   Standing Posture: leans to R    Gait: Patient walks with no AD.  Decreased step length on R and knee flexed on L to accomidate R leg shorter than L.  (supine measuring greater trochanter to medial malleolus R is 30 inches, L is 31.5 inches.      ROM / Strength:            Upper Extremities: NT     Lower Extremities:      WNL        WFL        Not Applicable        Norms Left  Right Strength Left  Right R.I.T. s   Hip Flexion 120    Hip Flexion 4/5 4/5    Hip Extension 15   Hip Extension 4+ 4+    Hip Abduction 50   Hip Abduction 4+/5 4+/5    Hip Adduction 15   Hip Adduction 4+/5 4+/5    Hip External Rot. 60   Hip External Rot.      Hip Internal Rot. 40   Hip Internal Rot.      Knee Extension 0   Knee Extension 4+/5 4+/5    Knee Flexion 135   Knee Flexion 4+/5 4+/5    Dorsiflexion 20   Dorsiflexion 4+/5 4+/5    (R.I.T. s Resisted Isometric Test  + indicates pain)  Pain: pt has pain with: none    ROM: supine: tight HS B but no nerve impingement reproduced with SLR  Tight with ER and IR but not painful     Balance:    Bench Sit: good   Standing: good   Single  Leg Stance (best of 3 attempts): Left= 5 seconds, Right= 5 seconds   Sharpened Romberg Stance: Left behind= 5 seconds, Right behind= 5 seconds    Fall Risk Screening:  Age of 65 or older and currently on medication identified as causing balance or gait disturbances  Yes:  Timed Up and Go: NT Seconds. Score greater than 14 seconds indicates fall risk     Today's Intervention:    Pt education on fall risk management.  Educated on ways to modify the home to decrease fall risk.  Educated on the importance of safety and energy conservation.      SLS 4-5 sec max  Tried supine SLR but she felt it in lateral hips and told her she can advance to that    Home Exercise Program:  Pt given handout.   Access Code: [email or web address] URL: [email or web address] Date: 08/09/2017 Prepared by: Darling Akbar     Exercises  Standing March with Counter Support - 20 reps - 2x daily  Seated Hamstring Stretch - 2 reps - 30 second hold - 2x daily  Standing Romberg to 3/4 Tandem Stance - 30 second hold - 2x daily  Romberg Stance with Eyes Closed - 2 reps - 30 second hold - 2x daily    Assessment      Clinical Impression:  Patient presents with signs and symptoms consistent with decreased balance with mild weakness and peripheral neuropathy.      Functional Limitations/Problems List:   1. Decreased safety walking outdoors on uneven surfaces  2. Decreased safety walking on stairs   3. Decreased safety walking long distances      Functional Impairment(s): Decreased Activity Tolerance:  walking, dynamic balance     Prior Function:   Limited progressing    Physical Impairment(s):       NEURO/SENSORY:  peripheral neuropathy    Goals: Following physical therapy intervention, the patient hopes to be able to: Feel more confident with walking outdoors.      Goals:   Short Term Goals: To be met in 4 weeks:   Following physical therapy intervention, the patient will be able to:   1. Patient following through with HEP.  2. Patient to show increasing  balance with being able to preform SLS x10 sec.     Long Term Goals: To be met in 8 weeks:   Following physical therapy intervention, the patient will be able to:   1. Patient to feel more confident with ambulating outdoors to be able to walk to park at least 3 times per week.   2. Patient will be independent with HEP to continue to maintain ROM and strength achieved during physical therapy.      Rehab Potential: Patient Potential for Achieving Desired Outcome:  Fair due to question patient follow through    Barriers to Learning: There are barriers to learning present with this patient with wanting to do on her own and not sure how much she is willing to put into HEP.     Patient participated in goal selection and understand(s) the plan of care: Yes     Therapist Assessment of Today's Intervention:  She did well with exercises given and understood parts of balance to work on, may need encouragement to continue.     Response to Intervention: Felt comfortable with HEP.     Plan    Treatment Plan / Targeted Outcomes:     Frequency:   6 visits     Duration of Treatment: 8 weeks     Plan of Care Due Date: Medicare/MA Re-Cert Due: 10/04/17     Planned Interventions:  Possible physical therapy interventions include but are not limited to:   Home Exercise Program development  Therapeutic Exercise (ROM & Strengthening)  Manual Therapy  Neuromuscular Re-education  Ultrasound  Electrical Stimulation  Phonophoresis with Ketoprofen  Iontophoresis with Dexamethasone  Therapeutic Activities  Gait Training    Patient will follow up with physician as needed during course of physical therapy intervention.  At time of discharge, patient will be given a home exercise program to continue to maintain strength and ROM achieved during therapy.      Plan for next visit:  Advance HEP and see if additional appointments needed for follow through.  Discussed some adjustment of R shoe to accommodate decreased leg length on R side.     Student or  PTA has been instructed in and demonstrates skills necessary to carry out above stated treatment plan: Yes    Thank you for your referral to [facility].  Please call with any questions, concerns or comments.  [phone]    The signature, of the referring medical provider, on this document indicates certification of the above prescribed plan of care and is medically necessary.             non contributory

## 2021-11-11 NOTE — PROGRESS NOTES
document embedded image  Patient Name: Sylvie Bautista    Address: [address] [place] [address]     YOB: 1934    [place] [address]    MR Number: [id number]    Phone: [phone]  PCP: Mirella Aly NP            Appointment Date: 11/09/21   Visit Provider: Jack Beaulieu MD    cc: Mirella Aly NP; ~    ENT Progress Note  Visit Reasons: Trouble swallowing    HPI  History of Present Illness  Chief complaint:  Swallowing difficulties    History  The patient is an 87-year-old female who has had slowly progressive pharyngeal dysphagia for couple of years.  Over the last year she feels she has lost approximately 30 lb of weight because of inability to efficiently work through a meal.  She had been given some swallowing exercises but had never perform these with any regularity.  She had recently undergone upper GI endoscopy that failed to reveal any significant abnormalities.  No dilation of the upper esophagus was performed at endoscopy.  Subsequent barium swallow does show a very tiny Zenker's diverticulum but a very prominent cricopharyngeal bar.  It did show a lot of tertiary wave dysfunction of the esophagus itself as well.  She does take Protonix once daily long-term for acid reflux.  She does get occasional breakthrough symptoms.    Exam  Oral cavity oropharynx-free of lesions or inflammation  Indirect laryngoscopy-neurologically intact larynx without mucosal lesion  Neck-no palpable masses or adenopathy  Nasal-no obstruction or purulence  Head neck integument-Clear  General-the patient appears well and in no distress  Neuro-there are no focal cranial nerve deficits  Barium swallow-I personally reviewed her barium swallow.  I can confirm the very tiny Zenker's diverticulum.  Although not mentioned, there was a very prominent cricopharyngeal bar noted on the lateral views.    A&P  Assessment & Plan  (1) Cricopharyngeal spasm:        Status: Acute        Code(s):  J39.2 - Other diseases  of pharynx  (2) Zenkers diverticulum:        Status: Acute        Code(s):  K22.5 - Diverticulum of esophagus, acquired  Additional Plan Details  Plan Details: Her Zenker's diverticulum is very small not be amenable to surgical intervention.  We will initially try to double her Protonix for couple of months to see if this helps to relax her cricopharyngeal bar.  We will also ask speech therapy to get involved try some swallowing exercises and relaxation techniques.  Repeated dilations has occasionally been successful with cricopharyngeal spasm.  We can initially sent her on for botulinum toxin injection into the muscle if all else fails.  Departure  Other Medications:        New:    pantoprazole (Protonix) 20 mg  PO BID 90 days 180 tabs 0RF            Jack Beaulieu MD    11/09/21 1008    <Electronically signed by Jack Beaulieu MD> 11/10/21 [id number]

## 2022-01-01 ENCOUNTER — TELEPHONE (OUTPATIENT)
Dept: NUTRITION | Facility: OTHER | Age: 87
End: 2022-01-01
Payer: COMMERCIAL

## 2022-01-01 ENCOUNTER — APPOINTMENT (OUTPATIENT)
Dept: CT IMAGING | Facility: OTHER | Age: 87
End: 2022-01-01
Attending: FAMILY MEDICINE
Payer: MEDICARE

## 2022-01-01 ENCOUNTER — OFFICE VISIT (OUTPATIENT)
Dept: SURGERY | Facility: OTHER | Age: 87
End: 2022-01-01
Attending: SURGERY
Payer: MEDICARE

## 2022-01-01 ENCOUNTER — TELEPHONE (OUTPATIENT)
Dept: SURGERY | Facility: OTHER | Age: 87
End: 2022-01-01
Payer: COMMERCIAL

## 2022-01-01 ENCOUNTER — HOME INFUSION (PRE-WILLOW HOME INFUSION) (OUTPATIENT)
Dept: PHARMACY | Facility: CLINIC | Age: 87
End: 2022-01-01
Payer: COMMERCIAL

## 2022-01-01 ENCOUNTER — MEDICAL CORRESPONDENCE (OUTPATIENT)
Dept: HEALTH INFORMATION MANAGEMENT | Facility: OTHER | Age: 87
End: 2022-01-01

## 2022-01-01 ENCOUNTER — MYC MEDICAL ADVICE (OUTPATIENT)
Dept: INTERNAL MEDICINE | Facility: OTHER | Age: 87
End: 2022-01-01

## 2022-01-01 ENCOUNTER — MYC MEDICAL ADVICE (OUTPATIENT)
Dept: INTERNAL MEDICINE | Facility: OTHER | Age: 87
End: 2022-01-01
Payer: COMMERCIAL

## 2022-01-01 ENCOUNTER — TELEPHONE (OUTPATIENT)
Dept: INTERNAL MEDICINE | Facility: OTHER | Age: 87
End: 2022-01-01

## 2022-01-01 ENCOUNTER — PATIENT OUTREACH (OUTPATIENT)
Dept: CARE COORDINATION | Facility: CLINIC | Age: 87
End: 2022-01-01

## 2022-01-01 ENCOUNTER — TELEPHONE (OUTPATIENT)
Dept: INTERNAL MEDICINE | Facility: OTHER | Age: 87
End: 2022-01-01
Payer: COMMERCIAL

## 2022-01-01 ENCOUNTER — PATIENT OUTREACH (OUTPATIENT)
Dept: CARE COORDINATION | Facility: CLINIC | Age: 87
End: 2022-01-01
Payer: COMMERCIAL

## 2022-01-01 ENCOUNTER — OFFICE VISIT (OUTPATIENT)
Dept: SURGERY | Facility: OTHER | Age: 87
End: 2022-01-01
Attending: SURGERY
Payer: COMMERCIAL

## 2022-01-01 ENCOUNTER — HOME INFUSION (PRE-WILLOW HOME INFUSION) (OUTPATIENT)
Dept: PHARMACY | Facility: CLINIC | Age: 87
End: 2022-01-01

## 2022-01-01 ENCOUNTER — OFFICE VISIT (OUTPATIENT)
Dept: INTERNAL MEDICINE | Facility: OTHER | Age: 87
End: 2022-01-01
Attending: NURSE PRACTITIONER
Payer: COMMERCIAL

## 2022-01-01 ENCOUNTER — OFFICE VISIT (OUTPATIENT)
Dept: SURGERY | Facility: OTHER | Age: 87
DRG: 920 | End: 2022-01-01
Attending: SURGERY
Payer: MEDICARE

## 2022-01-01 ENCOUNTER — MEDICAL CORRESPONDENCE (OUTPATIENT)
Dept: HEALTH INFORMATION MANAGEMENT | Facility: OTHER | Age: 87
End: 2022-01-01
Payer: COMMERCIAL

## 2022-01-01 ENCOUNTER — NURSING HOME VISIT (OUTPATIENT)
Dept: GERIATRICS | Facility: OTHER | Age: 87
End: 2022-01-01
Attending: NURSE PRACTITIONER
Payer: COMMERCIAL

## 2022-01-01 ENCOUNTER — VIRTUAL VISIT (OUTPATIENT)
Dept: FAMILY MEDICINE | Facility: OTHER | Age: 87
End: 2022-01-01
Attending: NURSE PRACTITIONER
Payer: MEDICARE

## 2022-01-01 ENCOUNTER — ANESTHESIA (OUTPATIENT)
Dept: SURGERY | Facility: OTHER | Age: 87
End: 2022-01-01
Payer: MEDICARE

## 2022-01-01 ENCOUNTER — OFFICE VISIT (OUTPATIENT)
Dept: OTOLARYNGOLOGY | Facility: OTHER | Age: 87
End: 2022-01-01
Attending: OTOLARYNGOLOGY
Payer: MEDICARE

## 2022-01-01 ENCOUNTER — OFFICE VISIT (OUTPATIENT)
Dept: INTERNAL MEDICINE | Facility: OTHER | Age: 87
End: 2022-01-01
Attending: NURSE PRACTITIONER
Payer: MEDICARE

## 2022-01-01 ENCOUNTER — NURSE TRIAGE (OUTPATIENT)
Dept: NURSING | Facility: CLINIC | Age: 87
End: 2022-01-01
Payer: COMMERCIAL

## 2022-01-01 ENCOUNTER — TELEPHONE (OUTPATIENT)
Dept: SURGERY | Facility: OTHER | Age: 87
End: 2022-01-01

## 2022-01-01 ENCOUNTER — OFFICE VISIT (OUTPATIENT)
Dept: SURGERY | Facility: OTHER | Age: 87
End: 2022-01-01
Attending: NURSE PRACTITIONER
Payer: COMMERCIAL

## 2022-01-01 ENCOUNTER — HOSPITAL ENCOUNTER (EMERGENCY)
Facility: OTHER | Age: 87
Discharge: HOME | End: 2022-01-09
Attending: FAMILY MEDICINE | Admitting: FAMILY MEDICINE
Payer: MEDICARE

## 2022-01-01 ENCOUNTER — ANESTHESIA EVENT (OUTPATIENT)
Dept: SURGERY | Facility: OTHER | Age: 87
End: 2022-01-01
Payer: MEDICARE

## 2022-01-01 ENCOUNTER — HOSPITAL ENCOUNTER (INPATIENT)
Facility: OTHER | Age: 87
LOS: 2 days | Discharge: HOSPICE | DRG: 920 | End: 2022-05-22
Attending: PHYSICIAN ASSISTANT | Admitting: FAMILY MEDICINE
Payer: MEDICARE

## 2022-01-01 ENCOUNTER — APPOINTMENT (OUTPATIENT)
Dept: GENERAL RADIOLOGY | Facility: OTHER | Age: 87
DRG: 920 | End: 2022-01-01
Attending: PHYSICIAN ASSISTANT
Payer: MEDICARE

## 2022-01-01 ENCOUNTER — MYC MEDICAL ADVICE (OUTPATIENT)
Dept: SURGERY | Facility: OTHER | Age: 87
End: 2022-01-01
Payer: COMMERCIAL

## 2022-01-01 ENCOUNTER — APPOINTMENT (OUTPATIENT)
Dept: CT IMAGING | Facility: OTHER | Age: 87
DRG: 920 | End: 2022-01-01
Attending: PHYSICIAN ASSISTANT
Payer: MEDICARE

## 2022-01-01 ENCOUNTER — ALLIED HEALTH/NURSE VISIT (OUTPATIENT)
Dept: FAMILY MEDICINE | Facility: OTHER | Age: 87
End: 2022-01-01
Attending: SURGERY
Payer: MEDICARE

## 2022-01-01 ENCOUNTER — HOSPITAL ENCOUNTER (OUTPATIENT)
Facility: OTHER | Age: 87
Discharge: HOME | End: 2022-02-10
Attending: SURGERY | Admitting: SURGERY
Payer: MEDICARE

## 2022-01-01 VITALS
HEART RATE: 68 BPM | OXYGEN SATURATION: 92 % | TEMPERATURE: 99.1 F | WEIGHT: 125 LBS | SYSTOLIC BLOOD PRESSURE: 112 MMHG | DIASTOLIC BLOOD PRESSURE: 61 MMHG | RESPIRATION RATE: 18 BRPM | BODY MASS INDEX: 20.83 KG/M2

## 2022-01-01 VITALS
DIASTOLIC BLOOD PRESSURE: 83 MMHG | OXYGEN SATURATION: 96 % | SYSTOLIC BLOOD PRESSURE: 159 MMHG | HEIGHT: 65 IN | WEIGHT: 122 LBS | BODY MASS INDEX: 20.33 KG/M2 | RESPIRATION RATE: 17 BRPM | HEART RATE: 62 BPM | TEMPERATURE: 96.9 F

## 2022-01-01 VITALS
BODY MASS INDEX: 20.47 KG/M2 | DIASTOLIC BLOOD PRESSURE: 70 MMHG | SYSTOLIC BLOOD PRESSURE: 118 MMHG | TEMPERATURE: 97.8 F | HEART RATE: 61 BPM | RESPIRATION RATE: 16 BRPM | OXYGEN SATURATION: 100 % | WEIGHT: 120.2 LBS

## 2022-01-01 VITALS
HEART RATE: 62 BPM | OXYGEN SATURATION: 99 % | TEMPERATURE: 96.9 F | RESPIRATION RATE: 15 BRPM | SYSTOLIC BLOOD PRESSURE: 128 MMHG | WEIGHT: 122.8 LBS | BODY MASS INDEX: 20.46 KG/M2 | DIASTOLIC BLOOD PRESSURE: 70 MMHG

## 2022-01-01 VITALS
RESPIRATION RATE: 16 BRPM | SYSTOLIC BLOOD PRESSURE: 128 MMHG | BODY MASS INDEX: 20.55 KG/M2 | HEIGHT: 64 IN | DIASTOLIC BLOOD PRESSURE: 62 MMHG | WEIGHT: 120.4 LBS | HEART RATE: 62 BPM | TEMPERATURE: 97.3 F

## 2022-01-01 VITALS
BODY MASS INDEX: 20.39 KG/M2 | HEIGHT: 65 IN | WEIGHT: 122.4 LBS | HEART RATE: 62 BPM | DIASTOLIC BLOOD PRESSURE: 68 MMHG | RESPIRATION RATE: 16 BRPM | TEMPERATURE: 97.8 F | SYSTOLIC BLOOD PRESSURE: 128 MMHG

## 2022-01-01 VITALS
SYSTOLIC BLOOD PRESSURE: 130 MMHG | RESPIRATION RATE: 16 BRPM | HEART RATE: 72 BPM | DIASTOLIC BLOOD PRESSURE: 78 MMHG | TEMPERATURE: 96.6 F | OXYGEN SATURATION: 94 %

## 2022-01-01 VITALS
OXYGEN SATURATION: 99 % | TEMPERATURE: 97.5 F | BODY MASS INDEX: 20.85 KG/M2 | WEIGHT: 122.4 LBS | RESPIRATION RATE: 16 BRPM | HEART RATE: 64 BPM | DIASTOLIC BLOOD PRESSURE: 62 MMHG | SYSTOLIC BLOOD PRESSURE: 110 MMHG

## 2022-01-01 VITALS
RESPIRATION RATE: 16 BRPM | WEIGHT: 122.38 LBS | TEMPERATURE: 97.4 F | HEIGHT: 64 IN | SYSTOLIC BLOOD PRESSURE: 96 MMHG | HEART RATE: 67 BPM | OXYGEN SATURATION: 94 % | DIASTOLIC BLOOD PRESSURE: 56 MMHG | BODY MASS INDEX: 20.89 KG/M2

## 2022-01-01 VITALS
TEMPERATURE: 98.1 F | RESPIRATION RATE: 16 BRPM | OXYGEN SATURATION: 98 % | DIASTOLIC BLOOD PRESSURE: 52 MMHG | HEART RATE: 68 BPM | SYSTOLIC BLOOD PRESSURE: 102 MMHG

## 2022-01-01 VITALS
TEMPERATURE: 98.2 F | BODY MASS INDEX: 23.58 KG/M2 | OXYGEN SATURATION: 95 % | DIASTOLIC BLOOD PRESSURE: 72 MMHG | WEIGHT: 138.45 LBS | RESPIRATION RATE: 18 BRPM | SYSTOLIC BLOOD PRESSURE: 162 MMHG | HEART RATE: 64 BPM

## 2022-01-01 VITALS — WEIGHT: 122 LBS | BODY MASS INDEX: 20.33 KG/M2 | HEIGHT: 65 IN

## 2022-01-01 VITALS
DIASTOLIC BLOOD PRESSURE: 68 MMHG | BODY MASS INDEX: 19.87 KG/M2 | TEMPERATURE: 97.2 F | HEART RATE: 78 BPM | RESPIRATION RATE: 16 BRPM | WEIGHT: 119.4 LBS | SYSTOLIC BLOOD PRESSURE: 108 MMHG

## 2022-01-01 VITALS
WEIGHT: 120 LBS | RESPIRATION RATE: 16 BRPM | DIASTOLIC BLOOD PRESSURE: 80 MMHG | BODY MASS INDEX: 19.97 KG/M2 | TEMPERATURE: 96.8 F | HEART RATE: 82 BPM | SYSTOLIC BLOOD PRESSURE: 128 MMHG

## 2022-01-01 DIAGNOSIS — K22.89 PRESBYESOPHAGUS: ICD-10-CM

## 2022-01-01 DIAGNOSIS — L08.9 WOUND INFECTION: Primary | ICD-10-CM

## 2022-01-01 DIAGNOSIS — R63.4 WEIGHT LOSS, NON-INTENTIONAL: Primary | ICD-10-CM

## 2022-01-01 DIAGNOSIS — M62.81 MUSCLE WEAKNESS (GENERALIZED): ICD-10-CM

## 2022-01-01 DIAGNOSIS — Z72.0 TOBACCO ABUSE: ICD-10-CM

## 2022-01-01 DIAGNOSIS — G47.00 INSOMNIA, UNSPECIFIED TYPE: ICD-10-CM

## 2022-01-01 DIAGNOSIS — R63.4 WEIGHT LOSS: ICD-10-CM

## 2022-01-01 DIAGNOSIS — R63.4 WEIGHT LOSS, NON-INTENTIONAL: ICD-10-CM

## 2022-01-01 DIAGNOSIS — K22.70 BARRETT'S ESOPHAGUS WITHOUT DYSPLASIA: Primary | ICD-10-CM

## 2022-01-01 DIAGNOSIS — S80.12XA LEG HEMATOMA, LEFT, INITIAL ENCOUNTER: Primary | ICD-10-CM

## 2022-01-01 DIAGNOSIS — R13.10 PROBLEMS WITH SWALLOWING AND MASTICATION: Primary | ICD-10-CM

## 2022-01-01 DIAGNOSIS — I10 ESSENTIAL HYPERTENSION: ICD-10-CM

## 2022-01-01 DIAGNOSIS — K94.23 MECHANICAL COMPLICATION OF GASTROSTOMY (H): ICD-10-CM

## 2022-01-01 DIAGNOSIS — Z51.5 COMFORT MEASURES ONLY STATUS: ICD-10-CM

## 2022-01-01 DIAGNOSIS — Z20.822 COVID-19 RULED OUT: ICD-10-CM

## 2022-01-01 DIAGNOSIS — Z20.822 EXPOSURE TO 2019 NOVEL CORONAVIRUS: Primary | ICD-10-CM

## 2022-01-01 DIAGNOSIS — K22.89 PRESBYESOPHAGUS: Primary | ICD-10-CM

## 2022-01-01 DIAGNOSIS — N18.31 STAGE 3A CHRONIC KIDNEY DISEASE (H): ICD-10-CM

## 2022-01-01 DIAGNOSIS — R13.10 DYSPHAGIA, UNSPECIFIED TYPE: ICD-10-CM

## 2022-01-01 DIAGNOSIS — D64.9 ANEMIA, UNSPECIFIED TYPE: ICD-10-CM

## 2022-01-01 DIAGNOSIS — K65.9 ABDOMINAL INFECTION (H): Primary | ICD-10-CM

## 2022-01-01 DIAGNOSIS — E03.9 HYPOTHYROIDISM, UNSPECIFIED TYPE: ICD-10-CM

## 2022-01-01 DIAGNOSIS — K22.4 CRICOPHARYNGEUS MUSCLE DYSFUNCTION: ICD-10-CM

## 2022-01-01 DIAGNOSIS — Z43.1 ATTENTION TO G-TUBE (H): Primary | ICD-10-CM

## 2022-01-01 DIAGNOSIS — Z11.52 ENCOUNTER FOR SCREENING LABORATORY TESTING FOR COVID-19 VIRUS: ICD-10-CM

## 2022-01-01 DIAGNOSIS — G60.9 HEREDITARY AND IDIOPATHIC PERIPHERAL NEUROPATHY: ICD-10-CM

## 2022-01-01 DIAGNOSIS — K22.5 ZENKER'S DIVERTICULUM: ICD-10-CM

## 2022-01-01 DIAGNOSIS — Z51.5 HOSPICE CARE PATIENT: ICD-10-CM

## 2022-01-01 DIAGNOSIS — T14.8XXA WOUND INFECTION: Primary | ICD-10-CM

## 2022-01-01 DIAGNOSIS — M62.81 GENERALIZED MUSCLE WEAKNESS: ICD-10-CM

## 2022-01-01 DIAGNOSIS — K22.5 ZENKER'S DIVERTICULUM: Primary | ICD-10-CM

## 2022-01-01 DIAGNOSIS — N95.2 POSTMENOPAUSAL ATROPHIC VAGINITIS: ICD-10-CM

## 2022-01-01 DIAGNOSIS — E83.42 HYPOMAGNESEMIA: ICD-10-CM

## 2022-01-01 DIAGNOSIS — L72.3 SEBACEOUS CYST: Primary | ICD-10-CM

## 2022-01-01 DIAGNOSIS — R29.898 WEAKNESS OF BOTH LEGS: ICD-10-CM

## 2022-01-01 DIAGNOSIS — K94.23 GASTROSTOMY TUBE DYSFUNCTION (H): ICD-10-CM

## 2022-01-01 DIAGNOSIS — K21.9 GASTROESOPHAGEAL REFLUX DISEASE, UNSPECIFIED WHETHER ESOPHAGITIS PRESENT: ICD-10-CM

## 2022-01-01 DIAGNOSIS — R13.10 SWALLOWING DYSFUNCTION: ICD-10-CM

## 2022-01-01 DIAGNOSIS — K22.5 DIVERTICULUM OF ESOPHAGUS, ACQUIRED: Primary | ICD-10-CM

## 2022-01-01 DIAGNOSIS — K91.71: Primary | ICD-10-CM

## 2022-01-01 DIAGNOSIS — R29.6 FALLS FREQUENTLY: Primary | ICD-10-CM

## 2022-01-01 DIAGNOSIS — K22.5 ZENKER DIVERTICULUM: ICD-10-CM

## 2022-01-01 DIAGNOSIS — R13.19 ESOPHAGEAL DYSPHAGIA: ICD-10-CM

## 2022-01-01 DIAGNOSIS — Z12.11 ENCOUNTER FOR SCREENING COLONOSCOPY: Primary | ICD-10-CM

## 2022-01-01 DIAGNOSIS — S81.802D WOUND OF LEFT LOWER EXTREMITY, SUBSEQUENT ENCOUNTER: ICD-10-CM

## 2022-01-01 DIAGNOSIS — R19.5 LOOSE STOOLS: ICD-10-CM

## 2022-01-01 DIAGNOSIS — G60.9 HEREDITARY AND IDIOPATHIC PERIPHERAL NEUROPATHY: Primary | ICD-10-CM

## 2022-01-01 DIAGNOSIS — Z93.1 STATUS POST INSERTION OF PERCUTANEOUS ENDOSCOPIC GASTROSTOMY (PEG) TUBE (H): ICD-10-CM

## 2022-01-01 DIAGNOSIS — J39.2 OTHER DISEASES OF PHARYNX: ICD-10-CM

## 2022-01-01 DIAGNOSIS — Z79.899 ENCOUNTER FOR MEDICATION REVIEW: ICD-10-CM

## 2022-01-01 LAB
ALBUMIN SERPL-MCNC: 3.5 G/DL (ref 3.5–5.7)
ALBUMIN SERPL-MCNC: 3.6 G/DL (ref 3.5–5.7)
ALBUMIN SERPL-MCNC: 4 G/DL (ref 3.5–5.7)
ALBUMIN UR-MCNC: 30 MG/DL
ALP SERPL-CCNC: 48 U/L (ref 34–104)
ALP SERPL-CCNC: 52 U/L (ref 34–104)
ALP SERPL-CCNC: 56 U/L (ref 34–104)
ALT SERPL W P-5'-P-CCNC: 10 U/L (ref 7–52)
ALT SERPL W P-5'-P-CCNC: 12 U/L (ref 7–52)
ALT SERPL W P-5'-P-CCNC: 9 U/L (ref 7–52)
ANION GAP SERPL CALCULATED.3IONS-SCNC: 7 MMOL/L (ref 3–14)
ANION GAP SERPL CALCULATED.3IONS-SCNC: 9 MMOL/L (ref 3–14)
APPEARANCE UR: CLEAR
AST SERPL W P-5'-P-CCNC: 13 U/L (ref 13–39)
AST SERPL W P-5'-P-CCNC: 15 U/L (ref 13–39)
AST SERPL W P-5'-P-CCNC: 21 U/L (ref 13–39)
ATRIAL RATE - MUSE: 64 BPM
BACTERIA #/AREA URNS HPF: ABNORMAL /HPF
BASOPHILS # BLD AUTO: 0 10E3/UL (ref 0–0.2)
BASOPHILS # BLD AUTO: 0 10E3/UL (ref 0–0.2)
BASOPHILS NFR BLD AUTO: 0 %
BASOPHILS NFR BLD AUTO: 0 %
BILIRUB SERPL-MCNC: 0.4 MG/DL (ref 0.3–1)
BILIRUB SERPL-MCNC: 0.5 MG/DL (ref 0.3–1)
BILIRUB SERPL-MCNC: 0.8 MG/DL (ref 0.3–1)
BILIRUB UR QL STRIP: NEGATIVE
BUN SERPL-MCNC: 19 MG/DL (ref 7–25)
BUN SERPL-MCNC: 20 MG/DL (ref 7–25)
BUN SERPL-MCNC: 38 MG/DL (ref 7–25)
BUN SERPL-MCNC: 42 MG/DL (ref 7–25)
C DIFF TOX B STL QL: NEGATIVE
CALCIUM SERPL-MCNC: 8.5 MG/DL (ref 8.6–10.3)
CALCIUM SERPL-MCNC: 8.8 MG/DL (ref 8.6–10.3)
CALCIUM SERPL-MCNC: 9.1 MG/DL (ref 8.6–10.3)
CALCIUM SERPL-MCNC: 9.8 MG/DL (ref 8.6–10.3)
CHLORIDE BLD-SCNC: 106 MMOL/L (ref 98–107)
CHLORIDE BLD-SCNC: 108 MMOL/L (ref 98–107)
CHLORIDE BLD-SCNC: 108 MMOL/L (ref 98–107)
CHLORIDE BLD-SCNC: 112 MMOL/L (ref 98–107)
CO2 SERPL-SCNC: 19 MMOL/L (ref 21–31)
CO2 SERPL-SCNC: 24 MMOL/L (ref 21–31)
CO2 SERPL-SCNC: 24 MMOL/L (ref 21–31)
CO2 SERPL-SCNC: 27 MMOL/L (ref 21–31)
COLOR UR AUTO: ABNORMAL
CREAT SERPL-MCNC: 1.35 MG/DL (ref 0.6–1.2)
CREAT SERPL-MCNC: 1.36 MG/DL (ref 0.6–1.2)
CREAT SERPL-MCNC: 1.61 MG/DL (ref 0.6–1.2)
CREAT SERPL-MCNC: 1.93 MG/DL (ref 0.6–1.2)
CRP SERPL-MCNC: 169.1 MG/L
CRP SERPL-MCNC: 9 MG/L
D DIMER PPP FEU-MCNC: 0.64 UG/ML FEU (ref 0–0.5)
DIASTOLIC BLOOD PRESSURE - MUSE: NORMAL MMHG
EOSINOPHIL # BLD AUTO: 0 10E3/UL (ref 0–0.7)
EOSINOPHIL # BLD AUTO: 0.1 10E3/UL (ref 0–0.7)
EOSINOPHIL NFR BLD AUTO: 0 %
EOSINOPHIL NFR BLD AUTO: 1 %
ERYTHROCYTE [DISTWIDTH] IN BLOOD BY AUTOMATED COUNT: 12.8 % (ref 10–15)
ERYTHROCYTE [DISTWIDTH] IN BLOOD BY AUTOMATED COUNT: 13.2 % (ref 10–15)
ERYTHROCYTE [DISTWIDTH] IN BLOOD BY AUTOMATED COUNT: 13.2 % (ref 10–15)
ERYTHROCYTE [DISTWIDTH] IN BLOOD BY AUTOMATED COUNT: 13.4 % (ref 10–15)
FLUAV RNA SPEC QL NAA+PROBE: NEGATIVE
FLUAV RNA SPEC QL NAA+PROBE: NEGATIVE
FLUBV RNA RESP QL NAA+PROBE: NEGATIVE
FLUBV RNA RESP QL NAA+PROBE: NEGATIVE
GFR SERPL CREATININE-BSD FRML MDRD: 24 ML/MIN/1.73M2
GFR SERPL CREATININE-BSD FRML MDRD: 30 ML/MIN/1.73M2
GFR SERPL CREATININE-BSD FRML MDRD: 37 ML/MIN/1.73M2
GFR SERPL CREATININE-BSD FRML MDRD: 38 ML/MIN/1.73M2
GLUCOSE BLD-MCNC: 54 MG/DL (ref 70–105)
GLUCOSE BLD-MCNC: 83 MG/DL (ref 70–105)
GLUCOSE BLD-MCNC: 93 MG/DL (ref 70–105)
GLUCOSE BLD-MCNC: 95 MG/DL (ref 70–105)
GLUCOSE UR STRIP-MCNC: NEGATIVE MG/DL
HCT VFR BLD AUTO: 30.8 % (ref 35–47)
HCT VFR BLD AUTO: 31.8 % (ref 35–47)
HCT VFR BLD AUTO: 34.2 % (ref 35–47)
HCT VFR BLD AUTO: 35.7 % (ref 35–47)
HGB BLD-MCNC: 10 G/DL (ref 11.7–15.7)
HGB BLD-MCNC: 10.8 G/DL (ref 11.7–15.7)
HGB BLD-MCNC: 11.3 G/DL (ref 11.7–15.7)
HGB BLD-MCNC: 9.8 G/DL (ref 11.7–15.7)
HGB UR QL STRIP: NEGATIVE
IMM GRANULOCYTES # BLD: 0 10E3/UL
IMM GRANULOCYTES # BLD: 0 10E3/UL
IMM GRANULOCYTES NFR BLD: 0 %
IMM GRANULOCYTES NFR BLD: 0 %
INR PPP: 1.17 (ref 0.85–1.15)
INR PPP: 1.21 (ref 0.85–1.15)
INTERPRETATION ECG - MUSE: NORMAL
KETONES UR STRIP-MCNC: NEGATIVE MG/DL
LEUKOCYTE ESTERASE UR QL STRIP: ABNORMAL
LYMPHOCYTES # BLD AUTO: 2.3 10E3/UL (ref 0.8–5.3)
LYMPHOCYTES # BLD AUTO: 2.9 10E3/UL (ref 0.8–5.3)
LYMPHOCYTES NFR BLD AUTO: 21 %
LYMPHOCYTES NFR BLD AUTO: 23 %
MAGNESIUM SERPL-MCNC: 1.8 MG/DL (ref 1.9–2.7)
MAGNESIUM SERPL-MCNC: 2.3 MG/DL (ref 1.9–2.7)
MCH RBC QN AUTO: 31.9 PG (ref 26.5–33)
MCH RBC QN AUTO: 31.9 PG (ref 26.5–33)
MCH RBC QN AUTO: 32.2 PG (ref 26.5–33)
MCH RBC QN AUTO: 32.3 PG (ref 26.5–33)
MCHC RBC AUTO-ENTMCNC: 31.4 G/DL (ref 31.5–36.5)
MCHC RBC AUTO-ENTMCNC: 31.6 G/DL (ref 31.5–36.5)
MCHC RBC AUTO-ENTMCNC: 31.7 G/DL (ref 31.5–36.5)
MCHC RBC AUTO-ENTMCNC: 31.8 G/DL (ref 31.5–36.5)
MCV RBC AUTO: 100 FL (ref 78–100)
MCV RBC AUTO: 101 FL (ref 78–100)
MCV RBC AUTO: 102 FL (ref 78–100)
MCV RBC AUTO: 103 FL (ref 78–100)
MONOCYTES # BLD AUTO: 0.3 10E3/UL (ref 0–1.3)
MONOCYTES # BLD AUTO: 0.3 10E3/UL (ref 0–1.3)
MONOCYTES NFR BLD AUTO: 2 %
MONOCYTES NFR BLD AUTO: 3 %
MUCOUS THREADS #/AREA URNS LPF: PRESENT /LPF
NEUTROPHILS # BLD AUTO: 8.2 10E3/UL (ref 1.6–8.3)
NEUTROPHILS # BLD AUTO: 9 10E3/UL (ref 1.6–8.3)
NEUTROPHILS NFR BLD AUTO: 75 %
NEUTROPHILS NFR BLD AUTO: 75 %
NITRATE UR QL: NEGATIVE
NRBC # BLD AUTO: 0 10E3/UL
NRBC # BLD AUTO: 0 10E3/UL
NRBC BLD AUTO-RTO: 0 /100
NRBC BLD AUTO-RTO: 0 /100
NT-PROBNP SERPL-MCNC: 359 PG/ML (ref 0–100)
P AXIS - MUSE: 79 DEGREES
PATH REPORT.COMMENTS IMP SPEC: NORMAL
PATH REPORT.FINAL DX SPEC: NORMAL
PATH REPORT.RELEVANT HX SPEC: NORMAL
PH UR STRIP: 5.5 [PH] (ref 5–9)
PHOTO IMAGE: NORMAL
PLATELET # BLD AUTO: 155 10E3/UL (ref 150–450)
PLATELET # BLD AUTO: 166 10E3/UL (ref 150–450)
PLATELET # BLD AUTO: 179 10E3/UL (ref 150–450)
PLATELET # BLD AUTO: 215 10E3/UL (ref 150–450)
POTASSIUM BLD-SCNC: 3.7 MMOL/L (ref 3.5–5.1)
POTASSIUM BLD-SCNC: 4.5 MMOL/L (ref 3.5–5.1)
POTASSIUM BLD-SCNC: 4.7 MMOL/L (ref 3.5–5.1)
POTASSIUM BLD-SCNC: 5 MMOL/L (ref 3.5–5.1)
PR INTERVAL - MUSE: 166 MS
PROT SERPL-MCNC: 6.3 G/DL (ref 6.4–8.9)
PROT SERPL-MCNC: 7.2 G/DL (ref 6.4–8.9)
PROT SERPL-MCNC: 7.5 G/DL (ref 6.4–8.9)
QRS DURATION - MUSE: 68 MS
QT - MUSE: 384 MS
QTC - MUSE: 396 MS
R AXIS - MUSE: 44 DEGREES
RBC # BLD AUTO: 3.07 10E6/UL (ref 3.8–5.2)
RBC # BLD AUTO: 3.1 10E6/UL (ref 3.8–5.2)
RBC # BLD AUTO: 3.35 10E6/UL (ref 3.8–5.2)
RBC # BLD AUTO: 3.54 10E6/UL (ref 3.8–5.2)
RBC URINE: 1 /HPF
RIBOTYPE 027/NAP1/BI: NORMAL
RSV RNA SPEC NAA+PROBE: NEGATIVE
RSV RNA SPEC NAA+PROBE: NEGATIVE
SARS-COV-2 RNA RESP QL NAA+PROBE: NEGATIVE
SODIUM SERPL-SCNC: 139 MMOL/L (ref 134–144)
SODIUM SERPL-SCNC: 139 MMOL/L (ref 134–144)
SODIUM SERPL-SCNC: 140 MMOL/L (ref 134–144)
SODIUM SERPL-SCNC: 140 MMOL/L (ref 134–144)
SP GR UR STRIP: 1.02 (ref 1–1.03)
SQUAMOUS EPITHELIAL: 3 /HPF
SYSTOLIC BLOOD PRESSURE - MUSE: NORMAL MMHG
T AXIS - MUSE: 47 DEGREES
T4 FREE SERPL-MCNC: 1.49 NG/DL (ref 0.6–1.6)
TROPONIN I SERPL-MCNC: 22.9 PG/ML (ref 0–34)
TSH SERPL DL<=0.005 MIU/L-ACNC: 2.53 MU/L (ref 0.4–4)
UROBILINOGEN UR STRIP-MCNC: NORMAL MG/DL
VENTRICULAR RATE- MUSE: 64 BPM
WBC # BLD AUTO: 10.8 10E3/UL (ref 4–11)
WBC # BLD AUTO: 12.3 10E3/UL (ref 4–11)
WBC # BLD AUTO: 7.2 10E3/UL (ref 4–11)
WBC # BLD AUTO: 7.8 10E3/UL (ref 4–11)
WBC URINE: 1 /HPF

## 2022-01-01 PROCEDURE — 258N000003 HC RX IP 258 OP 636: Performed by: NURSE ANESTHETIST, CERTIFIED REGISTERED

## 2022-01-01 PROCEDURE — 99212 OFFICE O/P EST SF 10 MIN: CPT | Performed by: SURGERY

## 2022-01-01 PROCEDURE — 97802 MEDICAL NUTRITION INDIV IN: CPT | Mod: TEL,95,GZ | Performed by: DIETITIAN, REGISTERED

## 2022-01-01 PROCEDURE — 43246 EGD PLACE GASTROSTOMY TUBE: CPT | Performed by: SURGERY

## 2022-01-01 PROCEDURE — G0463 HOSPITAL OUTPT CLINIC VISIT: HCPCS

## 2022-01-01 PROCEDURE — 97597 DBRDMT OPN WND 1ST 20 CM/<: CPT | Performed by: SURGERY

## 2022-01-01 PROCEDURE — 36415 COLL VENOUS BLD VENIPUNCTURE: CPT | Performed by: PHYSICIAN ASSISTANT

## 2022-01-01 PROCEDURE — 120N000001 HC R&B MED SURG/OB

## 2022-01-01 PROCEDURE — 250N000011 HC RX IP 250 OP 636: Performed by: FAMILY MEDICINE

## 2022-01-01 PROCEDURE — 85025 COMPLETE CBC W/AUTO DIFF WBC: CPT | Performed by: PHYSICIAN ASSISTANT

## 2022-01-01 PROCEDURE — G0463 HOSPITAL OUTPT CLINIC VISIT: HCPCS | Mod: 25

## 2022-01-01 PROCEDURE — G1004 CDSM NDSC: HCPCS

## 2022-01-01 PROCEDURE — 99285 EMERGENCY DEPT VISIT HI MDM: CPT | Performed by: PHYSICIAN ASSISTANT

## 2022-01-01 PROCEDURE — G0463 HOSPITAL OUTPT CLINIC VISIT: HCPCS | Performed by: SURGERY

## 2022-01-01 PROCEDURE — 97803 MED NUTRITION INDIV SUBSEQ: CPT | Mod: TEL,95,GZ | Performed by: DIETITIAN, REGISTERED

## 2022-01-01 PROCEDURE — 43762 RPLC GTUBE NO REVJ TRC: CPT | Performed by: SURGERY

## 2022-01-01 PROCEDURE — 250N000013 HC RX MED GY IP 250 OP 250 PS 637: Performed by: FAMILY MEDICINE

## 2022-01-01 PROCEDURE — 86140 C-REACTIVE PROTEIN: CPT | Performed by: FAMILY MEDICINE

## 2022-01-01 PROCEDURE — C9803 HOPD COVID-19 SPEC COLLECT: HCPCS

## 2022-01-01 PROCEDURE — 93010 ELECTROCARDIOGRAM REPORT: CPT | Performed by: INTERNAL MEDICINE

## 2022-01-01 PROCEDURE — 85379 FIBRIN DEGRADATION QUANT: CPT | Performed by: FAMILY MEDICINE

## 2022-01-01 PROCEDURE — 87493 C DIFF AMPLIFIED PROBE: CPT | Performed by: PHYSICIAN ASSISTANT

## 2022-01-01 PROCEDURE — 43249 ESOPH EGD DILATION <30 MM: CPT | Performed by: SURGERY

## 2022-01-01 PROCEDURE — 99223 1ST HOSP IP/OBS HIGH 75: CPT | Mod: 25 | Performed by: SURGERY

## 2022-01-01 PROCEDURE — 83735 ASSAY OF MAGNESIUM: CPT | Performed by: FAMILY MEDICINE

## 2022-01-01 PROCEDURE — G0180 MD CERTIFICATION HHA PATIENT: HCPCS | Performed by: NURSE PRACTITIONER

## 2022-01-01 PROCEDURE — 250N000011 HC RX IP 250 OP 636: Performed by: SURGERY

## 2022-01-01 PROCEDURE — 93005 ELECTROCARDIOGRAM TRACING: CPT | Performed by: PHYSICIAN ASSISTANT

## 2022-01-01 PROCEDURE — 99285 EMERGENCY DEPT VISIT HI MDM: CPT | Mod: 25 | Performed by: FAMILY MEDICINE

## 2022-01-01 PROCEDURE — 88305 TISSUE EXAM BY PATHOLOGIST: CPT

## 2022-01-01 PROCEDURE — 99214 OFFICE O/P EST MOD 30 MIN: CPT | Performed by: NURSE PRACTITIONER

## 2022-01-01 PROCEDURE — 80053 COMPREHEN METABOLIC PANEL: CPT | Performed by: FAMILY MEDICINE

## 2022-01-01 PROCEDURE — 85027 COMPLETE CBC AUTOMATED: CPT | Performed by: FAMILY MEDICINE

## 2022-01-01 PROCEDURE — 999N000010 HC STATISTIC ANES STAT CODE-CRNA PER MINUTE: Performed by: SURGERY

## 2022-01-01 PROCEDURE — 258N000003 HC RX IP 258 OP 636: Performed by: SURGERY

## 2022-01-01 PROCEDURE — 80053 COMPREHEN METABOLIC PANEL: CPT | Mod: ZL | Performed by: NURSE PRACTITIONER

## 2022-01-01 PROCEDURE — C9803 HOPD COVID-19 SPEC COLLECT: HCPCS | Performed by: PHYSICIAN ASSISTANT

## 2022-01-01 PROCEDURE — 43239 EGD BIOPSY SINGLE/MULTIPLE: CPT | Mod: XU

## 2022-01-01 PROCEDURE — 99232 SBSQ HOSP IP/OBS MODERATE 35: CPT | Mod: 25 | Performed by: SURGERY

## 2022-01-01 PROCEDURE — 96360 HYDRATION IV INFUSION INIT: CPT | Performed by: PHYSICIAN ASSISTANT

## 2022-01-01 PROCEDURE — U0005 INFEC AGEN DETEC AMPLI PROBE: HCPCS | Mod: ZL

## 2022-01-01 PROCEDURE — 258N000003 HC RX IP 258 OP 636: Performed by: PHYSICIAN ASSISTANT

## 2022-01-01 PROCEDURE — 36415 COLL VENOUS BLD VENIPUNCTURE: CPT | Performed by: FAMILY MEDICINE

## 2022-01-01 PROCEDURE — 85610 PROTHROMBIN TIME: CPT | Performed by: FAMILY MEDICINE

## 2022-01-01 PROCEDURE — 99204 OFFICE O/P NEW MOD 45 MIN: CPT | Performed by: SURGERY

## 2022-01-01 PROCEDURE — 80053 COMPREHEN METABOLIC PANEL: CPT | Performed by: PHYSICIAN ASSISTANT

## 2022-01-01 PROCEDURE — 85610 PROTHROMBIN TIME: CPT | Performed by: PHYSICIAN ASSISTANT

## 2022-01-01 PROCEDURE — 36415 COLL VENOUS BLD VENIPUNCTURE: CPT | Mod: ZL | Performed by: NURSE PRACTITIONER

## 2022-01-01 PROCEDURE — 250N000009 HC RX 250: Performed by: FAMILY MEDICINE

## 2022-01-01 PROCEDURE — 258N000003 HC RX IP 258 OP 636: Performed by: FAMILY MEDICINE

## 2022-01-01 PROCEDURE — 43239 EGD BIOPSY SINGLE/MULTIPLE: CPT | Mod: XU | Performed by: SURGERY

## 2022-01-01 PROCEDURE — 81001 URINALYSIS AUTO W/SCOPE: CPT | Performed by: PHYSICIAN ASSISTANT

## 2022-01-01 PROCEDURE — 99232 SBSQ HOSP IP/OBS MODERATE 35: CPT | Performed by: FAMILY MEDICINE

## 2022-01-01 PROCEDURE — 84439 ASSAY OF FREE THYROXINE: CPT | Mod: ZL | Performed by: NURSE PRACTITIONER

## 2022-01-01 PROCEDURE — 99223 1ST HOSP IP/OBS HIGH 75: CPT | Mod: AI | Performed by: FAMILY MEDICINE

## 2022-01-01 PROCEDURE — 70490 CT SOFT TISSUE NECK W/O DYE: CPT | Mod: TC

## 2022-01-01 PROCEDURE — 82310 ASSAY OF CALCIUM: CPT | Performed by: FAMILY MEDICINE

## 2022-01-01 PROCEDURE — 96367 TX/PROPH/DG ADDL SEQ IV INF: CPT | Performed by: FAMILY MEDICINE

## 2022-01-01 PROCEDURE — 84484 ASSAY OF TROPONIN QUANT: CPT | Performed by: PHYSICIAN ASSISTANT

## 2022-01-01 PROCEDURE — 87637 SARSCOV2&INF A&B&RSV AMP PRB: CPT | Performed by: FAMILY MEDICINE

## 2022-01-01 PROCEDURE — 86140 C-REACTIVE PROTEIN: CPT | Performed by: PHYSICIAN ASSISTANT

## 2022-01-01 PROCEDURE — G0463 HOSPITAL OUTPT CLINIC VISIT: HCPCS | Mod: 25,27

## 2022-01-01 PROCEDURE — 83735 ASSAY OF MAGNESIUM: CPT | Performed by: PHYSICIAN ASSISTANT

## 2022-01-01 PROCEDURE — 84443 ASSAY THYROID STIM HORMONE: CPT | Mod: ZL | Performed by: NURSE PRACTITIONER

## 2022-01-01 PROCEDURE — 43249 ESOPH EGD DILATION <30 MM: CPT

## 2022-01-01 PROCEDURE — 99213 OFFICE O/P EST LOW 20 MIN: CPT | Performed by: SURGERY

## 2022-01-01 PROCEDURE — 43249 ESOPH EGD DILATION <30 MM: CPT | Performed by: NURSE ANESTHETIST, CERTIFIED REGISTERED

## 2022-01-01 PROCEDURE — 99100 ANES PT EXTEME AGE<1 YR&>70: CPT | Performed by: NURSE ANESTHETIST, CERTIFIED REGISTERED

## 2022-01-01 PROCEDURE — 71275 CT ANGIOGRAPHY CHEST: CPT | Mod: TC

## 2022-01-01 PROCEDURE — 85025 COMPLETE CBC W/AUTO DIFF WBC: CPT | Performed by: FAMILY MEDICINE

## 2022-01-01 PROCEDURE — 83880 ASSAY OF NATRIURETIC PEPTIDE: CPT | Performed by: PHYSICIAN ASSISTANT

## 2022-01-01 PROCEDURE — 82040 ASSAY OF SERUM ALBUMIN: CPT | Mod: ZL | Performed by: NURSE PRACTITIONER

## 2022-01-01 PROCEDURE — 85027 COMPLETE CBC AUTOMATED: CPT | Mod: ZL | Performed by: NURSE PRACTITIONER

## 2022-01-01 PROCEDURE — 250N000011 HC RX IP 250 OP 636: Performed by: NURSE ANESTHETIST, CERTIFIED REGISTERED

## 2022-01-01 PROCEDURE — 96366 THER/PROPH/DIAG IV INF ADDON: CPT | Performed by: FAMILY MEDICINE

## 2022-01-01 PROCEDURE — C9803 HOPD COVID-19 SPEC COLLECT: HCPCS | Performed by: FAMILY MEDICINE

## 2022-01-01 PROCEDURE — 99238 HOSP IP/OBS DSCHRG MGMT 30/<: CPT | Performed by: FAMILY MEDICINE

## 2022-01-01 PROCEDURE — 99285 EMERGENCY DEPT VISIT HI MDM: CPT | Mod: 25 | Performed by: PHYSICIAN ASSISTANT

## 2022-01-01 PROCEDURE — 96361 HYDRATE IV INFUSION ADD-ON: CPT | Performed by: PHYSICIAN ASSISTANT

## 2022-01-01 PROCEDURE — 99284 EMERGENCY DEPT VISIT MOD MDM: CPT | Performed by: FAMILY MEDICINE

## 2022-01-01 PROCEDURE — 96365 THER/PROPH/DIAG IV INF INIT: CPT | Performed by: FAMILY MEDICINE

## 2022-01-01 PROCEDURE — 87637 SARSCOV2&INF A&B&RSV AMP PRB: CPT | Performed by: PHYSICIAN ASSISTANT

## 2022-01-01 PROCEDURE — 74019 RADEX ABDOMEN 2 VIEWS: CPT

## 2022-01-01 RX ORDER — TRAMADOL HYDROCHLORIDE 50 MG/1
50 TABLET ORAL EVERY 6 HOURS PRN
Qty: 10 TABLET | Refills: 0 | Status: SHIPPED | OUTPATIENT
Start: 2022-01-01 | End: 2022-01-01

## 2022-01-01 RX ORDER — MORPHINE SULFATE 20 MG/ML
5-10 SOLUTION ORAL
Status: DISCONTINUED | OUTPATIENT
Start: 2022-01-01 | End: 2022-01-01 | Stop reason: HOSPADM

## 2022-01-01 RX ORDER — TRAMADOL HYDROCHLORIDE 50 MG/1
50 TABLET ORAL EVERY 6 HOURS PRN
Qty: 15 TABLET | Refills: 0 | Status: SHIPPED | OUTPATIENT
Start: 2022-01-01 | End: 2022-01-01

## 2022-01-01 RX ORDER — NICOTINE 21 MG/24HR
1 PATCH, TRANSDERMAL 24 HOURS TRANSDERMAL DAILY
Status: DISCONTINUED | OUTPATIENT
Start: 2022-01-01 | End: 2022-01-01 | Stop reason: HOSPADM

## 2022-01-01 RX ORDER — SODIUM CHLORIDE, SODIUM LACTATE, POTASSIUM CHLORIDE, CALCIUM CHLORIDE 600; 310; 30; 20 MG/100ML; MG/100ML; MG/100ML; MG/100ML
INJECTION, SOLUTION INTRAVENOUS CONTINUOUS
Status: DISCONTINUED | OUTPATIENT
Start: 2022-01-01 | End: 2022-01-01 | Stop reason: HOSPADM

## 2022-01-01 RX ORDER — FENTANYL CITRATE 50 UG/ML
25 INJECTION, SOLUTION INTRAMUSCULAR; INTRAVENOUS
Status: DISCONTINUED | OUTPATIENT
Start: 2022-01-01 | End: 2022-01-01 | Stop reason: HOSPADM

## 2022-01-01 RX ORDER — LORAZEPAM 0.5 MG/1
0.5 TABLET ORAL ONCE
Status: DISCONTINUED | OUTPATIENT
Start: 2022-01-01 | End: 2022-01-01

## 2022-01-01 RX ORDER — PROPOFOL 10 MG/ML
INJECTION, EMULSION INTRAVENOUS CONTINUOUS PRN
Status: DISCONTINUED | OUTPATIENT
Start: 2022-01-01 | End: 2022-01-01

## 2022-01-01 RX ORDER — BISACODYL 10 MG
10 SUPPOSITORY, RECTAL RECTAL DAILY PRN
Status: DISCONTINUED | OUTPATIENT
Start: 2022-01-01 | End: 2022-01-01 | Stop reason: HOSPADM

## 2022-01-01 RX ORDER — GLYCOPYRROLATE 0.2 MG/ML
0.2 INJECTION, SOLUTION INTRAMUSCULAR; INTRAVENOUS EVERY 4 HOURS PRN
Status: DISCONTINUED | OUTPATIENT
Start: 2022-01-01 | End: 2022-01-01 | Stop reason: HOSPADM

## 2022-01-01 RX ORDER — NICOTINE 21 MG/24HR
1 PATCH, TRANSDERMAL 24 HOURS TRANSDERMAL DAILY
Qty: 28 PATCH | Refills: 0 | Status: SHIPPED | OUTPATIENT
Start: 2022-01-01 | End: 2022-01-01

## 2022-01-01 RX ORDER — MORPHINE SULFATE 20 MG/ML
2.5 SOLUTION ORAL EVERY 4 HOURS PRN
Qty: 30 ML | Refills: 0 | Status: SHIPPED | OUTPATIENT
Start: 2022-01-01

## 2022-01-01 RX ORDER — NALOXONE HYDROCHLORIDE 0.4 MG/ML
0.4 INJECTION, SOLUTION INTRAMUSCULAR; INTRAVENOUS; SUBCUTANEOUS
Status: DISCONTINUED | OUTPATIENT
Start: 2022-01-01 | End: 2022-01-01 | Stop reason: HOSPADM

## 2022-01-01 RX ORDER — ZOLPIDEM TARTRATE 5 MG/1
5 TABLET ORAL AT BEDTIME
Qty: 90 TABLET | Refills: 1 | Status: SHIPPED | OUTPATIENT
Start: 2022-01-01

## 2022-01-01 RX ORDER — PROCHLORPERAZINE MALEATE 5 MG
5 TABLET ORAL EVERY 6 HOURS PRN
Status: DISCONTINUED | OUTPATIENT
Start: 2022-01-01 | End: 2022-01-01 | Stop reason: HOSPADM

## 2022-01-01 RX ORDER — PANTOPRAZOLE SODIUM 40 MG/1
40 TABLET, DELAYED RELEASE ORAL DAILY
Qty: 90 TABLET | Refills: 11 | Status: SHIPPED | OUTPATIENT
Start: 2022-01-01

## 2022-01-01 RX ORDER — SENNOSIDES 8.6 MG
650 CAPSULE ORAL EVERY 6 HOURS PRN
COMMUNITY
Start: 2022-01-01

## 2022-01-01 RX ORDER — PROCHLORPERAZINE MALEATE 5 MG
5 TABLET ORAL EVERY 6 HOURS PRN
Status: DISCONTINUED | OUTPATIENT
Start: 2022-01-01 | End: 2022-01-01

## 2022-01-01 RX ORDER — PROCHLORPERAZINE 25 MG
12.5 SUPPOSITORY, RECTAL RECTAL EVERY 12 HOURS PRN
Status: DISCONTINUED | OUTPATIENT
Start: 2022-01-01 | End: 2022-01-01 | Stop reason: HOSPADM

## 2022-01-01 RX ORDER — MINERAL OIL/HYDROPHIL PETROLAT
OINTMENT (GRAM) TOPICAL EVERY 8 HOURS PRN
Status: DISCONTINUED | OUTPATIENT
Start: 2022-01-01 | End: 2022-01-01 | Stop reason: HOSPADM

## 2022-01-01 RX ORDER — LEVOTHYROXINE SODIUM 100 UG/1
100 TABLET ORAL DAILY
Qty: 90 TABLET | Refills: 3 | OUTPATIENT
Start: 2022-01-01

## 2022-01-01 RX ORDER — MAGNESIUM SULFATE HEPTAHYDRATE 40 MG/ML
2 INJECTION, SOLUTION INTRAVENOUS ONCE
Status: COMPLETED | OUTPATIENT
Start: 2022-01-01 | End: 2022-01-01

## 2022-01-01 RX ORDER — LIDOCAINE 40 MG/G
CREAM TOPICAL
Status: DISCONTINUED | OUTPATIENT
Start: 2022-01-01 | End: 2022-01-01 | Stop reason: HOSPADM

## 2022-01-01 RX ORDER — ONDANSETRON 2 MG/ML
4 INJECTION INTRAMUSCULAR; INTRAVENOUS EVERY 6 HOURS PRN
Status: DISCONTINUED | OUTPATIENT
Start: 2022-01-01 | End: 2022-01-01

## 2022-01-01 RX ORDER — HYDROMORPHONE HYDROCHLORIDE 1 MG/ML
0.4 INJECTION, SOLUTION INTRAMUSCULAR; INTRAVENOUS; SUBCUTANEOUS EVERY 5 MIN PRN
Status: DISCONTINUED | OUTPATIENT
Start: 2022-01-01 | End: 2022-01-01 | Stop reason: HOSPADM

## 2022-01-01 RX ORDER — OXYCODONE HYDROCHLORIDE 5 MG/1
5 TABLET ORAL EVERY 4 HOURS PRN
Status: DISCONTINUED | OUTPATIENT
Start: 2022-01-01 | End: 2022-01-01 | Stop reason: HOSPADM

## 2022-01-01 RX ORDER — LORAZEPAM 2 MG/ML
0.5 INJECTION INTRAMUSCULAR
Status: DISCONTINUED | OUTPATIENT
Start: 2022-01-01 | End: 2022-01-01 | Stop reason: HOSPADM

## 2022-01-01 RX ORDER — LORAZEPAM 0.5 MG/1
0.5 TABLET ORAL
Status: DISCONTINUED | OUTPATIENT
Start: 2022-01-01 | End: 2022-01-01 | Stop reason: HOSPADM

## 2022-01-01 RX ORDER — ONDANSETRON 4 MG/1
4 TABLET, ORALLY DISINTEGRATING ORAL EVERY 6 HOURS PRN
Status: DISCONTINUED | OUTPATIENT
Start: 2022-01-01 | End: 2022-01-01 | Stop reason: HOSPADM

## 2022-01-01 RX ORDER — NALOXONE HYDROCHLORIDE 0.4 MG/ML
0.1 INJECTION, SOLUTION INTRAMUSCULAR; INTRAVENOUS; SUBCUTANEOUS
Status: DISCONTINUED | OUTPATIENT
Start: 2022-01-01 | End: 2022-01-01 | Stop reason: HOSPADM

## 2022-01-01 RX ORDER — SODIUM CHLORIDE, SODIUM LACTATE, POTASSIUM CHLORIDE, CALCIUM CHLORIDE 600; 310; 30; 20 MG/100ML; MG/100ML; MG/100ML; MG/100ML
INJECTION, SOLUTION INTRAVENOUS CONTINUOUS PRN
Status: DISCONTINUED | OUTPATIENT
Start: 2022-01-01 | End: 2022-01-01

## 2022-01-01 RX ORDER — PANTOPRAZOLE SODIUM 20 MG/1
TABLET, DELAYED RELEASE ORAL
Qty: 180 TABLET | Refills: 0 | OUTPATIENT
Start: 2022-01-01

## 2022-01-01 RX ORDER — NALOXONE HYDROCHLORIDE 0.4 MG/ML
0.2 INJECTION, SOLUTION INTRAMUSCULAR; INTRAVENOUS; SUBCUTANEOUS
Status: DISCONTINUED | OUTPATIENT
Start: 2022-01-01 | End: 2022-01-01 | Stop reason: HOSPADM

## 2022-01-01 RX ORDER — IOPAMIDOL 755 MG/ML
100 INJECTION, SOLUTION INTRAVASCULAR ONCE
Status: COMPLETED | OUTPATIENT
Start: 2022-01-01 | End: 2022-01-01

## 2022-01-01 RX ORDER — ONDANSETRON 4 MG/1
4 TABLET, ORALLY DISINTEGRATING ORAL EVERY 30 MIN PRN
Status: DISCONTINUED | OUTPATIENT
Start: 2022-01-01 | End: 2022-01-01 | Stop reason: HOSPADM

## 2022-01-01 RX ORDER — HYDROMORPHONE HCL IN WATER/PF 6 MG/30 ML
0.2 PATIENT CONTROLLED ANALGESIA SYRINGE INTRAVENOUS
Status: DISCONTINUED | OUTPATIENT
Start: 2022-01-01 | End: 2022-01-01 | Stop reason: HOSPADM

## 2022-01-01 RX ORDER — GABAPENTIN 250 MG/5ML
SOLUTION ORAL
Qty: 720 ML | Refills: 11 | Status: SHIPPED | OUTPATIENT
Start: 2022-01-01 | End: 2022-01-01

## 2022-01-01 RX ORDER — CEFOXITIN 2 G/1
2 INJECTION, POWDER, FOR SOLUTION INTRAVENOUS EVERY 24 HOURS
Status: DISCONTINUED | OUTPATIENT
Start: 2022-01-01 | End: 2022-01-01 | Stop reason: CLARIF

## 2022-01-01 RX ORDER — SODIUM CHLORIDE 9 MG/ML
INJECTION, SOLUTION INTRAVENOUS CONTINUOUS
Status: DISCONTINUED | OUTPATIENT
Start: 2022-01-01 | End: 2022-01-01 | Stop reason: HOSPADM

## 2022-01-01 RX ORDER — GABAPENTIN 300 MG/1
CAPSULE ORAL
Qty: 360 CAPSULE | Refills: 2 | Status: SHIPPED | OUTPATIENT
Start: 2022-01-01 | End: 2022-01-01

## 2022-01-01 RX ORDER — PROCHLORPERAZINE 25 MG
12.5 SUPPOSITORY, RECTAL RECTAL EVERY 12 HOURS PRN
Status: DISCONTINUED | OUTPATIENT
Start: 2022-01-01 | End: 2022-01-01

## 2022-01-01 RX ORDER — LIDOCAINE HYDROCHLORIDE 20 MG/ML
5 JELLY TOPICAL
Status: DISCONTINUED | OUTPATIENT
Start: 2022-01-01 | End: 2022-01-01 | Stop reason: HOSPADM

## 2022-01-01 RX ORDER — MORPHINE SULFATE 20 MG/ML
2.5 SOLUTION ORAL EVERY 4 HOURS PRN
Qty: 30 ML | Refills: 0 | Status: SHIPPED | OUTPATIENT
Start: 2022-01-01 | End: 2022-01-01

## 2022-01-01 RX ORDER — ONDANSETRON 4 MG/1
4 TABLET, ORALLY DISINTEGRATING ORAL EVERY 6 HOURS PRN
Qty: 30 TABLET | Refills: 0 | Status: SHIPPED | OUTPATIENT
Start: 2022-01-01 | End: 2022-01-01

## 2022-01-01 RX ORDER — LORAZEPAM 0.5 MG/1
0.5 TABLET ORAL EVERY 8 HOURS PRN
Qty: 30 TABLET | Refills: 0 | Status: SHIPPED | OUTPATIENT
Start: 2022-01-01 | End: 2022-01-01

## 2022-01-01 RX ORDER — SODIUM CHLORIDE, SODIUM LACTATE, POTASSIUM CHLORIDE, CALCIUM CHLORIDE 600; 310; 30; 20 MG/100ML; MG/100ML; MG/100ML; MG/100ML
INJECTION, SOLUTION INTRAVENOUS CONTINUOUS
Status: CANCELLED | OUTPATIENT
Start: 2022-01-01

## 2022-01-01 RX ORDER — CEPHALEXIN 500 MG/1
500 CAPSULE ORAL 3 TIMES DAILY
Qty: 21 CAPSULE | Refills: 0 | Status: SHIPPED | OUTPATIENT
Start: 2022-01-01 | End: 2022-01-01

## 2022-01-01 RX ORDER — FENTANYL CITRATE 50 UG/ML
INJECTION, SOLUTION INTRAMUSCULAR; INTRAVENOUS PRN
Status: DISCONTINUED | OUTPATIENT
Start: 2022-01-01 | End: 2022-01-01

## 2022-01-01 RX ORDER — ONDANSETRON 2 MG/ML
4 INJECTION INTRAMUSCULAR; INTRAVENOUS EVERY 30 MIN PRN
Status: DISCONTINUED | OUTPATIENT
Start: 2022-01-01 | End: 2022-01-01 | Stop reason: HOSPADM

## 2022-01-01 RX ORDER — GABAPENTIN 300 MG/1
CAPSULE ORAL
Qty: 270 CAPSULE | Refills: 2 | Status: SHIPPED | OUTPATIENT
Start: 2022-01-01 | End: 2022-01-01

## 2022-01-01 RX ORDER — NEOMYCIN/BACITRACIN/POLYMYXINB 3.5-400-5K
OINTMENT (GRAM) TOPICAL 2 TIMES DAILY
Status: DISCONTINUED | OUTPATIENT
Start: 2022-01-01 | End: 2022-01-01 | Stop reason: HOSPADM

## 2022-01-01 RX ORDER — LORAZEPAM 2 MG/ML
0.5 INJECTION INTRAMUSCULAR ONCE
Status: COMPLETED | OUTPATIENT
Start: 2022-01-01 | End: 2022-01-01

## 2022-01-01 RX ORDER — ONDANSETRON 4 MG/1
4 TABLET, ORALLY DISINTEGRATING ORAL EVERY 6 HOURS PRN
Qty: 30 TABLET | Refills: 0 | Status: SHIPPED | OUTPATIENT
Start: 2022-01-01

## 2022-01-01 RX ORDER — ONDANSETRON 2 MG/ML
4 INJECTION INTRAMUSCULAR; INTRAVENOUS EVERY 6 HOURS PRN
Status: DISCONTINUED | OUTPATIENT
Start: 2022-01-01 | End: 2022-01-01 | Stop reason: HOSPADM

## 2022-01-01 RX ORDER — LORAZEPAM 0.5 MG/1
0.5 TABLET ORAL EVERY 8 HOURS PRN
Qty: 30 TABLET | Refills: 0 | Status: SHIPPED | OUTPATIENT
Start: 2022-01-01

## 2022-01-01 RX ORDER — FLUMAZENIL 0.1 MG/ML
0.2 INJECTION, SOLUTION INTRAVENOUS
Status: CANCELLED | OUTPATIENT
Start: 2022-01-01 | End: 2022-01-01

## 2022-01-01 RX ORDER — GLIPIZIDE 10 MG/1
2 TABLET ORAL EVERY 8 HOURS PRN
Status: DISCONTINUED | OUTPATIENT
Start: 2022-01-01 | End: 2022-01-01 | Stop reason: HOSPADM

## 2022-01-01 RX ORDER — ESTRADIOL 0.1 MG/G
CREAM VAGINAL
Qty: 42.5 G | Refills: 5 | Status: ON HOLD | OUTPATIENT
Start: 2022-01-01 | End: 2022-01-01

## 2022-01-01 RX ORDER — TRAMADOL HYDROCHLORIDE 50 MG/1
50 TABLET ORAL EVERY 6 HOURS PRN
Qty: 10 TABLET | Refills: 0 | Status: ON HOLD | OUTPATIENT
Start: 2022-01-01 | End: 2022-01-01

## 2022-01-01 RX ORDER — MEPERIDINE HYDROCHLORIDE 50 MG/ML
12.5 INJECTION INTRAMUSCULAR; INTRAVENOUS; SUBCUTANEOUS
Status: DISCONTINUED | OUTPATIENT
Start: 2022-01-01 | End: 2022-01-01 | Stop reason: HOSPADM

## 2022-01-01 RX ORDER — ONDANSETRON 4 MG/1
4 TABLET, ORALLY DISINTEGRATING ORAL EVERY 6 HOURS PRN
Status: DISCONTINUED | OUTPATIENT
Start: 2022-01-01 | End: 2022-01-01

## 2022-01-01 RX ORDER — FENTANYL CITRATE 50 UG/ML
50 INJECTION, SOLUTION INTRAMUSCULAR; INTRAVENOUS EVERY 5 MIN PRN
Status: DISCONTINUED | OUTPATIENT
Start: 2022-01-01 | End: 2022-01-01 | Stop reason: HOSPADM

## 2022-01-01 RX ORDER — BUPIVACAINE HYDROCHLORIDE 2.5 MG/ML
INJECTION, SOLUTION EPIDURAL; INFILTRATION; INTRACAUDAL PRN
Status: DISCONTINUED | OUTPATIENT
Start: 2022-01-01 | End: 2022-01-01 | Stop reason: HOSPADM

## 2022-01-01 RX ORDER — LIDOCAINE 40 MG/G
CREAM TOPICAL
Status: CANCELLED | OUTPATIENT
Start: 2022-01-01

## 2022-01-01 RX ADMIN — LORAZEPAM 0.5 MG: 2 INJECTION, SOLUTION INTRAMUSCULAR; INTRAVENOUS at 23:02

## 2022-01-01 RX ADMIN — FAMOTIDINE 20 MG: 10 INJECTION INTRAVENOUS at 01:18

## 2022-01-01 RX ADMIN — SODIUM CHLORIDE: 9 INJECTION, SOLUTION INTRAVENOUS at 16:50

## 2022-01-01 RX ADMIN — CEFOXITIN SODIUM 2 G: 2 POWDER, FOR SOLUTION INTRAVENOUS at 20:33

## 2022-01-01 RX ADMIN — SODIUM CHLORIDE: 9 INJECTION, SOLUTION INTRAVENOUS at 03:07

## 2022-01-01 RX ADMIN — SODIUM CHLORIDE, SODIUM LACTATE, POTASSIUM CHLORIDE, AND CALCIUM CHLORIDE: 600; 310; 30; 20 INJECTION, SOLUTION INTRAVENOUS at 12:25

## 2022-01-01 RX ADMIN — SODIUM CHLORIDE: 9 INJECTION, SOLUTION INTRAVENOUS at 21:21

## 2022-01-01 RX ADMIN — HYDROMORPHONE HYDROCHLORIDE 0.2 MG: 0.2 INJECTION, SOLUTION INTRAMUSCULAR; INTRAVENOUS; SUBCUTANEOUS at 23:42

## 2022-01-01 RX ADMIN — PROPOFOL 120 MCG/KG/MIN: 10 INJECTION, EMULSION INTRAVENOUS at 12:29

## 2022-01-01 RX ADMIN — BACITRACIN ZINC, NEOMYCIN, POLYMYXIN B: 400; 3.5; 5 OINTMENT TOPICAL at 09:41

## 2022-01-01 RX ADMIN — BACITRACIN ZINC, NEOMYCIN, POLYMYXIN B: 400; 3.5; 5 OINTMENT TOPICAL at 21:55

## 2022-01-01 RX ADMIN — NICOTINE 1 PATCH: 14 PATCH, EXTENDED RELEASE TRANSDERMAL at 21:55

## 2022-01-01 RX ADMIN — SODIUM CHLORIDE 500 ML: 9 INJECTION, SOLUTION INTRAVENOUS at 01:18

## 2022-01-01 RX ADMIN — SODIUM CHLORIDE 1000 ML: 900 INJECTION, SOLUTION INTRAVENOUS at 11:39

## 2022-01-01 RX ADMIN — NICOTINE 1 PATCH: 14 PATCH, EXTENDED RELEASE TRANSDERMAL at 09:42

## 2022-01-01 RX ADMIN — SODIUM CHLORIDE: 9 INJECTION, SOLUTION INTRAVENOUS at 13:14

## 2022-01-01 RX ADMIN — LORAZEPAM 0.5 MG: 2 INJECTION, SOLUTION INTRAMUSCULAR; INTRAVENOUS at 04:25

## 2022-01-01 RX ADMIN — SODIUM CHLORIDE, POTASSIUM CHLORIDE, SODIUM LACTATE AND CALCIUM CHLORIDE 30 ML/HR: 600; 310; 30; 20 INJECTION, SOLUTION INTRAVENOUS at 12:00

## 2022-01-01 RX ADMIN — HYDROMORPHONE HYDROCHLORIDE 0.2 MG: 0.2 INJECTION, SOLUTION INTRAMUSCULAR; INTRAVENOUS; SUBCUTANEOUS at 20:58

## 2022-01-01 RX ADMIN — MAGNESIUM SULFATE HEPTAHYDRATE 2 G: 40 INJECTION, SOLUTION INTRAVENOUS at 02:45

## 2022-01-01 RX ADMIN — LORAZEPAM 0.5 MG: 2 INJECTION, SOLUTION INTRAMUSCULAR; INTRAVENOUS at 19:43

## 2022-01-01 RX ADMIN — FENTANYL CITRATE 25 MCG: 50 INJECTION, SOLUTION INTRAMUSCULAR; INTRAVENOUS at 12:27

## 2022-01-01 RX ADMIN — IOPAMIDOL 65 ML: 755 INJECTION, SOLUTION INTRAVENOUS at 01:45

## 2022-01-01 ASSESSMENT — ENCOUNTER SYMPTOMS
NAUSEA: 0
FLANK PAIN: 0
SHORTNESS OF BREATH: 0
CONFUSION: 0
ABDOMINAL PAIN: 0
SHORTNESS OF BREATH: 0
FEVER: 0
FACIAL SWELLING: 0
BACK PAIN: 0
WOUND: 1
PALPITATIONS: 0
UNEXPECTED WEIGHT CHANGE: 1
NUMBNESS: 1
CHILLS: 0
HEMATURIA: 0
WHEEZING: 0
COUGH: 0
EYE PAIN: 0
ACTIVITY CHANGE: 1
ABDOMINAL PAIN: 0
CHILLS: 0
CHEST TIGHTNESS: 0
TROUBLE SWALLOWING: 1
RHINORRHEA: 0
DIARRHEA: 1
ARTHRALGIAS: 0
NECK PAIN: 0
DYSURIA: 0
STRIDOR: 0
APPETITE CHANGE: 1
WEAKNESS: 1
ANAL BLEEDING: 0
VOMITING: 0
AGITATION: 0
UNEXPECTED WEIGHT CHANGE: 0
WOUND: 1
FEVER: 0
TROUBLE SWALLOWING: 1
SORE THROAT: 0
COUGH: 0
TREMORS: 0
BLOOD IN STOOL: 0
ABDOMINAL PAIN: 1
FATIGUE: 1
SEIZURES: 0
BLOOD IN STOOL: 0
SHORTNESS OF BREATH: 0
COUGH: 0

## 2022-01-01 ASSESSMENT — ACTIVITIES OF DAILY LIVING (ADL)
ADLS_ACUITY_SCORE: 21
ADLS_ACUITY_SCORE: 31
DOING_ERRANDS_INDEPENDENTLY_DIFFICULTY: NO
TOILETING_ISSUES: NO
ADLS_ACUITY_SCORE: 26
ADLS_ACUITY_SCORE: 21
ADLS_ACUITY_SCORE: 26
ADLS_ACUITY_SCORE: 21
ADLS_ACUITY_SCORE: 24
ADLS_ACUITY_SCORE: 35
ADLS_ACUITY_SCORE: 31
ADLS_ACUITY_SCORE: 26
ADLS_ACUITY_SCORE: 26
ADLS_ACUITY_SCORE: 35
ADLS_ACUITY_SCORE: 35
DIFFICULTY_EATING/SWALLOWING: NO
FALL_HISTORY_WITHIN_LAST_SIX_MONTHS: NO
ADLS_ACUITY_SCORE: 27
ADLS_ACUITY_SCORE: 18
ADLS_ACUITY_SCORE: 24
ADLS_ACUITY_SCORE: 26
ADLS_ACUITY_SCORE: 26
EQUIPMENT_CURRENTLY_USED_AT_HOME: WALKER, STANDARD
ADLS_ACUITY_SCORE: 26
ADLS_ACUITY_SCORE: 31
ADLS_ACUITY_SCORE: 24
DRESSING/BATHING_DIFFICULTY: NO
WALKING_OR_CLIMBING_STAIRS_DIFFICULTY: NO
WEAR_GLASSES_OR_BLIND: NO
ADLS_ACUITY_SCORE: 18
CHANGE_IN_FUNCTIONAL_STATUS_SINCE_ONSET_OF_CURRENT_ILLNESS/INJURY: NO
CONCENTRATING,_REMEMBERING_OR_MAKING_DECISIONS_DIFFICULTY: NO

## 2022-01-01 ASSESSMENT — PAIN SCALES - GENERAL
PAINLEVEL: NO PAIN (0)
PAINLEVEL: MILD PAIN (2)
PAINLEVEL: MODERATE PAIN (5)
PAINLEVEL: NO PAIN (0)

## 2022-01-01 ASSESSMENT — MIFFLIN-ST. JEOR
SCORE: 984.27
SCORE: 984.27
SCORE: 986.08

## 2022-01-01 ASSESSMENT — LIFESTYLE VARIABLES: TOBACCO_USE: 1

## 2022-01-09 NOTE — PROGRESS NOTES
IV Contrast- Discharge Instructions After Your CT Scan      The IV contrast you received today will be filtered from your bloodstream by your kidneys during the next 24 hours and pass from the body in urine.  You will not be aware of this process and your urine will not change in color.  To help this process you should drink at least 4 additional glasses of water or juice today.  This reduces stress on your kidneys.    Most contrast reactions are immediate.  Should you develop symptoms of concern after discharge, contact the department at the number below.  After hours you should contact your personal physician.  If you develop breathing distress or wheezing, call 911.      1.  Has the patient had a previous reaction to IV contrast? no    2.  Does the patient have kidney disease? Yes GFR 37    3.  Is the patient on dialysis? no    If YES to any of these questions, exam will be reviewed with a Radiologist before administering contrast.

## 2022-01-09 NOTE — ED PROVIDER NOTES
History     Chief Complaint   Patient presents with     Dehydration     HPI  Sylvie Bautista is a 88 year old female who presents to the emergency room with a couple year history of difficulty with swallowing.  She has been diagnosed with presbyesophagus, she also has a small Zenker's diverticulum.  Was not thought to be a surgical diverticulum however.  She states she has had a 30 pound weight loss.  Her daughter who is here with her states that she is not eating or drinking and she is very concerned about her nutritional status.  Patient denies any chest pain or shortness of breath.  She denies any significant discomfort except that she was very lightheaded and dizzy today.  When this occurs they come to the ER and get fluids and she does feel better.  She denies any contact with Covid to her knowledge.  She is already had work-up but would like some IV fluids and make sure nothing else is going on today.    Allergies:  No Known Allergies    Problem List:    Patient Active Problem List    Diagnosis Date Noted     Hammer toe of right foot 04/13/2021     Priority: Medium     Added automatically from request for surgery [id number]       Right foot pain 04/13/2021     Priority: Medium     Added automatically from request for surgery [id number]       Grade II diastolic dysfunction- echo Jan 2020 01/31/2020     Priority: Medium     Moderate tricuspid insufficiency- echo Jan 2020 01/31/2020     Priority: Medium     Presbyesophagus 03/27/2019     Priority: Medium     Postmenopausal atrophic vaginitis 11/08/2018     Priority: Medium     Acute left-sided low back pain without sciatica 03/16/2018     Priority: Medium     Segmental and somatic dysfunction of sacral region 03/16/2018     Priority: Medium     Segmental and somatic dysfunction of pelvic region 03/16/2018     Priority: Medium     Tinea pedis 01/16/2018     Priority: Medium     Osteopenia 10/06/2017     Priority: Medium     Iron deficiency anemia 06/30/2017      Priority: Medium     Liver abscess 06/30/2017     Priority: Medium     Overview:   With biliary stricture and duct dilation.  MRI 6/2017       Low folic acid 05/26/2017     Priority: Medium     CAP (community acquired pneumonia) 06/15/2016     Priority: Medium     Cigarette smoker 06/05/2015     Priority: Medium     Overview:   Age 16 to current; 1/2 to 1 ppd       Raynaud phenomenon 06/05/2015     Priority: Medium     Irritable bowel syndrome 11/19/2014     Priority: Medium     Diarrhea 11/06/2014     Priority: Medium     Chronic kidney disease, stage III (moderate) (H) 07/27/2011     Priority: Medium     Hereditary and idiopathic peripheral neuropathy 03/09/2011     Priority: Medium     Mononeuritis 02/28/2011     Priority: Medium     Acid reflux disease 06/23/2010     Priority: Medium     Essential hypertension 06/23/2010     Priority: Medium     Hypothyroidism 06/23/2010     Priority: Medium     Insomnia 06/23/2010     Priority: Medium     Nontoxic multinodular goiter 03/26/2008     Priority: Medium        Past Medical History:    Past Medical History:   Diagnosis Date     Asymptomatic menopausal state      Calculus of kidney      Chronic kidney disease, stage III (moderate) (H)      Diarrhea      Dysphagia      Essential (primary) hypertension      Nicotine dependence, uncomplicated      Other specified anxiety disorders      Personal history of other medical treatment (CODE)      Polyp of colon      Raynaud's syndrome without gangrene      Thyrotoxicosis with diffuse goiter and without thyroid storm        Past Surgical History:    Past Surgical History:   Procedure Laterality Date     APPENDECTOMY OPEN      1979,,& Meckel's diverticulum removed     COLONOSCOPY      11/17/06,polyps; repeat in 5 YEARS     COLONOSCOPY      11/8/11     COLONOSCOPY      11/6/2014     EGD      Jarratt, normal but possible Zenker; considering esophagram     ESOPHAGOSCOPY, GASTROSCOPY, DUODENOSCOPY (EGD), COMBINED      3/14/07,done  for dysphagia; normal     ESOPHAGOSCOPY, GASTROSCOPY, DUODENOSCOPY (EGD), COMBINED N/A 2018    Procedure: COMBINED ESOPHAGOSCOPY, GASTROSCOPY, DUODENOSCOPY (EGD);  Gastroscopy;  Surgeon: Bisi Rivera MD;  Location: [facility] OR     HYSTERECTOMY TOTAL ABDOMINAL, BILATERAL SALPINGO-OOPHORECTOMY, COMBINED           LAPAROSCOPIC CHOLECYSTECTOMY      ,common bile duct transected     OTHER SURGICAL HISTORY      ,[id number],PREMALIG/BENIGN SKIN LESION EXCISION,epidermal inclusion cyst     OTHER SURGICAL HISTORY      10/26/2015,[id number],FOOT SURGERY,hammertoe repair, [facility] with Dr. Grimaldo     OTHER SURGICAL HISTORY      [id number],IR BILIARY STRICTURE DILATATION WO STENT,x3- per h/p /Choledochojejunostomy with Vane-En-Y due to transection of common bile duct     THYROIDECTOMY      3/26/08,Total thyroidectomy; multinodular goiter       Family History:    Family History   Problem Relation Age of Onset     Other - See Comments Mother          85yo, emphysema, dementia     Heart Disease Father         Heart Disease,MI in 50s,  62yo     Diabetes Father         Diabetes     Substance Abuse Father         Alcohol/Drug,Etoh     Diabetes Daughter         Diabetes     Other - See Comments Daughter         Psychiatric illness,depr/anxiety     Thyroid Disease Sister         Thyroid Disease     Breast Cancer No family hx of         Cancer-breast       Social History:  Marital Status:   [4]  Social History     Tobacco Use     Smoking status: Current Every Day Smoker     Packs/day: 0.50     Years: 30.00     Pack years: 15.00     Types: Cigarettes     Smokeless tobacco: Never Used     Tobacco comment: no ecig   Vaping Use     Vaping Use: Never used   Substance Use Topics     Alcohol use: No     Alcohol/week: 0.0 standard drinks     Drug use: No        Medications:    atorvastatin (LIPITOR) 10 MG tablet  gabapentin (NEURONTIN) 300 MG capsule  levothyroxine (SYNTHROID/LEVOTHROID) 100 MCG tablet  losartan  (COZAAR) 25 MG tablet  pantoprazole (PROTONIX) 20 MG EC tablet  zolpidem (AMBIEN) 5 MG tablet  acetaminophen (TYLENOL) 650 MG CR tablet  ascorbic acid (VITAMIN C) 1000 MG TABS  aspirin (ASA) 81 MG EC tablet  Bioflavonoid Products (VITAMIN C PLUS) 1000 MG TABS  Blood Pressure Monitor MISC  Calcium Carbonate-Vitamin D3 600-400 MG-UNIT TABS  carboxymethylcellulose (CARBOXYMETHYLCELLULOSE SODIUM) 0.5 % SOLN ophthalmic solution  docusate sodium (COLACE) 100 MG capsule  estradiol (ESTRACE) 0.1 MG/GM vaginal cream  folic acid (FOLVITE) 1 MG tablet  loratadine (CLARITIN) 10 MG tablet  methylcellulose (CITRUCEL) 500 MG TABS tablet  Multiple Vitamins-Minerals (OCUVITE ADULT 50+) CAPS  nystatin (MYCOSTATIN) 595293 UNIT/GM external cream  Sodium Fluoride (SF 5000 PLUS) 1.1 % CREA  vitamin (B COMPLEX) capsule          Review of Systems   Constitutional: Positive for activity change, appetite change and fatigue. Negative for chills and fever.   HENT: Negative for congestion.    Respiratory: Negative for cough, chest tightness and shortness of breath.    Cardiovascular: Negative for chest pain.   Gastrointestinal: Negative for abdominal pain.       Physical Exam   BP: 124/60  Pulse: 70  Temp: 99.1  F (37.3  C)  Resp: 18  Weight: 56.7 kg (125 lb)  SpO2: 95 %      Physical Exam  Vitals and nursing note reviewed.   Constitutional:       Appearance: Normal appearance.   HENT:      Head: Normocephalic and atraumatic.   Eyes:      Pupils: Pupils are equal, round, and reactive to light.   Cardiovascular:      Rate and Rhythm: Normal rate and regular rhythm.      Heart sounds: Normal heart sounds.   Pulmonary:      Effort: Pulmonary effort is normal.      Breath sounds: Normal breath sounds.   Abdominal:      General: Abdomen is flat. Bowel sounds are normal. There is no distension.      Palpations: Abdomen is soft.      Tenderness: There is no abdominal tenderness.   Skin:     General: Skin is warm and dry.      Capillary Refill:  Capillary refill takes less than 2 seconds.   Neurological:      General: No focal deficit present.      Mental Status: She is alert.       ED Course   Patient seen and examined. Labs ordered.   D dimer elevated- will get COVID swab and PE study. She has been very lethargic recently and staying in bed.   CT PE CHEST was negative.  CT neck showed esophageal thickening.   Review of chart shows ENT visit with endoscopy. Subsequent esophagram that showed abnormal swallow motility. Discussed our results with patient and her daughter.      Magnesium low- 2g ordered.     ED Course as of 01/09/22 0433   Sun Jan 09, 2022   0417 PE study is negative for emoboli     0417 CT neck suggests that esophageal wall is thickened.    0420 Discussed test results with patient and her daughter. REviewed chart- had endoscopy in Sept and esophagram in November. Does have a small Zencker's diverticulum, but also has aspiration on swallowing with disordered swallo and evidence of spasm in the lower esophagus.  Apparently was given swallowing exercises to do but has not done. Does have option of possible dilation, as well as botulinum injection into that area. Advise followup with her ENT doctor.      Procedures    Results for orders placed or performed during the hospital encounter of 01/08/22 (from the past 24 hour(s))   CBC with platelets differential    Narrative    The following orders were created for panel order CBC with platelets differential.  Procedure                               Abnormality         Status                     ---------                               -----------         ------                     CBC with platelets and d...[[id number]]  Abnormal            Final result                 Please view results for these tests on the individual orders.   CRP inflammation   Result Value Ref Range    CRP Inflammation 9.0 <10.0 mg/L   D dimer quantitative   Result Value Ref Range    D-Dimer Quantitative 0.64 (H) 0.00 - 0.50  ug/mL FEU    Narrative    This D-dimer assay is intended for use in conjunction with a clinical pretest probability assessment model to exclude pulmonary embolism (PE) and deep venous thrombosis (DVT) in outpatients suspected of PE or DVT. The cut-off value is 0.50 ug/mL FEU.   INR   Result Value Ref Range    INR 1.17 (H) 0.85 - 1.15   Comprehensive metabolic panel   Result Value Ref Range    Sodium 139 134 - 144 mmol/L    Potassium 3.7 3.5 - 5.1 mmol/L    Chloride 108 (H) 98 - 107 mmol/L    Carbon Dioxide (CO2) 24 21 - 31 mmol/L    Anion Gap 7 3 - 14 mmol/L    Urea Nitrogen 19 7 - 25 mg/dL    Creatinine 1.36 (H) 0.60 - 1.20 mg/dL    Calcium 8.8 8.6 - 10.3 mg/dL    Glucose 93 70 - 105 mg/dL    Alkaline Phosphatase 56 34 - 104 U/L    AST 13 13 - 39 U/L    ALT 9 7 - 52 U/L    Protein Total 6.3 (L) 6.4 - 8.9 g/dL    Albumin 3.5 3.5 - 5.7 g/dL    Bilirubin Total 0.4 0.3 - 1.0 mg/dL    GFR Estimate 37 (L) >60 mL/min/1.73m2   Magnesium   Result Value Ref Range    Magnesium 1.8 (L) 1.9 - 2.7 mg/dL   CBC with platelets and differential   Result Value Ref Range    WBC Count 10.8 4.0 - 11.0 10e3/uL    RBC Count 3.07 (L) 3.80 - 5.20 10e6/uL    Hemoglobin 9.8 (L) 11.7 - 15.7 g/dL    Hematocrit 30.8 (L) 35.0 - 47.0 %     78 - 100 fL    MCH 31.9 26.5 - 33.0 pg    MCHC 31.8 31.5 - 36.5 g/dL    RDW 13.4 10.0 - 15.0 %    Platelet Count 166 150 - 450 10e3/uL    % Neutrophils 75 %    % Lymphocytes 21 %    % Monocytes 3 %    % Eosinophils 1 %    % Basophils 0 %    % Immature Granulocytes 0 %    NRBCs per 100 WBC 0 <1 /100    Absolute Neutrophils 8.2 1.6 - 8.3 10e3/uL    Absolute Lymphocytes 2.3 0.8 - 5.3 10e3/uL    Absolute Monocytes 0.3 0.0 - 1.3 10e3/uL    Absolute Eosinophils 0.1 0.0 - 0.7 10e3/uL    Absolute Basophils 0.0 0.0 - 0.2 10e3/uL    Absolute Immature Granulocytes 0.0 <=0.4 10e3/uL    Absolute NRBCs 0.0 10e3/uL   Symptomatic; Unknown Influenza A/B & SARS-CoV2 (COVID-19) Virus PCR Multiplex Nose    Specimen: Nose;  Swab   Result Value Ref Range    Influenza A PCR Negative Negative    Influenza B PCR Negative Negative    RSV PCR Negative Negative    SARS CoV2 PCR Negative Negative    Narrative    Testing was performed using the Xpert Xpress CoV2/Flu/RSV Assay on the [employer] GeneXpert Instrument. This test should be ordered for the detection of SARS-CoV-2 and influenza viruses in individuals who meet clinical and/or epidemiological criteria. Test performance is unknown in asymptomatic patients. This test is for in vitro diagnostic use under the FDA EUA for laboratories certified under CLIA to perform high or moderate complexity testing. This test has not been FDA cleared or approved. A negative result does not rule out the presence of PCR inhibitors in the specimen or target RNA in concentration below the limit of detection for the assay. If only one viral target is positive but coinfection with multiple targets is suspected, the sample should be re-tested with another FDA cleared, approved, or authorized test, if coinfection would change clinical management. This test was validated by the [facility]. These laboratories are certified under the Clinical  Laboratory Improvement Amendments of 1988 (CLIA-88) as qualified to perform high complexity laboratory testing.       Medications   0.9% sodium chloride BOLUS (0 mLs Intravenous Stopped 1/9/22 0410)   famotidine (PEPCID) injection 20 mg (20 mg Intravenous Given 1/9/22 0118)   iopamidol (ISOVUE-370) solution 100 mL (65 mLs Intravenous Given 1/9/22 0145)   magnesium sulfate 2 g in water intermittent infusion (2 g Intravenous New Bag 1/9/22 0245)       Assessments & Plan (with Medical Decision Making)     I have reviewed the nursing notes.    I have reviewed the findings, diagnosis, plan and need for follow up with the patient.      New Prescriptions    No medications on file       Final diagnoses:   Swallowing dysfunction   Weight loss, non-intentional    Hypomagnesemia   Anemia, unspecified type       1/8/2022   [facility] MD  01/09/22 0612

## 2022-01-10 NOTE — TELEPHONE ENCOUNTER
Called and spoke with the patient will put her in the schedule at 11 am on 1/18/22.  Jess Simons LPN on 1/10/2022 at 11:51 AM

## 2022-01-10 NOTE — TELEPHONE ENCOUNTER
Having some more swallowing issues, any change to squeeze her in this Wednesday afternoon, please call, thank you!      Perla Villasenor on 1/10/2022 at 8:55 AM

## 2022-01-10 NOTE — TELEPHONE ENCOUNTER
Unfortunately, I have absolutely nothing available, not even a working spot.  If something opens up we will give her a call.  There are some spots available for next week

## 2022-01-10 NOTE — TELEPHONE ENCOUNTER
Gabapentin (NEURONTIN) 300 MG capsule  Last Written Prescription Date: 11/09/2020  Last Fill Quantity: 270,   # refills: 3  Last Office Visit: 08/31/2021 virtual visit  Future Office visit:       Routing refill request to provider for review/approval because:  Drug not on the [facility], [facility] or [facility] refill protocol or controlled substance    Unable to complete prescription refill per RNMedication Refill Policy.................... Misty Quick RN ....................  1/10/2022   9:39 AM

## 2022-01-18 NOTE — NURSING NOTE
"Chief Complaint   Patient presents with     Problems Swallowing       FOOD SECURITY SCREENING QUESTIONS  Hunger Vital Signs:  Within the past 12 months we worried whether our food would run out before we got money to buy more. Never  Within the past 12 months the food we bought just didn't last and we didn't have money to get more. Never  Jess Simons LPN 1/18/2022 11:10 AM      Initial /70 (BP Location: Right arm, Patient Position: Sitting, Cuff Size: Adult Regular)   Pulse 62   Temp 96.9  F (36.1  C) (Tympanic)   Resp 15   Wt 55.7 kg (122 lb 12.8 oz)   SpO2 99%   BMI 20.46 kg/m   Estimated body mass index is 20.46 kg/m  as calculated from the following:    Height as of 11/9/20: 1.65 m (5' 4.96\").    Weight as of this encounter: 55.7 kg (122 lb 12.8 oz).  Medication Reconciliation: complete    Jess Simons LPN  "

## 2022-01-18 NOTE — PROGRESS NOTES
ASSESSMENT:    ICD-10-CM    1. Falls frequently  R29.6 Home Care Nursing Referral     Comprehensive metabolic panel     Comprehensive metabolic panel   2. Weakness of both legs  R29.898 Home Care Nursing Referral     Comprehensive metabolic panel     Comprehensive metabolic panel   3. Weight loss  R63.4 Home Care Nursing Referral     Adult General Surg Referral     CBC with platelets     Comprehensive metabolic panel     CBC with platelets     Comprehensive metabolic panel   4. Presbyesophagus  K22.89 Adult General Surg Referral   5. Zenker diverticulum  K22.5 Adult General Surg Referral   6. Dysphagia, unspecified type  R13.10 Home Care Nursing Referral     Adult General Surg Referral   7. Hypothyroidism, unspecified type  E03.9 T4 free     TSH     T4 free     TSH       PLAN:  1.  Patient was referred to ENT by emergency room physician.  Keep that appointment today.  I would recommend consultation with general surgeon in regards to Zenker diverticulum, presbyesophagus and weight loss.  Feeding tube should be considered especially as this problem has not improved and she is having progressive weight loss due to the swallowing difficulty.  She has requested to see Dr. Chico Mcwilliams.  Referral is placed.  I have asked that she work on her swallowing exercises that had been recommended to her in the past by speech therapist.  2.  Home care skilled nursing, PT,  and speech therapy ordered.  Hopefully speech therapy will not only be able to assist her with her swallowing exercises but also make recommendations as far as foods appropriate and safe to consume orally.  Patient has weakness, weight loss and has difficulty traveling to the winter due to risk for falls and needing to use walker.  3.  Check TSH and free T4.    SUBJECTIVE:    She is here today for follow-up on ED visit from January 8, 2022.  Due to presbyesophagus and finger diverticulum she has had dysphagia leading to weight loss.  She has not  been eating much for a few days and was feeling pretty weak.  She states she did not fall but her daughter states that she did fall.  She was brought to the emergency department.  Work-up found that albumin and protein levels were on the low side of normal and renal function was slightly reduced and compared to baseline and hemoglobin was down to 9.5 g/dL.  She has not had any evidence of GI or  blood loss.  She has been evaluated by gastroenterology at [facility] this past year who did esophagram and EGD but did not recommend any further management.  She has been lax with doing her speech therapy exercises to help with the presbyesophagus.  She has been either drinking Premier protein or Ensure once daily.  She is able to eat soft foods and drink liquids but it is very difficult.  She spits up a lot.  She does have a walker at home but her daughter reports that she has a lot of furniture in the home and it is hard to get around the home but that she does not always use the walker.  Her weight has been stable since ED visit but she has had significant weight loss over the past year that was reviewed in other recent office visits by this provider.  This past spring TSH was low and Synthroid dose was adjusted.  She is due to have that checked again.  She is finding that her legs are weaker and she is having more trouble walking.  She tries not to travel out during the winter because it is harder for her to drive and she is concerned that she will fall.  In regards to her poor nutrition she would possibly be interested in a feeding tube to supplement.    PROBLEM LIST:  Patient Active Problem List   Diagnosis     Acid reflux disease     CAP (community acquired pneumonia)     Chronic kidney disease, stage III (moderate) (H)     Cigarette smoker     Diarrhea     Essential hypertension     Hypothyroidism     Insomnia     Iron deficiency anemia     Irritable bowel syndrome     Liver abscess     Low folic acid      Mononeuritis     Hereditary and idiopathic peripheral neuropathy     Nontoxic multinodular goiter     Osteopenia     Raynaud phenomenon     Tinea pedis     Acute left-sided low back pain without sciatica     Segmental and somatic dysfunction of sacral region     Segmental and somatic dysfunction of pelvic region     Postmenopausal atrophic vaginitis     Presbyesophagus     Grade II diastolic dysfunction- echo Jan 2020     Moderate tricuspid insufficiency- echo Jan 2020     Hammer toe of right foot     Right foot pain     PAST MEDICAL HISTORY:  Past Medical History:   Diagnosis Date     Asymptomatic menopausal state     DXA 10/27/06 normal     Calculus of kidney     1990,s/p lithotripsy     Chronic kidney disease, stage III (moderate) (H)     7/27/2011     Diarrhea     11/6/2014     Dysphagia     2007,EGD 3/14/07 nl; sx from goiter.     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     2004,on Tapazole     SURGICAL HISTORY:  Past Surgical History:   Procedure Laterality Date     APPENDECTOMY OPEN      1979,,& Meckel's diverticulum removed     COLONOSCOPY      11/17/06,polyps; repeat in 5 YEARS     COLONOSCOPY      11/8/11     COLONOSCOPY      11/6/2014     EGD      [place], normal but possible Zenker; considering esophagram     ESOPHAGOSCOPY, GASTROSCOPY, DUODENOSCOPY (EGD), COMBINED      3/14/07,done for dysphagia; normal     ESOPHAGOSCOPY, GASTROSCOPY, DUODENOSCOPY (EGD), COMBINED N/A 06/07/2018    Procedure: COMBINED ESOPHAGOSCOPY, GASTROSCOPY, DUODENOSCOPY (EGD);  Gastroscopy;  Surgeon: Bisi Rivera MD;  Location:  [facility] OR     HYSTERECTOMY TOTAL ABDOMINAL, BILATERAL SALPINGO-OOPHORECTOMY, COMBINED           LAPAROSCOPIC CHOLECYSTECTOMY      ,common bile duct transected     OTHER SURGICAL HISTORY      ,[id number],PREMALIG/BENIGN SKIN LESION EXCISION,epidermal inclusion cyst     OTHER SURGICAL HISTORY      10/26/2015,[id number],FOOT SURGERY,hammertoe repair, [facility] with Dr. Grimaldo     OTHER SURGICAL HISTORY      [id number],IR BILIARY STRICTURE DILATATION WO STENT,x3- per h/p /Choledochojejunostomy with Vane-En-Y due to transection of common bile duct     THYROIDECTOMY      3/26/08,Total thyroidectomy; multinodular goiter       SOCIAL HISTORY:  Social History     Socioeconomic History     Marital status:      Spouse name: Not on file     Number of children: Not on file     Years of education: Not on file     Highest education level: Not on file   Occupational History     Not on file   Tobacco Use     Smoking status: Current Every Day Smoker     Packs/day: 0.50     Years: 30.00     Pack years: 15.00     Types: Cigarettes     Smokeless tobacco: Never Used     Tobacco comment: no ecig   Vaping Use     Vaping Use: Never used   Substance and Sexual Activity     Alcohol use: No     Alcohol/week: 0.0 standard drinks     Drug use: No     Sexual activity: Not Currently     Partners: Male   Other Topics Concern     Not on file   Social History Narrative    ; 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; 4 children; Retired RN     Social Determinants of Health     Financial Resource Strain: Not on file   Food Insecurity: Not on file   Transportation Needs: Not on file   Physical Activity: Not on file   Stress: Not on file   Social Connections: Not on file   Intimate Partner Violence: Not on file   Housing Stability: Not on file     FAMILYHISTORY:  Family History   Problem Relation Age of Onset     Other - See Comments Mother          87yo, emphysema, dementia     Heart Disease Father         Heart Disease,MI in 50s,  62yo      Diabetes Father         Diabetes     Substance Abuse Father         Alcohol/Drug,Etoh     Diabetes Daughter         Diabetes     Other - See Comments Daughter         Psychiatric illness,depr/anxiety     Thyroid Disease Sister         Thyroid Disease     Breast Cancer No family hx of         Cancer-breast     CURRENT MEDICATIONS:   Current Outpatient Medications   Medication Sig Dispense Refill     acetaminophen (TYLENOL) 650 MG CR tablet Take 1,300 mg by mouth       ascorbic acid (VITAMIN C) 1000 MG TABS 1 tablet oral daily       aspirin (ASA) 81 MG EC tablet Take 1 tablet (81 mg) by mouth daily       atorvastatin (LIPITOR) 10 MG tablet Take 1 tablet (10 mg) by mouth daily 90 tablet 3     Bioflavonoid Products (VITAMIN C PLUS) 1000 MG TABS        Blood Pressure Monitor MISC As directed. OMRON       Calcium Carbonate-Vitamin D3 600-400 MG-UNIT TABS        carboxymethylcellulose (CARBOXYMETHYLCELLULOSE SODIUM) 0.5 % SOLN ophthalmic solution 1 drop both eyes once daily 1 Bottle      docusate sodium (COLACE) 100 MG capsule Take 1 capsule by mouth for constipation. Can take 2 in 24 hours.       estradiol (ESTRACE) 0.1 MG/GM vaginal cream INSERT 2GM INTO THE VAGINA EVERY MONDAY AND FRIDAY 42.5 g 5     folic acid (FOLVITE) 1 MG tablet Take 1 tablet (1 mg) by mouth daily 30 tablet      gabapentin (NEURONTIN) 300 MG capsule TAKE 1 CAPSULE BY MOUTH THREE TIMES DAILY 270 capsule 2     levothyroxine (SYNTHROID/LEVOTHROID) 100 MCG tablet Take 1 tablet (100 mcg) by mouth daily 90 tablet 3     loratadine (CLARITIN) 10 MG tablet Take 1 tablet (10 mg) by mouth daily 30 tablet      losartan (COZAAR) 25 MG tablet TAKE 2 TABLETS BY MOUTH ONCE DAILY 180 tablet 2     methylcellulose (CITRUCEL) 500 MG TABS tablet Take 1 tablet (500 mg) by mouth daily 14 each 0     Multiple Vitamins-Minerals (OCUVITE ADULT 50+) CAPS 1 tablet by mouth once daily.       nystatin (MYCOSTATIN) 734205 UNIT/GM external cream Apply topically 2 times daily 30 g 1      pantoprazole (PROTONIX) 20 MG EC tablet TAKE 1 TABLET (20 MG) BY MOUTH DAILY (Patient taking differently: Take 20 mg by mouth 2 times daily ) 90 tablet 2     Sodium Fluoride (SF 5000 PLUS) 1.1 % CREA        vitamin (B COMPLEX) capsule Take 1 capsule by mouth       zolpidem (AMBIEN) 5 MG tablet Take 1 tablet (5 mg) by mouth At Bedtime 90 tablet 1     ALLERGIES:  Patient has no known allergies.    REVIEW OF SYSTEMS:  Review of Systems   Constitutional: Positive for unexpected weight change.   HENT: Positive for trouble swallowing.    Respiratory: Negative for cough and shortness of breath.    Gastrointestinal: Negative for anal bleeding and blood in stool.   Musculoskeletal: Positive for gait problem.   Neurological: Positive for weakness.   Psychiatric/Behavioral: Negative for confusion.         OBJECTIVE:  /70 (BP Location: Right arm, Patient Position: Sitting, Cuff Size: Adult Regular)   Pulse 62   Temp 96.9  F (36.1  C) (Tympanic)   Resp 15   Wt 55.7 kg (122 lb 12.8 oz)   SpO2 99%   BMI 20.46 kg/m    EXAM:   Pleasant thin female accompanied by her daughter.  Alert and oriented x4.  Skin color pink.  Lung fields clear to auscultation.  Cardiovascular regular.  She is able to stand from a seated position without using her arms on the arm rest however she does push down on her lap to help with standing.  She walks with her knee slightly bent and small steps.    Gastroenterology and emergency department notes, laboratory and diagnostic studies are viewed and discussed.  Mirella Aly NP      Answers for HPI/ROS submitted by the patient on 1/11/2022  How many servings of fruits and vegetables do you eat daily?: 0-1  On average, how many sweetened beverages do you drink each day (Examples: soda, juice, sweet tea, etc.  Do NOT count diet or artificially sweetened beverages)?: 1  How many minutes a day do you exercise enough to make your heart beat faster?: 9 or less  How many days a week do you  exercise enough to make your heart beat faster?: 3 or less  How many days per week do you miss taking your medication?: 0

## 2022-01-18 NOTE — PROGRESS NOTES
document embedded image  Patient Name: Sylvie Bautista    Address: [address] [place] [address]     YOB: 1934    [place] [address]    MR Number: [id number]    Phone: [phone]  PCP: Mirella Aly NP            Appointment Date: 01/18/22   Visit Provider: Jack Beaulieu MD    cc: Mirella Aly NP; ~    ENT Progress Note  Intake  Visit Reasons: Re Check Throat    HPI  History of Present Illness  Chief complaint:  Dysphagia    History  The patient is an 88-year-old female whose had significant weight loss as well as dehydration resulting in ER visits because of some dysphagia.  It if sounds as though she has bows to pharyngeal esophageal components to her swallowing issues.  She had barium swallow that revealed some tertiary activity in the esophagus as well as cricopharyngeal spasm in a small diverticula.  She was placed on a prolonged acid suppression trial which is not improved her symptoms to any significant degree.  She is now entertaining a visit with 1 of the general surgeons to have a G-tube placed.    Exam  Unchanged    Allergies    No Known Allergies Allergy (Unverified 01/19/22 13:59)    PFSH  PFSH:     Medical History (Updated 01/19/22 @ 13:59 by Yana Latham, Med Assist)    HTN (hypertension)  Thyroid disorder     Surgical History (Updated 01/19/22 @ 13:59 by Yana Latham, Med Assist)    H/O thyroidectomy  H/O: hysterectomy  Hx of cholecystectomy     Social History (Updated 01/19/22 @ 14:00 by Yana Latham Med Assist)  Smoking Status:  Current every day smoker  second hand exposure:  No       A&P  Assessment & Plan  (1) Zenkers diverticulum:        Status: Acute        Code(s):  K22.5 - Diverticulum of esophagus, acquired  (2) Cricopharyngeal spasm:        Status: Acute        Code(s):  J39.2 - Other diseases of pharynx    Plan  She has a planned visit with 1 of the general surgeons to discuss a G-tube.  I think that is reasonable to proceed with a G-tube at  this time to a lower immediate access to halt her nutritional decline and allow her to more easily hydrate.  Should she decide to proceed with G-tube placement, I would ask the general surgeon to do a balloon dilation of her cricopharyngeus muscle as well to see if this helps or least temporarily with swallowing.  There is no reason she can not undergo repeated dilations of spasmed muscle.  This has been documented to improve the situation.  Should she be happy just by passing the problem with the G-tube in supporting primarily with her G-tube and eating and drinking for pleasure no further workup or intervention would be necessary.      Jack Beaulieu MD    01/18/22 [id number]    <Electronically signed by Jack Beaulieu MD> 01/19/22 [id number]

## 2022-01-18 NOTE — PROGRESS NOTES
Clinic Care Coordination Contact  Care Team Conversations    Call received from [facility] for referral regarding concerns for safety in home alone.  ED visit on 1-8-21 for weight loss, hypomagnesia and swallowing dysfunction.  Per daughter, patient has had multiple falls in her home recently. Having increase of weakness and weight loss due to difficulty with swallowing. Patient would not be agreeable to assisted living or moving.    Plan:  Informed PCP about situation prior to the office visit today. Patient and daughter agreed to a plan including a surgical consult for potential feeding tube, home health care, and PT/speech therapies.  Care coordination card was given. Encouraged to reach out if they need any assistance.  Vivi Onofre RN on 1/18/2022 at 2:37 PM

## 2022-01-25 NOTE — TELEPHONE ENCOUNTER
"URGENT: per CMS best practice, response is requested within 24 hours.    Home Care regulation mandates that you are notified about drug discrepancies, interactions & contraindications. Response within a 24 hour timeframe is established by CMS as \"best practice\" for the delivery of home health care. Home Care is required to report if the 24 hour timeframe was met. The home health clinician will contact you again if this timeframe is not met or if the response does not address all concerns.       Situation:        The patient was admitted to [facility], on 1/25/22. Upon admission, a med reconciliation was completed to identify any drug discrepancies, interactions or contraindications. Home Care's drug regime review has revealed significant medication issues.     You are being contacted for clarifying orders related to the medication issues.     Background: Noted medication interactions and discrepancies with epic med review on admission       Assessment:       Interactions  Moderate Level  1. Caltrate/Levothyroxine  2. Caltrate/ Protonix  3. Levothyroxine/ Multivitamin  4. Multivitamin/ Protonix    Discrepancies  1. Discontinue Vitamin B complex- client does not take  2. Discontinue Bioflavonoid Products - client does not take  3. Calcium supplement is Caltrate/Vitamin D 600/800 mg/unit  1 tab daily   4. Please update Colace 100 mg tab- client takes daily at bedtime,   5. Please update Estrace -  0.1 mg/GM- insert 2 gram into the vagina every Monday, Friday as needed, not as a scheduled dose.  6. Folic Acid- 400 mcg 1 po daily (not 1 mg )  7. Discontinue Refresh Plus- client does not use.  8. Discontinue Citrucel- client does not use  9. Discontinue Nystatin- client does not use  10. Sodium Fluoride (prevident) 1.1% client uses daily   11. Protonix- please update - client takes 20 mg twice daily       Recommendations:    Please evaluate this information and indicate below whether or not changes are " required. A copy of the patient's drug interaction/contraindications report is available upon request.       CLINIC NURSE - If changes are made, please update the Epic medication profile.     Please sign this encounter with your electronic signature.     Thanks for your time,   Nancy Dwyer RN

## 2022-01-25 NOTE — TELEPHONE ENCOUNTER
Request for Home Care Skilled Nursing orders as follows:    SN eval and treat date: 1/25/22    Continuation frequency =  ___1____ X week X ____2___ weeks and PRN             Effective date= 1/25/22      Interventions include:    Assessment  Patient/caregiver education      Fall risk: instruct on fall prevention strategies, home safety, assess environment Observe for signs and symptoms of depression, assess effectiveness of medication, if at risk  Instruct on pain relief measures and assess pain with each visit, if has pain. Assess effectiveness of medications.  Instruct on pressure relief methods to prevent pressure ulcers      Please respond with .HOMECAREAGREE, if you are in agreement. Please sign with your comments and signature, if you are not in agreement.      Thank you for your time,  Nancy Dwyer RN

## 2022-01-25 NOTE — TELEPHONE ENCOUNTER
Request for Home Care Physical Therapy orders as follows:    PT eval and treat date:  1/25/22    Continuation frequency =  2 x week x 4 weeks                Effective date = 1/25/22    Interventions include:    Therapeutic Exercise  Therapeutic Activity  Gait Training  Transfer Training  Gait/Balance/Dysfunction  Home Exercise Program  Eval & treat    Therapist: Romario GOMEZ

## 2022-01-26 NOTE — TELEPHONE ENCOUNTER
Home Care- I am fine with these changes.  Clinic nursing- please update Epic MAR as listed in home care note.  Thank you

## 2022-01-26 NOTE — PROGRESS NOTES
GENERAL SURGERY CONSULTATION NOTE    Sylvie ORIANA Lily   [address] [facility] [address]  88 year old  female  Admission Date/Time: No admission date for patient encounter.  Primary Care Provider:  Mirella Aly was asked to see this patient by Dr. Beaulieu for evaluation of feeding tube.     HPI: Mireya is a 88-year-old female with history of dysphagia, aspiration, weight loss all related to difficulty swallowing.  She has no history of CVA.  She has had esophagram/swallow studies that show a small Zenker's diverticulum as well as a prominent/spastic cricopharyngeus muscle.  On her swallow studies she will have a couple of intact swallows and then will have penetration.  She feels like something is stuck in her pharynx and then she will be able to cough and clear this and then swallow again successfully.  ENT locally has not entertained the idea of surgery for her Zenker's.  This Zenker's is small at this point.  She does not have any halitosis or regurgitation.    Of note and her surgical history and her lap crow in the early 90s was complicated by common duct injury.  She subsequently had what sounds like a choledochoduodenostomy.  Subsequently this began stricturing.  Subsequent to this was revised to choledochojejunostomy with a Vane-en-Y of some fashion.  Since this second revision she has not had problems.    REVIEW OF SYSTEMS:    GENERAL: No fevers or chills. ++ fatigue, ++ recent weight loss.  HEENT: No sinus drainage. No changes with vision or hearing. No difficulty swallowing.   LYMPHATICS:  Noswollen nodes in axilla, neck or groin.  CARDIOVASCULAR: Denies chest pain, palpitations and dyspnea on exertion.  PULMONARY: No shortness of breath or cough. No increase in sputum production.  GI: Denies melena,bright red blood in stools. No hematemesis. No constipation or diarrhea.  : No dysuria or hematuria.  SKIN: No recent rashes or ulcers.   HEMATOLOGY:  No history of easy  bruising or bleeding.  ENDOCRINE:  No history of diabetes or thyroid problems.  NEUROLOGY:  No history of seizures or headaches. No motor or sensory changes.        Patient Active Problem List   Diagnosis     Acid reflux disease     CAP (community acquired pneumonia)     Chronic kidney disease, stage III (moderate) (H)     Cigarette smoker     Diarrhea     Essential hypertension     Hypothyroidism     Insomnia     Iron deficiency anemia     Irritable bowel syndrome     Liver abscess     Low folic acid     Mononeuritis     Hereditary and idiopathic peripheral neuropathy     Nontoxic multinodular goiter     Osteopenia     Raynaud phenomenon     Tinea pedis     Acute left-sided low back pain without sciatica     Segmental and somatic dysfunction of sacral region     Segmental and somatic dysfunction of pelvic region     Postmenopausal atrophic vaginitis     Presbyesophagus     Grade II diastolic dysfunction- echo Jan 2020     Moderate tricuspid insufficiency- echo Jan 2020     Hammer toe of right foot     Right foot pain       Past Medical History:   Diagnosis Date     Asymptomatic menopausal state     DXA 10/27/06 normal     Calculus of kidney     1990,s/p lithotripsy     Chronic kidney disease, stage III (moderate) (H)     7/27/2011     Diarrhea     11/6/2014     Dysphagia     2007,EGD 3/14/07 nl; sx from goiter.     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     2004,on Tapazole       Past Surgical History:   Procedure Laterality Date     APPENDECTOMY OPEN      1979,,& Meckel's diverticulum removed     COLONOSCOPY       06,polyps; repeat in 5 YEARS     COLONOSCOPY      11     COLONOSCOPY      2014     EGD      [place], normal but possible Zenker; considering esophagram     ESOPHAGOSCOPY, GASTROSCOPY, DUODENOSCOPY (EGD), COMBINED      3/14/07,done for dysphagia; normal     ESOPHAGOSCOPY, GASTROSCOPY, DUODENOSCOPY (EGD), COMBINED N/A 2018    Procedure: COMBINED ESOPHAGOSCOPY, GASTROSCOPY, DUODENOSCOPY (EGD);  Gastroscopy;  Surgeon: Bisi Rivera MD;  Location: [facility]     HYSTERECTOMY TOTAL ABDOMINAL, BILATERAL SALPINGO-OOPHORECTOMY, COMBINED           LAPAROSCOPIC CHOLECYSTECTOMY      ,common bile duct transected     OTHER SURGICAL HISTORY      ,[id number],PREMALIG/BENIGN SKIN LESION EXCISION,epidermal inclusion cyst     OTHER SURGICAL HISTORY      10/26/2015,[id number],FOOT SURGERY,hammertoe repair, [facility] with Dr. Grimaldo     OTHER SURGICAL HISTORY      [id number],IR BILIARY STRICTURE DILATATION WO STENT,x3- per h/p /Choledochojejunostomy with Vane-En-Y due to transection of common bile duct     THYROIDECTOMY      3/26/08,Total thyroidectomy; multinodular goiter       Family History   Problem Relation Age of Onset     Other - See Comments Mother          87yo, emphysema, dementia     Heart Disease Father         Heart Disease,MI in 50s,  64yo     Diabetes Father         Diabetes     Substance Abuse Father         Alcohol/Drug,Etoh     Diabetes Daughter         Diabetes     Other - See Comments Daughter         Psychiatric illness,depr/anxiety     Thyroid Disease Sister         Thyroid Disease     Breast Cancer No family hx of         Cancer-breast       Social History     Social History Narrative    ; 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; 4 children; Retired RN       Social History     Socioeconomic History     Marital status:      Spouse name: Not on file     Number of children: Not on file     Years of education: Not on file     Highest education level: Not on file   Occupational History      Not on file   Tobacco Use     Smoking status: Current Every Day Smoker     Packs/day: 0.50     Years: 30.00     Pack years: 15.00     Types: Cigarettes     Smokeless tobacco: Never Used     Tobacco comment: no ecig   Vaping Use     Vaping Use: Never used   Substance and Sexual Activity     Alcohol use: No     Alcohol/week: 0.0 standard drinks     Drug use: No     Sexual activity: Not Currently     Partners: Male   Other Topics Concern     Not on file   Social History Narrative    ; 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; 4 children; Retired RN     Social Determinants of Health     Financial Resource Strain: Not on file   Food Insecurity: Not on file   Transportation Needs: Not on file   Physical Activity: Not on file   Stress: Not on file   Social Connections: Not on file   Intimate Partner Violence: Not on file   Housing Stability: Not on file       acetaminophen (TYLENOL) 650 MG CR tablet, Take 1,300 mg by mouth  ascorbic acid (VITAMIN C) 1000 MG TABS, 1 tablet oral daily  aspirin (ASA) 81 MG EC tablet, Take 1 tablet (81 mg) by mouth daily  atorvastatin (LIPITOR) 10 MG tablet, Take 1 tablet (10 mg) by mouth daily  Blood Pressure Monitor MISC, As directed. OMRON  calcium carbonate-vitamin D 600-200 MG-UNIT TABS, Take 1 tablet by mouth daily  carboxymethylcellulose (CARBOXYMETHYLCELLULOSE SODIUM) 0.5 % SOLN ophthalmic solution, 1 drop both eyes once daily  docusate sodium (COLACE) 100 MG capsule, Take 1 capsule by mouth daily Take daily at bedtime  estradiol (ESTRACE) 0.1 MG/GM vaginal cream, INSERT 2GM INTO THE VAGINA EVERY MONDAY AND FRIDAY  folic acid (FOLVITE) 1 MG tablet, Take 1 mcg by mouth daily  gabapentin (NEURONTIN) 300 MG capsule, TAKE 1 CAPSULE BY MOUTH THREE TIMES DAILY  levothyroxine (SYNTHROID/LEVOTHROID) 100 MCG tablet, Take 1 tablet (100 mcg) by mouth daily  loratadine (CLARITIN) 10 MG tablet, Take 1 tablet (10 mg) by mouth daily  losartan (COZAAR) 25 MG tablet, TAKE 2 TABLETS BY MOUTH ONCE DAILY  Multiple  "Vitamins-Minerals (OCUVITE ADULT 50+) CAPS, 1 tablet by mouth once daily.  pantoprazole (PROTONIX) 20 MG EC tablet, TAKE 1 TABLET (20 MG) BY MOUTH DAILY (Patient taking differently: Take 20 mg by mouth 2 times daily )  Sodium Fluoride (SF 5000 PLUS) 1.1 % CREA, daily  zolpidem (AMBIEN) 5 MG tablet, Take 1 tablet (5 mg) by mouth At Bedtime    No current facility-administered medications on file prior to visit.        ALLERGIES/SENSITIVITIES: No Known Allergies    PHYSICAL EXAM:     /68 (BP Location: Right arm, Patient Position: Sitting, Cuff Size: Adult Large)   Pulse 62   Temp 97.8  F (36.6  C) (Tympanic)   Resp 16   Ht 1.651 m (5' 5\")   Wt 55.5 kg (122 lb 6.4 oz)   Breastfeeding No   BMI 20.37 kg/m      General Appearance:  Cahectic  Heart & CV:  RRR no murmur.  Intact distal pulses, good cap refill.  LUNGS:  CTA B/L, no wheezing or crackles.  Abd:  Soft, flat, scaphoid  Ext: No deformity.       ADDITIONAL COMMENTS:      CONSULTATION ASSESSMENT AND PLAN:    88 year old female with dysphagia and weight loss.  Would entertain another ENT opinion from somebody at [place] regarding whether her dysphagia could be surgically corrected from a ENT standpoint.  We can use her feeding tube as a stopgap measure at this point.  Will attempt 20 mm hydraulic dilation to the cricopharyngeus area during EGD.    - Will plan for PEG tube for hydration and nutrition  - She will need tube feeds the remainder of her life.    Nutrition consult  Home care for tube feed teachings  ENT referral if considering Zenker's surgery    50 minutes spent in evaluation and discussion of her various problems and potential treatments  Chico Mcwilliams MD on 1/26/2022 at 10:33 AM      "

## 2022-01-26 NOTE — NURSING NOTE
"Chief Complaint   Patient presents with     Consult     painful swallowing and unable to eat.       Initial /68 (BP Location: Right arm, Patient Position: Sitting, Cuff Size: Adult Large)   Pulse 62   Temp 97.8  F (36.6  C) (Tympanic)   Resp 16   Ht 1.651 m (5' 5\")   Wt 55.5 kg (122 lb 6.4 oz)   Breastfeeding No   BMI 20.37 kg/m   Estimated body mass index is 20.37 kg/m  as calculated from the following:    Height as of this encounter: 1.651 m (5' 5\").    Weight as of this encounter: 55.5 kg (122 lb 6.4 oz).  Medication Reconciliation: complete    Qi Trevino LPN  "

## 2022-01-26 NOTE — TELEPHONE ENCOUNTER
The order request from yesterday was not signed before it was closed so we had to send a new one.       Please respond with .[email or web address], if you are in agreement. Please sign with your comments and signature, if you are not in agreement.      PT eval and treat date:  1/25/22     Continuation frequency =  2 x week x 4 weeks                Effective date = 1/25/22     Interventions include:    Therapeutic Exercise  Therapeutic Activity  Gait Training  Transfer Training  Gait/Balance/Dysfunction  Home Exercise Program  Eval & treat     Therapist: Romario GOMEZ      Request for Home Care Skilled Nursing orders as follows:     SN eval and treat date: 1/25/22     Continuation frequency =  ___1____ X week X ____2___ weeks and PRN             Effective date= 1/25/22        Interventions include:    Assessment  Patient/caregiver education        Fall risk: instruct on fall prevention strategies, home safety, assess environment Observe for signs and symptoms of depression, assess effectiveness of medication, if at risk  Instruct on pain relief measures and assess pain with each visit, if has pain. Assess effectiveness of medications.  Instruct on pressure relief methods to prevent pressure ulcers     Thank you for your time,  Nancy Dwyer RN

## 2022-01-26 NOTE — H&P (VIEW-ONLY)
GENERAL SURGERY CONSULTATION NOTE    Sylvie ORIANA Lily   [address] [facility] [address]  88 year old  female  Admission Date/Time: No admission date for patient encounter.  Primary Care Provider:  Mirella Aly was asked to see this patient by Dr. Beaulieu for evaluation of feeding tube.     HPI: Mireya is a 88-year-old female with history of dysphagia, aspiration, weight loss all related to difficulty swallowing.  She has no history of CVA.  She has had esophagram/swallow studies that show a small Zenker's diverticulum as well as a prominent/spastic cricopharyngeus muscle.  On her swallow studies she will have a couple of intact swallows and then will have penetration.  She feels like something is stuck in her pharynx and then she will be able to cough and clear this and then swallow again successfully.  ENT locally has not entertained the idea of surgery for her Zenker's.  This Zenker's is small at this point.  She does not have any halitosis or regurgitation.    Of note and her surgical history and her lap crow in the early 90s was complicated by common duct injury.  She subsequently had what sounds like a choledochoduodenostomy.  Subsequently this began stricturing.  Subsequent to this was revised to choledochojejunostomy with a Vane-en-Y of some fashion.  Since this second revision she has not had problems.    REVIEW OF SYSTEMS:    GENERAL: No fevers or chills. ++ fatigue, ++ recent weight loss.  HEENT: No sinus drainage. No changes with vision or hearing. No difficulty swallowing.   LYMPHATICS:  Noswollen nodes in axilla, neck or groin.  CARDIOVASCULAR: Denies chest pain, palpitations and dyspnea on exertion.  PULMONARY: No shortness of breath or cough. No increase in sputum production.  GI: Denies melena,bright red blood in stools. No hematemesis. No constipation or diarrhea.  : No dysuria or hematuria.  SKIN: No recent rashes or ulcers.   HEMATOLOGY:  No history of easy  bruising or bleeding.  ENDOCRINE:  No history of diabetes or thyroid problems.  NEUROLOGY:  No history of seizures or headaches. No motor or sensory changes.        Patient Active Problem List   Diagnosis     Acid reflux disease     CAP (community acquired pneumonia)     Chronic kidney disease, stage III (moderate) (H)     Cigarette smoker     Diarrhea     Essential hypertension     Hypothyroidism     Insomnia     Iron deficiency anemia     Irritable bowel syndrome     Liver abscess     Low folic acid     Mononeuritis     Hereditary and idiopathic peripheral neuropathy     Nontoxic multinodular goiter     Osteopenia     Raynaud phenomenon     Tinea pedis     Acute left-sided low back pain without sciatica     Segmental and somatic dysfunction of sacral region     Segmental and somatic dysfunction of pelvic region     Postmenopausal atrophic vaginitis     Presbyesophagus     Grade II diastolic dysfunction- echo Jan 2020     Moderate tricuspid insufficiency- echo Jan 2020     Hammer toe of right foot     Right foot pain       Past Medical History:   Diagnosis Date     Asymptomatic menopausal state     DXA 10/27/06 normal     Calculus of kidney     1990,s/p lithotripsy     Chronic kidney disease, stage III (moderate) (H)     7/27/2011     Diarrhea     11/6/2014     Dysphagia     2007,EGD 3/14/07 nl; sx from goiter.     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     2004,on Tapazole       Past Surgical History:   Procedure Laterality Date     APPENDECTOMY OPEN      1979,,& Meckel's diverticulum removed     COLONOSCOPY       06,polyps; repeat in 5 YEARS     COLONOSCOPY      11     COLONOSCOPY      2014     EGD      [place], normal but possible Zenker; considering esophagram     ESOPHAGOSCOPY, GASTROSCOPY, DUODENOSCOPY (EGD), COMBINED      3/14/07,done for dysphagia; normal     ESOPHAGOSCOPY, GASTROSCOPY, DUODENOSCOPY (EGD), COMBINED N/A 2018    Procedure: COMBINED ESOPHAGOSCOPY, GASTROSCOPY, DUODENOSCOPY (EGD);  Gastroscopy;  Surgeon: Bisi Rivera MD;  Location: [facility] OR     HYSTERECTOMY TOTAL ABDOMINAL, BILATERAL SALPINGO-OOPHORECTOMY, COMBINED           LAPAROSCOPIC CHOLECYSTECTOMY      ,common bile duct transected     OTHER SURGICAL HISTORY      ,[id number],PREMALIG/BENIGN SKIN LESION EXCISION,epidermal inclusion cyst     OTHER SURGICAL HISTORY      10/26/2015,[id number],FOOT SURGERY,hammertoe repair, [facility] with Dr. Grimaldo     OTHER SURGICAL HISTORY      [id number],IR BILIARY STRICTURE DILATATION WO STENT,x3- per h/p /Choledochojejunostomy with Vane-En-Y due to transection of common bile duct     THYROIDECTOMY      3/26/08,Total thyroidectomy; multinodular goiter       Family History   Problem Relation Age of Onset     Other - See Comments Mother          87yo, emphysema, dementia     Heart Disease Father         Heart Disease,MI in 50s,  64yo     Diabetes Father         Diabetes     Substance Abuse Father         Alcohol/Drug,Etoh     Diabetes Daughter         Diabetes     Other - See Comments Daughter         Psychiatric illness,depr/anxiety     Thyroid Disease Sister         Thyroid Disease     Breast Cancer No family hx of         Cancer-breast       Social History     Social History Narrative    ; 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; 4 children; Retired RN       Social History     Socioeconomic History     Marital status:      Spouse name: Not on file     Number of children: Not on file     Years of education: Not on file     Highest education level: Not on file   Occupational History      Not on file   Tobacco Use     Smoking status: Current Every Day Smoker     Packs/day: 0.50     Years: 30.00     Pack years: 15.00     Types: Cigarettes     Smokeless tobacco: Never Used     Tobacco comment: no ecig   Vaping Use     Vaping Use: Never used   Substance and Sexual Activity     Alcohol use: No     Alcohol/week: 0.0 standard drinks     Drug use: No     Sexual activity: Not Currently     Partners: Male   Other Topics Concern     Not on file   Social History Narrative    ; 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; 4 children; Retired RN     Social Determinants of Health     Financial Resource Strain: Not on file   Food Insecurity: Not on file   Transportation Needs: Not on file   Physical Activity: Not on file   Stress: Not on file   Social Connections: Not on file   Intimate Partner Violence: Not on file   Housing Stability: Not on file       acetaminophen (TYLENOL) 650 MG CR tablet, Take 1,300 mg by mouth  ascorbic acid (VITAMIN C) 1000 MG TABS, 1 tablet oral daily  aspirin (ASA) 81 MG EC tablet, Take 1 tablet (81 mg) by mouth daily  atorvastatin (LIPITOR) 10 MG tablet, Take 1 tablet (10 mg) by mouth daily  Blood Pressure Monitor MISC, As directed. OMRON  calcium carbonate-vitamin D 600-200 MG-UNIT TABS, Take 1 tablet by mouth daily  carboxymethylcellulose (CARBOXYMETHYLCELLULOSE SODIUM) 0.5 % SOLN ophthalmic solution, 1 drop both eyes once daily  docusate sodium (COLACE) 100 MG capsule, Take 1 capsule by mouth daily Take daily at bedtime  estradiol (ESTRACE) 0.1 MG/GM vaginal cream, INSERT 2GM INTO THE VAGINA EVERY MONDAY AND FRIDAY  folic acid (FOLVITE) 1 MG tablet, Take 1 mcg by mouth daily  gabapentin (NEURONTIN) 300 MG capsule, TAKE 1 CAPSULE BY MOUTH THREE TIMES DAILY  levothyroxine (SYNTHROID/LEVOTHROID) 100 MCG tablet, Take 1 tablet (100 mcg) by mouth daily  loratadine (CLARITIN) 10 MG tablet, Take 1 tablet (10 mg) by mouth daily  losartan (COZAAR) 25 MG tablet, TAKE 2 TABLETS BY MOUTH ONCE DAILY  Multiple  "Vitamins-Minerals (OCUVITE ADULT 50+) CAPS, 1 tablet by mouth once daily.  pantoprazole (PROTONIX) 20 MG EC tablet, TAKE 1 TABLET (20 MG) BY MOUTH DAILY (Patient taking differently: Take 20 mg by mouth 2 times daily )  Sodium Fluoride (SF 5000 PLUS) 1.1 % CREA, daily  zolpidem (AMBIEN) 5 MG tablet, Take 1 tablet (5 mg) by mouth At Bedtime    No current facility-administered medications on file prior to visit.        ALLERGIES/SENSITIVITIES: No Known Allergies    PHYSICAL EXAM:     /68 (BP Location: Right arm, Patient Position: Sitting, Cuff Size: Adult Large)   Pulse 62   Temp 97.8  F (36.6  C) (Tympanic)   Resp 16   Ht 1.651 m (5' 5\")   Wt 55.5 kg (122 lb 6.4 oz)   Breastfeeding No   BMI 20.37 kg/m      General Appearance:  Cahectic  Heart & CV:  RRR no murmur.  Intact distal pulses, good cap refill.  LUNGS:  CTA B/L, no wheezing or crackles.  Abd:  Soft, flat, scaphoid  Ext: No deformity.       ADDITIONAL COMMENTS:      CONSULTATION ASSESSMENT AND PLAN:    88 year old female with dysphagia and weight loss.  Would entertain another ENT opinion from somebody at [place] regarding whether her dysphagia could be surgically corrected from a ENT standpoint.  We can use her feeding tube as a stopgap measure at this point.  Will attempt 20 mm hydraulic dilation to the cricopharyngeus area during EGD.    - Will plan for PEG tube for hydration and nutrition  - She will need tube feeds the remainder of her life.    Nutrition consult  Home care for tube feed teachings  ENT referral if considering Zenker's surgery    50 minutes spent in evaluation and discussion of her various problems and potential treatments  Chico Mcwilliams MD on 1/26/2022 at 10:33 AM      "

## 2022-01-27 NOTE — TELEPHONE ENCOUNTER
Please call the patient.  She has questions about taking her calcium tablets and her surgery coming up.      Joanne Lee on 1/27/2022 at 9:16 AM

## 2022-01-27 NOTE — TELEPHONE ENCOUNTER
Per PAC nurse patient is able to take her calcium. Patient notified.  She is asking how the tube feedings will work.  Let her know that this is dependent on the nutritionists assessment.  I will follow up on this referral and touch base with her afterwards early next week.  Aarti Salazar LPN........................1/27/2022  10:21 AM

## 2022-01-28 NOTE — TELEPHONE ENCOUNTER
Spoke with [facility].  Looks like insurance will cover tube feeding for patient.  They do need LOV note to state that she has a need of 90 days or more.  Can you please addend note to include this?  Aarti Salazar LPN........................1/28/2022  10:20 AM

## 2022-02-01 NOTE — TELEPHONE ENCOUNTER
[facility] and that they should be reaching out.  She states that she has appointment with the nutritionist on 2/8/22.  Aarti Salazar LPN........................2/1/2022  9:03 AM

## 2022-02-03 NOTE — TELEPHONE ENCOUNTER
Requested Prescriptions   Pending Prescriptions Disp Refills     zolpidem (AMBIEN) 5 MG tablet [Pharmacy Med Name: ZOLPIDEM 5MG TABLET] 90 tablet 0     Sig: TAKE 1 TABLET (5 MG) BY MOUTH AT BEDTIME       There is no refill protocol information for this order     Last Written Prescription Date:  8/10/21  Last Fill Quantity: 90,   # refills: 1  Last Office Visit: 1/18/22 [place]  Future Office visit:    Next 5 appointments (look out 90 days)    Feb 06, 2022  9:20 AM  Nurse Only with  SKIP  [facility] [address]  [place] [address]  [phone]   Feb 16, 2022  9:30 AM  Return Visit with Chico Mcwilliams MD  [facility] [address]  [place] [address]  [phone]      Routing refill request to provider for review/approval because:  Unable to fill prescription refills per RN Medicine refill Policy.  Brenda J. Goodell, RN on 2/3/2022 at 1:57 PM

## 2022-02-06 NOTE — PROGRESS NOTES
Patient here for Covid Testing. Pre op 2/10/22 ASHER Mcwilliams.    Cecilia Toscano MA on 2/6/2022 at 9:15 AM

## 2022-02-07 NOTE — TELEPHONE ENCOUNTER
SPO_patient would like a call about what is going to take place with what is going on with the up coming surgery       Please call and advise    Thank You    Darshana Gay on 2/7/2022 at 2:06 PM

## 2022-02-08 NOTE — TELEPHONE ENCOUNTER
Pt wants to speak with Nutritionist-has more questions prior to surgery. Can someone help her? Thank you.  Yamilet Coulter

## 2022-02-08 NOTE — PROGRESS NOTES
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 After patient confirmed date of birth, we discussed her placement of PEG tube and feeding tube nutrition.    Patient is anxious about the procedure, mostly because she feels uninformed of process and instructions for feeding.     She reports that she has lost 20# in the last year, due to dysphagia. She lives alone. Her daughter brings food over often, as she lives 3 blocks away. We reviewed the foods that she is able to eat   With less difficulty. Reports gagging and needing a garbage can next to her as she eats foods. She often will eat 3-4 bites and then need to \"gag\" it up. Unclear how many calories per day she is able   To take PO.    There is not a speech evaluation at this time or orders for liquid consistency. Recommend ST order.      Nutrition Assessment:  Anthropometrics  Height: .  165.1 cm (5' 5\") BMI:    20.3  Weight:  55.3 kg (122 lb) BSA:  1.59  IBW (kg):  Female: 57 Male:       Significant weight change of 20# in 1 year.          Nutrition History   B-\"I'm not a breakfast or lunch person, unless I go out with friends which I don't do much anymore\"  2 cups of coffee , sometime a sweet roll  Dinner-soup.    She will have 1-2 cans of Ensure or Premier Protein daily. She reports that popcorn \"goes down very well\" and she eats that a few times a week.        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  I suggested she runs the feeding over night for 4 hours vs. Bolus feeding (which would allow her to eat more PO durin gthe day)  She does not think she would like to do this. MD will order infinity pump and home health nurse will encourage a feeding at night.      We will start  Tube Feeding Formula Isosource  1.5 Maxime 8.45 oz. Carton Ready to Use Unflavored Adult @ 2 containers per evening, providing 750 kcals, 30.8 grams of protein  H20 flush 60mL QID      Instructions emailed to patient:      The goal at first is for you to take in food and fluids as able, with the 5 small \"meals\" by mouth daily    For you to gain up to a weight of 125#, you should be getting in (via tube or mouth):    1,500 calories  65-70 grams of protein  53-60 oz of fluids    It is hard to say how much you should put via the tube at first.  I hope a goal can be 50% via mouth, 50% via tube.      By Mouth: 5 daily meals/snacks: (~750 calories)  Each meal/snack: ~150-200 calories     \" Ensure or Premier Protein  \" Yogurt  \" Cottage cheese with canned fruit  \" Cream soups  \" Toast with a lot of butter   \" Oatmeal with cream or butter & brown sugar  \" Your popcorn with a lot of butter  \" See list attached (high protein, high calorie recipes -soft foods)  \" Sip and eat slowly!     By tube:   \" 2 containers per day or ~750 calories per day  \" Start with two containers per day   o 60 mL lukewarm water flushes before and after container of formula      HYDRATION:  When you drink fluids, make sure there are calories. For example: add creamer to your coffee and drink H2O with juice (50/50)      1. For the first week, please keep a list of what foods/fluids and how much you are able to eat by mouth  2. I will call you February 15 @ 10am to see how it is going  3. If you have " problems with your tube, vomiting or diarrhea go to ER    I will find out how thick your liquids should be. I will be in touch.  Iza will take good care of you and help you figure this out!      Nutrition Follow Up / Monitoring:   Phone call RD 1 week      3 hours were spent coordinating care for Mireya's tube feeding.        Patient has RD contact information to call/email if needed.    KRISTIN K. KLINEFELTER, IESHA on 2/8/2022 at 5:37 PM                        History of Present Illness   She consumes 0 sweetened beverage(s) daily.She exercises with enough effort to increase her heart rate 9 or less minutes per day.  She exercises with enough effort to increase her heart rate 3 or less days per week.   She is taking medications regularly.        ROS      Physical Exam

## 2022-02-09 NOTE — TELEPHONE ENCOUNTER
It looks like yesterday, Dr. Mcwilliams already placed a referral to a nutritionist.  Someone should be calling her soon to get this set up.

## 2022-02-09 NOTE — TELEPHONE ENCOUNTER
After verifying last name and  patient notifed of the below information. She has already spoken to them.    Jess Simons LPN on 2022 at 8:56 AM

## 2022-02-09 NOTE — TELEPHONE ENCOUNTER
Dr Mirella SIMPSON reviewed and completed the following home care form for Sylvie Bautista on 02/9/22   This covers the certification period effective 1-25-22 to 3-25-22.  Jess Simons LPN on 2/9/2022 at 10:22 AM

## 2022-02-10 NOTE — OR NURSING
Patient and responsible adult given discharge instructions with no questions regarding instructions. Federico score 20. Pain level 0/10.  Discharged from unit via wheelchair . Patient discharged to home .

## 2022-02-10 NOTE — OP NOTE
PROCEDURE NOTE    SURGEON:Chico Mcwilliams MD    PRE-OP DIAGNOSIS:   Dysphagia, weight loss, hypertrophic cricopharyngeus with diverticulum      POST-OP DIAGNOSIS: Dysphagia, weight loss, hypertrophic cricopharyngeus with diverticulum    PROCEDURE PLANNED:   Diagnostic EGD, flexible, dilation, PEG tube placement        PROCEDURE PERFORMED:  EGD with biopsy, flexible, dilation to 20 mm throughout the lower mid and oropharyngeal esophagus, PEG placement    SPECIMEN:     ID Type Source Tests Collected by Time Destination   1 :  Biopsy Small Intestine, Duodenum SURGICAL PATHOLOGY EXAM Chico Mcwilliams MD 2/10/2022 12:39 PM    2 :  Biopsy Stomach, Antrum SURGICAL PATHOLOGY EXAM Chico Mcwilliams MD 2/10/2022 12:40 PM    3 :  Biopsy Gastric Esophageal Junction SURGICAL PATHOLOGY EXAM Chico Mcwilliams MD 2/10/2022 12:41 PM              ANESTHESIA: Monitor Anesthesia Care  Coverage requested due to: Age over 70  CRNA Independent: Kellerman, David, APRN CRNA      ESTIMATED BLOOD LOSS: None    COMPLICATIONS:  None    INDICATION FOR THE PROCEDURE:The patient is a 88 year oldfemale. The patient presents with weight loss and need for enteral feedings.  She also has dysphagia that is mixed and has a component of pharyngeal dysphagia from a hypertrophic cricopharyngeal muscle.  This has an associated diverticulum.  Her previous ENT consult did not recommend pexy or resection of her diverticulum and revision of her cricopharyngeus muscle.  She does have a another ENT opinion pending.. I explained to the patient the risks, benefits and alternatives to diagnostic EGD for evaluating dysphagia and weight loss and placement of a PEG tube. We specifically discussed the risks of bleeding, infection, perforation and the risks of sedation. The patient's questions were answered and the patient wished to proceed. Informed consent paperwork was completed.    PROCEDURE: The patient was taken to the endoscopy suite. Appropriate monitors were  attached. The patient was placed in the left lateral decubitus position. Bite block was positioned. Timeout was performed confirming the patient's identity and procedure to be performed. After appropriate sedation was confirmed, the flexible endoscope was advanced into the oropharynx. The posterior oropharynx appeared grossly normal. The scope was advanced into the proximal esophagus. The esophagus was insufflated with air. The scope was advanced under direct visualization. No acute abnormalities of the esophagus were noted. The scope was advanced into the stomach. The scope was advanced through the pylorus into the duodenal bulb. The bulb and distal duodenum appeared grossly normal.  The scope was withdrawn back into the stomach. Antral biopsy was obtained and sent to pathology. The scope was retroflexed and the GE junction inspected.  Fundal polyps were noted.  The scope was returned to a neutral position and the stomach was decompressed. The scope was withdrawn to the GE junction and biopsy obtained.  A 20 mm balloon was inserted.  This was insufflated without any difficulty in the distal esophagus.  Withdrawing this inflated yield a narrowing at about 25 cm from the incisors.  This was dilated.  The balloon was then left inflated until it caught in the cricopharyngeus muscle.  This was then deflated and then inflated and held for 30 seconds.  A discrete diverticulum was difficult to be observed endoscopically.  The scope was then reinserted and a safe track technique was used to identify a spot for PEG placement.  Transillumination one-to-one manipulation and aspiration first entry technique were used.  Upon entry we snared our angiocatheter and placed a wire.  The wire was then retrieved through the oropharynx.  This was secured to a PEG and a pull PEG technique was used.  This was secured to the skin at 4 cm.  The flange was snugged up to the skin.  The clamp and connector were placed.  The mucosa of the  esophagus was inspected while withdrawing the scope.  Mucosal defects of the distal esophagus midesophagus and cricopharyngeus were all noted from dilation. The scope was withdrawn from the patient. The bite block was removed. The patient tolerated the procedure with no immediately apparent complication. The patient was taken to recovery instable condition.    FOLLOW UP:  RECOMMENDATIONS may start using PEG tube at 4 hours postop.    Chico Mcwilliams MD on 2/10/2022 at 12:59 PM

## 2022-02-10 NOTE — ANESTHESIA PREPROCEDURE EVALUATION
Anesthesia Pre-Procedure Evaluation    Patient: Sylvie Bautista   MRN: [id number] : 1934        Preoperative Diagnosis: Weight loss [R63.4]    Procedure : Procedure(s):  ESOPHAGOGASTRODUODENOSCOPY, WITH PEG TUBE INSERTION          Past Medical History:   Diagnosis Date     Asymptomatic menopausal state     DXA 10/27/06 normal     Calculus of kidney     ,s/p lithotripsy     Chronic kidney disease, stage III (moderate) (H)     2011     Diarrhea     2014     Dysphagia     ,EGD 3/14/07 nl; sx from goiter.     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     ,on Tapazole      Past Surgical History:   Procedure Laterality Date     APPENDECTOMY OPEN      ,,& Meckel's diverticulum removed     COLONOSCOPY      06,polyps; repeat in 5 YEARS     COLONOSCOPY      11     COLONOSCOPY      2014     EGD      [place], normal but possible Zenker; considering esophagram     ESOPHAGOSCOPY, GASTROSCOPY, DUODENOSCOPY (EGD), COMBINED      3/14/07,done for dysphagia; normal     ESOPHAGOSCOPY, GASTROSCOPY, DUODENOSCOPY (EGD), COMBINED N/A 2018    Procedure: COMBINED ESOPHAGOSCOPY, GASTROSCOPY, DUODENOSCOPY (EGD);  Gastroscopy;  Surgeon: Bisi Rivera MD;  Location: [facility] OR     HYSTERECTOMY TOTAL ABDOMINAL, BILATERAL SALPINGO-OOPHORECTOMY, COMBINED           LAPAROSCOPIC CHOLECYSTECTOMY      ,common bile duct transected     OTHER SURGICAL HISTORY      ,[id number],PREMALIG/BENIGN SKIN LESION EXCISION,epidermal inclusion cyst     OTHER SURGICAL HISTORY      10/26/2015,[id number],FOOT  SURGERY,hammertoe repair, [facility] with Dr. Grimaldo     OTHER SURGICAL HISTORY      [id number],IR BILIARY STRICTURE DILATATION WO STENT,x3- per h/p /Choledochojejunostomy with Vane-En-Y due to transection of common bile duct     THYROIDECTOMY      3/26/08,Total thyroidectomy; multinodular goiter      No Known Allergies   Social History     Tobacco Use     Smoking status: Current Every Day Smoker     Packs/day: 0.50     Years: 30.00     Pack years: 15.00     Types: Cigarettes     Smokeless tobacco: Never Used     Tobacco comment: no ecig   Substance Use Topics     Alcohol use: No     Alcohol/week: 0.0 standard drinks      Wt Readings from Last 1 Encounters:   02/08/22 55.3 kg (122 lb)        Anesthesia Evaluation   Pt has had prior anesthetic.     No history of anesthetic complications       ROS/MED HX  ENT/Pulmonary:     (+) tobacco use, Current use, 1 packs/day,     Neurologic:     (+) peripheral neuropathy, - peripheral/idiopathic .     Cardiovascular: Comment: Raynauds syndrome     (+) hypertension-----CHF etiology: diastolic FERNANDEZ. valvular problems/murmurs Tricuspid insuff (moderate).     METS/Exercise Tolerance: 1 - Eating, dressing    Hematologic:     (+) anemia,     Musculoskeletal:   (+) arthritis,     GI/Hepatic: Comment: Dysphagia    (+) GERD, Asymptomatic on medication, Inflammatory bowel disease,     Renal/Genitourinary:     (+) renal disease, type: CRI, Pt does not require dialysis,     Endo:     (+) thyroid problem, hypothyroidism,     Psychiatric/Substance Use:     (+) psychiatric history anxiety     Infectious Disease:  - neg infectious disease ROS     Malignancy:  - neg malignancy ROS     Other: Comment: Uses walker     (+) , other significant disability Other (comment),          Physical Exam    Airway      Comment: Small oral opening    Mallampati: II   TM distance: < 3 FB   Neck ROM: limited   Mouth opening: < 3 cm    Respiratory Devices and Support         Dental  no notable dental  history         Cardiovascular   cardiovascular exam normal       Rhythm and rate: regular and normal     Pulmonary   pulmonary exam normal        breath sounds clear to auscultation           OUTSIDE LABS:  CBC:   Lab Results   Component Value Date    WBC 7.8 01/18/2022    WBC 10.8 01/08/2022    HGB 11.3 (L) 01/18/2022    HGB 9.8 (L) 01/08/2022    HCT 35.7 01/18/2022    HCT 30.8 (L) 01/08/2022     01/18/2022     01/08/2022     BMP:   Lab Results   Component Value Date     01/18/2022     01/08/2022    POTASSIUM 4.5 01/18/2022    POTASSIUM 3.7 01/08/2022    CHLORIDE 106 01/18/2022    CHLORIDE 108 (H) 01/08/2022    CO2 27 01/18/2022    CO2 24 01/08/2022    BUN 20 01/18/2022    BUN 19 01/08/2022    CR 1.35 (H) 01/18/2022    CR 1.36 (H) 01/08/2022    GLC 95 01/18/2022    GLC 93 01/08/2022     COAGS:   Lab Results   Component Value Date    INR 1.17 (H) 01/08/2022     POC: No results found for: BGM, HCG, HCGS  HEPATIC:   Lab Results   Component Value Date    ALBUMIN 4.0 01/18/2022    PROTTOTAL 7.2 01/18/2022    ALT 10 01/18/2022    AST 15 01/18/2022    ALKPHOS 52 01/18/2022    BILITOTAL 0.5 01/18/2022     OTHER:   Lab Results   Component Value Date    ELVIS 9.8 01/18/2022    MAG 1.8 (L) 01/08/2022    LIPASE 17 07/09/2018    AMYLASE 30 07/09/2018    TSH 2.53 01/18/2022    T4 1.49 01/18/2022    CRP 9.0 01/08/2022     (H) 06/21/2017       Anesthesia Plan    ASA Status:  3   NPO Status:  NPO Appropriate    Anesthesia Type: MAC.     - Reason for MAC: chronic cardiopulmonary disease, straight local not clinically adequate              Consents    Anesthesia Plan(s) and associated risks, benefits, and realistic alternatives discussed. Questions answered and patient/representative(s) expressed understanding.     - Discussed: Risks, Benefits and Alternatives for BOTH SEDATION and the PROCEDURE were discussed     - Discussed with:  Patient      - Extended Intubation/Ventilatory Support Discussed: No.       - Patient is DNR/DNI Status: No    Use of blood products discussed: No .     Postoperative Care    Pain management: IV analgesics.   PONV prophylaxis: Ondansetron (or other 5HT-3)     Comments:                LYNN Moran CRNA

## 2022-02-10 NOTE — DISCHARGE INSTRUCTIONS
[facility]  Adult Discharge Orders & Instructions      For 24 hours after surgery:  1. Get plenty of rest.  A responsible adult must stay with you for at least 24 hours after you leave the hospital.   2. You may feel lightheaded.  IF so, sit for a few minutes before standing.  Have someone help you get up.   3. You may have a slight fever. Call the doctor if your fever is over 101 F (38.3 C) (taken under the tongue) or lasts longer than 24 hours.  4. You may have a dry mouth, a sore throat, muscle aches or trouble sleeping.  These should go away after 24 hours.  5. Do not make important or legal decisions.  6.   Do not drive or use heavy equipment.  If you have weakness or tingling, don't drive or use heavy equipment until this feeling goes away.                                                                                                                                                                         To contact a doctor, call    [phone]______________

## 2022-02-10 NOTE — ANESTHESIA CARE TRANSFER NOTE
Patient: Sylvie Bautista    Procedure: Procedure(s):  ESOPHAGOGASTRODUODENOSCOPY, WITH PEG TUBE INSERTION,  dilation and biopsy       Diagnosis: Weight loss [R63.4]  Diagnosis Additional Information: No value filed.    Anesthesia Type:   MAC     Note:      Level of Consciousness: drowsy  Oxygen Supplementation: nasal cannula  Level of Supplemental Oxygen (L/min / FiO2): 2 L/min    Dentition: dentition unchanged  Vital Signs Stable: post-procedure vital signs reviewed and stable  Report to RN Given: handoff report given  Patient transferred to: Phase II    Handoff Report: Identifed the Patient, Identified the Reponsible Provider, Reviewed the pertinent medical history, Discussed the surgical course, Reviewed Intra-OP anesthesia mangement and issues during anesthesia, Set expectations for post-procedure period and Allowed opportunity for questions and acknowledgement of understanding      Vitals:  Vitals Value Taken Time   BP     Temp     Pulse     Resp     SpO2 98 % 02/10/22 1304   Vitals shown include unvalidated device data.    Electronically Signed By: David Kellerman, APRN CRNA  February 10, 2022  1:05 PM

## 2022-02-10 NOTE — ANESTHESIA POSTPROCEDURE EVALUATION
Patient: Sylvie Bautista    Procedure: Procedure(s):  ESOPHAGOGASTRODUODENOSCOPY, WITH PEG TUBE INSERTION,  dilation and biopsy       Diagnosis:Weight loss [R63.4]  Diagnosis Additional Information: No value filed.    Anesthesia Type:  MAC    Note:  Disposition: Outpatient   Postop Pain Control: Uneventful            Sign Out: Well controlled pain   PONV: No   Neuro/Psych: Uneventful            Sign Out: Acceptable/Baseline neuro status   Airway/Respiratory: Uneventful            Sign Out: Acceptable/Baseline resp. status   CV/Hemodynamics: Uneventful            Sign Out: Acceptable CV status; No obvious hypovolemia; No obvious fluid overload   Other NRE: NONE   DID A NON-ROUTINE EVENT OCCUR? No           Last vitals:  Vitals Value Taken Time   /73 02/10/22 1400   Temp     Pulse 62 02/10/22 1400   Resp     SpO2 96 % 02/10/22 1410   Vitals shown include unvalidated device data.    Electronically Signed By: LYNN JAMES CRNA  February 10, 2022  2:11 PM

## 2022-02-10 NOTE — INTERVAL H&P NOTE
I have reviewed the surgical (or preoperative) H&P that is linked to this encounter, and examined the patient. There are no significant changes    Chico Mcwilliams MD on 2/10/2022 at 11:41 AM

## 2022-02-14 NOTE — TELEPHONE ENCOUNTER
Patient wanted to know what medication she should be taking for her reflux.  Let her know that per Chico Mcwilliams MD result note she should be taking a PPI medication.  Looks like she already currently takes Protonix.  She will continue to take this medication.  Aarti Salazar LPN........................2/14/2022  4:43 PM      right

## 2022-02-15 NOTE — PROGRESS NOTES
"Phone call follow-up for nutrition.    After confirming patient's date of birth, we discussed her recovery from PEG tube placement.   Mireya reports that her dysphagia is much improved after the surgeon \"opened up my esophagus\".    She is able to eat and drink 50% more than she was last week. She has been able to sip fluids all day and eat foods that she enjoys without difficulty. She is not \"gagging up\"  Food or fluids.    Reports hydration status: good  No s/s of dehydration    Has had normal bowel movements the past two days.    Today she is increasing her feeding tube rate to 30/mL per hour to run over 4 hours.  She is doing ok with this. She does report difficulty opening the tubes. She will follow-up with   Home health about this.    Nutrition goals:  (1) Weight: 125-130#. She will weigh weekly at home.  (2) tolerate rate and formula of isosource 1.5  (3) PEG site free from infection or difficulty  (4) PO food and fluid intake without swallowing difficulty 750-1200 calories per day      Patient to contact RD for PO food intake questions or concerns.    Patient to F/U with surgeon tomorrow.  Patient to have home health nursing visits weekly.    Time on phone: 15 min    KRISTIN K. KLINEFELTER, RD on 2/15/2022 at 10:18 AM        History of Present Illness   She consumes 0 sweetened beverage(s) daily.She exercises with enough effort to increase her heart rate 9 or less minutes per day.  She exercises with enough effort to increase her heart rate 3 or less days per week.   She is taking medications regularly.        ROS      Physical Exam      "

## 2022-02-16 PROBLEM — K22.4 CRICOPHARYNGEUS MUSCLE DYSFUNCTION: Status: ACTIVE | Noted: 2022-01-01

## 2022-02-16 PROBLEM — Z93.1 STATUS POST INSERTION OF PERCUTANEOUS ENDOSCOPIC GASTROSTOMY (PEG) TUBE (H): Status: ACTIVE | Noted: 2022-01-01

## 2022-02-16 PROBLEM — K22.5 ZENKER'S DIVERTICULUM: Status: ACTIVE | Noted: 2022-01-01

## 2022-02-16 PROBLEM — R63.4 WEIGHT LOSS, NON-INTENTIONAL: Status: ACTIVE | Noted: 2022-01-01

## 2022-02-16 NOTE — PROGRESS NOTES
Patient presents for post surgical visit after EGD with PEG tube placement and dilation of the esophagus and cricopharyngeus muscle. Patient complains that trickle tube feeds are slow intake 4 hours. Patient has done well. No problems with the PEG tube. She notices that the dilation of her pharynx improved her swallowing and no longer has any choking or gagging.    /78 (BP Location: Right arm, Patient Position: Sitting, Cuff Size: Adult Regular)   Pulse 72   Temp (!) 96.6  F (35.9  C) (Temporal)   Resp 16   SpO2 94%   Breastfeeding No     General: NAD, pleasant and cooperative with exam and interview.  Abdomen: PEG tube is without drainage. No sign of infection. No pain with palpation.  Psychiatry: awake, alert and oriented. Appropriate affect.    Assessment/Plan:  88-year-old female with small Zenker's diverticulum and hyper tonic cricopharyngeus muscle with weight loss. Symptomatic improvement with endoscopic dilation. Tube feeds have been well-tolerated.    ENT referral has not been answered at this point. Would send another referral to the [facility] if [facility] does not respond. Her improvement in swallowing with endoscopic dilation suggest that she would improve with surgery.    We can continue endoscopic dilation as needed may need to happen every 6 to 12 months.    Okay to transition to bolus tube feeds.    Has a pretibial hematoma on the left. This is because some erythema. No sign of infection. Skin is not sloughed yet. She does have some edema to this leg. Just monitor.    Chico Mcwilliams MD on 2/16/2022 at 9:55 AM

## 2022-02-16 NOTE — NURSING NOTE
Chief Complaint   Patient presents with     Post-Op - General Surgery     States she has a lot of questions. Pain only when active or bending.     Medication Reconciliation: complete    Shelly Middleton LPN

## 2022-02-17 NOTE — TELEPHONE ENCOUNTER
After verifying last name and  patient notifed of the below information. No furhter questions or concerns.    Jess Simons LPN on 2022 at 1:20 PM

## 2022-02-17 NOTE — TELEPHONE ENCOUNTER
Reason for call:  Other   Patient called regarding (reason for call): call back  Additional comments: Mireya is wondering why there was an NG tube placed when she had the stretching og her esophogus. She has mandi eating normally with no choking or gagging. She wanted to inform Mirella of how she was doing.    Phone number to reach patient:  Home number on file [phone] (home)    Best Time:  ANY    Can we leave a detailed message on this number?  YES    Travel screening: Not Applicable

## 2022-02-17 NOTE — TELEPHONE ENCOUNTER
Request for Home Care Physical Therapy orders as follows:      Continuation frequency =  2 x week x __2__ weeks                Effective date = 2/20/22    Interventions include:    Therapeutic Exercise  Gait/Balance/Dysfunction  Home Exercise Program         Therapist: Romario Lopez PT  Please respond with .HOMECAREAGREE, if you are in agreement. Please sign with your comments and signature, if you are not in agreement.

## 2022-02-22 NOTE — TELEPHONE ENCOUNTER
After verifying last name and  patient notifed of the below information.   Jess Simons LPN on 2022 at 10:50 AM

## 2022-02-22 NOTE — NURSING NOTE
"Chief Complaint   Patient presents with     Clinic Care Coordination - Follow-up     follow up wound care       Initial /80 (BP Location: Right arm, Patient Position: Sitting, Cuff Size: Adult Regular)   Pulse 82   Temp 96.8  F (36  C) (Tympanic)   Resp 16   Wt 54.4 kg (120 lb)   Breastfeeding No   BMI 19.97 kg/m   Estimated body mass index is 19.97 kg/m  as calculated from the following:    Height as of 2/10/22: 1.651 m (5' 5\").    Weight as of this encounter: 54.4 kg (120 lb).  Medication Reconciliation: complete    Qi Trevino LPN  "

## 2022-02-22 NOTE — TELEPHONE ENCOUNTER
The only medication that she may need would be an antibiotic if it was infected.  If it is infected she will have increased pain, increased redness, warmth, etc.  Sometimes hematomas need to be debrided by a surgeon.  If Dr. Mcwilliams has looked at this in the past and she thinks that it needs to be debrided which means to open up and clean out then I would recommend she get an appointment with him.

## 2022-02-22 NOTE — TELEPHONE ENCOUNTER
I did send over an antibiotic for her.  Follow-up if not improving but I would recommend she see Dr. Mcwilliams as it may need debriding

## 2022-02-22 NOTE — PROGRESS NOTES
Patient called to ask if her tube feeding supplies are ordered and when she would get them.      Called [facility]. [phone] #[phone]. They confirmed that her supplies will arrive tomorrow, 2/23/22.      Called patient back to tell her. Also gave her [facility] phone number, as this patient needs more support for her tube feeding and our MNT out-patient   Are not available every day.  She said she has Mayra's number and will call her with further tube feeding questions.    Patient also is waiting for a call back about her leg and wants to know if she should come in.  I see messages were routed to Haydee Aly and I told the patient she should receive   A call back.    15 minutes spent on tube feeding questions.    KRISTIN K. KLINEFELTER, RD on 2/22/2022 at 9:45 AM          ROS      Physical Exam

## 2022-02-22 NOTE — TELEPHONE ENCOUNTER
Been watching a hematoma on leg-and it is weeping and wonders if needs medication-please call today

## 2022-02-22 NOTE — PROGRESS NOTES
Procedure Note      Pre/Post Operative Diagnosis: Tibial hematoma with skin necrosis    Procedure: Debridement of tibial hematoma and skin necrosis 2 cm x 4 cm.    Surgeon: Chico Mcwilliams MD    Local Anesthesia: 1% lidocaine with 0.25% Marcaine with epinephrine    Indication for the procedure:  This is a 88 year old female  patient referred by Mirella Aly with a tibial hematoma that is causing her necrosis     After explaining the risks to include bleeding, infection, or need for further debridement, and scarring the patient wished to proceed.    Procedure:   The area was insensate.  There is no apparent infection.  The eschar was debrided to the level of subcutaneous tissue and muscle.  This is debrided with scissors in a sharp manner.  The small amount of hematoma was removed.  Underlying tissue appears healthy.  Wet-to-dry dressing changes were initiated.    Plan:  Reactive erythema only.  No obvious infection.  Will take 2 to 3 months for wet-to-dry dressings to heal this area.  May be able to transition to dressing care weekly once granulation tissue is established.      Chico Mcwilliams MD....................  2/22/2022   2:09 PM

## 2022-02-22 NOTE — TELEPHONE ENCOUNTER
Called and spoke with the patient. She thinks it is infected and would like an antibiotic. She states she will also make an appointment to see Dr. Mcwilliams.     Jess Simons LPN on 2/22/2022 at 9:15 AM

## 2022-02-25 NOTE — PROGRESS NOTES
This is a recent snapshot of the patient's [facility] medical record.  For current drug dose and complete information and questions, call [phone] or In Basket pool, [facility] ([id number])  CSN Number:  [id number]

## 2022-03-04 NOTE — TELEPHONE ENCOUNTER
Patient will contact [facility] for this as that is where they got them last.   Aarti Salazar LPN........................3/4/2022  8:19 AM

## 2022-03-05 NOTE — PROGRESS NOTES
This is a recent snapshot of the patient's [facility] medical record.  For current drug dose and complete information and questions, call [phone] or [facility] Basket [facility] ([id number])  CSN Number:  [id number]

## 2022-03-08 NOTE — PATIENT INSTRUCTIONS
Wound care instructions with Vaseline and Allevyn dressings :    Apply Vaseline to open wound after showering.     Place Allevyn gentle border dressing and leave in place for a week.    Remove dressing and shower with it open.    Dry and replace Vaseline.    Replace Allevyn gentle border dressing and leave in place for a week.      ------------------------------------------------------------------------    Call [facility] regarding Zenker diverticulum consult.      Follow-up in about 4 weeks

## 2022-03-08 NOTE — PROGRESS NOTES
Mireya presents for follow-up after clinic debridement of her tibial hematoma with skin necrosis.  She is been doing wet-to-dry dressings twice daily herself.    /68 (BP Location: Right arm, Patient Position: Sitting, Cuff Size: Adult Regular)   Pulse 78   Temp 97.2  F (36.2  C) (Tympanic)   Resp 16   Wt 54.2 kg (119 lb 6.4 oz)   Breastfeeding No   BMI 19.87 kg/m      General: NAD, pleasant and cooperative with exam and interview.  Extremity: Her left shin has healthy fat and granulation tissue.  It is somewhat dry.  This measures 2 cm x 1 cm      Assessment/Plan:  88-year-old female status post bedside debridement of tibial hematoma.  Doing better.  Ready to transition to a heavy ointment dressing and can transition to once weekly wound cares with Allevyn.    She is again having recurrence of her dysphagia related to her Zenker's.    She is interested in having a ENT consultation about division of her cricopharyngeus and pexy versus resection of her diverticulum.    She has not been successful in pursuing this locally.    Chico Mciwlliams MD on 3/8/2022 at 1:45 PM

## 2022-03-08 NOTE — NURSING NOTE
"Chief Complaint   Patient presents with     Clinic Care Coordination - Follow-up     wound care follow up       Initial /68 (BP Location: Right arm, Patient Position: Sitting, Cuff Size: Adult Regular)   Pulse 78   Temp 97.2  F (36.2  C) (Tympanic)   Resp 16   Wt 54.2 kg (119 lb 6.4 oz)   Breastfeeding No   BMI 19.87 kg/m   Estimated body mass index is 19.87 kg/m  as calculated from the following:    Height as of 2/10/22: 1.651 m (5' 5\").    Weight as of this encounter: 54.2 kg (119 lb 6.4 oz).  Medication Reconciliation: complete    Qi Trevino LPN  "

## 2022-03-09 NOTE — TELEPHONE ENCOUNTER
Unable to see a ENT in [place] until May 27th.  Will this be ok.  Also she said she has some burning in her wound.  Denies any redness around the area.  Qi Trevino LPN..........3/9/2022  9:22 AM

## 2022-03-10 NOTE — TELEPHONE ENCOUNTER
May 27th should be fine.  [facility] is [facility] to [facility] if [facility] wants sooner.      I would expect the burning to get better if she uses plain Vaseline with the next dressing change .  If it worsens, go back to wet to dry dressings.      Chico Mcwilliams MD on 3/10/2022 at 11:34 AM

## 2022-03-11 NOTE — TELEPHONE ENCOUNTER
Would like to keep the May 27th appointment and not go to the [facility].  She said the burning has gotten better.  She will keep an eye on this.  Qi Trevino LPN..........3/11/2022  9:14 AM

## 2022-03-14 NOTE — PROGRESS NOTES
This is a recent snapshot of the patient's [facility] medical record.  For current drug dose and complete information and questions, call [phone] or In Basket pool[facility] ([id number])  CSN Number:  [id number]

## 2022-03-15 NOTE — NURSING NOTE
"Chief Complaint   Patient presents with     NEUROPATHY     in feet       FOOD SECURITY SCREENING QUESTIONS:    The next two questions are to help us understand your food security.  If you are feeling you need any assistance in this area, we have resources available to support you today.    Hunger Vital Signs:  Within the past 12 months we worried whether our food would run out before we got money to buy more. Never  Within the past 12 months the food we bought just didn't last and we didn't have money to get more. Never  Jessi Vallejo LPN,LPN on 3/15/2022 at 9:37 AM    Initial /62   Pulse 62   Temp 97.3  F (36.3  C) (Tympanic)   Resp 16   Ht 1.632 m (5' 4.25\")   Wt 54.6 kg (120 lb 6.4 oz)   BMI 20.51 kg/m   Estimated body mass index is 20.51 kg/m  as calculated from the following:    Height as of this encounter: 1.632 m (5' 4.25\").    Weight as of this encounter: 54.6 kg (120 lb 6.4 oz).         Jessi Vallejo LPN   "

## 2022-03-15 NOTE — PROGRESS NOTES
ASSESSMENT:    ICD-10-CM    1. Presbyesophagus  K22.89    2. Dysphagia, unspecified type  R13.10    3. Hereditary and idiopathic peripheral neuropathy  G60.9 gabapentin (NEURONTIN) 300 MG capsule     Adult Neurology  Referral   4. Wound of left lower extremity, subsequent encounter  S81.581S        PLAN:  1.  Keep follow-up appointment with ENT.  2.  Continue to work with nutritionist on feedings.  We would like to see her weight start to increase.  She will contact nutritionist.  3.  Increase gabapentin to 300 mg in the morning, 600 mg midday and 300 mg at bedtime.  Referral placed to neurology.  4.  Continue with Allevyn adhesive and change weekly.  Follow-up with Dr. Mcwilliams as directed.  Patient still needs to make appointment.    SUBJECTIVE:    She is here today for follow-up.  She has a history of presbyesophagus and Zenker's diverticulum.  She now has a feeding tube she has been doing gravity feedings twice daily.  She states it last about 45 minutes per feeding.  It is disruptive to her day.  She did not want overnight feedings.  She has been working with a nutritionist.  She is now eating yogurt and oatmeal in addition.  She does not have a follow-up appointment with nutritionist.  She has not gained any weight.  Fortunately she is no longer losing weight.  She has an appointment with ENT and has worked with speech therapy in the past.  She is still having neuropathic pain in her feet.  She currently takes gabapentin 300 mg 3 times daily and would like to adjust dose and see a neurologist if possible.  Also has a wound along the left shin which was from a previous hematoma.  She is been following with Dr. Mcwilliams.  They are treating this with Vaseline to wound bed followed by Allevyn adhesive with dressing change every 7 days.    PROBLEM LIST:  Patient Active Problem List   Diagnosis     Acid reflux disease     CAP (community acquired pneumonia)     Chronic kidney disease, stage III (moderate) (H)      Cigarette smoker     Diarrhea     Essential hypertension     Hypothyroidism     Insomnia     Iron deficiency anemia     Irritable bowel syndrome     Liver abscess     Low folic acid     Mononeuritis     Hereditary and idiopathic peripheral neuropathy     Nontoxic multinodular goiter     Osteopenia     Raynaud phenomenon     Tinea pedis     Acute left-sided low back pain without sciatica     Segmental and somatic dysfunction of sacral region     Segmental and somatic dysfunction of pelvic region     Postmenopausal atrophic vaginitis     Presbyesophagus     Grade II diastolic dysfunction- echo Jan 2020     Moderate tricuspid insufficiency- echo Jan 2020     Hammer toe of right foot     Right foot pain     Zenker's diverticulum     Cricopharyngeus muscle dysfunction     Weight loss, non-intentional     Status post insertion of percutaneous endoscopic gastrostomy (PEG) tube (H)     PAST MEDICAL HISTORY:  Past Medical History:   Diagnosis Date     Asymptomatic menopausal state     DXA 10/27/06 normal     Calculus of kidney     1990,s/p lithotripsy     Chronic kidney disease, stage III (moderate) (H)     7/27/2011     Diarrhea     11/6/2014     Dysphagia     2007,EGD 3/14/07 nl; sx from goiter.     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     2004,on Tapazole     SURGICAL HISTORY:  Past Surgical History:   Procedure Laterality Date     APPENDECTOMY OPEN      1979,,& Meckel's diverticulum removed     COLONOSCOPY      11/17/06,polyps; repeat in 5 YEARS     COLONOSCOPY      11/8/11     COLONOSCOPY      11/6/2014      EGD      [place], normal but possible Zenker; considering esophagram     ESOPHAGOSCOPY, GASTROSCOPY, DUODENOSCOPY (EGD), COMBINED      3/14/07,done for dysphagia; normal     ESOPHAGOSCOPY, GASTROSCOPY, DUODENOSCOPY (EGD), COMBINED N/A 06/07/2018    Procedure: COMBINED ESOPHAGOSCOPY, GASTROSCOPY, DUODENOSCOPY (EGD);  Gastroscopy;  Surgeon: Bisi Rivera MD;  Location: [facility] OR     HYSTERECTOMY TOTAL ABDOMINAL, BILATERAL SALPINGO-OOPHORECTOMY, COMBINED      1979     LAPAROSCOPIC CHOLECYSTECTOMY      1991,common bile duct transected     OTHER SURGICAL HISTORY      4/05,KLN634,PREMALIG/BENIGN SKIN LESION EXCISION,epidermal inclusion cyst     OTHER SURGICAL HISTORY      10/26/2015,[id number],FOOT SURGERY,hammertoe repair, [facility] with Dr. Grimaldo     OTHER SURGICAL HISTORY      [id number],IR BILIARY STRICTURE DILATATION WO STENT,x3- per h/p /Choledochojejunostomy with Vane-En-Y due to transection of common bile duct     THYROIDECTOMY      3/26/08,Total thyroidectomy; multinodular goiter       SOCIAL HISTORY:  Social History     Socioeconomic History     Marital status:      Spouse name: Not on file     Number of children: Not on file     Years of education: Not on file     Highest education level: Not on file   Occupational History     Not on file   Tobacco Use     Smoking status: Current Every Day Smoker     Packs/day: 0.50     Years: 30.00     Pack years: 15.00     Types: Cigarettes     Smokeless tobacco: Never Used     Tobacco comment: no ecig   Vaping Use     Vaping Use: Never used   Substance and Sexual Activity     Alcohol use: No     Alcohol/week: 0.0 standard drinks     Drug use: No     Sexual activity: Not Currently     Partners: Male   Other Topics Concern     Not on file   Social History Narrative    ; 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; 4 children; Retired RN     Social Determinants of Health     Financial Resource Strain: Not on file   Food Insecurity: Not on file   Transportation Needs: Not on file   Physical  Activity: Not on file   Stress: Not on file   Social Connections: Not on file   Intimate Partner Violence: Not on file   Housing Stability: Not on file     FAMILYHISTORY:  Family History   Problem Relation Age of Onset     Other - See Comments Mother          85yo, emphysema, dementia     Heart Disease Father         Heart Disease,MI in 50s,  64yo     Diabetes Father         Diabetes     Substance Abuse Father         Alcohol/Drug,Etoh     Diabetes Daughter         Diabetes     Other - See Comments Daughter         Psychiatric illness,depr/anxiety     Thyroid Disease Sister         Thyroid Disease     Breast Cancer No family hx of         Cancer-breast     CURRENT MEDICATIONS:   Current Outpatient Medications   Medication Sig Dispense Refill     ascorbic acid (VITAMIN C) 1000 MG TABS 1 tablet oral daily       aspirin (ASA) 81 MG EC tablet Take 1 tablet (81 mg) by mouth daily       atorvastatin (LIPITOR) 10 MG tablet Take 1 tablet (10 mg) by mouth daily 90 tablet 3     Blood Pressure Monitor MISC As directed. OMRON       calcium carbonate-vitamin D 600-200 MG-UNIT TABS Take 1 tablet by mouth daily       carboxymethylcellulose (CARBOXYMETHYLCELLULOSE SODIUM) 0.5 % SOLN ophthalmic solution 1 drop both eyes once daily 1 Bottle      docusate sodium (COLACE) 100 MG capsule Take 1 capsule by mouth 2 times daily as needed Take daily at bedtime       estradiol (ESTRACE) 0.1 MG/GM vaginal cream INSERT 2GM INTO THE VAGINA EVERY MONDAY AND FRIDAY 42.5 g 5     folic acid (FOLVITE) 1 MG tablet Take 1 mcg by mouth daily 30 tablet      gabapentin (NEURONTIN) 300 MG capsule Take 1 capsule in morning, 2 capsules midday and 1 capsule at bedtime 360 capsule 2     levothyroxine (SYNTHROID/LEVOTHROID) 100 MCG tablet Take 1 tablet (100 mcg) by mouth daily 90 tablet 3     loratadine (CLARITIN) 10 MG tablet Take 1 tablet (10 mg) by mouth daily 30 tablet      losartan (COZAAR) 25 MG tablet TAKE 2 TABLETS BY MOUTH ONCE DAILY 180  "tablet 2     Multiple Vitamins-Minerals (OCUVITE ADULT 50+) CAPS 1 tablet by mouth once daily.       pantoprazole (PROTONIX) 20 MG EC tablet TAKE 1 TABLET (20 MG) BY MOUTH DAILY (Patient taking differently: Take 20 mg by mouth 2 times daily ) 90 tablet 2     Sodium Fluoride (SF 5000 PLUS) 1.1 % CREA daily       traMADol (ULTRAM) 50 MG tablet Take 1 tablet (50 mg) by mouth every 6 hours as needed for severe pain 10 tablet 0     zolpidem (AMBIEN) 5 MG tablet TAKE 1 TABLET (5 MG) BY MOUTH AT BEDTIME 90 tablet 1     acetaminophen (TYLENOL) 650 MG CR tablet Take 1,300 mg by mouth       ALLERGIES:  Patient has no known allergies.    REVIEW OF SYSTEMS:  Review of Systems   Constitutional: Negative for unexpected weight change.   HENT: Positive for trouble swallowing.         She tells me that her swallowing improved significantly for about a week after esophageal dilation but is now back to having her usual difficulty with swallowing.   Skin: Positive for wound.   Neurological:        Neuropathic pain both feet         OBJECTIVE:  /62   Pulse 62   Temp 97.3  F (36.3  C) (Tympanic)   Resp 16   Ht 1.632 m (5' 4.25\")   Wt 54.6 kg (120 lb 6.4 oz)   BMI 20.51 kg/m    EXAM:   Pleasant female no acute distress.  Affect normal.  Alert and oriented x4.  Left shin with a wound that measures 3 x 1.2 x 0.1 cm with 90% granulation tissue and 10% yellow mixed slough.  No surrounding erythema warmth or maceration.  Wound is irrigated with saline wash.  Thin layer of Vaseline applied over wound followed by 4 inch Allevyn gentle border light dressing.      Mirella Aly NP      Answers for HPI/ROS submitted by the patient on 3/9/2022  How many servings of fruits and vegetables do you eat daily?: 0-1  On average, how many sweetened beverages do you drink each day (Examples: soda, juice, sweet tea, etc.  Do NOT count diet or artificially sweetened beverages)?: 2  How many minutes a day do you exercise enough to make your " heart beat faster?: 10 to 19  How many days a week do you exercise enough to make your heart beat faster?: 3 or less  How many days per week do you miss taking your medication?: 0  What is the reason for your visit today?: neuropathy  When did your symptoms begin?: More than a month  What are your symptoms?: numbness, tingling  How would you describe these symptoms?: Moderate  Are your symptoms:: Worsening  Have you had these symptoms before?: Yes  Is there anything that makes you feel worse?: cold temps  Is there anything that makes you feel better?: Gabapentin

## 2022-03-17 NOTE — TELEPHONE ENCOUNTER
Pt would like to get a prescription for liquid gabapentin verse the tablet form.  Please call.    Alex Spencer on 3/17/2022 at 3:06 PM

## 2022-03-29 PROBLEM — S80.12XA LEG HEMATOMA, LEFT, INITIAL ENCOUNTER: Status: ACTIVE | Noted: 2022-01-01

## 2022-03-29 NOTE — PROGRESS NOTES
"Mireya presents for follow-up after clinic debridement of her tibial hematoma with skin necrosis.  She cannot swallow solids. Her weight is stable.     BP 96/56 (BP Location: Right arm, Patient Position: Sitting, Cuff Size: Adult Regular)   Pulse 67   Temp 97.4  F (36.3  C) (Tympanic)   Resp 16   Ht 1.632 m (5' 4.25\")   Wt 55.5 kg (122 lb 6 oz)   LMP  (LMP Unknown)   SpO2 94%   Breastfeeding No   BMI 20.84 kg/m      General: NAD, pleasant and cooperative with exam and interview.  Extremity: Her left shin has healthy fat and granulation tissue.  It is somewhat dry.  This measures 2.5 X 2 with no depth.       Assessment/Plan:  88-year-old female status post bedside debridement of tibial hematoma.  Recheck in a month. Recommend wiping with gauze at dressing changes to remove slough.     She is frustrated by her inability to swallow.   - Follow in 1 mo re wound care.     Chico Mcwilliams MD on 3/29/2022 at 1:00 PM       "

## 2022-03-29 NOTE — NURSING NOTE
Patient presents to clinic for wound care to lower left leg.  Medication Rec Complete  Vivi Arauz LPN............3/29/2022 10:32 AM

## 2022-03-30 NOTE — TELEPHONE ENCOUNTER
RX pended please fill in frequency of changes.  Aarti Salazar LPN........................3/30/2022  4:36 PM

## 2022-04-06 NOTE — TELEPHONE ENCOUNTER
Gus Chong [phone] Melany- got a script for patient and has no code on it-please call as patient was in Fri to  supplies

## 2022-04-20 NOTE — TELEPHONE ENCOUNTER
[facility] #[id number] sent Rx request for the following:      PANTOPRAZOLE 20MG DR TABLET      Last Prescription Date:   4/19/2022  Last Fill Qty/Refills:         90, R-11      Prescription refused.  This is a duplicate request.  Lazaro An RN.......4/20/2022 10:30 AM

## 2022-04-22 NOTE — TELEPHONE ENCOUNTER
Calling to inquire about arrival time for her procedure.  She will arrive at noon according to day surgery.  Patient understands.  Qi Trevino LPN..........2/8/2022  9:01 AM     normal...

## 2022-04-26 NOTE — NURSING NOTE
"Chief Complaint   Patient presents with     Clinic Care Coordination - Face To Face     Follow up for wound care on left lower leg       Initial /70 (BP Location: Right arm, Patient Position: Sitting, Cuff Size: Adult Regular)   Pulse 61   Temp 97.8  F (36.6  C) (Tympanic)   Resp 16   Wt 54.5 kg (120 lb 3.2 oz)   LMP  (LMP Unknown)   SpO2 100%   Breastfeeding No   BMI 20.47 kg/m   Estimated body mass index is 20.47 kg/m  as calculated from the following:    Height as of 3/29/22: 1.632 m (5' 4.25\").    Weight as of this encounter: 54.5 kg (120 lb 3.2 oz).  Medication Reconciliation: complete    Qi Trevino LPN  "

## 2022-04-26 NOTE — PROGRESS NOTES
Mireya presents for follow-up after clinic debridement of her tibial hematoma with skin necrosis.     /70 (BP Location: Right arm, Patient Position: Sitting, Cuff Size: Adult Regular)   Pulse 61   Temp 97.8  F (36.6  C) (Tympanic)   Resp 16   Wt 54.5 kg (120 lb 3.2 oz)   LMP  (LMP Unknown)   SpO2 100%   Breastfeeding No   BMI 20.47 kg/m      General: NAD, pleasant and cooperative with exam and interview.  Extremity: Her left shin has granulation tissue and heavy slough.This measures 3 X 1.5 with no depth.       Assessment/Plan:  88-year-old female status post bedside debridement of tibial hematoma.  Recheck in a month.     Treated with Santyl ointment today.      Chico Mcwilliams MD on 4/26/2022 at 10:58 AM

## 2022-05-02 NOTE — TELEPHONE ENCOUNTER
At her last visit she said you discussed a shorter cap for her feeding tube so it doesn't lay on her tummy.  Can she get this done before you go on vacation or should she wait.  Also, is this something we will need to order or does the facility carry them.  Qi Trevino LPN..........5/2/2022  10:50 AM

## 2022-05-03 NOTE — TELEPHONE ENCOUNTER
Tube has been ordered.  It will take a couple of weeks to get here.  Let patient know this and that when we have the tube we will get her in to put it in.  Aarti Salazar LPN........................5/3/2022  9:44 AM

## 2022-05-04 NOTE — PROGRESS NOTES
This is a recent snapshot of the patient's [facility] medical record.  For current drug dose and complete information and questions, call [phone] or In Basket pool, [facility] home infusion ([id number])  CSN Number:  [id number]

## 2022-05-06 NOTE — TELEPHONE ENCOUNTER
Me     AB    9:44 AM  Note  Tube has been ordered.  It will take a couple of weeks to get here.  Let patient know this and that when we have the tube we will get her in to put it in.

## 2022-05-12 NOTE — TELEPHONE ENCOUNTER
[facility] #[id number] sent Rx request for the following:      ESTRADIOL 0.01% VAGINAL CREAM      Last Prescription Date:   11/9/2020  Last Fill Qty/Refills:         42.5g, R-5    Last Office Visit:              3/15/2022   Future Office visit:           none    Lazaro An RN, BSN  ....................  5/12/2022   9:06 AM

## 2022-05-12 NOTE — TELEPHONE ENCOUNTER
Questions about peg tube supplies and switching it out. Would like a call back in the AM. Did discuss safe ways to secure her feeding tub without having to keep removing the tape which was making her frustrated. Roberta Esparza RN  ....................  5/12/2022   4:19 PM

## 2022-05-13 NOTE — TELEPHONE ENCOUNTER
Patient scheduled for 5/18/22. She requested a call from a surgery nurse prior to the appointment as she has questions.    Kiara Quintanilla on 5/13/2022 at 12:54 PM

## 2022-05-13 NOTE — TELEPHONE ENCOUNTER
Please schedule an appointment with any surgeon. To have G-tube changed.  Tube was ordered and is here.  Aarti Salazar LPN........................5/13/2022  11:54 AM

## 2022-05-16 NOTE — TELEPHONE ENCOUNTER
Patient wanted to know how G-tube exchange procedure is going to go.  Procedure explained to patient.  She also wanted to know if she would have a different tube connection.  Let her know that we did order a low profile (Miguelito Key) tube for her but this should be the same connection that she currently uses for her feedings.   Aarti Salazar LPN........................5/16/2022  11:28 AM

## 2022-05-18 PROBLEM — Z43.1 ATTENTION TO G-TUBE (H): Status: ACTIVE | Noted: 2022-01-01

## 2022-05-18 NOTE — TELEPHONE ENCOUNTER
Called and spoke with the patient. She states she thinks she has been having hemorid's and thinks this is contributing to he bowel movements. She is wondering if she can be seen sooner. The next available appointment is June 15th.   Jess Simons LPN on 5/18/2022 at 9:48 AM

## 2022-05-18 NOTE — PROGRESS NOTES
Procedure Note  Pre/Post Operative Diagnosis:   G-tube needing attention    Procedure:    G-tube change DREAD-KEY 20 FR 4.5 cm    Surgeon: Vikash Jimenez MD [facility]    Local Anesthesia:  none    Indication for the procedure:    This is a 88 year old female patient referred by Dr Mcwilliams with PEG tube needing change to RDEAD-Key tube.   After explaining the risks to include bleeding, infection, recurrence or need for re-excision,and scarring the patient wished to proceed.    Procedure:   Traction applied to PEG tube until it was removed. 20 FR 4.5 cm DREAD-Key tube placed without difficulty and balloon inflated with 5 mL saline.    A clean dry dressing was applied.    Plan:  The patient will followup in three months for G-tube change. Patient will followup if there any problems with the wound including redness or drainage.

## 2022-05-18 NOTE — NURSING NOTE
"No chief complaint on file.      Initial /62 (BP Location: Right arm, Patient Position: Sitting, Cuff Size: Adult Regular)   Pulse 64   Temp 97.5  F (36.4  C) (Tympanic)   Resp 16   Wt 55.5 kg (122 lb 6.4 oz)   LMP  (LMP Unknown)   SpO2 99%   Breastfeeding No   BMI 20.85 kg/m   Estimated body mass index is 20.85 kg/m  as calculated from the following:    Height as of 3/29/22: 1.632 m (5' 4.25\").    Weight as of this encounter: 55.5 kg (122 lb 6.4 oz).  Medication Reconciliation: Completed     Advanced Care Directive Reviewed    Aarti Salazar LPN  "

## 2022-05-18 NOTE — TELEPHONE ENCOUNTER
After verifying last name and  patient notifed of the below information.   Jess Simons LPN on 2022 at 12:50 PM

## 2022-05-18 NOTE — TELEPHONE ENCOUNTER
Patient thinks she may have Hemorrhoids. Could not find an appointment till June.  Patient would like to talk to the nurse.  Please call      Casie Hill on 5/18/2022 at 9:43 AM

## 2022-05-18 NOTE — TELEPHONE ENCOUNTER
Patient called stating she has some further questions regarding visit from today. She would like a rebekah back tomorrow afternoon, she will not be home till after 12.     Akua Helton on 5/18/2022 at 4:46 PM

## 2022-05-19 NOTE — TELEPHONE ENCOUNTER
Call to patient to discuss with her questions she had. !. How much saline is in balloon? Reported she did not have any saline to replace it with. Discussed how her dressing changes went, she reported bloody dressing with some drainage, changed it today to a clean dressing. She ordered more split gauze for dressing changes. Reports diarrhea, discussed management of diarrhea, encouraged fluids as tolerated. I encouraged her to call back if she had any farther questions. Angela Almanza RN on 5/19/2022 at 3:15 PM    Patient called back wondering about how much drainage is acceptable. She reported that drainage was brownish, and between 75%-50% saturaged on a gauze pad (1 inch around). I educated her to change the dressing when it is over 50 % saturated as needed and that it should become less over the next several days. She verbalized understanding. Angela Almanza RN on 5/19/2022 at 3:26 PM

## 2022-05-20 PROBLEM — K94.23 GASTROSTOMY TUBE DYSFUNCTION (H): Status: ACTIVE | Noted: 2022-01-01

## 2022-05-20 PROBLEM — R19.5 LOOSE STOOLS: Status: ACTIVE | Noted: 2022-01-01

## 2022-05-20 PROBLEM — K65.9 ABDOMINAL INFECTION (H): Status: ACTIVE | Noted: 2022-01-01

## 2022-05-20 PROBLEM — M62.81 GENERALIZED MUSCLE WEAKNESS: Status: ACTIVE | Noted: 2022-01-01

## 2022-05-20 NOTE — ED TRIAGE NOTES
Patient had feeding tube replaced Wednesday and has had severe diarrhea since replacement. Slight abd pain associated with diarrhea. States slipped out of bed yesterday but did not hit head or have any injuries. Not on blood thinners. Pt alert    Triage Assessment     Row Name 05/20/22 1058       Triage Assessment (Adult)    Airway WDL WDL       Respiratory WDL    Respiratory WDL WDL       Skin Circulation/Temperature WDL    Skin Circulation/Temperature WDL WDL       Cardiac WDL    Cardiac WDL WDL       Peripheral/Neurovascular WDL    Peripheral Neurovascular WDL WDL       Cognitive/Neuro/Behavioral WDL    Cognitive/Neuro/Behavioral WDL WDL

## 2022-05-20 NOTE — PHARMACY
Pharmacy- Renal Dose Adjustment    Patient Active Problem List   Diagnosis     Acid reflux disease     CAP (community acquired pneumonia)     Chronic kidney disease, stage III (moderate) (H)     Cigarette smoker     Diarrhea     Essential hypertension     Hypothyroidism     Insomnia     Iron deficiency anemia     Irritable bowel syndrome     Liver abscess     Low folic acid     Mononeuritis     Hereditary and idiopathic peripheral neuropathy     Nontoxic multinodular goiter     Osteopenia     Raynaud phenomenon     Tinea pedis     Acute left-sided low back pain without sciatica     Segmental and somatic dysfunction of sacral region     Segmental and somatic dysfunction of pelvic region     Postmenopausal atrophic vaginitis     Presbyesophagus     Grade II diastolic dysfunction- echo Jan 2020     Moderate tricuspid insufficiency- echo Jan 2020     Hammer toe of right foot     Right foot pain     Zenker's diverticulum     Cricopharyngeus muscle dysfunction     Weight loss, non-intentional     Status post insertion of percutaneous endoscopic gastrostomy (PEG) tube (H)     Leg hematoma, left, initial encounter     Attention to G-tube (H)     Abdominal infection (H)     Generalized muscle weakness     Loose stools     Gastrostomy tube dysfunction (H)        Relevant Labs:  Recent Labs   Lab Test 05/20/22  1133 01/18/22  1153   WBC 12.3* 7.8   HGB 10.8* 11.3*    215        CrCl: 17.6 ml/min    No intake or output data in the 24 hours ending 05/20/22 1859       Per Renal Dose Adjustment Protocol, will adjust cefoxitin to 2 g IVPB every 24 hours.     Will continue to follow and make adjustments accordingly. Thank You.    Pao Santamaria [facility] ....................  5/20/2022   6:59 PM

## 2022-05-20 NOTE — PROGRESS NOTES
[facility] [place]    Patient admitted to room 305 at approximately 1834 via wheel chair from emergency room. Patient was accompanied by nurse.     Verbal SBAR report received from THAD Chiu prior to patient arrival.     Patient ambulated to bed with stand-by assist. Patient alert and oriented X 1. The patient is not having any pain.  . Admission vital signs: Blood pressure 110/54, pulse 66, temperature 98  F (36.7  C), temperature source Temporal, resp. rate 11, weight 55.3 kg (122 lb), SpO2 92 %, not currently breastfeeding. Patient was oriented to plan of care, call light, bed controls, tv, telephone, bathroom and visiting hours.     Risk Assessment    The following safety risks were identified during admission: fall and skin. Yellow risk band applied: YES.       Education    Patient has a [place] to Observation order: No  Observation education completed and documented: N/A    Consuelo Shah RN on 5/20/2022 at 6:39 PM

## 2022-05-20 NOTE — PROGRESS NOTES
[facility] TRANSPORT NOTE  Data:   Reason for Transport:  Transferred to [facility] from ED    Sylvie Bautista was transported to [facility] via wheel chair at 1834.  Patient was accompanied by Registered Nurse. Equipment used for transport: None. Family was aware of reason for transport: yes    Action:  Report: received from THAD Chiu RN on 5/20/2022 at 6:37 PM

## 2022-05-20 NOTE — CONSULTS
[facility]    Surgical Consultation    Date of Admission:  5/20/2022    Assessment & Plan   Sylvie Bautista is a 88 year old female who was admitted on 5/20/2022. I was asked to see the patient for g-tube malposition.    Active Problems:    Abdominal infection (H)    Assessment: likely from colon or stomach leak    Plan: will start iv antibiotics,    Generalized muscle weakness    Assessment: acute exacerbation of chronic problem    Plan: iv fluids, replace electrolytes as needed.  therapies    Loose stools    Assessment: due to g-tube malposition    Plan: do not use g-tube    Gastrostomy tube dysfunction (H)    Assessment: acute change since replacement on 5/18    Plan: discussion with patient and her daughter that a significant surgery will be needed to correct the placement of the g-tube, repair the colon and any ongoing leak of the stomach and place a new feeding tube. Patient and daughter want to consider the significance of the surgery. The other option would be comfort care. They will discuss and make a decision. They understand that the risk of infection and further injury to the colon makes this an urgent but not emergent decision as she doesn't have peritoneal signs at this time. Will discuss and let me or Dr. Telles know.       DVT Prophylaxis: Defer to primary service  Code Status: Full Code    Bisi Rivera MD    Reason for Consult   Reason for consult: I was asked by DELLA Gonzalez  to evaluate this patient for malposition of g-tube.    Primary Care Physician   Mirella Aly    Chief Complaint   Diarrhea since g-tube replaced, weakness    History is obtained from the patient and chart review    History of Present Illness   Sylvie Bautista is a 88 year old female who presents with diarrhea since her g-tube was replaced on 5/18 and increasing weakness. She reports that the replacement was painful. At this time, she is having some upper abdominal pain but not all  over pain. She doesn't think she has had fever. She hasn't had vomiting.   G-tube in place since 2/10/22, was replaced with a DREAD key in the office on 5/18 by Dr. Jimenez. Diarrhea started with her first feed. Prior to the change she had some soft stools but not diarrhea.     Past Medical History    I have reviewed this patient's medical history and updated it with pertinent information if needed.   Past Medical History:   Diagnosis Date     Asymptomatic menopausal state     DXA 10/27/06 normal     Calculus of kidney     1990,s/p lithotripsy     Chronic kidney disease, stage III (moderate) (H)     7/27/2011     Diarrhea     11/6/2014     Dysphagia     2007,EGD 3/14/07 nl; sx from goiter.     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  Past Surgical History:   Procedure Laterality Date     APPENDECTOMY OPEN      1979,,& Meckel's diverticulum removed     COLONOSCOPY      11/17/06,polyps; repeat in 5 YEARS     COLONOSCOPY      11/8/11     COLONOSCOPY      11/6/2014     EGD      [place], normal but possible Zenker; considering esophagram     ESOPHAGOSCOPY, GASTROSCOPY, DUODENOSCOPY (EGD), COMBINED      3/14/07,done for dysphagia; normal     ESOPHAGOSCOPY, GASTROSCOPY, DUODENOSCOPY (EGD), COMBINED N/A 06/07/2018    Procedure: COMBINED ESOPHAGOSCOPY, GASTROSCOPY, DUODENOSCOPY (EGD);  Gastroscopy;  Surgeon: Bisi Rivera MD;  Location: [facility] OR     HYSTERECTOMY TOTAL ABDOMINAL,  BILATERAL SALPINGO-OOPHORECTOMY, COMBINED           LAPAROSCOPIC CHOLECYSTECTOMY      ,common bile duct transected     OTHER SURGICAL HISTORY      ,[id number],PREMALIG/BENIGN SKIN LESION EXCISION,epidermal inclusion cyst     OTHER SURGICAL HISTORY      10/26/2015,[id number],FOOT SURGERY,hammertoe repair, [facility] with Dr. Grimaldo     OTHER SURGICAL HISTORY      900387,IR BILIARY STRICTURE DILATATION WO STENT,x3- per h/p /Choledochojejunostomy with Vane-En-Y due to transection of common bile duct     THYROIDECTOMY      3/26/08,Total thyroidectomy; multinodular goiter       Prior to Admission Medications   Prior to Admission Medications   Prescriptions Last Dose Informant Patient Reported? Taking?   Blood Pressure Monitor MISC   Yes No   Sig: As directed. OMRON   Multiple Vitamins-Minerals (OCUVITE ADULT 50+) CAPS   Yes No   Si tablet by mouth once daily.   Sodium Fluoride (SF 5000 PLUS) 1.1 % CREA   Yes No   Sig: daily   UNABLE TO FIND   No No   Sig: MEDICATION NAME: Allevyn 4x4 gentle border light  Change dressing once every 5 days   acetaminophen (TYLENOL) 650 MG CR tablet   Yes No   Sig: Take 1,300 mg by mouth   ascorbic acid (VITAMIN C) 1000 MG TABS   Yes No   Si tablet oral daily   aspirin (ASA) 81 MG EC tablet   No No   Sig: Take 1 tablet (81 mg) by mouth daily   atorvastatin (LIPITOR) 10 MG tablet   No No   Sig: Take 1 tablet (10 mg) by mouth daily   calcium carbonate-vitamin D 600-200 MG-UNIT TABS   Yes No   Sig: Take 1 tablet by mouth daily   carboxymethylcellulose (CARBOXYMETHYLCELLULOSE SODIUM) 0.5 % SOLN ophthalmic solution   Yes No   Si drop both eyes once daily   docusate sodium (COLACE) 100 MG capsule   Yes No   Sig: Take 1 capsule by mouth 2 times daily as needed Take daily at bedtime   estradiol (ESTRACE) 0.1 MG/GM vaginal cream   No No   Sig: INSERT 2GM INTO THE VAGINA EVERY MONDAY AND FRIDAY   folic acid (FOLVITE) 1 MG tablet   Yes No   Sig: Take 1 mcg by mouth daily    gabapentin (NEURONTIN) 300 MG/6ML oral solution   No No   Sig: Take 6 mLs (300 mg) by mouth every morning AND 12 mLs (600 mg) daily at 2 pm AND 6 mLs (300 mg) At Bedtime.   levothyroxine (SYNTHROID/LEVOTHROID) 100 MCG tablet   No No   Sig: Take 1 tablet (100 mcg) by mouth daily   loratadine (CLARITIN) 10 MG tablet   Yes No   Sig: Take 1 tablet (10 mg) by mouth daily   losartan (COZAAR) 25 MG tablet   No No   Sig: TAKE 2 TABLETS BY MOUTH ONCE DAILY   pantoprazole (PROTONIX) 20 MG EC tablet   No No   Sig: TAKE 1 TABLET (20 MG) BY MOUTH DAILY   Patient taking differently: Take 20 mg by mouth 2 times daily    pantoprazole (PROTONIX) 40 MG EC tablet   No No   Sig: Take 1 tablet (40 mg) by mouth daily   traMADol (ULTRAM) 50 MG tablet   No No   Sig: Take 1 tablet (50 mg) by mouth every 6 hours as needed for severe pain   zolpidem (AMBIEN) 5 MG tablet   No No   Sig: TAKE 1 TABLET (5 MG) BY MOUTH AT BEDTIME      Facility-Administered Medications: None     Allergies   No Known Allergies    Social History   I have reviewed this patient's social history and updated it with pertinent information if needed. Sylvie Bautista  reports that she has been smoking cigarettes. She has a 15.00 pack-year smoking history. She has never used smokeless tobacco. She reports that she does not drink alcohol and does not use drugs.    Family History   I have reviewed this patient's family history and updated it with pertinent information if needed.   Family History   Problem Relation Age of Onset     Other - See Comments Mother          85yo, emphysema, dementia     Heart Disease Father         Heart Disease,MI in 50s,  62yo     Diabetes Father         Diabetes     Substance Abuse Father         Alcohol/Drug,Etoh     Diabetes Daughter         Diabetes     Other - See Comments Daughter         Psychiatric illness,depr/anxiety     Thyroid Disease Sister         Thyroid Disease     Breast Cancer No family hx of         Cancer-breast        REVIEW OF SYSTEMS  GENERAL: No fevers or chills.Has fatigue. Weight has been stable over the month or so.  HEENT: No sinus drainage. No changes with vision or hearing.   LYMPHATICS:  No swollen nodes in axilla, neck or groin.  CARDIOVASCULAR: Denies chest pain, palpitations and dyspnea on exertion.  PULMONARY: No increased shortness of breath or cough. No increase in sputum production.  GI:Denies melena, bright red blood in stools. No hematemesis. No constipation.  : No dysuria or hematuria.  SKIN: No recent rashes or ulcers.   HEMATOLOGY:  No history of easy bruising or bleeding.  ENDOCRINE:  No history of diabetes or thyroid problems.  NEUROLOGY:  No history of seizures or headaches. No motor or sensory changes.    Physical Exam   Temp: 98  F (36.7  C) Temp src: Temporal BP: 110/54 Pulse: 66   Resp: 11 SpO2: 92 % O2 Device: None (Room air)    Vital Signs with Ranges  Temp:  [98  F (36.7  C)] 98  F (36.7  C)  Pulse:  [65-71] 66  Resp:  [11-17] 11  BP: ()/(43-99) 110/54  SpO2:  [92 %-100 %] 92 %  122 lbs 0 oz    Constitutional: NAD, pleasant and cooperative  HEENT: normocephalic, no icterus, no nasal drainage, no oral lesions, mucus membranes pink and moist  Respiratory: lungs clear to ausculation bilaterally, no respiratory distress  Cardiovascular: heart regular rate and rhythm  Abdomen: bowel sounds +, soft and non-distended, moderate mid upper abdomen tenderness with palpation, no peritoneal signs  Skin: pink, warm and dry. No rash. Left shin with Alevyn in place-patient wants it left on for now  Musculoskeletal: calves soft and non-tender  Neurologic: grossly intact, AAO x 3  Psychiatric: appropriate affect    Data   -Data reviewed today: All pertinent laboratory and imaging results from this encounter were reviewed. I personally reviewed the abdominal CT image(s) showing feeding tube in transverse colon with contrast in distal colon, some small bubbles of free air in the area but no large  collection of free air or fluid noted. .  Recent Labs   Lab 05/20/22  1133   WBC 12.3*   HGB 10.8*   *      INR 1.21*      POTASSIUM 4.7   CHLORIDE 108*   CO2 24   BUN 42*   CR 1.93*   ANIONGAP 7   ELVIS 9.1   GLC 83   ALBUMIN 3.6   PROTTOTAL 7.5   BILITOTAL 0.8   ALKPHOS 48   ALT 12   AST 21       Recent Results (from the past 24 hour(s))   XR Abdomen 2 Views    Narrative    Exam: XR ABDOMEN 2VIEWS.    Exam Reason: G-tube placement, diarrhea    Comparison: 9/20/2022, 6/22/2021    FINDINGS: There is a percutaneous gastrostomy tube in the expected  position.    There is a mildly dilated loop of small bowel in the left abdomen with  couple of fluid levels. There is mild gaseous distention of the colon  with a few nonspecific fluid levels.      No acute osseous abnormality.    There are several radiopaque foci in the right lower lung, likely  aspirated barium.      Impression    IMPRESSION:    The percutaneous gastrostomy tube is in the expected position.    There is a mildly dilated loop of small bowel in the left abdomen with  a couple of fluid levels, which could be due to obstruction in the  appropriate clinical setting. There is also mild gaseous distention of  the colon with a couple of nonspecific fluid levels.    MARGARET TABARES MD         SYSTEM ID:  [id number]   CT Abdomen Pelvis w/o Contrast    Narrative    Exam: CT ABDOMEN PELVIS W/O CONTRAST    Exam reason: Bowel obstruction suspected    Technique: Using helical CT technique, axial images of the abdomen and  pelvis  were acquired without administration of IV contrast. Coronal  and sagittal reformats were performed.    Comparison: 5/20/2022, 6/23/2017.    FINDINGS:    NOTE: Noncontrast images are insensitive for detection of solid organ  abnormalities and some other types of pathology.    ABDOMEN:     Liver:  Postsurgical changes in the liver. No new focal findings.    Gallbladder:  Resected.  Bile Ducts:  No significantly dilated  ducts.  Spleen:  Normal size without focal abnormality.  Kidneys:  Normal size without hydronephrosis. No renal calculi.  Adrenals:  No nodules.  Pancreas:  No mass or peripancreatic fat stranding.   Lymph Nodes:   No significant adenopathy.   Vascular:  No aortic aneurysm.   Abdominal wall:   No acute findings.    Pelvis: No mass or adenopathy.    Bowel/Mesentery/Peritoneum:   -No evidence of bowel obstruction. The dilated loop of small bowel in  the recent abdominal radiograph is no longer present and was likely  physiologic.  -There is a left upper quadrant percutaneous gastrostomy tube which  terminates in the transverse colon. There is enteric contrast within  the distal transverse, descending, and sigmoid colon.  -There are a few small foci of pneumoperitoneum, likely  postprocedural.  -Trace free fluid in the pelvis.    Visualized portion of the Chest: There are several hyperdense foci  within the right lower lobe, likely aspirated barium. There is  dependent atelectasis and ground glass opacities in the posterior  aspects of both lower lobes.     Musculoskeletal: No acute osseous abnormality.       Impression    IMPRESSION:    There is a left upper quadrant percutaneous gastrostomy tube which  terminates in the transverse colon, with enteric contrast within the  lumen of the distal transverse, descending, and sigmoid colon. There  are couple small foci of pneumoperitoneum which are likely  postprocedural.    No evidence of small bowel obstruction.    Dependent atelectasis and ground glass opacities in the posterior  lower lobes, worse on the left, likely a atelectasis, however a  superimposed infectious process is possible.    These results were discussed with Luisito Rahman PA-C by Keith Ferrell MD on 5/20/2022 3:58 PM.     KEITH FERRELL MD         SYSTEM ID:  [id number]

## 2022-05-20 NOTE — ED PROVIDER NOTES
"  History     Chief Complaint   Patient presents with     Diarrhea     Fall     HPI  Sylvie Bautista is a 88 year old female who had her PEG-tube replaced on Wednesday, 5/18/2022 by Dr. Jimenez.   She reports that the entire process was \"very traumatic for her\".  She states she has never had that much pain with exchanging her G-tube.  She reports that she has been having loose and explosive diarrhea since the replacement.  Her appetite is decreased and she is been feeling weak and fatigued.  She slipped out of bed yesterday and fell but did not hit her head.  Denies any head injury.  She is not on any blood thinners.  She reports that she pretty much primarily consumes nutrition through her feeding tube.  She has not been able to take things orally for quite some time.  No recent antibiotic use.  No recent travel.  No new foods or restaurants.  Denies any fever or chills.  No nausea or vomiting.  No lightheadedness or dizziness.  No chest pain or shortness of breath.  Past medical history is significant for CABG, chronic kidney disease stage III,   Cigarette smoker, hypertension, hypothyroidism, iron deficiency anemia, IBS, liver abscess, mononeuritis, osteopenia, somatic dysfunction of the sacral region, grade 2 diastolic dysfunction, moderate tricuspid insufficiency, and Zenker's diverticulum.  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     Priority: Medium     Weight loss, non-intentional 02/16/2022     Priority: Medium     Status post insertion of percutaneous endoscopic gastrostomy (PEG) tube (H) 02/16/2022     Priority: Medium     Hammer toe of right foot 04/13/2021     Priority: Medium     Added automatically " from request for surgery [id number]       Right foot pain 04/13/2021     Priority: Medium     Added automatically from request for surgery [id number]       Grade II diastolic dysfunction- echo Jan 2020 01/31/2020     Priority: Medium     Moderate tricuspid insufficiency- echo Jan 2020 01/31/2020     Priority: Medium     Presbyesophagus 03/27/2019     Priority: Medium     Postmenopausal atrophic vaginitis 11/08/2018     Priority: Medium     Acute left-sided low back pain without sciatica 03/16/2018     Priority: Medium     Segmental and somatic dysfunction of sacral region 03/16/2018     Priority: Medium     Segmental and somatic dysfunction of pelvic region 03/16/2018     Priority: Medium     Tinea pedis 01/16/2018     Priority: Medium     Osteopenia 10/06/2017     Priority: Medium     Iron deficiency anemia 06/30/2017     Priority: Medium     Liver abscess 06/30/2017     Priority: Medium     Overview:   With biliary stricture and duct dilation.  MRI 6/2017       Low folic acid 05/26/2017     Priority: Medium     CAP (community acquired pneumonia) 06/15/2016     Priority: Medium     Cigarette smoker 06/05/2015     Priority: Medium     Overview:   Age 16 to current; 1/2 to 1 ppd       Raynaud phenomenon 06/05/2015     Priority: Medium     Irritable bowel syndrome 11/19/2014     Priority: Medium     Diarrhea 11/06/2014     Priority: Medium     Chronic kidney disease, stage III (moderate) (H) 07/27/2011     Priority: Medium     Hereditary and idiopathic peripheral neuropathy 03/09/2011     Priority: Medium     Mononeuritis 02/28/2011     Priority: Medium     Acid reflux disease 06/23/2010     Priority: Medium     Essential hypertension 06/23/2010     Priority: Medium     Hypothyroidism 06/23/2010     Priority: Medium     Insomnia 06/23/2010     Priority: Medium     Nontoxic multinodular goiter 03/26/2008     Priority: Medium        Past Medical History:    Past Medical History:   Diagnosis Date     Asymptomatic menopausal  state      Calculus of kidney      Chronic kidney disease, stage III (moderate) (H)      Diarrhea      Dysphagia      Essential (primary) hypertension      Nicotine dependence, uncomplicated      Other specified anxiety disorders      Personal history of other medical treatment (CODE)      Polyp of colon      Raynaud's syndrome without gangrene      Thyrotoxicosis with diffuse goiter and without thyroid storm        Past Surgical History:    Past Surgical History:   Procedure Laterality Date     APPENDECTOMY OPEN      ,,& Meckel's diverticulum removed     COLONOSCOPY      06,polyps; repeat in 5 YEARS     COLONOSCOPY      11     COLONOSCOPY      2014     EGD      [place], normal but possible Zenker; considering esophagram     ESOPHAGOSCOPY, GASTROSCOPY, DUODENOSCOPY (EGD), COMBINED      3/14/07,done for dysphagia; normal     ESOPHAGOSCOPY, GASTROSCOPY, DUODENOSCOPY (EGD), COMBINED N/A 2018    Procedure: COMBINED ESOPHAGOSCOPY, GASTROSCOPY, DUODENOSCOPY (EGD);  Gastroscopy;  Surgeon: Bisi Rivera MD;  Location: [facility] OR     HYSTERECTOMY TOTAL ABDOMINAL, BILATERAL SALPINGO-OOPHORECTOMY, COMBINED           LAPAROSCOPIC CHOLECYSTECTOMY      ,common bile duct transected     OTHER SURGICAL HISTORY      ,[id number],PREMALIG/BENIGN SKIN LESION EXCISION,epidermal inclusion cyst     OTHER SURGICAL HISTORY      10/26/2015,[id number],FOOT SURGERY,hammertoe repair, [facility] with Dr. Grimaldo     OTHER SURGICAL HISTORY      [id number],IR BILIARY STRICTURE DILATATION WO STENT,x3- per h/p /Choledochojejunostomy with Vane-En-Y due to transection of common bile duct     THYROIDECTOMY      3/26/08,Total thyroidectomy; multinodular goiter       Family History:    Family History   Problem Relation Age of Onset     Other - See Comments Mother          87yo, emphysema, dementia     Heart Disease Father         Heart Disease,MI in 50s,  64yo     Diabetes Father         Diabetes     Substance  Abuse Father         Alcohol/Drug,Etoh     Diabetes Daughter         Diabetes     Other - See Comments Daughter         Psychiatric illness,depr/anxiety     Thyroid Disease Sister         Thyroid Disease     Breast Cancer No family hx of         Cancer-breast       Social History:  Marital Status:   [4]  Social History     Tobacco Use     Smoking status: Current Every Day Smoker     Packs/day: 0.50     Years: 30.00     Pack years: 15.00     Types: Cigarettes     Smokeless tobacco: Never Used     Tobacco comment: no ecig   Vaping Use     Vaping Use: Never used   Substance Use Topics     Alcohol use: No     Alcohol/week: 0.0 standard drinks     Drug use: No        Medications:    acetaminophen (TYLENOL) 650 MG CR tablet  ascorbic acid (VITAMIN C) 1000 MG TABS  aspirin (ASA) 81 MG EC tablet  atorvastatin (LIPITOR) 10 MG tablet  Blood Pressure Monitor MISC  calcium carbonate-vitamin D 600-200 MG-UNIT TABS  carboxymethylcellulose (CARBOXYMETHYLCELLULOSE SODIUM) 0.5 % SOLN ophthalmic solution  docusate sodium (COLACE) 100 MG capsule  estradiol (ESTRACE) 0.1 MG/GM vaginal cream  folic acid (FOLVITE) 1 MG tablet  gabapentin (NEURONTIN) 300 MG/6ML oral solution  levothyroxine (SYNTHROID/LEVOTHROID) 100 MCG tablet  loratadine (CLARITIN) 10 MG tablet  losartan (COZAAR) 25 MG tablet  Multiple Vitamins-Minerals (OCUVITE ADULT 50+) CAPS  pantoprazole (PROTONIX) 20 MG EC tablet  pantoprazole (PROTONIX) 40 MG EC tablet  Sodium Fluoride (SF 5000 PLUS) 1.1 % CREA  traMADol (ULTRAM) 50 MG tablet  UNABLE TO FIND  zolpidem (AMBIEN) 5 MG tablet          Review of Systems   Constitutional: Negative for fever.   HENT: Negative for facial swelling and sore throat.    Eyes: Negative for pain.   Respiratory: Negative for wheezing and stridor.    Cardiovascular: Negative for chest pain.   Gastrointestinal: Positive for diarrhea. Negative for abdominal pain, nausea and vomiting.   Genitourinary: Negative for flank pain.    Musculoskeletal: Negative for back pain and neck pain.   Skin: Negative for pallor.   Neurological: Negative for tremors and seizures.   Psychiatric/Behavioral: Negative for agitation.   All other systems reviewed and are negative.      Physical Exam   BP: 99/43  Pulse: 71  Temp: 98  F (36.7  C)  Resp: 17  Weight: 55.3 kg (122 lb)  SpO2: 100 %      Physical Exam  Vitals and nursing note reviewed.   Constitutional:       General: She is not in acute distress.     Appearance: Normal appearance. She is not ill-appearing or toxic-appearing.   HENT:      Head: Normocephalic. No raccoon eyes, right periorbital erythema or left periorbital erythema.      Right Ear: No drainage or tenderness.      Left Ear: No drainage or tenderness.      Nose: Nose normal.   Eyes:      General: Lids are normal. Gaze aligned appropriately. No scleral icterus.     Extraocular Movements: Extraocular movements intact.   Neck:      Trachea: No tracheal deviation.   Cardiovascular:      Rate and Rhythm: Normal rate.   Pulmonary:      Effort: Pulmonary effort is normal. No respiratory distress.      Breath sounds: No stridor. No wheezing.      Comments: Lung sounds are clear.  SaO2 is 100% on room air.  She does not appear to be in any respiratory distress.  No tachypnea.  Abdominal:      General: Abdomen is flat. Bowel sounds are normal. There is no distension.      Palpations: Abdomen is soft.      Tenderness: There is no abdominal tenderness.      Comments: Abdomen is soft nontender no rebound or masses bowel sounds are normal.  Her G-tube is in the left lower quadrant area.  There are no signs of surrounding skin cellulitis or inflammation.   Musculoskeletal:         General: No deformity or signs of injury. Normal range of motion.      Cervical back: Normal range of motion. No signs of trauma.   Skin:     General: Skin is warm and dry.      Coloration: Skin is not jaundiced or pale.   Neurological:      General: No focal deficit present.       Mental Status: She is alert and oriented to person, place, and time.      GCS: GCS eye subscore is 4. GCS verbal subscore is 5. GCS motor subscore is 6.      Motor: No tremor or seizure activity.   Psychiatric:         Attention and Perception: Attention normal.         Mood and Affect: Mood normal.         ED Course     EKG shows normal sinus rhythm with a heart rate of 64.    Results for orders placed or performed during the hospital encounter of 05/20/22 (from the past 24 hour(s))   CBC with platelets differential    Narrative    The following orders were created for panel order CBC with platelets differential.  Procedure                               Abnormality         Status                     ---------                               -----------         ------                     CBC with platelets and d...[[id number]]  Abnormal            Final result                 Please view results for these tests on the individual orders.   INR   Result Value Ref Range    INR 1.21 (H) 0.85 - 1.15   Comprehensive metabolic panel   Result Value Ref Range    Sodium 139 134 - 144 mmol/L    Potassium 4.7 3.5 - 5.1 mmol/L    Chloride 108 (H) 98 - 107 mmol/L    Carbon Dioxide (CO2) 24 21 - 31 mmol/L    Anion Gap 7 3 - 14 mmol/L    Urea Nitrogen 42 (H) 7 - 25 mg/dL    Creatinine 1.93 (H) 0.60 - 1.20 mg/dL    Calcium 9.1 8.6 - 10.3 mg/dL    Glucose 83 70 - 105 mg/dL    Alkaline Phosphatase 48 34 - 104 U/L    AST 21 13 - 39 U/L    ALT 12 7 - 52 U/L    Protein Total 7.5 6.4 - 8.9 g/dL    Albumin 3.6 3.5 - 5.7 g/dL    Bilirubin Total 0.8 0.3 - 1.0 mg/dL    GFR Estimate 24 (L) >60 mL/min/1.73m2   Troponin I   Result Value Ref Range    Troponin I 22.9 0.0 - 34.0 pg/mL   Magnesium   Result Value Ref Range    Magnesium 2.3 1.9 - 2.7 mg/dL   CRP inflammation   Result Value Ref Range    CRP Inflammation 169.1 (H) <10.0 mg/L   Nt probnp inpatient (BNP)   Result Value Ref Range    N terminal Pro BNP Inpatient 359 (H) 0 - 100 pg/mL    CBC with platelets and differential   Result Value Ref Range    WBC Count 12.3 (H) 4.0 - 11.0 10e3/uL    RBC Count 3.35 (L) 3.80 - 5.20 10e6/uL    Hemoglobin 10.8 (L) 11.7 - 15.7 g/dL    Hematocrit 34.2 (L) 35.0 - 47.0 %     (H) 78 - 100 fL    MCH 32.2 26.5 - 33.0 pg    MCHC 31.6 31.5 - 36.5 g/dL    RDW 13.2 10.0 - 15.0 %    Platelet Count 179 150 - 450 10e3/uL    % Neutrophils 75 %    % Lymphocytes 23 %    % Monocytes 2 %    % Eosinophils 0 %    % Basophils 0 %    % Immature Granulocytes 0 %    NRBCs per 100 WBC 0 <1 /100    Absolute Neutrophils 9.0 (H) 1.6 - 8.3 10e3/uL    Absolute Lymphocytes 2.9 0.8 - 5.3 10e3/uL    Absolute Monocytes 0.3 0.0 - 1.3 10e3/uL    Absolute Eosinophils 0.0 0.0 - 0.7 10e3/uL    Absolute Basophils 0.0 0.0 - 0.2 10e3/uL    Absolute Immature Granulocytes 0.0 <=0.4 10e3/uL    Absolute NRBCs 0.0 10e3/uL   XR Abdomen 2 Views    Narrative    Exam: XR ABDOMEN 2VIEWS.    Exam Reason: G-tube placement, diarrhea    Comparison: 9/20/2022, 6/22/2021    FINDINGS: There is a percutaneous gastrostomy tube in the expected  position.    There is a mildly dilated loop of small bowel in the left abdomen with  couple of fluid levels. There is mild gaseous distention of the colon  with a few nonspecific fluid levels.      No acute osseous abnormality.    There are several radiopaque foci in the right lower lung, likely  aspirated barium.      Impression    IMPRESSION:    The percutaneous gastrostomy tube is in the expected position.    There is a mildly dilated loop of small bowel in the left abdomen with  a couple of fluid levels, which could be due to obstruction in the  appropriate clinical setting. There is also mild gaseous distention of  the colon with a couple of nonspecific fluid levels.    MARGARET TABARES MD         SYSTEM ID:  [id number]   Clostridium difficile toxin B PCR    Specimen: Per Rectum; Stool   Result Value Ref Range    C Difficile Toxin B by PCR Negative Negative    Ribotype  027/NAP1/B1 Presumptive Negative Presumptive Negative    Narrative    The [employer] Xpert C. difficile Assay, performed on the [employer]  Instrument Systems, is a qualitative in vitro diagnostic test for rapid detection of toxin B gene sequences from unformed (liquid or soft) stool specimens collected from patients suspected of having Clostridioides difficile infection (CDI). The test utilizes automated real-time polymerase chain reaction (PCR) to detect toxin gene sequences associated with toxin producing C. difficile. The Xpert C. difficile Assay is intended as an aid in the diagnosis of CDI.   Asymptomatic Influenza A/B & SARS-CoV2 (COVID-19) Virus PCR Multiplex Nasopharyngeal    Specimen: Nasopharyngeal; Swab   Result Value Ref Range    Influenza A PCR Negative Negative    Influenza B PCR Negative Negative    RSV PCR Negative Negative    SARS CoV2 PCR Negative Negative    Narrative    Testing was performed using the Xpert Xpress CoV2/Flu/RSV Assay on the [email or web address] Instrument. This test should be ordered for the detection of SARS-CoV-2 and influenza viruses in individuals who meet clinical and/or epidemiological criteria. Test performance is unknown in asymptomatic patients. This test is for in vitro diagnostic use under the FDA EUA for laboratories certified under CLIA to perform high or moderate complexity testing. This test has not been FDA cleared or approved. A negative result does not rule out the presence of PCR inhibitors in the specimen or target RNA in concentration below the limit of detection for the assay. If only one viral target is positive but coinfection with multiple targets is suspected, the sample should be re-tested with another FDA cleared, approved, or authorized test, if coinfection would change clinical management. This test was validated by the [facility]. These laboratories are certified under the Clinical  Laboratory Improvement Amendments of 1988  (CLIA-88) as qualified to perform high complexity laboratory testing.   CT Abdomen Pelvis w/o Contrast    Narrative    Exam: CT ABDOMEN PELVIS W/O CONTRAST    Exam reason: Bowel obstruction suspected    Technique: Using helical CT technique, axial images of the abdomen and  pelvis  were acquired without administration of IV contrast. Coronal  and sagittal reformats were performed.    Comparison: 5/20/2022, 6/23/2017.    FINDINGS:    NOTE: Noncontrast images are insensitive for detection of solid organ  abnormalities and some other types of pathology.    ABDOMEN:     Liver:  Postsurgical changes in the liver. No new focal findings.    Gallbladder:  Resected.  Bile Ducts:  No significantly dilated ducts.  Spleen:  Normal size without focal abnormality.  Kidneys:  Normal size without hydronephrosis. No renal calculi.  Adrenals:  No nodules.  Pancreas:  No mass or peripancreatic fat stranding.   Lymph Nodes:   No significant adenopathy.   Vascular:  No aortic aneurysm.   Abdominal wall:   No acute findings.    Pelvis: No mass or adenopathy.    Bowel/Mesentery/Peritoneum:   -No evidence of bowel obstruction. The dilated loop of small bowel in  the recent abdominal radiograph is no longer present and was likely  physiologic.  -There is a left upper quadrant percutaneous gastrostomy tube which  terminates in the transverse colon. There is enteric contrast within  the distal transverse, descending, and sigmoid colon.  -There are a few small foci of pneumoperitoneum, likely  postprocedural.  -Trace free fluid in the pelvis.    Visualized portion of the Chest: There are several hyperdense foci  within the right lower lobe, likely aspirated barium. There is  dependent atelectasis and ground glass opacities in the posterior  aspects of both lower lobes.     Musculoskeletal: No acute osseous abnormality.       Impression    IMPRESSION:    There is a left upper quadrant percutaneous gastrostomy tube which  terminates in the  transverse colon, with enteric contrast within the  lumen of the distal transverse, descending, and sigmoid colon. There  are couple small foci of pneumoperitoneum which are likely  postprocedural.    No evidence of small bowel obstruction.    Dependent atelectasis and ground glass opacities in the posterior  lower lobes, worse on the left, likely a atelectasis, however a  superimposed infectious process is possible.    These results were discussed with Luisito Rahman PA-C by Keith Ferrell MD on 5/20/2022 3:58 PM.     KEITH FERRELL MD         SYSTEM ID:  [id number]   UA with Microscopic reflex to Culture    Specimen: Urine, Midstream   Result Value Ref Range    Color Urine Light Yellow Colorless, Straw, Light Yellow, Yellow    Appearance Urine Clear Clear    Glucose Urine Negative Negative mg/dL    Bilirubin Urine Negative Negative    Ketones Urine Negative Negative mg/dL    Specific Gravity Urine 1.018 1.000 - 1.030    Blood Urine Negative Negative    pH Urine 5.5 5.0 - 9.0    Protein Albumin Urine 30  (A) Negative mg/dL    Urobilinogen Urine Normal Normal, 2.0 mg/dL    Nitrite Urine Negative Negative    Leukocyte Esterase Urine Small (A) Negative    Bacteria Urine Few (A) None Seen /HPF    Mucus Urine Present (A) None Seen /LPF    RBC Urine 1 <=2 /HPF    WBC Urine 1 <=5 /HPF    Squamous Epithelials Urine 3 (H) <=1 /HPF    Narrative    Urine Culture not indicated       Medications   0.9% sodium chloride BOLUS (0 mLs Intravenous Stopped 5/20/22 1542)     Followed by   sodium chloride 0.9% infusion ( Intravenous New Bag 5/20/22 1650)   lidocaine 1 % 0.1-1 mL (has no administration in time range)   lidocaine (LMX4) cream (has no administration in time range)   sodium chloride (PF) 0.9% PF flush 3 mL (has no administration in time range)   sodium chloride (PF) 0.9% PF flush 3 mL (has no administration in time range)   ondansetron (ZOFRAN ODT) ODT tab 4 mg (has no administration in time range)     Or   ondansetron  (ZOFRAN) injection 4 mg (has no administration in time range)   prochlorperazine (COMPAZINE) injection 5 mg (has no administration in time range)     Or   prochlorperazine (COMPAZINE) tablet 5 mg (has no administration in time range)     Or   prochlorperazine (COMPAZINE) suppository 12.5 mg (has no administration in time range)   bisacodyl (DULCOLAX) Suppository 10 mg (has no administration in time range)   HYDROmorphone (DILAUDID) injection 0.2 mg (has no administration in time range)   iohexol (OMNIPAQUE) 9 MG/ML oral solution 1,000 mL (250 mLs Oral Given 5/20/22 1532)       Assessments & Plan (with Medical Decision Making)     I have reviewed the nursing notes.    I have reviewed the findings, diagnosis, plan and need for follow up with the patient.       ED to Inpatient Handoff:    Discussed with Dr. Telles, Dr. Rivrea at [facility]  Patient accepted for Inpatient Stay  Pending studies include none  Code Status: Not Addressed           New Prescriptions    No medications on file       Final diagnoses:   Loose stools   Generalized muscle weakness   Gastrostomy tube dysfunction (H)     Afebrile.  Vital signs stable.  Patient with recent G-tube replacement.  Reporting the procedure was very painful and since then has had frequent loose stools.  Feels weak and fatigued.  Differential diagnosis for sudden onset of diarrhea includes but is not limited to viruses, food poisoning, Giardia, Shigella, drug reaction especially from antibiotics, ulcerative colitis, ischemic colitis, fecal impaction, laxative abuse, and emotional stress.  IV established and she was given fluids.  EKG shows normal sinus rhythm with a heart rate of 64.  Troponin is normal.  INR is 1.21.  CRP is elevated at 169.1.  BNP is elevated at 359, no previous for comparison.  CBC shows elevated white blood cells at 12.3 with a left shift.  Her hemoglobin is 10.8.  C. difficile is negative.  Influenza, RSV and COVID tests are negative.  Her CMP shows a GFR of  24, creatinine is 1.93 and BUN is 42.  Initial abdominal x-rays show The percutaneous gastrostomy tube is in the expected position.  There is a mildly dilated loop of small bowel in the left abdomen with a couple of fluid levels, which could be due to obstruction in the appropriate clinical setting. There is also mild gaseous distention of the colon with a couple of nonspecific fluid levels.  The patient's UA shows a small amount of leukocyte Estrace and few bacteria but she has normal white blood cells and increased squamous cells most likely contaminated sample.  This patient cannot take oral contrast.  She was given oral contrast through her G-tube and a CT of her abdomen and pelvis was performed which shows There is a left upper quadrant percutaneous gastrostomy tube which terminates in the transverse colon, with enteric contrast within the lumen of the distal transverse, descending, and sigmoid colon. There are couple small foci of pneumoperitoneum which are likely Postprocedural.  No evidence of small bowel obstruction.   Dependent atelectasis and ground glass opacities in the posterior lower lobes, worse on the left, likely a atelectasis, however a superimposed infectious process is possible.  I consulted with Dr. Rivera.  I reviewed the CT findings with the patient as well and discussed the fact that CT imaging is two-dimensional and is not always 100% accurate.  The patient is not very happy about this.  She will need further general Surgical evaluation to possibly include laparoscopic evaluation.  The patient will be admitted at this time for further surgical and medical management.  5/20/2022   [facility] HospitalLuisito PA-C  05/20/22 9793

## 2022-05-20 NOTE — H&P
[facility]    History and Physical - Hospitalist Service       Date of Admission:  5/20/2022    Assessment & Plan      Sylvie Bautista is a 88 year old female admitted on 5/20/2022. She presented with abdominal pain and diarrhea and ws found to have left upper quadrant percutaneous gastrostomy tube terminating in transverse colon.    Intra-abdominal infection  Suspected secondary to colon and gastric leak.  Afebrile and no leukocytosis.  CRP elevated at 169.1.  - IV fluids.  - Cefoxitin, day 1.    Presbyesophagus, Zenker's diverticulum  History of Vane-en-Y  Gastrostomy tube dysfunction  General surgery consulted.  She would need significant surgery to correct placement of g-tube, repair colon and stomach, and place new feeding tube.  She will make her decision regarding surgery tomorrow.  - Plan to transfer for higher level of care if she decides to proceed with surgical correction.  - NPO.  - Pneumatic compression devices.  - Appreciate general surgery.    Acute kidney injury  Creatinine 1.93 upon presentation.  Baseline creatinine 1.35.  Suspect prerenal from dehydration.  - Start IV fluids.  - Continue to monitor.    Chronic anemia  Hgb 10.8.  No evidence of acute bleeding.  - Stable at baseline.  Continue to monitor.    Hypertension  BP at goal < 130/80.  - Hold home Losartan 50 mg daily.    Hyperlipidemia  - Hold home Atorvastatin 10 mg daily.    Hypothyroidism  - Hold home Levothyroxine 100 mcg daily.    GERD  - Hold home Pantoprazole.    Neuropathy  - Hold home Gabapentin.       Diet: NPO per Anesthesia Guidelines for Procedure/Surgery Except for: Meds    DVT Prophylaxis: Pneumatic Compression Devices  Celeste Catheter: Not present  Central Lines: None  Cardiac Monitoring: None  Code Status: Full Code      Disposition Plan   Expected Discharge: tomorrow  Anticipated discharge location: Hospice versus transfer to higher level of care       The patient's care was discussed with the  "Patient and Surgical Consultant.    Jolly Telles DO  Hospitalist Service  [facility]    ______________________________________________________________________    Chief Complaint   Abdominal pain, diarrhea    History is obtained from the patient and her daughter    History of Present Illness      Sylvie Bautista is a 88 year old female with past medical history of complicated cholecystectomy, Vane-en-Y, presbyesophagus and Zenker's diverticulum S/P feeding tube, hypertension, hyperlipidemia, hypothyroidism, GERD, and neuropathy who presented with abdominal pain and diarrhea since her PEG tube was changed to a DREAD-Key tube on 5/18.  She reports that the procedure was difficult and painful, and she felt that something was \"not right\" immediately afterwards.  She has had increasing upper abdominal pain since and frequent, occasionally \"explosive\" watery diarrhea.  Some of her stools have looked black and tarry, which she believes is due to Pepto-Bismol use.  She has not seen any blood or mucous.  Diarrhea has begun to slow down now, but abdominal pain has been persistent, prompting her to be evaluated in the emergency department.    Work-up revealed leukocytosis, chronic anemia, acute kidney injury, elevated CRP, negative C diff, and CT abdomen/pelvis significant for left upper quadrant percutaneous gastrostomy tube which terminates in the transverse colon with enteric contrast within the lumen of the distal transverse, descending, and sigmoid colon.    General surgery has been consulted, and she is admitted for medical management.    Review of Systems    Review of Systems   Constitutional: Negative for chills and fever.   HENT: Negative for congestion, rhinorrhea and sore throat.    Eyes: Negative for visual disturbance.   Respiratory: Negative for cough and shortness of breath.    Cardiovascular: Negative for chest pain and palpitations.   Gastrointestinal: Positive for abdominal pain and " diarrhea. Negative for blood in stool, nausea and vomiting.   Genitourinary: Negative for dysuria and hematuria.   Musculoskeletal: Negative for arthralgias.   Skin: Positive for wound.   Neurological: Positive for numbness.        Peripheral neuropathy, chronic and unchanged.     Past Medical History    I have reviewed this patient's medical history and updated it with pertinent information if needed.   Past Medical History:   Diagnosis Date     Asymptomatic menopausal state     DXA 10/27/06 normal     Calculus of kidney     1990,s/p lithotripsy     Chronic kidney disease, stage III (moderate) (H)     7/27/2011     Diarrhea     11/6/2014     Dysphagia     2007,EGD 3/14/07 nl; sx from goiter.     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  Past Surgical History:   Procedure Laterality Date     APPENDECTOMY OPEN      1979,,& Meckel's diverticulum removed     COLONOSCOPY      11/17/06,polyps; repeat in 5 YEARS     COLONOSCOPY      11/8/11     COLONOSCOPY      11/6/2014     EGD      [place], normal but possible Zenker; considering esophagram     ESOPHAGOSCOPY, GASTROSCOPY, DUODENOSCOPY (EGD), COMBINED      3/14/07,done for dysphagia; normal     ESOPHAGOSCOPY, GASTROSCOPY, DUODENOSCOPY (EGD), COMBINED N/A 06/07/2018    Procedure: COMBINED ESOPHAGOSCOPY, GASTROSCOPY, DUODENOSCOPY (EGD);  Gastroscopy;  Surgeon: Bisi Rivera MD;  Location: [facility] OR     HYSTERECTOMY TOTAL  ABDOMINAL, BILATERAL SALPINGO-OOPHORECTOMY, COMBINED           LAPAROSCOPIC CHOLECYSTECTOMY      ,common bile duct transected     OTHER SURGICAL HISTORY      ,[id number],PREMALIG/BENIGN SKIN LESION EXCISION,epidermal inclusion cyst     OTHER SURGICAL HISTORY      10/26/2015,[id number],FOOT SURGERY,hammertoe repair, [facility] with Dr. Grimaldo     OTHER SURGICAL HISTORY      [id number],IR BILIARY STRICTURE DILATATION WO STENT,x3- per h/p /Choledochojejunostomy with Vane-En-Y due to transection of common bile duct     THYROIDECTOMY      3/26/08,Total thyroidectomy; multinodular goiter     Social History   I have reviewed this patient's social history and updated it with pertinent information if needed.  Social History     Tobacco Use     Smoking status: Current Every Day Smoker     Packs/day: 0.50     Years: 30.00     Pack years: 15.00     Types: Cigarettes     Smokeless tobacco: Never Used     Tobacco comment: no ecig   Vaping Use     Vaping Use: Never used   Substance Use Topics     Alcohol use: No     Alcohol/week: 0.0 standard drinks     Drug use: No     Family History   I have reviewed this patient's family history and updated it with pertinent information if needed.  Family History   Problem Relation Age of Onset     Other - See Comments Mother          85yo, emphysema, dementia     Heart Disease Father         Heart Disease,MI in 50s,  62yo     Diabetes Father         Diabetes     Substance Abuse Father         Alcohol/Drug,Etoh     Diabetes Daughter         Diabetes     Other - See Comments Daughter         Psychiatric illness,depr/anxiety     Thyroid Disease Sister         Thyroid Disease     Breast Cancer No family hx of         Cancer-breast     Prior to Admission Medications   Prior to Admission Medications   Prescriptions Last Dose Informant Patient Reported? Taking?   Blood Pressure Monitor MISC   Yes No   Sig: As directed. OMRON   Multiple Vitamins-Minerals (OCUVITE ADULT 50+) CAPS    Yes No   Si tablet by mouth once daily.   Sodium Fluoride (SF 5000 PLUS) 1.1 % CREA   Yes No   Sig: daily   UNABLE TO FIND   No No   Sig: MEDICATION NAME: Allevyn 4x4 gentle border light  Change dressing once every 5 days   acetaminophen (TYLENOL) 650 MG CR tablet   Yes No   Sig: Take 1,300 mg by mouth   ascorbic acid (VITAMIN C) 1000 MG TABS   Yes No   Si tablet oral daily   aspirin (ASA) 81 MG EC tablet   No No   Sig: Take 1 tablet (81 mg) by mouth daily   atorvastatin (LIPITOR) 10 MG tablet   No No   Sig: Take 1 tablet (10 mg) by mouth daily   calcium carbonate-vitamin D 600-200 MG-UNIT TABS   Yes No   Sig: Take 1 tablet by mouth daily   carboxymethylcellulose (CARBOXYMETHYLCELLULOSE SODIUM) 0.5 % SOLN ophthalmic solution   Yes No   Si drop both eyes once daily   docusate sodium (COLACE) 100 MG capsule   Yes No   Sig: Take 1 capsule by mouth 2 times daily as needed Take daily at bedtime   estradiol (ESTRACE) 0.1 MG/GM vaginal cream   No No   Sig: INSERT 2GM INTO THE VAGINA EVERY MONDAY AND FRIDAY   folic acid (FOLVITE) 1 MG tablet   Yes No   Sig: Take 1 mcg by mouth daily   gabapentin (NEURONTIN) 300 MG/6ML oral solution   No No   Sig: Take 6 mLs (300 mg) by mouth every morning AND 12 mLs (600 mg) daily at 2 pm AND 6 mLs (300 mg) At Bedtime.   levothyroxine (SYNTHROID/LEVOTHROID) 100 MCG tablet   No No   Sig: Take 1 tablet (100 mcg) by mouth daily   loratadine (CLARITIN) 10 MG tablet   Yes No   Sig: Take 1 tablet (10 mg) by mouth daily   losartan (COZAAR) 25 MG tablet   No No   Sig: TAKE 2 TABLETS BY MOUTH ONCE DAILY   pantoprazole (PROTONIX) 20 MG EC tablet   No No   Sig: TAKE 1 TABLET (20 MG) BY MOUTH DAILY   Patient taking differently: Take 20 mg by mouth 2 times daily    pantoprazole (PROTONIX) 40 MG EC tablet   No No   Sig: Take 1 tablet (40 mg) by mouth daily   traMADol (ULTRAM) 50 MG tablet   No No   Sig: Take 1 tablet (50 mg) by mouth every 6 hours as needed for severe pain   zolpidem  (AMBIEN) 5 MG tablet   No No   Sig: TAKE 1 TABLET (5 MG) BY MOUTH AT BEDTIME      Facility-Administered Medications: None     Allergies   No Known Allergies    Physical Exam   Vital Signs: Temp: 98.6  F (37  C) Temp src: Tympanic BP: 114/62 Pulse: 68   Resp: 18 SpO2: 92 % O2 Device: None (Room air)    Weight: 122 lbs 0 oz  Physical Exam  Constitutional:       General: She is not in acute distress.     Appearance: Normal appearance. She is not ill-appearing.   HENT:      Mouth/Throat:      Mouth: Mucous membranes are dry.      Pharynx: No oropharyngeal exudate or posterior oropharyngeal erythema.   Eyes:      General:         Right eye: No discharge.         Left eye: No discharge.   Cardiovascular:      Rate and Rhythm: Normal rate and regular rhythm.      Heart sounds: No murmur heard.    No friction rub. No gallop.   Pulmonary:      Effort: Pulmonary effort is normal.      Breath sounds: Normal breath sounds. No wheezing, rhonchi or rales.   Abdominal:      General: Bowel sounds are normal.      Palpations: Abdomen is soft.      Tenderness: There is abdominal tenderness in the right upper quadrant, right lower quadrant, epigastric area and left upper quadrant.   Musculoskeletal:         General: No swelling.      Cervical back: Neck supple. No tenderness.   Lymphadenopathy:      Cervical: No cervical adenopathy.   Skin:     Capillary Refill: Capillary refill takes less than 2 seconds.      Comments: Anterior wound of left lower leg, bandage clean/dry/intact without surrounding erythema or swelling. No drainage.   Neurological:      General: No focal deficit present.      Mental Status: She is alert.   Psychiatric:         Mood and Affect: Mood normal.       Data   Data reviewed today: I reviewed all medications, new labs and imaging results over the last 24 hours. I personally reviewed the EKG tracing showing normal sinus rhythm.    Recent Labs   Lab 05/20/22  1133   WBC 12.3*   HGB 10.8*   *       INR 1.21*      POTASSIUM 4.7   CHLORIDE 108*   CO2 24   BUN 42*   CR 1.93*   ANIONGAP 7   ELVIS 9.1   GLC 83   ALBUMIN 3.6   PROTTOTAL 7.5   BILITOTAL 0.8   ALKPHOS 48   ALT 12   AST 21

## 2022-05-21 NOTE — PROVIDER NOTIFICATION
05/21/22 0324   Vital Signs   Temp 97.9  F (36.6  C)   Temp src Tympanic   Resp 18   Oximeter Heart Rate 71 bpm   /62   Oxygen Therapy   SpO2 90 %   O2 Device None (Room air)     Patient having pain in lower abdomen/back.  Given dilaudid for pain.  Patient and daughter are very upset about circumstances and trying to decide on surgery vs comfort care.  Patient has been unable to sleep, tearful.  Updated Dr. Telles who gave VORB for ativan 0.5 mg once. Provided emotional support.  Will continue to monitor.

## 2022-05-21 NOTE — PLAN OF CARE
Patient and family have made the descion for the patient to go comfort care. She has denied pain. She remains npo, is on Iv fluids. Family at bedside.  Problem: Palliative Care  Goal: Enhanced Quality of Life  Intervention: Maximize Comfort  Recent Flowsheet Documentation  Taken 5/21/2022 0812 by Roopa Barnett, RN  Pain Management Interventions: declines   Goal Outcome Evaluation:    Plan of Care Reviewed With: patient     Overall Patient Progress: no change

## 2022-05-21 NOTE — PROGRESS NOTES
Gilma from [facility] is looking at admitting patient at 1300 on Sunday the 22. They will call back to confirm but requesting info on patient to be faxed to intake. Any Reeder RN 5/21/2022 12:24 PM    Information has been faxed. Any Reeder RN 5/21/2022 2:18 PM

## 2022-05-21 NOTE — PROGRESS NOTES
[facility]  Surgical Progress Note    Assessment & Plan   Sylvie Bautista is a 88 year old female who was admitted on 5/20/2022.    Active Problems:    Abdominal infection (H)    Assessment: acute-leaking colon/stomach    Plan: iv antibiotics, on comfort cares. Hospice is coming sometime today/tomorrow for discussion. Family is working on plan for care. Will advance diet to soft as tolerated. Pain medications as needed.     Generalized muscle weakness    Loose stools    Gastrostomy tube dysfunction (H)    Assessment: don't use    Plan: leave in place    # Pain Assessment:  Current Pain Score 5/21/2022   Patient currently in pain? yes   Pain score (0-10) -   Pain location -   - Sylvie is experiencing pain due to g tube malposition. Pain management was discussed with Sylvie and her family and the plan was created in a collaborative fashion.  Sylvie's response to the current recommendations: engaged  - continue with current regimen      DVT Prophylaxis: Pneumatic Compression Devices  Code Status: No CPR- Do NOT Intubate    Bisi Rivera MD    Interval History   Long discussions overnight with her family-has decided that she prefers not to pursue surgery. Will be on comfort cares and will have hospice referral. Will advance diet and continue with current pain medications. The dilaudid and ativan helped her sleep and helped her pain. Discussion with her and her daughters today.  -Data reviewed today: I reviewed all new labs and imaging results over the last 24 hours.    Physical Exam   Temp: 98.2  F (36.8  C) Temp src: Tympanic BP: (!) 162/72 Pulse: 61   Resp: 20 SpO2: 95 % O2 Device: None (Room air)    Vitals:    05/20/22 1055 05/21/22 0604   Weight: 55.3 kg (122 lb) 62.8 kg (138 lb 7.2 oz)     Vital Signs with Ranges  Temp:  [97.9  F (36.6  C)-98.6  F (37  C)] 98.2  F (36.8  C)  Pulse:  [61-98] 61  Resp:  [11-20] 20  BP: (101-162)/(50-99) 162/72  SpO2:  [90 %-95 %] 95 %  I/O last 3  completed shifts:  In: 1327 [I.V.:1327]  Out: 200 [Urine:200]    Constitutional: No acute distress, pleasant and cooperative, intermittently tearful.  Respiratory: Clear lungs, no respiratory distress  Cardiovascular: regular rate and rhythm, no murmur  GI: abdomen soft, moderate epigastric tenderness, g-tube with no leakage of stool. Positive bowel sounds  Skin/Integument: pink warm and dry, no rash    Medications     sodium chloride 125 mL/hr at 05/21/22 0307       cefOXitin  2 g Intravenous Q24H     neomycin-bacitracin-polymyxin   Topical BID     nicotine  1 patch Transdermal Daily     nicotine   Transdermal Q8H     sodium chloride (PF)  3 mL Intracatheter Q8H       Data   Recent Labs   Lab 05/21/22  0638 05/20/22  1133   WBC 7.2 12.3*   HGB 10.0* 10.8*   * 102*    179   INR  --  1.21*    139   POTASSIUM 5.0 4.7   CHLORIDE 112* 108*   CO2 19* 24   BUN 38* 42*   CR 1.61* 1.93*   ANIONGAP 9 7   ELVIS 8.5* 9.1   GLC 54* 83   ALBUMIN  --  3.6   PROTTOTAL  --  7.5   BILITOTAL  --  0.8   ALKPHOS  --  48   ALT  --  12   AST  --  21       Recent Results (from the past 24 hour(s))   XR Abdomen 2 Views    Narrative    Exam: XR ABDOMEN 2VIEWS.    Exam Reason: G-tube placement, diarrhea    Comparison: 9/20/2022, 6/22/2021    FINDINGS: There is a percutaneous gastrostomy tube in the expected  position.    There is a mildly dilated loop of small bowel in the left abdomen with  couple of fluid levels. There is mild gaseous distention of the colon  with a few nonspecific fluid levels.      No acute osseous abnormality.    There are several radiopaque foci in the right lower lung, likely  aspirated barium.      Impression    IMPRESSION:    The percutaneous gastrostomy tube is in the expected position.    There is a mildly dilated loop of small bowel in the left abdomen with  a couple of fluid levels, which could be due to obstruction in the  appropriate clinical setting. There is also mild gaseous distention  of  the colon with a couple of nonspecific fluid levels.    MARGARET TABARES MD         SYSTEM ID:  [id number]   CT Abdomen Pelvis w/o Contrast    Narrative    Exam: CT ABDOMEN PELVIS W/O CONTRAST    Exam reason: Bowel obstruction suspected    Technique: Using helical CT technique, axial images of the abdomen and  pelvis  were acquired without administration of IV contrast. Coronal  and sagittal reformats were performed.    Comparison: 5/20/2022, 6/23/2017.    FINDINGS:    NOTE: Noncontrast images are insensitive for detection of solid organ  abnormalities and some other types of pathology.    ABDOMEN:     Liver:  Postsurgical changes in the liver. No new focal findings.    Gallbladder:  Resected.  Bile Ducts:  No significantly dilated ducts.  Spleen:  Normal size without focal abnormality.  Kidneys:  Normal size without hydronephrosis. No renal calculi.  Adrenals:  No nodules.  Pancreas:  No mass or peripancreatic fat stranding.   Lymph Nodes:   No significant adenopathy.   Vascular:  No aortic aneurysm.   Abdominal wall:   No acute findings.    Pelvis: No mass or adenopathy.    Bowel/Mesentery/Peritoneum:   -No evidence of bowel obstruction. The dilated loop of small bowel in  the recent abdominal radiograph is no longer present and was likely  physiologic.  -There is a left upper quadrant percutaneous gastrostomy tube which  terminates in the transverse colon. There is enteric contrast within  the distal transverse, descending, and sigmoid colon.  -There are a few small foci of pneumoperitoneum, likely  postprocedural.  -Trace free fluid in the pelvis.    Visualized portion of the Chest: There are several hyperdense foci  within the right lower lobe, likely aspirated barium. There is  dependent atelectasis and ground glass opacities in the posterior  aspects of both lower lobes.     Musculoskeletal: No acute osseous abnormality.       Impression    IMPRESSION:    There is a left upper quadrant percutaneous  gastrostomy tube which  terminates in the transverse colon, with enteric contrast within the  lumen of the distal transverse, descending, and sigmoid colon. There  are couple small foci of pneumoperitoneum which are likely  postprocedural.    No evidence of small bowel obstruction.    Dependent atelectasis and ground glass opacities in the posterior  lower lobes, worse on the left, likely a atelectasis, however a  superimposed infectious process is possible.    These results were discussed with Luisito Rahman PA-C by Keith Ferrell MD on 5/20/2022 3:58 PM.     KEITH FERRELL MD         SYSTEM ID:  [id number]

## 2022-05-21 NOTE — PROGRESS NOTES
05/20/22 3330   Initial Information   Patient Belongings remains with patient   Patient Belongings Remaining with Patient purse/wallet;watch;bracelet;clothing;ring  (2 rings)   Did you bring any home meds/supplements to the hospital?  No     [facility] will make every effort per our policy to help keep your items safe while in the hospital.  If you choose to keep any items at the bedside, we cannot be held responsible for any items that are lost or broken.      List items sent to safe: none    I have reviewed my belongings list on admission and verify that it is correct.     Patient signature_______________________________  Date/Time_____________________    2nd Staff person if patient unable to sign __________________________  Date/Time ______________________      I have received all my belongings noted above at discharge.    Patient signature________________________________  Date/Time  __________________________

## 2022-05-21 NOTE — PLAN OF CARE
Patient pain tolerable to patient with use of dilaudid x 2.  Patient has denied any nausea.  Abdomen is soft and tender.  Faint, hypoactive bowel sounds.  Adequate urine output, bladder scanned once with <100 mL.  VSS, afebrile.  Family has remained at bedside.     Problem: Plan of Care - These are the overarching goals to be used throughout the patient stay.    Goal: Absence of Hospital-Acquired Illness or Injury  Intervention: Identify and Manage Fall Risk  Recent Flowsheet Documentation  Taken 5/21/2022 0114 by Jolene Isaacs RN  Safety Promotion/Fall Prevention:    activity supervised    assistive device/personal items within reach    bed alarm on    clutter free environment maintained    fall prevention program maintained    mobility aid in reach    nonskid shoes/slippers when out of bed    patient and family education    room near nurse's station    room organization consistent    safety round/check completed    supervised activity    treat reversible contributory factors    treat underlying cause  Taken 5/20/2022 2030 by Jolene Isaacs RN  Safety Promotion/Fall Prevention:    activity supervised    assistive device/personal items within reach    bed alarm on    clutter free environment maintained    fall prevention program maintained    mobility aid in reach    nonskid shoes/slippers when out of bed    patient and family education    room near nurse's station    room organization consistent    safety round/check completed    supervised activity    treat reversible contributory factors    treat underlying cause  Intervention: Prevent and Manage VTE (Venous Thromboembolism) Risk  Recent Flowsheet Documentation  Taken 5/21/2022 0114 by Jolene Isaacs RN  VTE Prevention/Management: SCDs (sequential compression devices) off  Activity Management:    activity adjusted per tolerance    activity encouraged  Taken 5/20/2022 2030 by Jolene Isaacs RN  VTE Prevention/Management: SCDs (sequential  compression devices) off  Activity Management:    activity adjusted per tolerance    activity encouraged  Goal: Optimal Comfort and Wellbeing  Intervention: Monitor Pain and Promote Comfort  Recent Flowsheet Documentation  Taken 5/21/2022 0324 by Jolene Isaacs RN  Pain Management Interventions: medication (see MAR)  Taken 5/20/2022 2342 by Jolene Isaacs, RN  Pain Management Interventions: medication (see MAR)  Taken 5/20/2022 2030 by Jolene Isaacs RN  Pain Management Interventions: declines  Goal: Readiness for Transition of Care  Intervention: Mutually Develop Transition Plan  Recent Flowsheet Documentation  Taken 5/20/2022 2200 by Jolene Isaacs, RN  Equipment Currently Used at Home: walker, standard   Goal Outcome Evaluation:

## 2022-05-21 NOTE — PHARMACY-ADMISSION MEDICATION HISTORY
Pharmacy -- Admission Medication Reconciliation    Prior to admission (PTA) medications were reviewed and the patient's PTA medication list was updated.    Sources Consulted: Sure scripts, family    The reliability of this Medication Reconciliation is: Reliability: Borderline reliable    The following significant changes were made:  Updated last doses    In addition, the patient's allergies were reviewed with the patient and left as follows:   Allergies: Patient has no known allergies.    The pharmacist has reviewed with the patient that all personal medications should be removed from the building or locked in the belongings safe.  Patient shall only take medications ordered by the physician and administered by the nursing staff.       Medication barriers identified: none noted   Medication adherence concerns: none noted   Understanding of emergency medications: n/a    Pao Santamaria [facility], 5/21/2022,  5:52 PM

## 2022-05-21 NOTE — PLAN OF CARE
Goal Outcome Evaluation:    Plan of Care Reviewed With: patient, daughter     Overall Patient Progress: no change

## 2022-05-21 NOTE — PROGRESS NOTES
[facility]    Medicine Progress Note - Hospitalist Service    Date of Admission:  5/20/2022    Assessment & Plan          Sylvie Bautista is a 88 year old female admitted on 5/20/2022. She presented with abdominal pain and diarrhea and ws found to have left upper quadrant percutaneous gastrostomy tube terminating in transverse colon.  Risks/benefits of surgical correction versus hospice care discussed.  She would like to pursue hospice.    Intra-abdominal infection  Suspected secondary to colon and gastric leak.  Afebrile and no leukocytosis.  CRP elevated at 169.1.  - Continue maintenance IV fluids.  - Continue Cefoxitin, day 2.  Plan to discontinue when transitioning to hospice.    Presbyesophagus, Zenker's diverticulum  History of Vane-en-Y  Gastrostomy tube dysfunction  General surgery consulted.  She would need significant surgery to correct placement of g-tube, repair colon and stomach, and place new feeding tube.  She has opted for hospice care.  - Comfort care orders placed.  - Hospice referral.  Plan for admission at home at 1300 tomorrow.       Diet: NPO per Anesthesia Guidelines for Procedure/Surgery Except for: Meds    DVT Prophylaxis: Low Risk/Ambulatory with no VTE prophylaxis indicated  Celeste Catheter: Not present  Central Lines: None  Cardiac Monitoring: None  Code Status: No CPR- Do NOT Intubate      Disposition Plan   Expected Discharge: tomorrow  Anticipated discharge location:  Home with hospice care       The patient's care was discussed with the Patient, Patient's Family and Surgical Consultant.    Jolly Telles, DO  Hospitalist Service  [facility]    ______________________________________________________________________    Interval History   She has though about the risks of surgery as well as post-operative recovery and complications and has decided to pursue hospice care.  She reports that she is comfortable with current pain medications.   Ativan helped with her anxiety and allowed her to sleep last night.    Data reviewed today: I reviewed all medications, new labs and imaging results over the last 24 hours. I personally reviewed no images or EKG's today.    Physical Exam   Vital Signs: Temp: 98.2  F (36.8  C) Temp src: Tympanic BP: (!) 162/72 Pulse: 61   Resp: 20 SpO2: 95 % O2 Device: None (Room air)    Weight: 138 lbs 7.18 oz  General Appearance: Alert. No acute distress.  Respiratory: Normal rate and effort. CTAB.  Cardiovascular: RRR.  GI: Soft. Normal bowel sounds. Diffuse tenderness to palpation.  Extremities: No lower extremity swelling or edema.  Pysch: Normal mood and affect.    Data   Recent Labs   Lab 05/21/22  0638 05/20/22  1133   WBC 7.2 12.3*   HGB 10.0* 10.8*   * 102*    179   INR  --  1.21*    139   POTASSIUM 5.0 4.7   CHLORIDE 112* 108*   CO2 19* 24   BUN 38* 42*   CR 1.61* 1.93*   ANIONGAP 9 7   ELVIS 8.5* 9.1   GLC 54* 83   ALBUMIN  --  3.6   PROTTOTAL  --  7.5   BILITOTAL  --  0.8   ALKPHOS  --  48   ALT  --  12   AST  --  21

## 2022-05-22 NOTE — PHARMACY - DISCHARGE MEDICATION RECONCILIATION AND EDUCATION
Pharmacy:  Discharge Counseling and Medication Reconciliation    Sylvie Bautista  [address] [facility] [id number] [phone] (home)   88 year old female  PCP: Mirella Aly    Allergies: Patient has no known allergies.    Discharge Counseling:    Pharmacist met with patient and family today to review the medication portion of the After Visit Summary (with an emphasis on NEW medications) and to address patient's questions/concerns.    Summary of Education: Reviewed lorazepam, morphine liquid, ondansetron ODT, and nicotine patch verbally and in print    Materials Provided:  MedCounselor sheets printed from Clinical Pharmacology on:  lorazepam, morphine liquid, ondansetron ODT, and nicotine patch     Discharge Medication Reconciliation:    It has been determined that the patient has an adequate supply of medications available or which can be obtained from the patient's preferred pharmacy, which she has confirmed as: [facility] today (cancelled at 728). An updated medication list will be faxed to the patient's pharmacies and [facility].    Thank you for the consult.    Pao Santamaria [facility]........May 22, 2022 12:00 PM

## 2022-05-22 NOTE — DISCHARGE SUMMARY
[facility]  Hospitalist Discharge Summary      Date of Admission:  5/20/2022  Date of Discharge:  5/22/2022  Discharging Provider: Jolly Telles DO  Discharge Service: Hospitalist Service    Discharge Diagnoses   Abdominal infection  Prebyesophagus, Zenker's diverticulum, History of Vane-Reg, Gastrostomy tube dysfunction    Follow-ups Needed After Discharge   Follow-up Appointments     Follow-up and recommended labs and tests       Follow up with hospice provider           Discharge Disposition   Admited to hospice care.   Agency: [facility].  Discharged to home  Condition at discharge: Stable    Hospital Course   Sylvie Bautista is a 88 year old female with past medical history of complicated cholecystectomy, Vane-en-Y, presbyesophagus and Zenker's diverticulum S/P feeding tube, hypertension, hyperlipidemia, hypothyroidism, GERD, and neuropathy who presented with abdominal pain and diarrhea since her PEG tube was changed to a DREAD-Key tube on 5/18.    CT abdomen/pelvis revealed left upper quadrant percutaneous gastrostomy tube terminating in transverse colon with enteric contrast within the lumen of the distal transverse, descending, and sigmoid colon.  She was admitted and started on antibiotics for an abdominal infection, presumed secondary to acute leaking of her colon/stomach.  General surgery was consulted, and after discussion of risks/benefits of surgery to correct placement of g-tube, repair colon and any ongoing leak of the stomach, and placement of new feeding tube, patient decided to pursue hospice care.    Code status converted to DNR/DNI and comfort measure ordered.  She as discharged to care of [facility].    Consultations This Hospital Stay   SOCIAL WORK IP CONSULT  CARE MANAGEMENT / SOCIAL WORK IP CONSULT    Code Status   No CPR- Do NOT Intubate    Time Spent on this Encounter   IJolly DO, personally saw the patient today and spent less than or equal to  30 minutes discharging this patient.       DO ENEIDA Noble [facility]  [address]  [place] [address]  Phone: [phone]  Fax: [phone]  ______________________________________________________________________    Physical Exam   Vital Signs:      Pulse: 64   Resp: 18 SpO2: 95 % O2 Device: None (Room air)    Weight: 138 lbs 7.18 oz  General Appearance: Alert. No acute distress.  Respiratory: Normal rate and effort.  Cardiovascular: Regular rate.  GI: Soft.  Extremities: No lower extremity swelling.  Psych: Normal mood and affect.        Primary Care Physician   Mirella Aly    Discharge Orders      Hospice Referral      Reason for your hospital stay    Abdominal infection, displaced g-tube     Follow-up and recommended labs and tests     Follow up with hospice provider     Activity    Your activity upon discharge: activity as tolerated     Diet    Follow this diet upon discharge: Mechanical/Dental Soft Diet     Significant Results and Procedures   Most Recent 3 CBC's:Recent Labs   Lab Test 05/21/22  0638 05/20/22  1133 01/18/22  1153   WBC 7.2 12.3* 7.8   HGB 10.0* 10.8* 11.3*   * 102* 101*    179 215     Most Recent 3 BMP's:Recent Labs   Lab Test 05/21/22  0638 05/20/22  1133 01/18/22  1153    139 140   POTASSIUM 5.0 4.7 4.5   CHLORIDE 112* 108* 106   CO2 19* 24 27   BUN 38* 42* 20   CR 1.61* 1.93* 1.35*   ANIONGAP 9 7 7   ELVIS 8.5* 9.1 9.8   GLC 54* 83 95   ,   Results for orders placed or performed during the hospital encounter of 05/20/22   XR Abdomen 2 Views    Narrative    Exam: XR ABDOMEN 2VIEWS.    Exam Reason: G-tube placement, diarrhea    Comparison: 9/20/2022, 6/22/2021    FINDINGS: There is a percutaneous gastrostomy tube in the expected  position.    There is a mildly dilated loop of small bowel in the left abdomen with  couple of fluid levels. There is mild gaseous distention of the colon  with a few nonspecific fluid levels.       No acute osseous abnormality.    There are several radiopaque foci in the right lower lung, likely  aspirated barium.      Impression    IMPRESSION:    The percutaneous gastrostomy tube is in the expected position.    There is a mildly dilated loop of small bowel in the left abdomen with  a couple of fluid levels, which could be due to obstruction in the  appropriate clinical setting. There is also mild gaseous distention of  the colon with a couple of nonspecific fluid levels.    MARGARET TABARES MD         SYSTEM ID:  [id number]   CT Abdomen Pelvis w/o Contrast    Narrative    Exam: CT ABDOMEN PELVIS W/O CONTRAST    Exam reason: Bowel obstruction suspected    Technique: Using helical CT technique, axial images of the abdomen and  pelvis  were acquired without administration of IV contrast. Coronal  and sagittal reformats were performed.    Comparison: 5/20/2022, 6/23/2017.    FINDINGS:    NOTE: Noncontrast images are insensitive for detection of solid organ  abnormalities and some other types of pathology.    ABDOMEN:     Liver:  Postsurgical changes in the liver. No new focal findings.    Gallbladder:  Resected.  Bile Ducts:  No significantly dilated ducts.  Spleen:  Normal size without focal abnormality.  Kidneys:  Normal size without hydronephrosis. No renal calculi.  Adrenals:  No nodules.  Pancreas:  No mass or peripancreatic fat stranding.   Lymph Nodes:   No significant adenopathy.   Vascular:  No aortic aneurysm.   Abdominal wall:   No acute findings.    Pelvis: No mass or adenopathy.    Bowel/Mesentery/Peritoneum:   -No evidence of bowel obstruction. The dilated loop of small bowel in  the recent abdominal radiograph is no longer present and was likely  physiologic.  -There is a left upper quadrant percutaneous gastrostomy tube which  terminates in the transverse colon. There is enteric contrast within  the distal transverse, descending, and sigmoid colon.  -There are a few small foci of  pneumoperitoneum, likely  postprocedural.  -Trace free fluid in the pelvis.    Visualized portion of the Chest: There are several hyperdense foci  within the right lower lobe, likely aspirated barium. There is  dependent atelectasis and ground glass opacities in the posterior  aspects of both lower lobes.     Musculoskeletal: No acute osseous abnormality.       Impression    IMPRESSION:    There is a left upper quadrant percutaneous gastrostomy tube which  terminates in the transverse colon, with enteric contrast within the  lumen of the distal transverse, descending, and sigmoid colon. There  are couple small foci of pneumoperitoneum which are likely  postprocedural.    No evidence of small bowel obstruction.    Dependent atelectasis and ground glass opacities in the posterior  lower lobes, worse on the left, likely a atelectasis, however a  superimposed infectious process is possible.    These results were discussed with uLisito Rahman PA-C by Keith Ferrell MD on 5/20/2022 3:58 PM.     KEITH FERRELL MD         SYSTEM ID:  [id number]     Discharge Medications   Current Discharge Medication List      START taking these medications    Details   LORazepam (ATIVAN) 0.5 MG tablet Place 1 tablet (0.5 mg) under the tongue every 8 hours as needed for anxiety  Qty: 30 tablet, Refills: 0    Associated Diagnoses: Gastrostomy tube dysfunction (H); Abdominal infection (H)      morphine sulfate (ROXANOL) 20 mg/mL (HIGH CONC) soln Take 0.125 mLs (2.5 mg) by mouth every 4 hours as needed for moderate to severe pain  Qty: 30 mL, Refills: 0    Associated Diagnoses: Gastrostomy tube dysfunction (H); Abdominal infection (H)      nicotine (NICODERM CQ) 14 MG/24HR 24 hr patch Place 1 patch onto the skin daily  Qty: 28 patch, Refills: 0    Associated Diagnoses: Tobacco abuse      ondansetron (ZOFRAN ODT) 4 MG ODT tab Take 1 tablet (4 mg) by mouth every 6 hours as needed for nausea or vomiting  Qty: 30 tablet, Refills: 0    Associated  Diagnoses: Gastrostomy tube dysfunction (H); Abdominal infection (H)         CONTINUE these medications which have CHANGED    Details   acetaminophen (TYLENOL) 650 MG CR tablet Take 1 tablet (650 mg) by mouth every 6 hours as needed for mild pain or fever         CONTINUE these medications which have NOT CHANGED    Details   Blood Pressure Monitor MISC As directed. OMRON      docusate sodium (COLACE) 100 MG capsule Take 1 capsule by mouth 2 times daily as needed Take daily at bedtime      gabapentin (NEURONTIN) 300 MG/6ML oral solution Take 6 mLs (300 mg) by mouth every morning AND 12 mLs (600 mg) daily at 2 pm AND 6 mLs (300 mg) At Bedtime.  Qty: 720 mL, Refills: 11    Associated Diagnoses: Hereditary and idiopathic peripheral neuropathy      levothyroxine (SYNTHROID/LEVOTHROID) 100 MCG tablet Take 1 tablet (100 mcg) by mouth daily  Qty: 90 tablet, Refills: 3    Comments: Dose reduction  Associated Diagnoses: Hypothyroidism, unspecified type      loratadine (CLARITIN) 10 MG tablet Take 1 tablet (10 mg) by mouth daily  Qty: 30 tablet      pantoprazole (PROTONIX) 40 MG EC tablet Take 1 tablet (40 mg) by mouth daily  Qty: 90 tablet, Refills: 11    Associated Diagnoses: Garcia's esophagus without dysplasia      UNABLE TO FIND MEDICATION NAME: Allevyn 4x4 gentle border light  Change dressing once every 5 days  Qty: 30 each, Refills: 0    Associated Diagnoses: Encounter for screening colonoscopy      zolpidem (AMBIEN) 5 MG tablet TAKE 1 TABLET (5 MG) BY MOUTH AT BEDTIME  Qty: 90 tablet, Refills: 1    Associated Diagnoses: Insomnia, unspecified type         STOP taking these medications       ascorbic acid (VITAMIN C) 1000 MG TABS Comments:   Reason for Stopping:         aspirin (ASA) 81 MG EC tablet Comments:   Reason for Stopping:         atorvastatin (LIPITOR) 10 MG tablet Comments:   Reason for Stopping:         calcium carbonate-vitamin D 600-200 MG-UNIT TABS Comments:   Reason for Stopping:          carboxymethylcellulose (CARBOXYMETHYLCELLULOSE SODIUM) 0.5 % SOLN ophthalmic solution Comments:   Reason for Stopping:         estradiol (ESTRACE) 0.1 MG/GM vaginal cream Comments:   Reason for Stopping:         folic acid (FOLVITE) 1 MG tablet Comments:   Reason for Stopping:         losartan (COZAAR) 25 MG tablet Comments:   Reason for Stopping:         Multiple Vitamins-Minerals (OCUVITE ADULT 50+) CAPS Comments:   Reason for Stopping:         Sodium Fluoride (SF 5000 PLUS) 1.1 % CREA Comments:   Reason for Stopping:         traMADol (ULTRAM) 50 MG tablet Comments:   Reason for Stopping:             Allergies   No Known Allergies

## 2022-05-22 NOTE — PROGRESS NOTES
[facility]  Surgical Progress Note    Assessment & Plan   Sylvie Bautista is a 88 year old female who was admitted on 5/20/2022.    Active Problems:    Abdominal infection (H)    Assessment: acute-leaking colon/stomach    Plan: soft diet as tolerated. Pain medications as needed. Hospice.    Generalized muscle weakness    Loose stools    Gastrostomy tube dysfunction (H)    Assessment: don't use    Plan: leave in place    # Pain Assessment:  Current Pain Score 5/22/2022   Patient currently in pain? denies   Pain score (0-10) -   Pain location -   - Sylvie is experiencing pain due to g tube malposition. Pain management was discussed with Sylvie and her family and the plan was created in a collaborative fashion.  Sylvie's response to the current recommendations: engaged  - continue with current regimen      DVT Prophylaxis: Pneumatic Compression Devices  Code Status: No CPR- Do NOT Intubate    Bisi Rivera MD    Interval History   Rough night with some confusion. Denies abdominal pain just doesn't feel well. Hospice as planned. They are setting up needs today. Family has plan in place. Call for questions.  Discussion with her and her family today.  -Data reviewed today: I reviewed all new labs and imaging results over the last 24 hours.    Physical Exam        Pulse: 64   Resp: 18 SpO2: 95 % O2 Device: None (Room air)    Vitals:    05/20/22 1055 05/21/22 0604   Weight: 55.3 kg (122 lb) 62.8 kg (138 lb 7.2 oz)     Vital Signs with Ranges  Pulse:  [64] 64  Resp:  [18] 18  SpO2:  [95 %] 95 %  I/O last 3 completed shifts:  In: 729 [I.V.:729]  Out: 1075 [Urine:1075]    Constitutional: No acute distress, pleasant and cooperative, intermittently tearful.  GI: abdomen soft, moderate epigastric tenderness, g-tube with no leakage of stool. Positive bowel sounds    Medications     sodium chloride Stopped (05/21/22 1394)       neomycin-bacitracin-polymyxin   Topical BID     nicotine  1 patch  Transdermal Daily     nicotine   Transdermal Q8H     sodium chloride (PF)  3 mL Intracatheter Q8H       Data   Recent Labs   Lab 05/21/22  0638 05/20/22  1133   WBC 7.2 12.3*   HGB 10.0* 10.8*   * 102*    179   INR  --  1.21*    139   POTASSIUM 5.0 4.7   CHLORIDE 112* 108*   CO2 19* 24   BUN 38* 42*   CR 1.61* 1.93*   ANIONGAP 9 7   ELVIS 8.5* 9.1   GLC 54* 83   ALBUMIN  --  3.6   PROTTOTAL  --  7.5   BILITOTAL  --  0.8   ALKPHOS  --  48   ALT  --  12   AST  --  21       No results found for this or any previous visit (from the past 24 hour(s)).

## 2022-05-22 NOTE — PLAN OF CARE
Goal Outcome Evaluation:    Plan of Care Reviewed With: patient, family     Overall Patient Progress: no change

## 2022-05-22 NOTE — PROGRESS NOTES
[facility] DISCHARGE NOTE    Patient discharged to home at 10:49 AM via wheel chair. Accompanied by other:family and staff. Discharge instructions reviewed with patient and family, opportunity offered to ask questions. Prescriptions sent to patients preferred pharmacy. All belongings sent with patient.    Roopa Barnett RN

## 2022-05-22 NOTE — PLAN OF CARE
Goal Outcome Evaluation:    Plan of Care Reviewed With: patient     Overall Patient Progress: no change     Patient alert and orientated. She has family at bedside. Denies pain. Waiting for breakfast.

## 2022-05-22 NOTE — PLAN OF CARE
Patient was tearful at start of shift.  Family at bedside.  Family requests for patient to have ativan.  Patient in agreement.  After dose patient did relax and rest for some time.  When patient awakened she was again tearful and upset.  Making comments like all these flowers people are getting me are just to start my  early.  Patient given a second dose of ativan.  Shortly after daughter came to desk upset that patient was frequently waking her up.  Patient denied any pain during this time.  Up frequently to go to bathroom.  After several times of getting up daughter was frustrated with not being able to sleep and left hospital.  Patient bladder scanned PVR was 33.  Continued to deny pain besides once at start of shift given dilaudid which was effective for relief.  Patient was very easily redirectable and as night progressed she did clear more.  Possibly ativan that created increase in confusion.  Bed alarms on at all times, patient room next to nurses desk with door open when family not in room.      Problem: Plan of Care - These are the overarching goals to be used throughout the patient stay.    Goal: Plan of Care Review/Shift Note  Description: The Plan of Care Review/Shift note should be completed every shift.  The Outcome Evaluation is a brief statement about your assessment that the patient is improving, declining, or no change.  This information will be displayed automatically on your shift note.  Outcome: Ongoing, Progressing  Flowsheets (Taken 2022 0530)  Plan of Care Reviewed With:   patient   daughter  Overall Patient Progress: no change  Goal: Absence of Hospital-Acquired Illness or Injury  Intervention: Identify and Manage Fall Risk  Recent Flowsheet Documentation  Taken 2022 0230 by Jolene Isaacs RN  Safety Promotion/Fall Prevention:   activity supervised   assistive device/personal items within reach   bed alarm on   clutter free environment maintained   fall prevention  program maintained   nonskid shoes/slippers when out of bed   patient and family education   room near nurse's station   room organization consistent   safety round/check completed   supervised activity   treat underlying cause   treat reversible contributory factors  Taken 5/21/2022 1940 by Jolene Isaacs, RN  Safety Promotion/Fall Prevention:   activity supervised   assistive device/personal items within reach   bed alarm on   clutter free environment maintained   fall prevention program maintained   nonskid shoes/slippers when out of bed   patient and family education   room near nurse's station   room organization consistent   safety round/check completed   supervised activity   treat underlying cause   treat reversible contributory factors  Intervention: Prevent Skin Injury  Recent Flowsheet Documentation  Taken 5/21/2022 2300 by Jolene Isaacs, RN  Body Position:   supine, head elevated   weight shifting   foot of bed elevated   lower extremity elevated   side-lying 30 degrees   right   turned   Goal Outcome Evaluation:    Plan of Care Reviewed With: patient, daughter     Overall Patient Progress: no change

## 2022-05-22 NOTE — PHARMACY - DISCHARGE MEDICATION RECONCILIATION AND EDUCATION
[facility]  Part of [facility]  [address] [place] [id number]    May 22, 2022    Dear Pharmacist,    Your customer, Sylvie Bautista, born on 1/7/1934, was recently discharged from [facility].  We have updated her medication list and want to alert you to the following:       Review of your medicines      START taking      Dose / Directions   LORazepam 0.5 MG tablet  Commonly known as: ATIVAN  Used for: Gastrostomy tube dysfunction (H), Abdominal infection (H)      Dose: 0.5 mg  Place 1 tablet (0.5 mg) under the tongue every 8 hours as needed for anxiety  Quantity: 30 tablet  Refills: 0     morphine sulfate 20 mg/mL (HIGH CONC) soln  Commonly known as: ROXANOL  Used for: Gastrostomy tube dysfunction (H), Abdominal infection (H)      Dose: 2.5 mg  Take 0.125 mLs (2.5 mg) by mouth every 4 hours as needed for moderate to severe pain  Quantity: 30 mL  Refills: 0     nicotine 14 MG/24HR 24 hr patch  Commonly known as: NICODERM CQ  Used for: Tobacco abuse      Dose: 1 patch  Place 1 patch onto the skin daily  Quantity: 28 patch  Refills: 0     ondansetron 4 MG ODT tab  Commonly known as: ZOFRAN ODT  Used for: Gastrostomy tube dysfunction (H), Abdominal infection (H)      Dose: 4 mg  Take 1 tablet (4 mg) by mouth every 6 hours as needed for nausea or vomiting  Quantity: 30 tablet  Refills: 0        CONTINUE these medicines which may have CHANGED, or have new prescriptions. If we are uncertain of the size of tablets/capsules you have at home, strength may be listed as something that might have changed.      Dose / Directions   acetaminophen 650 MG CR tablet  Commonly known as: TYLENOL  This may have changed:     how much to take    when to take this    reasons to take this      Dose: 650 mg  Take 1 tablet (650 mg) by mouth every 6 hours as needed for mild pain or fever  Refills: 0     pantoprazole 40 MG EC tablet  Commonly known as: PROTONIX  This may have  changed: Another medication with the same name was removed. Continue taking this medication, and follow the directions you see here.  Used for: Garcia's esophagus without dysplasia      Dose: 40 mg  Take 1 tablet (40 mg) by mouth daily  Quantity: 90 tablet  Refills: 11        CONTINUE these medicines which have NOT CHANGED      Dose / Directions   Blood Pressure Monitor Misc      As directed. OMRON  Refills: 0     docusate sodium 100 MG capsule  Commonly known as: COLACE      Dose: 1 capsule  Take 1 capsule by mouth 2 times daily as needed Take daily at bedtime  Refills: 0     gabapentin 300 MG/6ML oral solution  Commonly known as: NEURONTIN  Used for: Hereditary and idiopathic peripheral neuropathy      Take 6 mLs (300 mg) by mouth every morning AND 12 mLs (600 mg) daily at 2 pm AND 6 mLs (300 mg) At Bedtime.  Quantity: 720 mL  Refills: 11     levothyroxine 100 MCG tablet  Commonly known as: SYNTHROID/LEVOTHROID  Used for: Hypothyroidism, unspecified type      Dose: 100 mcg  Take 1 tablet (100 mcg) by mouth daily  Quantity: 90 tablet  Refills: 3     loratadine 10 MG tablet  Commonly known as: CLARITIN      Dose: 10 mg  Take 1 tablet (10 mg) by mouth daily  Quantity: 30 tablet  Refills: 0     UNABLE TO FIND  Used for: Encounter for screening colonoscopy      MEDICATION NAME: Allevyn 4x4 gentle border light  Change dressing once every 5 days  Quantity: 30 each  Refills: 0     zolpidem 5 MG tablet  Commonly known as: AMBIEN  Used for: Insomnia, unspecified type      Dose: 5 mg  TAKE 1 TABLET (5 MG) BY MOUTH AT BEDTIME  Quantity: 90 tablet  Refills: 1        STOP taking    aspirin 81 MG EC tablet  Commonly known as: ASA        atorvastatin 10 MG tablet  Commonly known as: LIPITOR        calcium carbonate-vitamin D 600-200 MG-UNIT Tabs        carboxymethylcellulose sodium 0.5 % Soln ophthalmic solution  Generic drug: carboxymethylcellulose        estradiol 0.1 MG/GM vaginal cream  Commonly known as: ESTRACE         folic acid 1 MG tablet  Commonly known as: FOLVITE        losartan 25 MG tablet  Commonly known as: COZAAR        Ocuvite Adult 50+ Caps        SF 5000 Plus 1.1 % Crea  Generic drug: sodium fluoride        traMADol 50 MG tablet  Commonly known as: ULTRAM        vitamin C 1000 MG Tabs  Commonly known as: ASCORBIC ACID              Where to get your medicines      These medications were sent to [id number] ([employer] [facility] - [place], [place] - [address]  [address] [facility], [facility] [address]    Phone: [phone]     LORazepam 0.5 MG tablet    morphine sulfate 20 mg/mL (HIGH CONC) soln    nicotine 14 MG/24HR 24 hr patch    ondansetron 4 MG ODT tab         We also reviewed Sylvie Bautista's allergy list and updated it as needed:  Allergies: Patient has no known allergies.    Thank you for continuing to care for Sylvie Bautista.  We look forward to working together with you in the future.    Sincerely,  Pao Santamaria, [facility]

## 2022-05-23 NOTE — TELEPHONE ENCOUNTER
Called and spoke with the patient. She states she had a feeding tube placed last week and had diarrhea after. She went to the ER and was found that when the feeding tube was placed it went through the colon. She states she is not going to have the surgery done to get it fixed. She is now going to be on hospice.     Jess Simons LPN on 5/23/2022 at 8:47 AM

## 2022-05-23 NOTE — PROGRESS NOTES
Transitional Care Management    Patient discharged with orders for hospice. Hospice was admitting within 48 hours. No TCM call required per policy.   Vivi Onofre RN  5/23/2022 9:55 AM

## 2022-05-23 NOTE — TELEPHONE ENCOUNTER
Yes, I reviewed the hospital and ED notes.  I am sorry to hear that she is not well.  Let her know I am thinking of her and to please let me know if she needs anything.

## 2022-05-23 NOTE — TELEPHONE ENCOUNTER
Pt has bad news, please call, hospice is involved.      Perla Villasenor on 5/23/2022 at 8:29 AM

## 2022-05-24 NOTE — TELEPHONE ENCOUNTER
After verifying last name and  patient notifed of the below information.     Jess Simons LPN on 2022 at 8:38 AM

## 2022-05-25 NOTE — TELEPHONE ENCOUNTER
Dr Mirella SIMPSON   reviewed and completed the following home care form for Sylvie Bautista on 5/25/22   This covers the certification period effective 5/22/22 to 8/19/22.  Jess Simons LPN on 5/25/2022 at 1:36 PM

## 2022-06-02 NOTE — TELEPHONE ENCOUNTER
Wants to discuss patient's situation. She is on hospice and there is another doctor seeing her right now who thinks maybe she should not be on hospice. Please call.    Arianna Marroquin on 6/2/2022 at 1:56 PM

## 2022-06-03 NOTE — TELEPHONE ENCOUNTER
CBC - had antibiotics on board last time it was drawn. They would like to check for signs of infection. If you would rather defer the lab order to Dr. Mcwilliams, patient has an appointment 6/7/22. Kimberly Davenport RN on 6/3/2022 at 3:48 PM

## 2022-06-03 NOTE — TELEPHONE ENCOUNTER
Patient called wanting to speak to RKV about end of life plans.     Akua Helton on 6/3/2022 at 11:45 AM

## 2022-06-03 NOTE — TELEPHONE ENCOUNTER
Please give patient a call to find out what lab work she would like to have completed.  Also, she was seen by surgeon Dr. Bisi Rivera when she was in the emergency department at the time when hospice was recommended.  In regards to continuing hospice care, this likely should be a question to ask either Dr. Bisi Rivera who saw her in the ED or Dr. Chico Mcwilliams who had been following her for g-tube placement.

## 2022-06-06 NOTE — TELEPHONE ENCOUNTER
After verifying last name and  patient notifed of the below information.   Jess Simons LPN on 2022 at 9:33 AM

## 2022-06-07 NOTE — PROGRESS NOTES
S: Mireya presents with her daughters after having feeding tube replacement.  After feeding tube replacement she developed diarrhea and abdominal pain.  A CT scan showed that she had malposition of her tube.  The gastrostomy balloon was no longer within the stomach.  It was not discretely within the colon but did have contrast communicating with the colon at this point.  During this hospitalization she was offered transfer surgery for surgical repair or hospice.  The family was under the assumption that she would die rather quickly without surgery.  She is continued to gradually improve/stay stable.  She has been able to eat a little bit more now that she is not on tube feeds.  Her blood pressure is low today.  Her mood is poor.    /52 (BP Location: Right arm, Patient Position: Sitting, Cuff Size: Adult Regular)   Pulse 68   Temp 98.1  F (36.7  C) (Tympanic)   Resp 16   LMP  (LMP Unknown)   SpO2 98%   Breastfeeding No     General: Sullen but nontoxic  Abdomen: Gastrostomy tube without drainage. No sign of infection. No pain with palpation.  Malpositioned G-tube removed  Psychiatry: awake, alert and oriented. Appropriate affect.    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  Status post G-tube placement with tube feeds for several months.  Now with problems after G-tube replacement and malpositioned G-tube.  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  We remove the G-tube in the clinic to encourage this fistula to close.  Of note she does not have any continued signs of gastrocutaneous fistula.      Patient will discuss with her daughters whether she should come off hospice.  This is her decision.    I would estimate that if she continues to eat poorly she would have 3 to 4 months due to malnourishment.    Had an ENT appointment after her G tube was changed.  The this has been canceled.    Would offer expectant  management of the malpositioned tube tract and CT gastrostomy if needed for continued tube tube feeds.  This is not available here at [facility].  Could offer a laparoscopic G-tube once the infection/old tract has healed.    Her leg wound has been healing since the last time I seen her    Chico Mcwilliams MD on 6/7/2022 at 1:24 PM

## 2022-06-07 NOTE — NURSING NOTE
"Chief Complaint   Patient presents with     Follow Up     Here today to follow up for post-op       Initial /52 (BP Location: Right arm, Patient Position: Sitting, Cuff Size: Adult Regular)   Pulse 68   Temp 98.1  F (36.7  C) (Tympanic)   Resp 16   LMP  (LMP Unknown)   SpO2 98%   Breastfeeding No  Estimated body mass index is 23.58 kg/m  as calculated from the following:    Height as of 3/29/22: 1.632 m (5' 4.25\").    Weight as of 5/21/22: 62.8 kg (138 lb 7.2 oz).  Medication Reconciliation: complete    Qi Trevino LPN  "

## 2022-07-07 NOTE — TELEPHONE ENCOUNTER
Levothyroxine not on current noted as stopped by patient on 6/7/22    Called [facility] and they state refill request was on auto request . States last refill was 4/18/22.  States patient is on [facility] and this is not a hospice medications but was on auto fill.    Called place to [facility] to verify if patient continues to take as medication is no longer on current med list but unable to locate a discontinued note.    Tereza Latham RN on 7/7/2022 at 3:50 PM

## 2022-07-08 NOTE — PROGRESS NOTES
Sylvie Bautista is a 88 year old female being seen today for comprehensive and follow up visit at [facility].    Code Status: DNR / DNI, Comfort care only.   Health Care Power of : Extended Emergency Contact Information  Primary Emergency Contact: [place] [address] [facility]  Home Phone: [phone]  Mobile Phone: [phone]  Relation: Daughter  Secondary Emergency Contact: [place] [address] [facility]  Home Phone: [phone]  Mobile Phone: [phone]  Relation: Friend     Allergies: Patient has no known allergies.     Chief Complaint / HPI: Daughter had contacted to request a follow-up visit at [facility] for reports of dietary alternatives being offered to resident other than recommended liquids and puréed foods.  Did have a meeting onsite with hospice, [facility] RN and myself with the patient  Who clearly requests liquids only.  Liquids clearly stated on door and on white board in room--- apparently various family members have not been in agreement and have been offering a variety of foods to resident who does not want to upset anyone and will attempt however has difficulty swallowing and having GI pain.  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    Discussion with hospice on pain management--recommendations discussed providing regular nursing staff nonverbal pain assessments at least twice a shift--- consider long-acting pain medication such as low-dose methadone to provide overall consistent pain moderation with breakthrough pain medication available--- hospice nurse will discuss with medical director and adjust Pain therapy medications per medical director as indicated by transition to active stage and passing.    Nursing staff  motivated to respect resident's wishes regarding wanting liquids only diet--- apparently there have been some newer nursing staff  Facility RN will provide communication and reeducation for consistent seamless wishes maintained.      Past Medical, Surgical, Family and Social History reviewed: YES.     Medications: reviewed  Medications - recent changes: Dilaudid PRN, Lorazepam PRN    Review of Systems:  General: positive for weight loss, weakness  Skin: positive for palor  Resp: positive for dyspnea and emphysema   GI: positive for dysphagia, abdominal pain and constipation  : positive for incontinence  Musculoskeletal: positive for muscular weakness  Neurologic: positive for speech problems, memory problems and behavior changes  Hematologic: positive for weight loss  All other systems negative  Toileting:    Continent of Bowel: No   Continent of Bladder: No  Mobility: bed bound    Recent Labs: reviewed    Pertinent Screening Tool results: PAINAD Moderate to Severe    Current Therapies: Hospice    Exam:  Vital signs reviewed.   GENERAL APPEARANCE: healthy, alert and no distress  EYES: Eyes grossly normal to inspection, PERRL and conjunctivae and sclerae normal  RESP: lungs clear to auscultation - no rales, rhonchi or wheezes  ABDOMEN: soft, nontender, no hepatosplenomegaly, no masses and bowel sounds normal  MS: no musculoskeletal defects are noted and gait is age appropriate without ataxia  SKIN: no suspicious lesions or rashes  NEURO: Normal strength and tone, sensory exam grossly normal, mentation intact and speech normal  PSYCH: mentation appears normal and affect normal/bright    Assessment and Plan:    Hospice patient appears transitioning to final stages--not active passing at this time however appears close. Lucidity transient  Pain and comfort management top priority--personally do not feel scheduled Tylenol with PRN Dilaudid can accomplish in this stage  As resident is not consistently able to request  PRNs.  Would best be served with a scheduled or long-acting pain medication--frequent staff nonverbal pain assessments to provide appropriate breakthrough  Adjustments.  Hospice staff will provide air mattress for frequent offloading repositioning with new coccyx ulcer.  I have requested a Broda chair as patient does still enjoy being in wheelchair however unable to support head and torso.  Requested Hospice nurse discuss above comfort needs as assessed and modify treatment plan to meet changing needs    Resident did specifically request that I order liquids so that staff and all family members would respect her dietary wishes.  Did speak with her daughter about above issues and plan--and importance of respecting resident's wishes for rest and peaceful environment    [facility] [place] [facility] back with [facility] and [facility] on progress next week and PRN    Over 30 minutes spent face to face exam and assessment, family and staff care conference and coordination of care planning      (R63.4) Weight loss, non-intentional  (primary encounter diagnosis)      (Z51.5) Comfort measures only status      (Z51.5) Hospice care patient      (R13.10) Dysphagia, unspecified type

## 2022-07-11 NOTE — TELEPHONE ENCOUNTER
Shae from  [facility] calling with update on patient, her condition is declining and is now on daily visit.     Akua Helton on 7/11/2022 at 4:21 PM

## 2022-07-25 NOTE — PROGRESS NOTES
This is a recent snapshot of the patient's [facility] medical record.  For current drug dose and complete information and questions, call [phone] or In Basket pool, fv home infusion ([id number])  CSN Number:  [id number]

## 2022-07-26 NOTE — PROGRESS NOTES
This is a recent snapshot of the patient's [facility] medical record.  For current drug dose and complete information and questions, call [phone] or In Basket pool, fv home infusion ([id number])  CSN Number:  [id number]

## 2022-07-28 NOTE — PROGRESS NOTES
This is a recent snapshot of the patient's [facility] medical record.  For current drug dose and complete information and questions, call [phone] or In Basket pool, fv home infusion ([id number])  CSN Number:  [id number]

## 2022-08-04 NOTE — PROGRESS NOTES
This is a recent snapshot of the patient's [facility] medical record.  For current drug dose and complete information and questions, call [phone] or In Basket pool, fv home infusion ([id number])  CSN Number:  [id number]

## 2022-08-23 NOTE — PROGRESS NOTES
This is a recent snapshot of the patient's [facility] medical record.  For current drug dose and complete information and questions, call [phone] or In [facility] pool, fv home infusion ([id number])  CSN Number:  [id number]

## 2022-09-01 NOTE — PROGRESS NOTES
This is a recent snapshot of the patient's [facility] medical record.  For current drug dose and complete information and questions, call [phone] or In Basket pool, fv home infusion ([id number])  CSN Number:  [id number]

## 2022-09-23 NOTE — PROGRESS NOTES
This is a recent snapshot of the patient's [facility] medical record.  For current drug dose and complete information and questions, call [phone] or In Basket pool, fv home infusion ([id number])  CSN Number:  [id number]

## 2022-11-10 NOTE — PROGRESS NOTES
This is a recent snapshot of the patient's [facility] medical record.  For current drug dose and complete information and questions, call [phone] or In Basket pool, fv home infusion ([id number])  CSN Number:  [id number]

## 2022-11-25 NOTE — TELEPHONE ENCOUNTER
Jacqueline Sales at bedside at [phone]. ROSY Earl at bedside at [phone]    Assisted pt to sitting up on bedside at 1650. Timeout completed at [phone] with MD, ROSY and myself at bedside. Test dose given at 1701. Negative reaction. Dose given at 1705. Pt assisted to lying back in left tilt position. See anesthesia record for details. See vital sign flow sheet for BP. Tolerated procedure well. Patient needs ambien refilled.

## 2023-01-18 NOTE — TELEPHONE ENCOUNTER
Patient Information     Patient Name MRN Sylvie Jacobson [id number] Female 1934      Telephone Encounter by Mirella Aly NP at 7/3/2017  8:50 AM     Author:  Mirella Aly NP Service:  (none) Author Type:  PHYS- Nurse Practitioner     Filed:  7/3/2017  8:51 AM Encounter Date:  7/3/2017 Status:  Signed     :  Mirella Aly NP (PHYS- Nurse Practitioner)            Yes, I would like to see her next week for a hospital follow-up appointment         See attached patient care instructions.

## 2023-01-28 NOTE — TELEPHONE ENCOUNTER
Daughter Roopa states pt fell because she is dizzy. States she believes pt is very dehydrated, as she has been in the past, because pt cannot swallow well enough to take in any significant amount of fluid or food. Can take sips of fluid only and then will spit this fluid back up. Apparently the swallowing problem has been present for many months but no feeding tube has been placed, etc. Pt wants to go to ED but not by 911.However, dtr does not think she can safely get pt into the car to take her there as pt is so weak and lightheaded. Advised call 911 now.     Reason for Disposition    [1] Fainted > 15 minutes ago AND [2] still feels too weak or dizzy to stand    Additional Information    Negative: Severe difficulty breathing (e.g., struggling for each breath, speaks in single words)    Negative: [1] Difficulty breathing or swallowing AND [2] started suddenly after medicine, an allergic food or bee sting    Negative: Shock suspected (e.g., cold/pale/clammy skin, too weak to stand, low BP, rapid pulse)    Negative: Difficult to awaken or acting confused (e.g., disoriented, slurred speech)    Negative: [1] Weakness (i.e., paralysis, loss of muscle strength) of the face, arm or leg on one side of the body AND [2] sudden onset AND [3] present now    Negative: [1] Numbness (i.e., loss of sensation) of the face, arm or leg on one side of the body AND [2] sudden onset AND [3] present now    Negative: [1] Loss of speech or garbled speech AND [2] sudden onset AND [3] present now    Negative: Overdose (accidental or intentional) of medications    Protocols used: DIZZINESS - [id number]       Ambulatory

## 2024-05-14 NOTE — TELEPHONE ENCOUNTER
rx printed and in outbox ready to be faxed.   Render Note In Bullet Format When Appropriate: No Render Post-Care Instructions In Note?: yes Number Of Freeze-Thaw Cycles: 2 freeze-thaw cycles Duration Of Freeze Thaw-Cycle (Seconds): 0 Post-Care Instructions: I reviewed with the patient in detail post-care instructions. \\nPatient is to wear sun protection, avoid picking at any of the treated lesions. Patient instructed to apply Vaseline, Aquaphor or CeraVe Healing Ointment to crusted or scabbing areas until healed. Consent: The patient's consent was obtained including but not limited to risks of crusting, scabbing, blistering, scarring, darker or lighter pigmentary change, recurrence, incomplete removal and infection. Detail Level: Detailed

## 2025-01-12 NOTE — DISCHARGE INSTRUCTIONS
1) recommend follow-up with your primary to discuss further work-up for your anemia this may be diet related as well.  2) the ENT doc recommended that you take your Protonix twice daily, and if that should you are not having improvement I would follow-up with him as he does have other recommendations to treat your swallowing disorder.  3) do the exercises were recommended for your swallowing as this may help significantly with getting it back to normal.  4) continue taking the Ensure but may be increase it to twice daily if possible.   Monitor blood count

## (undated) DEVICE — ENDO BITE BLOCK 60 MAXI LF 00712804

## (undated) DEVICE — TUBING SUCTION 10'X3/16" N510

## (undated) DEVICE — TUBE GASTROSTOMY KIT 20FR

## (undated) DEVICE — GLOVE PROTEXIS POWDER FREE SMT 7.5  2D72PT75X

## (undated) DEVICE — GLOVE BIOGEL INDICATOR 7.5 LF 41675

## (undated) DEVICE — SUCTION MANIFOLD NEPTUNE 2 SYS 4 PORT 0702-020-000

## (undated) DEVICE — SOL WATER 1500ML

## (undated) DEVICE — SYR INFLATOR AND GAUGE 60ML M00550601

## (undated) DEVICE — SYR 50ML LL W/O NDL 309653

## (undated) DEVICE — Device

## (undated) DEVICE — ENDO CATH ESOPH/PYL/COLONIC BALLOON 18-19-20MMX240CM CRE

## (undated) DEVICE — ENDO FORCEP ENDOJAW BIOPSY 2.8MMX230CM FB-220U

## (undated) DEVICE — ENDO KIT COMPLIANCE DYKENDOCMPLY

## (undated) RX ORDER — MAGNESIUM OXIDE 400 MG/1
TABLET ORAL
Status: DISPENSED
Start: 2021-06-22

## (undated) RX ORDER — TRIAMCINOLONE ACETONIDE 40 MG/ML
INJECTION, SUSPENSION INTRA-ARTICULAR; INTRAMUSCULAR
Status: DISPENSED
Start: 2018-09-06

## (undated) RX ORDER — TRIAMCINOLONE ACETONIDE 40 MG/ML
INJECTION, SUSPENSION INTRA-ARTICULAR; INTRAMUSCULAR
Status: DISPENSED
Start: 2021-04-07

## (undated) RX ORDER — LIDOCAINE HYDROCHLORIDE 10 MG/ML
INJECTION, SOLUTION EPIDURAL; INFILTRATION; INTRACAUDAL; PERINEURAL
Status: DISPENSED
Start: 2020-10-27

## (undated) RX ORDER — METHYLPREDNISOLONE ACETATE 80 MG/ML
INJECTION, SUSPENSION INTRA-ARTICULAR; INTRALESIONAL; INTRAMUSCULAR; SOFT TISSUE
Status: DISPENSED
Start: 2019-12-26

## (undated) RX ORDER — LIDOCAINE HYDROCHLORIDE 10 MG/ML
INJECTION, SOLUTION EPIDURAL; INFILTRATION; INTRACAUDAL; PERINEURAL
Status: DISPENSED
Start: 2020-05-05

## (undated) RX ORDER — FENTANYL CITRATE 50 UG/ML
INJECTION, SOLUTION INTRAMUSCULAR; INTRAVENOUS
Status: DISPENSED
Start: 2022-01-01

## (undated) RX ORDER — SODIUM CHLORIDE 9 MG/ML
INJECTION, SOLUTION INTRAVENOUS
Status: DISPENSED
Start: 2021-06-22

## (undated) RX ORDER — SODIUM CHLORIDE 9 MG/ML
INJECTION, SOLUTION INTRAVENOUS
Status: DISPENSED
Start: 2022-01-01

## (undated) RX ORDER — BUPIVACAINE HYDROCHLORIDE 2.5 MG/ML
INJECTION, SOLUTION EPIDURAL; INFILTRATION; INTRACAUDAL
Status: DISPENSED
Start: 2022-01-01

## (undated) RX ORDER — TRIAMCINOLONE ACETONIDE 40 MG/ML
INJECTION, SUSPENSION INTRA-ARTICULAR; INTRAMUSCULAR
Status: DISPENSED
Start: 2020-10-27

## (undated) RX ORDER — TRIAMCINOLONE ACETONIDE 40 MG/ML
INJECTION, SUSPENSION INTRA-ARTICULAR; INTRAMUSCULAR
Status: DISPENSED
Start: 2018-05-03

## (undated) RX ORDER — HYDROMORPHONE HCL IN WATER/PF 6 MG/30 ML
PATIENT CONTROLLED ANALGESIA SYRINGE INTRAVENOUS
Status: DISPENSED
Start: 2022-01-01

## (undated) RX ORDER — PROPOFOL 10 MG/ML
INJECTION, EMULSION INTRAVENOUS
Status: DISPENSED
Start: 2022-01-01

## (undated) RX ORDER — PROPOFOL 10 MG/ML
INJECTION, EMULSION INTRAVENOUS
Status: DISPENSED
Start: 2018-06-07

## (undated) RX ORDER — LIDOCAINE HYDROCHLORIDE 10 MG/ML
INJECTION, SOLUTION INFILTRATION; PERINEURAL
Status: DISPENSED
Start: 2018-05-03

## (undated) RX ORDER — LIDOCAINE HYDROCHLORIDE 10 MG/ML
INJECTION, SOLUTION INFILTRATION; PERINEURAL
Status: DISPENSED
Start: 2019-12-26

## (undated) RX ORDER — LIDOCAINE HYDROCHLORIDE 10 MG/ML
INJECTION, SOLUTION EPIDURAL; INFILTRATION; INTRACAUDAL; PERINEURAL
Status: DISPENSED
Start: 2019-12-26

## (undated) RX ORDER — LIDOCAINE HYDROCHLORIDE 10 MG/ML
INJECTION, SOLUTION EPIDURAL; INFILTRATION; INTRACAUDAL; PERINEURAL
Status: DISPENSED
Start: 2021-04-07

## (undated) RX ORDER — METHYLPREDNISOLONE ACETATE 80 MG/ML
INJECTION, SUSPENSION INTRA-ARTICULAR; INTRALESIONAL; INTRAMUSCULAR; SOFT TISSUE
Status: DISPENSED
Start: 2019-08-21

## (undated) RX ORDER — LIDOCAINE HYDROCHLORIDE 20 MG/ML
INJECTION, SOLUTION EPIDURAL; INFILTRATION; INTRACAUDAL; PERINEURAL
Status: DISPENSED
Start: 2018-06-07

## (undated) RX ORDER — MAGNESIUM SULFATE HEPTAHYDRATE 40 MG/ML
INJECTION, SOLUTION INTRAVENOUS
Status: DISPENSED
Start: 2022-01-01

## (undated) RX ORDER — LIDOCAINE HYDROCHLORIDE 10 MG/ML
INJECTION, SOLUTION EPIDURAL; INFILTRATION; INTRACAUDAL; PERINEURAL
Status: DISPENSED
Start: 2019-08-21

## (undated) RX ORDER — LIDOCAINE HYDROCHLORIDE 10 MG/ML
INJECTION, SOLUTION INFILTRATION; PERINEURAL
Status: DISPENSED
Start: 2019-08-21

## (undated) RX ORDER — TRIAMCINOLONE ACETONIDE 40 MG/ML
INJECTION, SUSPENSION INTRA-ARTICULAR; INTRAMUSCULAR
Status: DISPENSED
Start: 2020-05-05